# Patient Record
Sex: FEMALE | Race: WHITE | NOT HISPANIC OR LATINO | Employment: OTHER | URBAN - METROPOLITAN AREA
[De-identification: names, ages, dates, MRNs, and addresses within clinical notes are randomized per-mention and may not be internally consistent; named-entity substitution may affect disease eponyms.]

---

## 2017-01-03 ENCOUNTER — GENERIC CONVERSION - ENCOUNTER (OUTPATIENT)
Dept: OTHER | Facility: OTHER | Age: 52
End: 2017-01-03

## 2017-01-16 ENCOUNTER — GENERIC CONVERSION - ENCOUNTER (OUTPATIENT)
Dept: OTHER | Facility: OTHER | Age: 52
End: 2017-01-16

## 2017-01-16 DIAGNOSIS — R93.2 ABNORMAL FINDINGS ON DIAGNOSTIC IMAGING OF LIVER AND BILIARY TRACT: ICD-10-CM

## 2017-01-16 DIAGNOSIS — D69.3 IMMUNE THROMBOCYTOPENIC PURPURA (HCC): ICD-10-CM

## 2017-01-16 DIAGNOSIS — R79.9 ABNORMAL FINDING OF BLOOD CHEMISTRY: ICD-10-CM

## 2017-01-16 DIAGNOSIS — E78.5 HYPERLIPIDEMIA: ICD-10-CM

## 2017-01-16 DIAGNOSIS — F41.1 GENERALIZED ANXIETY DISORDER: ICD-10-CM

## 2017-01-16 DIAGNOSIS — F31.81 BIPOLAR II DISORDER (HCC): ICD-10-CM

## 2017-02-13 ENCOUNTER — ALLSCRIPTS OFFICE VISIT (OUTPATIENT)
Dept: OTHER | Facility: OTHER | Age: 52
End: 2017-02-13

## 2017-02-15 ENCOUNTER — GENERIC CONVERSION - ENCOUNTER (OUTPATIENT)
Dept: OTHER | Facility: OTHER | Age: 52
End: 2017-02-15

## 2017-03-08 ENCOUNTER — GENERIC CONVERSION - ENCOUNTER (OUTPATIENT)
Dept: OTHER | Facility: OTHER | Age: 52
End: 2017-03-08

## 2017-03-09 ENCOUNTER — GENERIC CONVERSION - ENCOUNTER (OUTPATIENT)
Dept: OTHER | Facility: OTHER | Age: 52
End: 2017-03-09

## 2017-03-20 ENCOUNTER — GENERIC CONVERSION - ENCOUNTER (OUTPATIENT)
Dept: OTHER | Facility: OTHER | Age: 52
End: 2017-03-20

## 2017-03-20 ENCOUNTER — ALLSCRIPTS OFFICE VISIT (OUTPATIENT)
Dept: PSYCHOLOGY | Facility: CLINIC | Age: 52
End: 2017-03-20
Payer: COMMERCIAL

## 2017-03-20 PROCEDURE — G0410 GRP PSYCH PARTIAL HOSP 45-50: HCPCS | Performed by: PSYCHIATRY & NEUROLOGY

## 2017-03-20 PROCEDURE — S0201 PARTIAL HOSPITALIZATION SERV: HCPCS | Performed by: PSYCHIATRY & NEUROLOGY

## 2017-03-20 PROCEDURE — 90791 PSYCH DIAGNOSTIC EVALUATION: CPT | Performed by: PSYCHIATRY & NEUROLOGY

## 2017-03-20 PROCEDURE — G0176 OPPS/PHP;ACTIVITY THERAPY: HCPCS | Performed by: PSYCHIATRY & NEUROLOGY

## 2017-03-20 PROCEDURE — G0177 OPPS/PHP; TRAIN & EDUC SERV: HCPCS | Performed by: PSYCHIATRY & NEUROLOGY

## 2017-03-21 ENCOUNTER — GENERIC CONVERSION - ENCOUNTER (OUTPATIENT)
Dept: OTHER | Facility: OTHER | Age: 52
End: 2017-03-21

## 2017-03-21 ENCOUNTER — APPOINTMENT (OUTPATIENT)
Dept: PSYCHOLOGY | Facility: CLINIC | Age: 52
End: 2017-03-21
Payer: COMMERCIAL

## 2017-03-21 PROCEDURE — S0201 PARTIAL HOSPITALIZATION SERV: HCPCS | Performed by: PSYCHIATRY & NEUROLOGY

## 2017-03-21 PROCEDURE — G0177 OPPS/PHP; TRAIN & EDUC SERV: HCPCS | Performed by: PSYCHIATRY & NEUROLOGY

## 2017-03-21 PROCEDURE — G0176 OPPS/PHP;ACTIVITY THERAPY: HCPCS | Performed by: PSYCHIATRY & NEUROLOGY

## 2017-03-21 PROCEDURE — G0410 GRP PSYCH PARTIAL HOSP 45-50: HCPCS | Performed by: PSYCHIATRY & NEUROLOGY

## 2017-03-22 ENCOUNTER — APPOINTMENT (OUTPATIENT)
Dept: PSYCHOLOGY | Facility: CLINIC | Age: 52
End: 2017-03-22
Payer: COMMERCIAL

## 2017-03-22 ENCOUNTER — GENERIC CONVERSION - ENCOUNTER (OUTPATIENT)
Dept: OTHER | Facility: OTHER | Age: 52
End: 2017-03-22

## 2017-03-22 PROCEDURE — G0177 OPPS/PHP; TRAIN & EDUC SERV: HCPCS | Performed by: PSYCHIATRY & NEUROLOGY

## 2017-03-22 PROCEDURE — G0410 GRP PSYCH PARTIAL HOSP 45-50: HCPCS | Performed by: PSYCHIATRY & NEUROLOGY

## 2017-03-22 PROCEDURE — G0176 OPPS/PHP;ACTIVITY THERAPY: HCPCS | Performed by: PSYCHIATRY & NEUROLOGY

## 2017-03-22 PROCEDURE — S0201 PARTIAL HOSPITALIZATION SERV: HCPCS | Performed by: PSYCHIATRY & NEUROLOGY

## 2017-03-23 ENCOUNTER — APPOINTMENT (OUTPATIENT)
Dept: PSYCHOLOGY | Facility: CLINIC | Age: 52
End: 2017-03-23
Payer: COMMERCIAL

## 2017-03-23 ENCOUNTER — GENERIC CONVERSION - ENCOUNTER (OUTPATIENT)
Dept: OTHER | Facility: OTHER | Age: 52
End: 2017-03-23

## 2017-03-23 PROCEDURE — G0176 OPPS/PHP;ACTIVITY THERAPY: HCPCS | Performed by: PSYCHIATRY & NEUROLOGY

## 2017-03-23 PROCEDURE — S0201 PARTIAL HOSPITALIZATION SERV: HCPCS | Performed by: PSYCHIATRY & NEUROLOGY

## 2017-03-23 PROCEDURE — G0410 GRP PSYCH PARTIAL HOSP 45-50: HCPCS | Performed by: PSYCHIATRY & NEUROLOGY

## 2017-03-23 PROCEDURE — G0177 OPPS/PHP; TRAIN & EDUC SERV: HCPCS | Performed by: PSYCHIATRY & NEUROLOGY

## 2017-03-24 ENCOUNTER — GENERIC CONVERSION - ENCOUNTER (OUTPATIENT)
Dept: OTHER | Facility: OTHER | Age: 52
End: 2017-03-24

## 2017-03-24 ENCOUNTER — APPOINTMENT (OUTPATIENT)
Dept: PSYCHOLOGY | Facility: CLINIC | Age: 52
End: 2017-03-24
Payer: COMMERCIAL

## 2017-03-24 PROCEDURE — G0177 OPPS/PHP; TRAIN & EDUC SERV: HCPCS | Performed by: PSYCHIATRY & NEUROLOGY

## 2017-03-24 PROCEDURE — G0176 OPPS/PHP;ACTIVITY THERAPY: HCPCS | Performed by: PSYCHIATRY & NEUROLOGY

## 2017-03-24 PROCEDURE — G0410 GRP PSYCH PARTIAL HOSP 45-50: HCPCS | Performed by: PSYCHIATRY & NEUROLOGY

## 2017-03-24 PROCEDURE — S0201 PARTIAL HOSPITALIZATION SERV: HCPCS | Performed by: PSYCHIATRY & NEUROLOGY

## 2017-03-27 ENCOUNTER — GENERIC CONVERSION - ENCOUNTER (OUTPATIENT)
Dept: OTHER | Facility: OTHER | Age: 52
End: 2017-03-27

## 2017-03-27 ENCOUNTER — APPOINTMENT (OUTPATIENT)
Dept: PSYCHOLOGY | Facility: CLINIC | Age: 52
End: 2017-03-27
Payer: COMMERCIAL

## 2017-03-27 PROCEDURE — S0201 PARTIAL HOSPITALIZATION SERV: HCPCS | Performed by: PSYCHIATRY & NEUROLOGY

## 2017-03-27 PROCEDURE — G0177 OPPS/PHP; TRAIN & EDUC SERV: HCPCS | Performed by: PSYCHIATRY & NEUROLOGY

## 2017-03-27 PROCEDURE — G0410 GRP PSYCH PARTIAL HOSP 45-50: HCPCS | Performed by: PSYCHIATRY & NEUROLOGY

## 2017-03-28 ENCOUNTER — APPOINTMENT (OUTPATIENT)
Dept: PSYCHOLOGY | Facility: CLINIC | Age: 52
End: 2017-03-28
Payer: COMMERCIAL

## 2017-03-28 ENCOUNTER — GENERIC CONVERSION - ENCOUNTER (OUTPATIENT)
Dept: OTHER | Facility: OTHER | Age: 52
End: 2017-03-28

## 2017-03-28 PROCEDURE — G0177 OPPS/PHP; TRAIN & EDUC SERV: HCPCS | Performed by: PSYCHIATRY & NEUROLOGY

## 2017-03-28 PROCEDURE — G0176 OPPS/PHP;ACTIVITY THERAPY: HCPCS | Performed by: PSYCHIATRY & NEUROLOGY

## 2017-03-28 PROCEDURE — S0201 PARTIAL HOSPITALIZATION SERV: HCPCS | Performed by: PSYCHIATRY & NEUROLOGY

## 2017-03-28 PROCEDURE — G0410 GRP PSYCH PARTIAL HOSP 45-50: HCPCS | Performed by: PSYCHIATRY & NEUROLOGY

## 2017-03-29 ENCOUNTER — APPOINTMENT (OUTPATIENT)
Dept: PSYCHOLOGY | Facility: CLINIC | Age: 52
End: 2017-03-29
Payer: COMMERCIAL

## 2017-03-29 ENCOUNTER — GENERIC CONVERSION - ENCOUNTER (OUTPATIENT)
Dept: OTHER | Facility: OTHER | Age: 52
End: 2017-03-29

## 2017-03-29 PROCEDURE — S0201 PARTIAL HOSPITALIZATION SERV: HCPCS | Performed by: PSYCHIATRY & NEUROLOGY

## 2017-03-29 PROCEDURE — G0176 OPPS/PHP;ACTIVITY THERAPY: HCPCS | Performed by: PSYCHIATRY & NEUROLOGY

## 2017-03-29 PROCEDURE — G0177 OPPS/PHP; TRAIN & EDUC SERV: HCPCS | Performed by: PSYCHIATRY & NEUROLOGY

## 2017-03-29 PROCEDURE — G0410 GRP PSYCH PARTIAL HOSP 45-50: HCPCS | Performed by: PSYCHIATRY & NEUROLOGY

## 2017-03-30 ENCOUNTER — APPOINTMENT (OUTPATIENT)
Dept: PSYCHOLOGY | Facility: CLINIC | Age: 52
End: 2017-03-30
Payer: COMMERCIAL

## 2017-03-30 ENCOUNTER — GENERIC CONVERSION - ENCOUNTER (OUTPATIENT)
Dept: OTHER | Facility: OTHER | Age: 52
End: 2017-03-30

## 2017-03-30 PROCEDURE — G0176 OPPS/PHP;ACTIVITY THERAPY: HCPCS | Performed by: PSYCHIATRY & NEUROLOGY

## 2017-03-30 PROCEDURE — S0201 PARTIAL HOSPITALIZATION SERV: HCPCS | Performed by: PSYCHIATRY & NEUROLOGY

## 2017-03-30 PROCEDURE — G0177 OPPS/PHP; TRAIN & EDUC SERV: HCPCS | Performed by: PSYCHIATRY & NEUROLOGY

## 2017-03-30 PROCEDURE — G0410 GRP PSYCH PARTIAL HOSP 45-50: HCPCS | Performed by: PSYCHIATRY & NEUROLOGY

## 2017-04-21 ENCOUNTER — ALLSCRIPTS OFFICE VISIT (OUTPATIENT)
Dept: OTHER | Facility: OTHER | Age: 52
End: 2017-04-21

## 2017-05-01 ENCOUNTER — ALLSCRIPTS OFFICE VISIT (OUTPATIENT)
Dept: OTHER | Facility: OTHER | Age: 52
End: 2017-05-01

## 2017-05-03 ENCOUNTER — GENERIC CONVERSION - ENCOUNTER (OUTPATIENT)
Dept: OTHER | Facility: OTHER | Age: 52
End: 2017-05-03

## 2017-05-03 ENCOUNTER — LAB CONVERSION - ENCOUNTER (OUTPATIENT)
Dept: OTHER | Facility: OTHER | Age: 52
End: 2017-05-03

## 2017-05-03 LAB
A/G RATIO (HISTORICAL): 1.6 (CALC) (ref 1–2.5)
ALBUMIN SERPL BCP-MCNC: 3.9 G/DL (ref 3.6–5.1)
ALP SERPL-CCNC: 65 U/L (ref 33–130)
ALT SERPL W P-5'-P-CCNC: 12 U/L (ref 6–29)
AST SERPL W P-5'-P-CCNC: 12 U/L (ref 10–35)
BASOPHILS # BLD AUTO: 0.6 %
BASOPHILS # BLD AUTO: 38 CELLS/UL (ref 0–200)
BILIRUB SERPL-MCNC: 0.3 MG/DL (ref 0.2–1.2)
BUN SERPL-MCNC: 15 MG/DL (ref 7–25)
BUN/CREA RATIO (HISTORICAL): NORMAL (CALC) (ref 6–22)
CALCIUM SERPL-MCNC: 9.3 MG/DL (ref 8.6–10.4)
CHLORIDE SERPL-SCNC: 107 MMOL/L (ref 98–110)
CHOLEST SERPL-MCNC: 227 MG/DL (ref 125–200)
CHOLEST/HDLC SERPL: 3.6 (CALC)
CO2 SERPL-SCNC: 29 MMOL/L (ref 20–31)
CREAT SERPL-MCNC: 0.69 MG/DL (ref 0.5–1.05)
DEPRECATED RDW RBC AUTO: 14.1 % (ref 11–15)
EGFR AFRICAN AMERICAN (HISTORICAL): 115 ML/MIN/1.73M2
EGFR-AMERICAN CALC (HISTORICAL): 99 ML/MIN/1.73M2
EOSINOPHIL # BLD AUTO: 1.9 %
EOSINOPHIL # BLD AUTO: 120 CELLS/UL (ref 15–500)
GAMMA GLOBULIN (HISTORICAL): 2.5 G/DL (CALC) (ref 1.9–3.7)
GLUCOSE (HISTORICAL): 92 MG/DL (ref 65–99)
HCT VFR BLD AUTO: 41.5 % (ref 35–45)
HDLC SERPL-MCNC: 63 MG/DL
HGB BLD-MCNC: 13.6 G/DL (ref 11.7–15.5)
LDL CHOLESTEROL (HISTORICAL): 149 MG/DL (CALC)
LYMPHOCYTES # BLD AUTO: 1909 CELLS/UL (ref 850–3900)
LYMPHOCYTES # BLD AUTO: 30.3 %
MCH RBC QN AUTO: 29.5 PG (ref 27–33)
MCHC RBC AUTO-ENTMCNC: 32.9 G/DL (ref 32–36)
MCV RBC AUTO: 89.6 FL (ref 80–100)
MONOCYTES # BLD AUTO: 510 CELLS/UL (ref 200–950)
MONOCYTES (HISTORICAL): 8.1 %
NEUTROPHILS # BLD AUTO: 3723 CELLS/UL (ref 1500–7800)
NEUTROPHILS # BLD AUTO: 59.1 %
NON-HDL-CHOL (CHOL-HDL) (HISTORICAL): 164 MG/DL (CALC)
PLATELET # BLD AUTO: 89 THOUSAND/UL (ref 140–400)
PMV BLD AUTO: 12.5 FL (ref 7.5–12.5)
POTASSIUM SERPL-SCNC: 4 MMOL/L (ref 3.5–5.3)
RBC # BLD AUTO: 4.63 MILLION/UL (ref 3.8–5.1)
RBC MORPHOLOGY (HISTORICAL): ABNORMAL
SODIUM SERPL-SCNC: 140 MMOL/L (ref 135–146)
TOTAL PROTEIN (HISTORICAL): 6.4 G/DL (ref 6.1–8.1)
TRIGL SERPL-MCNC: 77 MG/DL
WBC # BLD AUTO: 6.3 THOUSAND/UL (ref 3.8–10.8)

## 2017-05-08 ENCOUNTER — ALLSCRIPTS OFFICE VISIT (OUTPATIENT)
Dept: OTHER | Facility: OTHER | Age: 52
End: 2017-05-08

## 2017-05-08 DIAGNOSIS — R10.31 RIGHT LOWER QUADRANT PAIN: ICD-10-CM

## 2017-05-09 ENCOUNTER — ALLSCRIPTS OFFICE VISIT (OUTPATIENT)
Dept: OTHER | Facility: OTHER | Age: 52
End: 2017-05-09

## 2017-05-10 ENCOUNTER — HOSPITAL ENCOUNTER (OUTPATIENT)
Dept: ULTRASOUND IMAGING | Facility: HOSPITAL | Age: 52
Discharge: HOME | End: 2017-05-10
Payer: COMMERCIAL

## 2017-05-10 DIAGNOSIS — R10.31 RIGHT LOWER QUADRANT PAIN: ICD-10-CM

## 2017-05-10 PROCEDURE — 76830 TRANSVAGINAL US NON-OB: CPT

## 2017-05-10 PROCEDURE — 76856 US EXAM PELVIC COMPLETE: CPT

## 2017-05-15 ENCOUNTER — ALLSCRIPTS OFFICE VISIT (OUTPATIENT)
Dept: OTHER | Facility: OTHER | Age: 52
End: 2017-05-15

## 2017-05-16 ENCOUNTER — GENERIC CONVERSION - ENCOUNTER (OUTPATIENT)
Dept: OTHER | Facility: OTHER | Age: 52
End: 2017-05-16

## 2017-05-22 ENCOUNTER — ALLSCRIPTS OFFICE VISIT (OUTPATIENT)
Dept: OTHER | Facility: OTHER | Age: 52
End: 2017-05-22

## 2017-06-05 ENCOUNTER — ALLSCRIPTS OFFICE VISIT (OUTPATIENT)
Dept: OTHER | Facility: OTHER | Age: 52
End: 2017-06-05

## 2017-06-07 ENCOUNTER — APPOINTMENT (EMERGENCY)
Dept: RADIOLOGY | Facility: HOSPITAL | Age: 52
End: 2017-06-07
Payer: COMMERCIAL

## 2017-06-07 ENCOUNTER — HOSPITAL ENCOUNTER (EMERGENCY)
Facility: HOSPITAL | Age: 52
Discharge: HOME | End: 2017-06-07
Attending: EMERGENCY MEDICINE | Admitting: EMERGENCY MEDICINE
Payer: COMMERCIAL

## 2017-06-07 VITALS
OXYGEN SATURATION: 97 % | SYSTOLIC BLOOD PRESSURE: 129 MMHG | TEMPERATURE: 98.4 F | DIASTOLIC BLOOD PRESSURE: 66 MMHG | WEIGHT: 227.51 LBS | BODY MASS INDEX: 34.59 KG/M2 | RESPIRATION RATE: 16 BRPM | HEART RATE: 88 BPM

## 2017-06-07 DIAGNOSIS — M25.562 LEFT LATERAL KNEE PAIN: Primary | ICD-10-CM

## 2017-06-07 PROCEDURE — 99283 EMERGENCY DEPT VISIT LOW MDM: CPT

## 2017-06-07 PROCEDURE — 73564 X-RAY EXAM KNEE 4 OR MORE: CPT

## 2017-06-07 RX ORDER — TRAMADOL HYDROCHLORIDE 50 MG/1
50 TABLET ORAL EVERY 6 HOURS PRN
COMMUNITY
End: 2019-02-14 | Stop reason: ALTCHOICE

## 2017-06-07 RX ORDER — IBUPROFEN 400 MG/1
400 TABLET ORAL ONCE
Status: COMPLETED | OUTPATIENT
Start: 2017-06-07 | End: 2017-06-07

## 2017-06-07 RX ADMIN — IBUPROFEN 400 MG: 400 TABLET ORAL at 10:17

## 2017-06-14 ENCOUNTER — ALLSCRIPTS OFFICE VISIT (OUTPATIENT)
Dept: OTHER | Facility: OTHER | Age: 52
End: 2017-06-14

## 2017-06-14 DIAGNOSIS — F41.1 GENERALIZED ANXIETY DISORDER: ICD-10-CM

## 2017-06-14 DIAGNOSIS — D69.3 IMMUNE THROMBOCYTOPENIC PURPURA (HCC): ICD-10-CM

## 2017-06-14 DIAGNOSIS — F31.81 BIPOLAR II DISORDER (HCC): ICD-10-CM

## 2017-06-14 DIAGNOSIS — R79.9 ABNORMAL FINDING OF BLOOD CHEMISTRY: ICD-10-CM

## 2017-06-19 ENCOUNTER — ALLSCRIPTS OFFICE VISIT (OUTPATIENT)
Dept: OTHER | Facility: OTHER | Age: 52
End: 2017-06-19

## 2017-06-22 ENCOUNTER — GENERIC CONVERSION - ENCOUNTER (OUTPATIENT)
Dept: OTHER | Facility: OTHER | Age: 52
End: 2017-06-22

## 2017-06-29 ENCOUNTER — GENERIC CONVERSION - ENCOUNTER (OUTPATIENT)
Dept: OTHER | Facility: OTHER | Age: 52
End: 2017-06-29

## 2017-07-03 ENCOUNTER — ALLSCRIPTS OFFICE VISIT (OUTPATIENT)
Dept: OTHER | Facility: OTHER | Age: 52
End: 2017-07-03

## 2017-07-17 ENCOUNTER — GENERIC CONVERSION - ENCOUNTER (OUTPATIENT)
Dept: OTHER | Facility: OTHER | Age: 52
End: 2017-07-17

## 2017-07-21 ENCOUNTER — ALLSCRIPTS OFFICE VISIT (OUTPATIENT)
Dept: OTHER | Facility: OTHER | Age: 52
End: 2017-07-21

## 2017-07-24 ENCOUNTER — ALLSCRIPTS OFFICE VISIT (OUTPATIENT)
Dept: OTHER | Facility: OTHER | Age: 52
End: 2017-07-24

## 2017-07-25 ENCOUNTER — GENERIC CONVERSION - ENCOUNTER (OUTPATIENT)
Dept: OTHER | Facility: OTHER | Age: 52
End: 2017-07-25

## 2017-07-31 ENCOUNTER — ALLSCRIPTS OFFICE VISIT (OUTPATIENT)
Dept: OTHER | Facility: OTHER | Age: 52
End: 2017-07-31

## 2017-08-14 ENCOUNTER — ALLSCRIPTS OFFICE VISIT (OUTPATIENT)
Dept: OTHER | Facility: OTHER | Age: 52
End: 2017-08-14

## 2017-08-25 ENCOUNTER — ALLSCRIPTS OFFICE VISIT (OUTPATIENT)
Dept: OTHER | Facility: OTHER | Age: 52
End: 2017-08-25

## 2017-08-28 ENCOUNTER — GENERIC CONVERSION - ENCOUNTER (OUTPATIENT)
Dept: OTHER | Facility: OTHER | Age: 52
End: 2017-08-28

## 2017-09-18 ENCOUNTER — ALLSCRIPTS OFFICE VISIT (OUTPATIENT)
Dept: OTHER | Facility: OTHER | Age: 52
End: 2017-09-18

## 2017-09-27 ENCOUNTER — ALLSCRIPTS OFFICE VISIT (OUTPATIENT)
Dept: OTHER | Facility: OTHER | Age: 52
End: 2017-09-27

## 2017-10-05 ENCOUNTER — GENERIC CONVERSION - ENCOUNTER (OUTPATIENT)
Dept: OTHER | Facility: OTHER | Age: 52
End: 2017-10-05

## 2017-10-09 ENCOUNTER — GENERIC CONVERSION - ENCOUNTER (OUTPATIENT)
Dept: OTHER | Facility: OTHER | Age: 52
End: 2017-10-09

## 2017-10-10 ENCOUNTER — ALLSCRIPTS OFFICE VISIT (OUTPATIENT)
Dept: OTHER | Facility: OTHER | Age: 52
End: 2017-10-10

## 2017-10-11 ENCOUNTER — ALLSCRIPTS OFFICE VISIT (OUTPATIENT)
Dept: OTHER | Facility: OTHER | Age: 52
End: 2017-10-11

## 2017-10-18 ENCOUNTER — GENERIC CONVERSION - ENCOUNTER (OUTPATIENT)
Dept: OTHER | Facility: OTHER | Age: 52
End: 2017-10-18

## 2017-10-23 ENCOUNTER — ALLSCRIPTS OFFICE VISIT (OUTPATIENT)
Dept: OTHER | Facility: OTHER | Age: 52
End: 2017-10-23

## 2017-10-24 ENCOUNTER — GENERIC CONVERSION - ENCOUNTER (OUTPATIENT)
Dept: OTHER | Facility: OTHER | Age: 52
End: 2017-10-24

## 2017-10-24 NOTE — PROGRESS NOTES
Assessment  1  Numbness and tingling of left side of face (782 0) (R20 0,R20 2)   2  Current smoker (305 1) (F17 200)   3  DREAD (generalized anxiety disorder) (300 02) (F41 1)   4  Perimenopausal symptoms (627 2) (N95 1)    Plan  Numbness and tingling of left side of face    · *1 - [facility]  *  Status: Active  Requested for: 25VQT9696  Care Summary provided  : Yes    Discussion/Summary    See neurology for tingling/altered tasteif worseningseeing [facility]     Chief Complaint  Patient presents today for numbness and tingling in lips and a pain in the left arm  History of Present Illness  HPI: patient is here for tingling and numbness of lips and left side of face  has neck pain radiating to the left armknee painnauseoussees a therapist  under a lot of stress  upper back pain, seeing a pain specialist at [facility]  of weeks of numbness of face, changes in taste      Review of Systems    Constitutional: no fever,-- not feeling poorly,-- no chills-- and-- not feeling tired  ENT: no earache,-- no nosebleeds,-- no hearing loss-- and-- no nasal discharge  Cardiovascular: the heart rate was not slow,-- no chest pain,-- the heart rate was not fast-- and-- no palpitations  Respiratory: no shortness of breath,-- no cough,-- no wheezing-- and-- no shortness of breath during exertion  Gastrointestinal: no abdominal pain,-- no nausea,-- no constipation-- and-- no diarrhea  Musculoskeletal: no arthralgias,-- no joint swelling-- and-- no joint stiffness  Integumentary: no rashes,-- no itching,-- no skin lesions-- and-- no skin wound  Neurological: no headache,-- no numbness,-- no confusion-- and-- no dizziness  Active Problems   1  Abnormal blood chemistry (790 6) (R79 9)   2  Abnormal finding on lung imaging (793 19) (R91 8)   3  Abnormal findings on diagnostic imaging of abdomen (793 6) (R93 5)   4  Acute pain of left knee (719 46) (M25 562)   5   Benign colon polyp (211  3) (K63 5)   6  Bunion (727 1) (M21 619)   7  Chronic idiopathic thrombocytopenic purpura (287 31) (D69 3)   8  Current smoker (305 1) (F17 200)   9  Herpes simplex type 1 infection (054 9) (B00 9)   10  History of tobacco use (V15 82) (Z87 891)   11  Hyperlipidemia (272 4) (E78 5)   12  Long term use of drug (V58 69) (Z79 899)   13  Lung nodule, solitary (793 11) (R91 1)   14  Mitral regurgitation (424 0) (I34 0)   15  Obesity (278 00) (E66 9)   16  Other specified menopausal and perimenopausal disorders (627 8) (N95 8)   17  Panic disorder without agoraphobia (300 01) (F41 0)   18  Perimenopausal symptoms (627 2) (N95 1)   19  Right lower quadrant pain (789 03) (R10 31)   20  History of Tobacco use (305 1) (Z72 0)   21  Urge and stress incontinence (788 33) (N39 46)    DREAD (generalized anxiety disorder) (300 02) (F41 1)       Bipolar II disorder (296 89) (F31 81)          Past Medical History  Active Problems And Past Medical History Reviewed: The active problems and past medical history were reviewed and updated today  Surgical History  Surgical History Reviewed: The surgical history was reviewed and updated today  Social History   · Denied: History of Alcohol use   · Once weekly  · Current smoker (305 1) (F17 200)   · Daily caffeine consumption   · Denied: History of Drug Use   · Graduated from high school   · History of tobacco use (V15 82) (Z87 891)   · History of Tobacco use (305 1) (Z72 0)  The social history was reviewed and updated today  The social history was reviewed and is unchanged  Family History  Family History Reviewed: The family history was reviewed and updated today  Current Meds   1  DULoxetine HCl - 30 MG Oral Capsule Delayed Release Particles; TAKE 1 CAPSULE IN   THE EVENING; Therapy: 08TMH5625 to (GWIAHWRK:34PFH7432)  Requested for: 05UBW6570; Last   Rx:[id number] Ordered   2   DULoxetine HCl - 60 MG Oral Capsule Delayed Release Particles; TAKE 1 CAPSULE DAILY; Therapy: 06EYW5809 to (Evaluate:10Nov2017)  Requested for: 93QEA5928; Last   Rx:12Oqm4423 Ordered   3  LamoTRIgine 100 MG Oral Tablet; TAKE 1 TABLET IN THE MORNING AND 1 AND 1/2   TABLETS AT BEDTIME; Therapy: 12DVD7113 to (Evaluate:82Ezb8719)  Requested for: 23FJE0834; Last   Rx:[id number] Ordered   4  LORazepam 0 5 MG Oral Tablet; TAKE 1 TABLET 4 TIMES DAILY AS NEEDED; Last   Rx:[id number] Ordered    The medication list was reviewed and updated today  Allergies  1  BusPIRone HCl TABS  2  No Known Environmental Allergies   3  No Known Food Allergies    Vitals   Recorded: 48XPY1514 02:21PM   Heart Rate 75   Respiration 16   Systolic 424   Diastolic 70   Height 5 ft 8 in   Weight 223 lb 0 2 oz   BMI Calculated 33 91   BSA Calculated 2 14   O2 Saturation 98     Physical Exam    Constitutional   General appearance: No acute distress, well appearing and well nourished  Eyes   Conjunctiva and lids: No swelling, erythema or discharge  Pupils and irises: Equal, round and reactive to light  Ears, Nose, Mouth, and Throat   External inspection of ears and nose: Normal     Otoscopic examination: Tympanic membranes translucent with normal light reflex  Canals patent without erythema  Nasal mucosa, septum, and turbinates: Normal without edema or erythema  Oropharynx: Normal with no erythema, edema, exudate or lesions  Pulmonary   Respiratory effort: No increased work of breathing or signs of respiratory distress  Auscultation of lungs: Clear to auscultation  Cardiovascular   Auscultation of heart: Normal rate and rhythm, normal S1 and S2, without murmurs  Examination of extremities for edema and/or varicosities: Normal     Abdomen   Abdomen: Non-tender, no masses  Liver and spleen: No hepatomegaly or splenomegaly  Lymphatic   Palpation of lymph nodes in neck: No lymphadenopathy  Musculoskeletal   Gait and station: Normal     Digits and nails: Normal without clubbing or cyanosis  Inspection/palpation of joints, bones, and muscles: Normal     Skin   Skin and subcutaneous tissue: Normal without rashes or lesions  Psychiatric   Orientation to person, place, and time: Normal     Mood and affect: Normal          Future Appointments    Date/Time Provider Specialty Site   01/05/2018 12:30 PM [facility]   11/29/2017 02:00 PM EMETERIO Khalil   Psychiatry [facility]   11/14/2017 02:00 PM Malachy Paddy, LCSW Psychiatry [facility]   11/30/2017 12:00 PM Malachy Paddy, LCSW Psychiatry [facility]   12/18/2017 10:00 AM Malachy Paddy, \[facility]\""W Psychiatry [facility]   01/04/2018 10:00 AM Malachy Paddy, LCSW Psychiatry [facility]   01/16/2018 11:00 AM Malachy Paddy, \[facility]\""W Psychiatry [facility]   12/20/2017 09:00 AM [facility] Bennett[facility] [address] [place] [address]     Signatures   Electronically signed by : Theodore Patricia; Oct 23 2017  2:36PM EST                       (Author)    Electronically signed by : Forest Horn DO; Oct 23 2017  7:25PM EST                       (Author)

## 2017-10-25 ENCOUNTER — ALLSCRIPTS OFFICE VISIT (OUTPATIENT)
Dept: OTHER | Facility: OTHER | Age: 52
End: 2017-10-25

## 2017-10-25 DIAGNOSIS — R20.2 PARESTHESIA OF SKIN: ICD-10-CM

## 2017-10-25 DIAGNOSIS — I34.0 NONRHEUMATIC MITRAL VALVE INSUFFICIENCY: ICD-10-CM

## 2017-10-25 DIAGNOSIS — R20.0 ANESTHESIA OF SKIN: ICD-10-CM

## 2017-10-26 ENCOUNTER — TRANSCRIBE ORDERS (OUTPATIENT)
Dept: ADMINISTRATIVE | Facility: HOSPITAL | Age: 52
End: 2017-10-26

## 2017-10-26 DIAGNOSIS — R20.0 NUMBNESS: Primary | ICD-10-CM

## 2017-10-26 NOTE — CONSULTS
Assessment  1  Current smoker (305 1) (F17 200)   2  Hyperlipidemia (272 4) (E78 5)   3  Numbness and tingling of left side of face (782 0) (R20 0,R20 2)   4  Numbness on right side (782 0) (R20 0)   5  Cervical radiculopathy (723 4) (M54 12)   6  Chronic lumbar radiculopathy (724 4) (M54 16)    Plan  Mitral regurgitation, Numbness and tingling of left side of face, Numbness on right side    · VAS CAROTID COMPLETE STUDY; SIDE:Bilateral; Status:Active; Requested  [id number];    Perform:[address]; W[place]N:07DAB1147; Last Updated By:Martha Dill; 10/25/2017 5:08:38 PM;Ordered; For:Mitral regurgitation, Numbness and tingling of left side of face, Numbness on right side; Ordered By:Alison Mccain;  Numbness and tingling of left side of face, Numbness on right side    · Follow-up visit in 3 months Evaluation and Treatment  Follow-up  Status: Complete   Done: 03KIR3134   Ordered; For: Numbness and tingling of left side of face, Numbness on right side; Ordered By: Saranya Sanchez Performed:  Due: 58FJE7200; Last Updated By: Lamar Hawk; 10/25/2017 5:08:56 PM  Numbness on right side    · * MRI BRAIN WO CONTRAST; Status:Need Information - Financial Authorization; Requested Critical access hospital:[id number];    Perform:[facility] Radiology; BLAYNE:[id number]; Last Updated By:Martha Dill; 10/25/2017 5:08:38 PM;Ordered;For:Numbness on right side; Ordered By:Alison Mccain;    Discussion/Summary  Discussion Summary:   Ms Tabatha Hernández is a 46 yo female seen in consultation for left facial numbness and tingling acute onset and persistent daily since a few months ago, with no inciting event  my exam surprisingly today however she has decreased sensation to PP on her entire right hemisoma, as can be seen with a sensory type of lacunar infarctionNeurologic exam otherwise with left biceps, triceps, and patellar hyperreflexia- potentially chronic from her history of cervical stenosis  Will obtain MRI brain for further structural evaluation  Will obtain CUSStroke risk factors: Age, HLD-  recently, and tobacco use since the age of 5  We discussed stroke risk factor modification  Discussed with her to take ASA 81 mg daily for secondary stroke prevention (will keep a close eye on her CBC given chronic thrombocytopenia 85038 per recent CBC- she denies bruising or bleeding)  Pending above results, will likely add low dose statin to her regimen going forward for secondary stroke prevention  She tells me she will work on her diet for now in regards to her high cholesterol  We discussed asking her PCP for assistance with smoking cessation if she is interested in this  Will obtain [facility] C [facility] and if needed based on those results will repeat MRI C spine if needed  She has no other florid myelopathic signs on my exam today, other than left sided hyperreflexia  with her if sudden onset one sided weakness, or slurred speech or vision change (loss or diplopia) or any other focal deficits, to call 911/go to ED, as these can be potential signs of stroke  is in agreement with the above course of action  see her in three months time in follow up  Counseling Documentation With Imm: The patient, patient's family was counseled regarding risk factor reductions  Stroke Patient Family Education [facility]:   Patient/Family was also educated regarding: Stroke Diagnosis (TIA,Ischemic Stroke, ICH, SAH),-- Need For Medical Follow Up,-- Medication Adherence,-- Anticoagulation,-- Personal Stroke/Cardiovascular Risk Factors,-- smoking cessation advice,-- Diet Education (Low Salt, Low Cholesterol, Diabetic,-- Activity/Exercise Guidelines,-- Weight loss/Management Counseling,-- Signs And Symptoms Of Stroke/Call 911-- and-- Patient/Family Received Education Materials        Chief Complaint  Chief Complaint Free Text Note Form: Pt presents for consult of numbness      History of Present Illness  HPI: Ms Edwin Whatley is a 46 yo female seen in consultation for left tongue and facial numbness and tingling  No pain  Started a few months ago  Staying the same  States constant  States no trouble swallowing  States she does not recall if this was acute in onset  States she assumed this was secondary to the pinched nerves in her neck as most prominent dysesthesias are in her left tongue  Denies any facial droop or other weakness  Denies any new symptoms in her other extremities (does have chronic radicular pain and dysesthesias in her LUE and LLE from her history of DDD in C and L spine- states never had surgery for this  Gets shots for this with some pain relief)  new headaches  States chronic neck pain and L spine pain with radicular pain- conservatively managed- no surgery  no fevers, chills or unexpected weight loss  Denies history CA is on Duloxetine and Lamictal for bipolar disorder- states stable on this medication  thrombocytopenia 17956- tells me this is chronic and not new for her history blood clots or known heart disease or irregular heart rhythms or family history of stroke at a young age  me history of significant tobacco use- still smokes half a pack a day, started at the age of 10 alcohol/ drug use  Review of Systems  Neurological ROS:   Constitutional: no fever, no chills, no recent weight gain, no recent weight loss, no complaints of feeling tired, no changes in appetite  HEENT:  no sinus problems, not feeling congested, no blurred vision, no dryness of the eyes, no eye pain, no hearing loss, no tinnitus, no mouth sores, no sore throat, no hoarseness, no dysphagia, no masses, no bleeding  Cardiovascular: chest pain or pressure  Respiratory:  no unusual or persistant cough, no shortness of breath with or without exertion  Gastrointestinal: nausea-- and-- diarrhea  Genitourinary: increased frequency  Musculoskeletal: arthralgias,-- myalgias,-- head/neck/back pain-- and-- pain while walking  Integumentary   no masses, no rash, no skin lesions, no livedo reticularis  Psychiatric: anxiety,-- depression-- and-- mood swings  Endocrine hair loss or gain  Hematologic/Lymphatic: a tendency for easy bruising  Neurological General:  no headache, no nausea or vomiting, no lightheadedness, no convulsions, no blackouts, no syncope, no trauma, no photopsia, no increased sleepiness, no trouble falling asleep, no snoring, no awakening at night  Neurological Mental Status:  no confusion, no mood swings, no alteration or loss of consciousness, no difficulty expressing/understanding speech, no memory problems  Neurological Cranial Nerves: taste or smell loss/changes  Neurological Motor findings include:  no tremor, no twitching, no cramping(pre/post exercise), no atrophy  Neurological Coordination:  no unsteadiness, no vertigo or dizziness, no clumsiness, no problems reaching for objects  Neurological Sensory: numbness-- and-- tingling  Neurological Gait:  no difficulty walking, not falling to one side, no sensation of being pushed, has not had falls  ROS Reviewed:   ROS reviewed  Active Problems  1  Abnormal blood chemistry (790 6) (R79 9)   2  Abnormal finding on lung imaging (793 19) (R91 8)   3  Abnormal findings on diagnostic imaging of abdomen (793 6) (R93 5)   4  Acute pain of left knee (719 46) (M25 562)   5  Benign colon polyp (211 3) (K63 5)   6  Bipolar II disorder (296 89) (F31 81)   7  Bunion (727 1) (M21 619)   8  Chronic idiopathic thrombocytopenic purpura (287 31) (D69 3)   9  Current smoker (305 1) (F17 200)   10  DREAD (generalized anxiety disorder) (300 02) (F41 1)   11  Herpes simplex type 1 infection (054 9) (B00 9)   12  History of tobacco use (V15 82) (Z87 891)   13  Hyperlipidemia (272 4) (E78 5)   14  Long term use of drug (V58 69) (Z79 899)   15  Lung nodule, solitary (793 11) (R91 1)   16  Mitral regurgitation (424 0) (I34 0)   17  Numbness and tingling of left side of face (782 0) (R20 0,R20 2)   18  Obesity (278 00) (E66 9)   19  Other specified menopausal and perimenopausal disorders (627 8) (N95 8)   20  Panic disorder without agoraphobia (300 01) (F41 0)   21  Perimenopausal symptoms (627 2) (N95 1)   22  Right lower quadrant pain (789 03) (R10 31)   23  History of Tobacco use (305 1) (Z72 0)   24  Urge and stress incontinence (788 33) (N39 46)    Past Medical History  1  History of Abdominal pain, epigastric (789 06) (R10 13)   2  History of Abdominal pain, RUQ (789 01) (R10 11)   3  History of Abnormal liver scan (794 8) (R93 2)   4  History of Acute maxillary sinusitis, recurrence not specified (461 0) (J01 00)   5  History of Atypical chest pain (786 59) (R07 89)   6  History of Bladder disorder (596 9) (N32 9)   7  History of Colon cancer screening (V76 51) (Z12 11)   8  History of Conjunctivitis of left eye (372 30) (H10 9)   9  History of Encounter for routine gynecological examination (V72 31) (Z01 419)   10  History of acute pharyngitis (V12 69) (Z87 09)   11  History of acute sinusitis (V12 69) (Z87 09)   12  History of allergic reaction (V15 09) (Z88 9)   13  History of dermatitis (V13 3) (Z87 2)   14  Denied: History of head injury   15  History of headache (V13 89) (Z87 898)   16  History of influenza vaccination (V49 89) (Z92 29)   17  History of screening mammography (V15 89) (Z92 89)   18  History of viral infection (V12 09) (Z86 19)   19  History of Luteinized follicular cyst (817 9) (N83 00)   20  History of Menorrhagia (626 2) (N92 0)   21  History of Need for Tdap vaccination (V06 1) (Z23)   22  History of Preop examination (V72 84) (Z01 818)   23  Denied: History of Seizures   24  History of Skin rash (782 1) (R21)   25  History of Uterine fibroid (218 9) (D25 9)  Active Problems And Past Medical History Reviewed: The active problems and past medical history were reviewed and updated today  Surgical History  1  History of Cholecystectomy   2   History of Oral Surgery Tooth Extraction   3  History of Total Abdominal Hysterectomy    Family History  Mother    1  Family history of Bipolar disorder   2  Family history of Lung Cancer (V16 1)   3  Family history of Mother  At Age ___  Father    3  Family history of Psychosis  Sister    5  Family history of Mother  At Age 61  Family History    6  Family history of Depression  Family History Reviewed: The family history was reviewed and updated today  Social History   · Denied: History of Alcohol use   · Current smoker (305 1) (F17 200)   · Daily caffeine consumption   · Denied: History of Drug Use   · Graduated from high school   · History of tobacco use (V15 82) (Z87 891)   · History of Tobacco use (305 1) (Z72 0)  Social History Reviewed: The social history was reviewed and updated today  Current Meds   1  DULoxetine HCl - 30 MG Oral Capsule Delayed Release Particles; TAKE 1 CAPSULE IN   THE EVENING; Therapy: 43OZY9564 to (TUXABTDX:36XSL8821)  Requested for: 49QXF5716; Last   Rx:[id number] Ordered   2  DULoxetine HCl - 60 MG Oral Capsule Delayed Release Particles; TAKE 1 CAPSULE   DAILY; Therapy: 72NXM8516 to (Evaluate:00Umu6458)  Requested for: 12ONH6035; Last   Rx:[id number] Ordered   3  LamoTRIgine 100 MG Oral Tablet; TAKE 1 TABLET IN THE MORNING AND 1 AND 1/2   TABLETS AT BEDTIME; Therapy: 43KTA3158 to (Evaluate:53Ucf0990)  Requested for: 05ZEQ7396; Last   Rx:[id number] Ordered   4  LORazepam 0 5 MG Oral Tablet; TAKE 1 TABLET 4 TIMES DAILY AS NEEDED; Last   Rx:[id number] Ordered    Allergies  1  BusPIRone HCl TABS  2  No Known Environmental Allergies   3  No Known Food Allergies    Vitals  Signs   Recorded: 76OBL7597 04:21PM   Respiration: 16  Systolic: 458  Diastolic: 74  Height: 5 ft 8 in  Weight: 224 lb   BMI Calculated: 34 06  BSA Calculated: 2 14    Physical Exam    Constitutional   General appearance: Abnormal   overweight  Head and Face   Head and face: Atraumatic on inspection   Facial strength normal    Eyes   Ophthalmoscopic examination: Vision is grossly normal  Gross visual field testing by confrontation shows no abnormalities  EOMI in both eyes  Conjunctivae clear  Eyelids normal palpebral fissures equal  Orbits exhibit normal position  No discharge from the eyes  PERRL  External inspection of ears and nose: Normal       Pulmonary   Respiratory effort: Lungs are clear bilaterally  Cardiovascular   Auscultation of heart: Rate is regular  Rhythm is regular  Peripheral vascular exam: Normal pulses throughout, no tenderness, erythema or swelling  Musculoskeletal   Gait and station: Abnormal  -- antalgic gait, left LE painful  -- L spine hypertonicity, chronic LLE knee pain limited ROM in knee with extension  -- see above  Muscle strength: Abnormal  -- b/l UE 5/5, RLE 5/5, LLE 4+/5 (pain limited due to low back muscle spasm)  Muscle tone: No atrophy, abnormal movements, flaccidity, cogwheeling or spasticity  Inspection/palpation of joints, bones, and muscles: Abnormal      Range of motion: Abnormal         Neurologic   Sensation: Abnormal  -- Decreased sensation to PP right face, right arm and right leg proximal and distal  Intact symmetric sensation to temp (cold) and vibration  Reflexes: Abnormal  -- 3/4 left biceps and patellar, all else normal    Coordination: Cerebellum function intact  No involuntary movement or psychomotor activity  Cortical function: Normal     Radicular Testing: Abormal  -- + spurling sign C-spine  12th cranial nerve: Abnormal  -- see above  Upper Extremities: Normal to inspection  No tenderness over the upper extremities bilaterally  No instability bilaterally  Strength: Motor strength is 5/5 bilaterally  Normal muscle tone bilaterally  Muscle bulk: Muscle bulk is normal bilaterally  Full ROM bilaterally  Lower Extremities: Normal to inspection and palpation  No tenderness of the lower extremities bilaterally  Exhibits no instability bilaterally  Strength: Motor strength is 5/5 bilaterally  Normal muscle tone bilaterally  Muscle Bulk: Muscle bulk is normal bilaterally  Full ROM bilaterally  Cranial Nerve Exam   I: Normal with no deficit    II: Normal with no deficit  III,IV, VI: Normal with no deficit  5th cranial nerve: Abnormal  -- decreased sensation to PP R V1/2/3  VII: Normal with no deficit  VIII: Normal with no deficit  IX: Normal with no deficit  X: Normal with no deficit  XI: Normal with no deficit  12th cranial nerve: Abnormal  -- rightward tongue deviation but equal strength with b/l movement  Future Appointments    Date/Time Provider Specialty Site   11/29/2017 02:00 PM EMETERIO Velez   Psychiatry [facility]   11/14/2017 02:00 PM Vincent Escort, [facility]iat[facility]   11/30/2017 12:00 PM Vincent Escort, [facility] Psychiatry [facility]   12/18/2017 10:00 AM Vincent Escort, [facility] Psychiat[facility]   01/04/2018 10:00 AM Vincent Escort, [facility] Psychiat[facility]   01/16/2018 11:00 AM Vincent Escort, [facility] Psychiat[facility]   12/20/2017 09:00 AM Tatyana Rojo, [address] [place] [address] Internal Medicine [facility]     Signatures   Electronically signed by : Jerman Walker MD; Oct 25 2017  5:32PM EST                       (Author)

## 2017-10-31 ENCOUNTER — GENERIC CONVERSION - ENCOUNTER (OUTPATIENT)
Dept: OTHER | Facility: OTHER | Age: 52
End: 2017-10-31

## 2017-11-02 ENCOUNTER — ALLSCRIPTS OFFICE VISIT (OUTPATIENT)
Dept: OTHER | Facility: OTHER | Age: 52
End: 2017-11-02

## 2017-11-03 ENCOUNTER — HOSPITAL ENCOUNTER (OUTPATIENT)
Dept: NON INVASIVE DIAGNOSTICS | Facility: CLINIC | Age: 52
Discharge: HOME | End: 2017-11-03
Payer: COMMERCIAL

## 2017-11-03 DIAGNOSIS — R20.2 PARESTHESIA OF SKIN: ICD-10-CM

## 2017-11-03 DIAGNOSIS — R20.0 ANESTHESIA OF SKIN: ICD-10-CM

## 2017-11-03 DIAGNOSIS — I34.0 NONRHEUMATIC MITRAL VALVE INSUFFICIENCY: ICD-10-CM

## 2017-11-03 PROCEDURE — 93880 EXTRACRANIAL BILAT STUDY: CPT

## 2017-11-12 ENCOUNTER — HOSPITAL ENCOUNTER (OUTPATIENT)
Dept: MRI IMAGING | Facility: HOSPITAL | Age: 52
Discharge: HOME | End: 2017-11-12
Attending: PSYCHIATRY & NEUROLOGY
Payer: COMMERCIAL

## 2017-11-12 DIAGNOSIS — R20.0 NUMBNESS: ICD-10-CM

## 2017-11-12 PROCEDURE — 70551 MRI BRAIN STEM W/O DYE: CPT

## 2017-11-14 ENCOUNTER — ALLSCRIPTS OFFICE VISIT (OUTPATIENT)
Dept: OTHER | Facility: OTHER | Age: 52
End: 2017-11-14

## 2017-11-17 ENCOUNTER — GENERIC CONVERSION - ENCOUNTER (OUTPATIENT)
Dept: OTHER | Facility: OTHER | Age: 52
End: 2017-11-17

## 2017-11-29 ENCOUNTER — ALLSCRIPTS OFFICE VISIT (OUTPATIENT)
Dept: OTHER | Facility: OTHER | Age: 52
End: 2017-11-29

## 2017-11-30 ENCOUNTER — ALLSCRIPTS OFFICE VISIT (OUTPATIENT)
Dept: OTHER | Facility: OTHER | Age: 52
End: 2017-11-30

## 2017-12-06 LAB
A/G RATIO (HISTORICAL): 1.7 (CALC) (ref 1–2.5)
ALBUMIN SERPL BCP-MCNC: 4.3 G/DL (ref 3.6–5.1)
ALP SERPL-CCNC: 73 U/L (ref 33–130)
ALT SERPL W P-5'-P-CCNC: 13 U/L (ref 6–29)
AST SERPL W P-5'-P-CCNC: 13 U/L (ref 10–35)
BASOPHILS # BLD AUTO: 0.8 %
BASOPHILS # BLD AUTO: 53 CELLS/UL (ref 0–200)
BILIRUB SERPL-MCNC: 0.4 MG/DL (ref 0.2–1.2)
BUN SERPL-MCNC: 12 MG/DL (ref 7–25)
BUN/CREA RATIO (HISTORICAL): NORMAL (CALC) (ref 6–22)
CALCIUM (ADJUSTED FOR ALBUMIN) (HISTORICAL): 10 MG/DL (CALC) (ref 8.6–10.2)
CALCIUM SERPL-MCNC: 9.9 MG/DL (ref 8.6–10.4)
CHLORIDE SERPL-SCNC: 105 MMOL/L (ref 98–110)
CHOLEST SERPL-MCNC: 236 MG/DL
CHOLEST/HDLC SERPL: 3.5 (CALC)
CO2 SERPL-SCNC: 24 MMOL/L (ref 20–31)
CREAT SERPL-MCNC: 0.74 MG/DL (ref 0.5–1.05)
DEPRECATED RDW RBC AUTO: 12.9 % (ref 11–15)
EGFR AFRICAN AMERICAN (HISTORICAL): 107 ML/MIN/1.73M2
EGFR-AMERICAN CALC (HISTORICAL): 92 ML/MIN/1.73M2
EOSINOPHIL # BLD AUTO: 211 CELLS/UL (ref 15–500)
EOSINOPHIL # BLD AUTO: 3.2 %
GAMMA GLOBULIN (HISTORICAL): 2.6 G/DL (CALC) (ref 1.9–3.7)
GLUCOSE (HISTORICAL): 97 MG/DL (ref 65–99)
HCT VFR BLD AUTO: 41.3 % (ref 35–45)
HDLC SERPL-MCNC: 67 MG/DL
HGB BLD-MCNC: 13.9 G/DL (ref 11.7–15.5)
LDL CHOLESTEROL (HISTORICAL): 150 MG/DL (CALC)
LYMPHOCYTES # BLD AUTO: 1690 CELLS/UL (ref 850–3900)
LYMPHOCYTES # BLD AUTO: 25.6 %
MCH RBC QN AUTO: 30 PG (ref 27–33)
MCHC RBC AUTO-ENTMCNC: 33.7 G/DL (ref 32–36)
MCV RBC AUTO: 89.2 FL (ref 80–100)
MONOCYTES # BLD AUTO: 528 CELLS/UL (ref 200–950)
MONOCYTES (HISTORICAL): 8 %
NEUTROPHILS # BLD AUTO: 4118 CELLS/UL (ref 1500–7800)
NEUTROPHILS # BLD AUTO: 62.4 %
NON-HDL-CHOL (CHOL-HDL) (HISTORICAL): 169 MG/DL (CALC)
PLATELET # BLD AUTO: 104 THOUSAND/UL (ref 140–400)
PMV BLD AUTO: 13.9 FL (ref 7.5–12.5)
POTASSIUM SERPL-SCNC: 4.2 MMOL/L (ref 3.5–5.3)
RBC # BLD AUTO: 4.63 MILLION/UL (ref 3.8–5.1)
SODIUM SERPL-SCNC: 140 MMOL/L (ref 135–146)
TOTAL PROTEIN (HISTORICAL): 6.9 G/DL (ref 6.1–8.1)
TRIGL SERPL-MCNC: 83 MG/DL
WBC # BLD AUTO: 6.6 THOUSAND/UL (ref 3.8–10.8)

## 2017-12-10 ENCOUNTER — GENERIC CONVERSION - ENCOUNTER (OUTPATIENT)
Dept: OTHER | Facility: OTHER | Age: 52
End: 2017-12-10

## 2017-12-12 ENCOUNTER — GENERIC CONVERSION - ENCOUNTER (OUTPATIENT)
Dept: OTHER | Facility: OTHER | Age: 52
End: 2017-12-12

## 2017-12-14 ENCOUNTER — ALLSCRIPTS OFFICE VISIT (OUTPATIENT)
Dept: OTHER | Facility: OTHER | Age: 52
End: 2017-12-14

## 2017-12-19 ENCOUNTER — GENERIC CONVERSION - ENCOUNTER (OUTPATIENT)
Dept: INTERNAL MEDICINE CLINIC | Facility: CLINIC | Age: 52
End: 2017-12-19

## 2017-12-20 ENCOUNTER — ALLSCRIPTS OFFICE VISIT (OUTPATIENT)
Dept: OTHER | Facility: OTHER | Age: 52
End: 2017-12-20
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
## 2018-01-04 ENCOUNTER — GENERIC CONVERSION - ENCOUNTER (OUTPATIENT)
Dept: OTHER | Facility: OTHER | Age: 53
End: 2018-01-04

## 2018-01-09 NOTE — PSYCH
Message  Message Free Text Note Form: Clinician returned a call from Anna Ortiz to discuss current stressors regarding family  Janine confirmed biweekly appointments scheduled  Is unable to come in this week due to finances  Sees Dr Naga Lowery today  Active Problems    1  Abnormal blood chemistry (790 6) (R79 9)   2  Abnormal finding on lung imaging (793 19) (R91 8)   3  Abnormal findings on diagnostic imaging of abdomen (793 6) (R93 5)   4  Abnormal liver scan (794 8) (R93 2)   5  Acute maxillary sinusitis, recurrence not specified (461 0) (J01 00)   6  Acute sinusitis (461 9) (J01 90)   7  Atypical chest pain (786 59) (R07 89)   8  Benign colon polyp (211 3) (K63 5)   9  Bipolar II disorder (296 89) (F31 81)   10  Bladder disorder (596 9) (N32 9)   11  Bunion (727 1) (M21 619)   12  Chronic idiopathic thrombocytopenic purpura (287 31) (D69 3)   13  Colon cancer screening (V76 51) (Z12 11)   14  Current smoker (305 1) (F17 200)   15  Encounter for routine gynecological examination (V72 31) (Z01 419)   16  Encounter for screening mammogram for malignant neoplasm of breast (V76 12)    (Z12 31)   17  DREAD (generalized anxiety disorder) (300 02) (F41 1)   18  Herpes simplex type 1 infection (054 9) (B00 9)   19  History of tobacco use (V15 82) (Z87 891)   20  Hyperlipemia (272 4) (E78 5)   21  Hyperlipidemia (272 4) (E78 5)   22  Long term use of drug (V58 69) (Z79 899)   23  Lung nodule, solitary (793 11) (R91 1)   24  Mitral regurgitation (424 0) (I34 0)   25  Need for Tdap vaccination (V06 1) (Z23)   26  Obesity (278 00) (E66 9)   27  Other specified menopausal and perimenopausal disorders (627 8) (N95 8)   28  Panic disorder without agoraphobia (300 01) (F41 0)   29  Perimenopausal symptoms (627 2) (N95 1)   30  Preop examination (V72 84) (Z01 818)   31  Right lower quadrant pain (789 03) (R10 31)   32  Skin rash (782 1) (R21)   33  Thrombocytopenia (287 5) (D69 6)   34  History of Tobacco use (305 1) (Z72 0)   35  Urge and stress incontinence (788 33) (N39 46)   36  Viral syndrome (112 99) (B34 9)    Current Meds   1  DULoxetine HCl - 60 MG Oral Capsule Delayed Release Particles (Cymbalta); TAKE 1   CAPSULE DAILY; Therapy: 09JGB2606 to (Evaluate:10Nov2017)  Requested for: 67CHV8544; Last   Rx:69Yvg0617 Ordered   2  LamoTRIgine 100 MG Oral Tablet; TAKE 1 TABLET TWICE DAILY; Therapy: 20Mar2012 to (Evaluate:10Nov2017)  Requested for: 47HBI9669; Last   Rx:07Cgi9219 Ordered   3  LORazepam 0 5 MG Oral Tablet (Ativan); TAKE 1 TABLET 4 TIMES DAILY AS NEEDED;   Last Rx:12Ret1561 Ordered    Allergies    1  BusPIRone HCl TABS    2  No Known Environmental Allergies   3   No Known Food Allergies    Signatures   Electronically signed by : Katie Lang LCSW; May 15 2017 12:00PM EST                       (Author)

## 2018-01-09 NOTE — PSYCH
Progress Note  Psychotherapy Provided [facility]: Individual Psychotherapy 50 minutes provided today  Goals addressed in session:   Addressed goals 1-3 of initial plan    D: Met with Janine cara DIAZ; 'overwhelmed' as she is in the process of moving  Review of recent conversation with son, grandchild's illness and her marriage related to trust, affection etc  Still awaiting a response from [facility]  Recent drainage of knee due to injury  Denied SI    A; Anna Ortiz is demonstrating progress in that she has identified goals which was not always the case  SHe is downsizing her home due to finances  Her relationship at home is very uncomfortable due to trust      P: Continue individual therapy  Discuss recent move, marriage obstacles  Pain Scale and Suicide Risk [facility]: Current Pain Assessment: no pain   Current suicide risk is low   Behavioral Health Treatment Plan Kvng Chavez: Diagnosis and Treatment Plan explained to patient, patient relates understanding diagnosis and is agreeable to Treatment Plan  Results/Data  PHQ-9 Adult Depression Screening 50Lbz9358 10:59AM Tracee Berkowitz     Test Name Result Flag Reference   PHQ-9 Adult Depression Score 15     Over the last two weeks, how often have you been bothered by any of the following problems? Little interest or pleasure in doing things: More than half the days - 2  Feeling down, depressed, or hopeless: More than half the days - 2  Trouble falling or staying asleep, or sleeping too much: Nearly every day - 3  Feeling tired or having little energy: Several days - 1  Poor appetite or over eating: Not at all - 0  Feeling bad about yourself - or that you are a failure or have let yourself or your family down: Nearly every day - 3  Trouble concentrating on things, such as reading the newspaper or watching television: Nearly every day - 3  Moving or speaking so slowly that other people could have noticed   Or the opposite -  being so fidgety or restless that you have been moving around a lot more than usual: Several days - 1  Thoughts that you would be better off dead, or of hurting yourself in some way: Not at all - 0   PHQ-9 Adult Depression Screening Positive     PHQ-9 Difficulty Level Very difficult     PHQ-9 Severity      Moderately Severe Depression       Assessment    1  Bipolar II disorder (296 89) (F31 81)   2   DREAD (generalized anxiety disorder) (300 02) (F41 1)    Signatures   Electronically signed by : Karissa Beltran LCSW; Jul  3 2017  2:30PM EST                       (Author)
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
## 2018-01-10 NOTE — PSYCH
Message   Recorded as Task   Date: 07/17/2017 09:09 AM, Created By: Francois Aquino   Task Name: Document Appointment   Assigned To: Ruslanstephany Oharagard   Regarding Patient: Rupert Shah, Status: Active   CommentIris Maw - 17 Jul 2017 9:09 AM     TASK CREATED  Caller: Self; Other; [phone] (Home); [phone] (Work)  Pt called and cancelled her appointment, no reason was given  Active Problems    1  Abnormal blood chemistry (790 6) (R79 9)   2  Abnormal finding on lung imaging (793 19) (R91 8)   3  Abnormal findings on diagnostic imaging of abdomen (793 6) (R93 5)   4  Acute pain of left knee (719 46) (M25 562)   5  Benign colon polyp (211 3) (K63 5)   6  Bipolar II disorder (296 89) (F31 81)   7  Bunion (727 1) (M21 619)   8  Chronic idiopathic thrombocytopenic purpura (287 31) (D69 3)   9  Current smoker (305 1) (F17 200)   10  DREAD (generalized anxiety disorder) (300 02) (F41 1)   11  Herpes simplex type 1 infection (054 9) (B00 9)   12  History of tobacco use (V15 82) (Z87 891)   13  Hyperlipidemia (272 4) (E78 5)   14  Long term use of drug (V58 69) (Z79 899)   15  Lung nodule, solitary (793 11) (R91 1)   16  Mitral regurgitation (424 0) (I34 0)   17  Obesity (278 00) (E66 9)   18  Other specified menopausal and perimenopausal disorders (627 8) (N95 8)   19  Panic disorder without agoraphobia (300 01) (F41 0)   20  Perimenopausal symptoms (627 2) (N95 1)   21  Right lower quadrant pain (789 03) (R10 31)   22  History of Tobacco use (305 1) (Z72 0)   23  Urge and stress incontinence (788 33) (N39 46)    Current Meds   1  DULoxetine HCl - 60 MG Oral Capsule Delayed Release Particles (Cymbalta); TAKE 1   CAPSULE DAILY; Therapy: 94FQH8948 to (Evaluate:44Jzz9762)  Requested for: 64BTM2380; Last   Rx:41Qoj8848 Ordered   2  LamoTRIgine 100 MG Oral Tablet; TAKE 1 TABLET IN THE MORNING AND 1 AND 1/2   TABLETS AT BEDTIME;    Therapy: 38JUY2056 to 95 876192)  Requested for: 41ZZN7061; Last   Rx:36Asy6627 Ordered   3  LORazepam 0 5 MG Oral Tablet (Ativan); TAKE 1 TABLET 4 TIMES DAILY AS NEEDED;   Last Rx:37Lzx8081 Ordered    Allergies    1  BusPIRone HCl TABS    2  No Known Environmental Allergies   3   No Known Food Allergies    Signatures   Electronically signed by : Dony Wells LCSW; Jul 17 2017  9:56AM EST                       (Author)

## 2018-01-10 NOTE — PSYCH
Treatment Plan Tracking    #1 Treatment Plan not completed within required time limits due to: Client cancelled/ no-showed scheduled appointment            Signatures   Electronically signed by : Rosario Carrasco LCSW; Oct  9 2017  8:48AM EST                       (Author)

## 2018-01-10 NOTE — PSYCH
Progress Note  Psychotherapy Provided [facility]: Individual Psychotherapy 50 minutes provided today  Goals addressed in session:   Addressed goals 1-3 of initial plan    D: Met with Janine individually  ROS; feels depression and anxiety are 'pretty good' despite several psychosocial stressors occurring  Session focused upon ongoing martial obstacles;  may be moving to [place] hence will end marriage, Futre daughter in law; mental health concerns with periodic anger outbursts against Janine and son; unmedicated BiPolar  Specific circumstances discussed with possible skills/interventions to practice until next session  Denied SI     A: Alicia Brooks continues to experience multiple psychosocial stressors though she is beginning to exhibit some boundary setting demonstrating progress  P: Continue individual therapy  Address ongoing psychosocial stressors and await LTD decision  Pain Scale and Suicide Risk [facility]: Current Pain Assessment: no pain   Current suicide risk is low   Behavioral Health Treatment Plan [facility]: Diagnosis and Treatment Plan explained to patient, patient relates understanding diagnosis and is agreeable to Treatment Plan  Assessment    1  Bipolar II disorder (296 89) (F31 81)   2   DREAD (generalized anxiety disorder) (300 02) (F41 1)    Signatures   Electronically signed by : Juanpablo Rodney LCSW; Jul 31 2017  4:09PM EST                       (Author)

## 2018-01-10 NOTE — MISCELLANEOUS
Provider Comments  Provider Comments:   Pt  was a no show for her Rheumatology evaluation today, 8/25/17        Signatures   Electronically signed by : Lizz aSnders DO; Aug 25 2017  2:49PM EST                       (Author)
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
## 2018-01-11 NOTE — PSYCH
Progress Note  Psychotherapy Provided [facility]: Individual Psychotherapy 50 minutes provided today  Goals addressed in session:   Address goals 2,3 of initial plan    D: Met with Janine individually  ROS; feels depression and anxiety have increased since last session  Session focused upon specific psychosocial stressors i e / family dynamics, grandson, son, finances  Review of enabling behaviors with certain stressors  Fleeting SI without a plan  A: Qasim Lo presented with anxious mood, constricted affect and tangential speech  Stressors are influencing Janine's increased negative thoughts towards self, mood fluctuations and anxiety  P: Continue biweekly individual therapy  Ongoing exploration of stressors, enabling behavior and circumstances outside of Janine's control  Pain Scale and Suicide Risk [facility]: Current Pain Assessment: no pain   Current suicide risk is low   Behavioral Health Treatment Plan [facility]: Diagnosis and Treatment Plan explained to patient, patient relates understanding diagnosis and is agreeable to Treatment Plan  Assessment    1  Bipolar II disorder (296 89) (F31 81)   2   DREAD (generalized anxiety disorder) (300 02) (F41 1)    Signatures   Electronically signed by : Laura Echols LCSW; Jun 19 2017  1:25PM EST                       (Author)

## 2018-01-11 NOTE — RESULT NOTES
Message   #1  Please call the patient with the results of her laboratory tests  #2  Her laboratory test results look well  Her platelet count is low, but stable  Her cholesterol profile has improved  #3  I recommend that she continue with her current medications, until her office visit with me next month  #4  You may leave a message, if her cmmunication consent allows for it  Verified Results  (Q) CBC (INCLUDES DIFF/PLT) (REFL) [id number] 10:58AM Isabel Phillips     Test Name Result Flag Reference   CBC MORPHOLOGY  A NORMAL   Red cell morphology appears unremarkable     Giant platelets noted  WHITE BLOOD CELL COUNT 6 7 Thousand/uL  3 8-10 8   RED BLOOD CELL COUNT 4 54 Million/uL  3 80-5 10   HEMOGLOBIN 13 4 g/dL  11 7-15 5   HEMATOCRIT 41 4 %  35 0-45 0   MCV 91 3 fL  80 0-100 0   MCH 29 6 pg  27 0-33 0   MCHC 32 4 g/dL  32 0-36 0   RDW 14 1 %  11 0-15 0   PLATELET COUNT 66 Thousand/uL L 140-400   MPV 15 9 fL H 7 5-11 5   ABSOLUTE NEUTROPHILS 4643 cells/uL  6500-9293   ABSOLUTE LYMPHOCYTES 1494 cells/uL  850-3900   ABSOLUTE MONOCYTES 436 cells/uL  200-950   ABSOLUTE EOSINOPHILS 94 cells/uL     ABSOLUTE BASOPHILS 34 cells/uL  0-200   NEUTROPHILS 69 3 %     LYMPHOCYTES 22 3 %     MONOCYTES 6 5 %     EOSINOPHILS 1 4 %     BASOPHILS 0 5 %       (Q) COMPREHENSIVE METABOLIC PNL W/ADJUSTED CALCIUM [id number] 10:58AM Isabel Phillips     Test Name Result Flag Reference   GLUCOSE 91 mg/dL  65-99   Fasting reference interval   UREA NITROGEN (BUN) 10 mg/dL  7-25   CREATININE 0 56 mg/dL  0 50-1 05   For patients >52years of age, the reference limit  for Creatinine is approximately 13% higher for people  identified as -American  eGFR NON-AFR   AMERICAN 108 mL/min/1 73m2  > OR = 60   eGFR AFRICAN AMERICAN 125 mL/min/1 73m2  > OR = 60   BUN/CREATININE RATIO   3-31   NOT APPLICABLE (calc)   SODIUM 139 mmol/L  135-146   POTASSIUM 3 9 mmol/L  3 5-5 3   CHLORIDE 105 mmol/L     CARBON DIOXIDE 25 mmol/L  19-30   CALCIUM 9 1 mg/dL  8 6-10 4   CALCIUM (ADJUSTED FOR$ALBUMIN) 9 4 mg/dL (calc)  8 6-10 2   PROTEIN, TOTAL 6 5 g/dL  6 1-8 1   ALBUMIN 4 0 g/dL  3 6-5 1   GLOBULIN 2 5 g/dL (calc)  1 9-3 7   ALBUMIN/GLOBULIN RATIO 1 6 (calc)  1 0-2 5   BILIRUBIN, TOTAL 0 4 mg/dL  0 2-1 2   ALKALINE PHOSPHATASE 62 U/L     AST 13 U/L  10-35   ALT 12 U/L  6-29     (Q) LIPID PANEL WITH REFLEX TO DIRECT LDL [id number] 10:58AM Lamont Fragmin     Test Name Result Flag Reference   CHOLESTEROL, TOTAL 228 mg/dL H 125-200   HDL CHOLESTEROL 63 mg/dL  > OR = 46   TRIGLICERIDES 796 mg/dL  <150   LDL-CHOLESTEROL 144 mg/dL (calc) H <130   Desirable range <100 mg/dL for patients with CHD or  diabetes and <70 mg/dL for diabetic patients with  known heart disease  CHOL/HDLC RATIO 3 6 (calc)  < OR = 5 0   NON HDL CHOLESTEROL 165 mg/dL (calc) H    Target for non-HDL cholesterol is 30 mg/dL higher than   LDL cholesterol target       (Q) TSH, 3RD GENERATION W/REFLEX TO FT4 [id number] 10:58AM Lamont Fragmin   REPORT COMMENT:  FASTING:YES     Test Name Result Flag Reference   TSH W/REFLEX TO FT4 0 81 mIU/L     Reference Range                         > or = 20 Years  0 40-4 50                              Pregnancy Ranges            First trimester    0 26-2 66            Second trimester   0 55-2 73            Third trimester    0 43-2 91

## 2018-01-11 NOTE — PSYCH
Message   Recorded as Task   Date: 10/09/2017 11:55 AM, Created By: Dawson Martinez   Task Name: Call Back   Assigned To: Debbi Butler   Regarding Patient: Rupert Shah, Status: Active   CommentTha Santos - 09 Oct 2017 11:55 AM     TASK CREATED  Caller: Self; Other; [phone] (Home); [phone] (Work)  Not able to come in today at noon  Patient called at 11:54am   Her Aye Grant had an emergency CT  Thuy Lowry would like an appt  later this week if possible and asked that you give her a call  Thank you  Message Free Text Note Form: Spoke with Thuy Lowry regarding emergency visit for grandson  Active Problems    1  Abnormal blood chemistry (790 6) (R79 9)   2  Abnormal finding on lung imaging (793 19) (R91 8)   3  Abnormal findings on diagnostic imaging of abdomen (793 6) (R93 5)   4  Acute pain of left knee (719 46) (M25 562)   5  Benign colon polyp (211 3) (K63 5)   6  Bipolar II disorder (296 89) (F31 81)   7  Bunion (727 1) (M21 619)   8  Chronic idiopathic thrombocytopenic purpura (287 31) (D69 3)   9  Current smoker (305 1) (F17 200)   10  DREAD (generalized anxiety disorder) (300 02) (F41 1)   11  Herpes simplex type 1 infection (054 9) (B00 9)   12  History of tobacco use (V15 82) (Z87 891)   13  Hyperlipidemia (272 4) (E78 5)   14  Long term use of drug (V58 69) (Z79 899)   15  Lung nodule, solitary (793 11) (R91 1)   16  Mitral regurgitation (424 0) (I34 0)   17  Obesity (278 00) (E66 9)   18  Other specified menopausal and perimenopausal disorders (627 8) (N95 8)   19  Panic disorder without agoraphobia (300 01) (F41 0)   20  Perimenopausal symptoms (627 2) (N95 1)   21  Right lower quadrant pain (789 03) (R10 31)   22  History of Tobacco use (305 1) (Z72 0)   23  Urge and stress incontinence (788 33) (N39 46)    Current Meds   1  DULoxetine HCl - 30 MG Oral Capsule Delayed Release Particles; TAKE 1 CAPSULE IN   THE EVENING;    Therapy: 32JGX8302 to (092 214 688)  Requested for: 69XPQ0344; Last   MS:14GUT4584 Ordered   2  DULoxetine HCl - 60 MG Oral Capsule Delayed Release Particles (Cymbalta); TAKE 1   CAPSULE DAILY; Therapy: 33IGN9719 to (Evaluate:28Czv4573)  Requested for: 57WHO3725; Last   Rx:83Zmr4008 Ordered   3  LamoTRIgine 100 MG Oral Tablet; TAKE 1 TABLET IN THE MORNING AND 1 AND 1/2   TABLETS AT BEDTIME; Therapy: 77BMD9999 to (Evaluate:65Paq3927)  Requested for: 03SMR5241; Last   Rx:52Eyd2215 Ordered   4  LORazepam 0 5 MG Oral Tablet (Ativan); TAKE 1 TABLET 4 TIMES DAILY AS NEEDED;   Last Rx:64Uvr9511 Ordered    Allergies    1  BusPIRone HCl TABS    2  No Known Environmental Allergies   3   No Known Food Allergies    Signatures   Electronically signed by : Zack Young LCSW; Oct  9 2017 12:08PM EST                       (Author)

## 2018-01-11 NOTE — MISCELLANEOUS
Provider Comments  Provider Comments:   Pt was a n/s  Unable to LM on phone        Signatures   Electronically signed by : CALEB Caballero; Feb 15 2017  8:00PM EST                       (Author)
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
## 2018-01-11 NOTE — PSYCH
Progress Note  Psychotherapy Provided [facility]: Individual Psychotherapy 50 minutes provided today  Goals addressed in session:   Addressed goals 2,3 of initial plan    D: Met with Janine marroquin  ROS; depression and anxiety has increased greatly  Fleeting SI without a plan  Session focused upon completion of disability paperwork returned for further info  Meme Holcomb stated she is 'extremely' overwhelmed and 'breaking down'  Psycho stressors at home regarding grandson's health, marriage difficulties and financial needs  A: Meme Holcomb presented with depressed mood, flattened affect and tearfulness  Fleeting SI without plan noted  Contracted for safety  Crisis numbers provided  P: Continue individual therapy  Ongoing discussion regarding boundaries and need fro self care  Pain Scale and Suicide Risk [facility]: Current Pain Assessment: no pain   Current suicide risk is low   Behavioral Health Treatment Plan [facility]: Diagnosis and Treatment Plan explained to patient, patient relates understanding diagnosis and is agreeable to Treatment Plan  Assessment    1  DREAD (generalized anxiety disorder) (300 02) (F41 1)   2   Bipolar II disorder (296 89) (F31 81)    Signatures   Electronically signed by : Mariama Jane LCSW; Jul 21 2017 12:07PM EST                       (Author)

## 2018-01-11 NOTE — MISCELLANEOUS
Provider Comments  Provider Comments:     Pt was n/s  Called phone on file but couldn't leave a msg  Signatures   Electronically signed by : CALEB Ramsey;  Aug 23 2016  6:59PM EST                       (Author)

## 2018-01-11 NOTE — RESULT NOTES
Verified Results  (Q) COMPREHENSIVE METABOLIC PNL W/ADJUSTED CALCIUM 22Oct2016 11:04AM Romel Bennett     Test Name Result Flag Reference   GLUCOSE 87 mg/dL  65-99   Fasting reference interval   UREA NITROGEN (BUN) 15 mg/dL  7-25   CREATININE 0 67 mg/dL  0 50-1 05   For patients >52years of age, the reference limit  for Creatinine is approximately 13% higher for people  identified as -American  eGFR NON-AFR   AMERICAN 101 mL/min/1 73m2  > OR = 60   eGFR AFRICAN AMERICAN 117 mL/min/1 73m2  > OR = 60   BUN/CREATININE RATIO   0-77   NOT APPLICABLE (calc)   AST 12 U/L  10-35   ALT 15 U/L  6-29   PROTEIN, TOTAL 6 4 g/dL  6 1-8 1   ALBUMIN 4 0 g/dL  3 6-5 1   GLOBULIN 2 4 g/dL (calc)  1 9-3 7   ALBUMIN/GLOBULIN RATIO 1 7 (calc)  1 0-2 5   BILIRUBIN, TOTAL 0 4 mg/dL  0 2-1 2   ALKALINE PHOSPHATASE 68 U/L     SODIUM 140 mmol/L  135-146   POTASSIUM 4 0 mmol/L  3 5-5 3   CHLORIDE 107 mmol/L     CARBON DIOXIDE 25 mmol/L  20-31   CALCIUM 9 3 mg/dL  8 6-10 4   CALCIUM (ADJUSTED FOR$ALBUMIN) 9 6 mg/dL (calc)  8 6-10 2     (Q) CBC (INCLUDES DIFF/PLT) (REFL) 22Oct2016 11:04AM Chaffeebong Bennett     Test Name Result Flag Reference   WHITE BLOOD CELL COUNT 6 9 Thousand/uL  3 8-10 8   RED BLOOD CELL COUNT 4 52 Million/uL  3 80-5 10   HEMOGLOBIN 13 5 g/dL  11 7-15 5   HEMATOCRIT 40 9 %  35 0-45 0   MCV 90 5 fL  80 0-100 0   MCH 29 9 pg  27 0-33 0   EOSINOPHILS 1 3 %     BASOPHILS 0 5 %     ABSOLUTE MONOCYTES 449 cells/uL  200-950   ABSOLUTE EOSINOPHILS 90 cells/uL     ABSOLUTE BASOPHILS 35 cells/uL  0-200   NEUTROPHILS 63 2 %     LYMPHOCYTES 28 5 %     MONOCYTES 6 5 %     MCHC 33 0 g/dL  32 0-36 0   RDW 14 9 %  11 0-15 0   PLATELET COUNT 423 Thousand/uL L 140-400   MPV 11 1 fL  7 5-11 5   ABSOLUTE NEUTROPHILS 4361 cells/uL  6874-0792   ABSOLUTE LYMPHOCYTES 1967 cells/uL  850-3900     (Q) TSH, 3RD GENERATION W/REFLEX TO FT4 22Oct2016 11:04AM Romel Bennett   REPORT COMMENT:  FASTING:YES     Test Name Result Flag Reference   TSH W/REFLEX TO FT4 0 80 mIU/L     Reference Range                         > or = 20 Years  0 40-4 50                              Pregnancy Ranges            First trimester    0 26-2 66            Second trimester   0 55-2 73            Third trimester    0 43-2 91     (Q) [facility] AND [address] [id number] 11:04AM Carlos Noriega     Test Name Result Flag Reference   [facility] 44 5 mIU/mL     Reference Range                        Follicular Phase       5 0-90 8               Mid-cycle Peak         3 1-17 7               Luteal Phase           1 5- 9 1               Postmenopausal       23 0-116 3   LH 18 6 mIU/mL     Reference Range  Follicular Phase  9 0-20 5  Mid-Cycle Peak    8 7-76 3  Luteal Phase      0 5-16 9  Postmenopausal    10 0-54 7     (Q) ESTROGENS, FRACTIONATED, LC/MS/MS [id number] 11:04AM Carlos Noriega     Test Name Result Flag Reference   ESTRONE, LC/MS/MS 16 pg/mL     Adult Female Reference Ranges for Estrone,    LC/MS/MS:       Follicular Phase:        pg/mL    Luteal Phase:            pg/mL    Postmenopausal Phase:  < or = 65 pg/mL        Pediatric Female Reference Ranges for Estrone,    LC/MS/MS:       Pre-pubertal      (1-9 years):     < or = 34 pg/mL    10-11 years:       < or = 72 pg/mL    12-14 years:       < or = 75 pg/mL    15-17 years:       < or = 188 pg/mL     This test was developed and its analytical performance  characteristics have been determined by [facility]  It has not been  cleared or approved by FDA  This assay has been validated  pursuant to the CLIA regulations and is used for clinical  purposes     ESTRADIOL, ULTRASENSITIVE$LC/MS/MS 34 pg/mL     Adult Female Reference Ranges for Estradiol,    Ultrasensitive, LC/MS/MS:       Follicular Phase:       pg/mL    Luteal Phase:           pg/mL    Postmenopausal Phase: < or = 10 pg/mL        Pediatric Female Reference Ranges for Estradiol,    Ultrasensitive, LC/MS/MS:       Pre-pubertal      (1-9 years):     < or = 16 pg/mL    10-11 years:       < or = 65 pg/mL    12-14 years:       < or = 142 pg/mL    15-17 years:       < or = 283 pg/mL     This test was developed and its analytical performance  characteristics have been determined by [address]  It has not been  cleared or approved by FDA  This assay has been validated  pursuant to the CLIA regulations and is used for clinical  purposes  ESTRIOL, LC/MS/MS, SERUM <0 10 ng/mL     Adult Reference Ranges for Estriol, LC/MS/MS[de-identified]       Adult Males:                  < or = 0 18 ng/mL    Adult Females (nonpregnant):  < or = 0 21 ng/mL       Pregnancy:      First Trimester:            < or = 2 50 ng/mL      Second Trimester:           < or = 9 60 ng/mL      Third Trimester:            < or = 14 60 ng/mL     This test was developed and its analytical performance  characteristics have been determined by [address]  It has not been  cleared or approved by FDA  This assay has been validated  pursuant to the CLIA regulations and is used for clinical  purposes  (Q) LIPID PANEL WITH REFLEX TO DIRECT LDL 22Oct2016 11:04AM Chelsey Hayley     Test Name Result Flag Reference   CHOLESTEROL, TOTAL 206 mg/dL H 125-200   HDL CHOLESTEROL 70 mg/dL  > OR = 46   TRIGLICERIDES 43 mg/dL  <230   LDL-CHOLESTEROL 127 mg/dL (calc)  <130   Desirable range <100 mg/dL for patients with CHD or  diabetes and <70 mg/dL for diabetic patients with  known heart disease  CHOL/HDLC RATIO 2 9 (calc)  < OR = 5 0   NON HDL CHOLESTEROL 136 mg/dL (calc)     Target for non-HDL cholesterol is 30 mg/dL higher than   LDL cholesterol target  Discussion/Summary   LABS STABLE, FSH IN THE POSTMENOPAUSAL RANGE

## 2018-01-11 NOTE — PSYCH
Progress Note  Psychotherapy Provided [facility]: Individual Psychotherapy 50 minutes provided today  Goals addressed in session:   Crisis management during session    D: Met with Janine DIAZ; 'very overwhelmed'  Session focused upon marriage issues;  wants to move to [place] however will not leave (the marriage) until disability claim is settled and this may be another year  , newly diagnosed medical issues with Marlene Mini and completion of paperwork for disability  (LANEY signed)  A: Marcelino Perea presented with a  Due to financial constraints she has been unable to come to all her appointments  Hardship letter submitted by Marcelino Perea to [facility]  P: Continue individual therapy  Ongoing support and discussion regarding family dynamics and grandson's illness  Pain Scale and Suicide Risk [facility]: Current Pain Assessment: no pain   Current suicide risk is low   Behavioral Health Treatment Plan [facility]: Diagnosis and Treatment Plan explained to patient, patient relates understanding diagnosis and is agreeable to Treatment Plan            Signatures   Electronically signed by : Marilee Garcia LCSW; Oct 11 2017  9:09AM EST                       (Author)

## 2018-01-11 NOTE — PSYCH
Treatment Plan Tracking    #1 Treatment Plan not completed within required time limits due to: Client cancelled/ no-showed scheduled appointment  , Client presented with emotional/behavioral issues that required clinical intervention            Signatures   Electronically signed by : Joaquim Beltran LCSW; Sep 18 2017  4:53PM EST                       (Author)

## 2018-01-12 VITALS
RESPIRATION RATE: 16 BRPM | WEIGHT: 223.01 LBS | HEIGHT: 68 IN | HEART RATE: 75 BPM | DIASTOLIC BLOOD PRESSURE: 70 MMHG | BODY MASS INDEX: 33.8 KG/M2 | OXYGEN SATURATION: 98 % | SYSTOLIC BLOOD PRESSURE: 114 MMHG

## 2018-01-12 NOTE — PSYCH
History of Present Illness  [facility] Clinical Progress Note [facility]:   Specialized Services Documentation - Therapist must complete separate progress note for each specific clinical activity in which the client participated during the day  (395) Group Psychotherapy: (9:30-10:30) Hiro Duncan participated in psychotherapy group focused on loss and finding purpose in one's life  Hiro Duncan identified finances and her grandson's surgery on April 3rd as a stressor  She was an active participant in group discussion, and encouraged peers to recognize their grief from losses in their lives, and to find something in their lives to give them a sense of purpose  Moderate progress noted toward goals  Continue psychotherapy group to encourage Janine to explore stressors and coping  Treatment Plan Problem(s): 1 1, 1 2  Vidhya Kelly MSW, MANAW       (846) Group Psychotherapy: [id number] Hiro Duncan participated in wellness group focused on learning about medications used in treatment and recovery  Hiro Duncan began to ask one of her peers about his medical condition and was redirected to her own questions and concerns  Hiro Duncan acknowledged that she feels more comfortable giving advice and helping others rather than to focus on herself and her recovery  Hiro Duncan took redirection well but made only mild progress toward goals  Continue to offer group to educate Hiro Duncan on her medications, and engaging successfully in using medication treatment, in addition to cognitive and behavioral therapies used in her recovery  Treatment Plan Problem(s): 1 3  Kd Mooney RN     (121) Education Therapy Goals set - have a positive weekend by setting boundaries    Treatment Plan Problem(s): 1 1, 1 2  Education Therapy Time - 0900 - 0930 Previous goal was not met  Readiness to Learning:  She is receptive to learning  There are  no barriers to learning  Learning Assessment Time - 1330 - 1400   Education completed on  illness and wellness tools     The teaching method was  verbal and written  Shared area of learning: Yes  Albino Aguilar MSW, LSW     (050) Allied Therapy [id number] Alexis Kumari actively shared in [facility] group focused on boundary setting  She engaged in improvisation and discussion exploring value of and ways to set healthy limits with self and others  Group discussed reality of feeling guilty at times, yet the anxiety and chaos left if one chooses not to set limits  She participated in practicing boundaries with given scenarios  Ways to set limits with self including goal setting, lists and time limits reviewed  She was encouraged to look at how she sets limits as she feels easily manipulated by grandchildren's parents as they feel the grandchild is the pawn  Progress noted toward goal  Continue AT to encourage skill development and use  Treatment Plan Problem(s): 1 1  JESSICA Sanchez       Case Management Note:   [id number] Met with Alexis Kumari  She identified a sense of relief that her grandson's surgery was scheduled at [facility] in [place] for 4/3/17  She identified that she will be in [place] with the child's mother that whole week - discussed impact on program and anticipated discharge will be 3/30/17  She continues to express feeling overwhelmed by current and impending stressors  Reviewed her ability to manage her day and weekend supports and plans  TREATMENT SESSION NUMBER: 5   Current suicide risk is low  Medications not changed/added/denied  JESSICA Sanchez      Active Problems    1  Abnormal blood chemistry (790 6) (R79 9)   2  Abnormal finding on lung imaging (793 19) (R91 8)   3  Abnormal findings on diagnostic imaging of abdomen (793 6) (R93 5)   4  Abnormal liver scan (794 8) (R93 2)   5  Acute maxillary sinusitis, recurrence not specified (461 0) (J01 00)   6  Acute sinusitis (461 9) (J01 90)   7  Atypical chest pain (786 59) (R07 89)   8  Benign colon polyp (211 3) (K63 5)   9  Bipolar II disorder (296 89) (F31 81)   10  Bladder disorder (596 9) (N32 9)   11  Bunion (727 1) (M21 619)   12  Chronic idiopathic thrombocytopenic purpura (287 31) (D69 3)   13  Colon cancer screening (V76 51) (Z12 11)   14  Current smoker (305 1) (F17 200)   15  Encounter for routine gynecological examination (V72 31) (Z01 419)   16  Encounter for screening mammogram for malignant neoplasm of breast (V76 12)    (Z12 31)   17  DREAD (generalized anxiety disorder) (300 02) (F41 1)   18  Herpes simplex type 1 infection (054 9) (B00 9)   19  History of tobacco use (V15 82) (Z87 891)   20  Hyperlipemia (272 4) (E78 5)   21  Hyperlipidemia (272 4) (E78 5)   22  Long term use of drug (V58 69) (Z79 899)   23  Lung nodule, solitary (793 11) (R91 1)   24  Mitral regurgitation (424 0) (I34 0)   25  Need for Tdap vaccination (V06 1) (Z23)   26  Obesity (278 00) (E66 9)   27  Other specified menopausal and perimenopausal disorders (627 8) (N95 8)   28  Panic disorder without agoraphobia (300 01) (F41 0)   29  Perimenopausal symptoms (627 2) (N95 1)   30  Preop examination (V72 84) (Z01 818)   31  Skin rash (782 1) (R21)   32  Thrombocytopenia (287 5) (D69 6)   33  Tobacco use (305 1) (Z72 0)   34  Urge and stress incontinence (788 33) (N39 46)   35  Viral syndrome (079 99) (B34 9)    Past Medical History    1  History of Abdominal pain, epigastric (789 06) (R10 13)   2  History of Abdominal pain, RUQ (789 01) (R10 11)   3  History of Conjunctivitis of left eye (372 30) (H10 9)   4  History of acute pharyngitis (V12 69) (Z87 09)   5  History of allergic reaction (V15 09) (Z88 9)   6  History of dermatitis (V13 3) (Z87 2)   7  Denied: History of head injury   8  History of headache (V13 89) (Z87 898)   9  History of Luteinized follicular cyst (596 1) (N83 00)   10  History of Menorrhagia (626 2) (N92 0)   11  Denied: History of Seizures   12  History of Uterine fibroid (218 9) (D25 9)    Allergies    1  BusPIRone HCl TABS    2  No Known Environmental Allergies   3   No Known Food Allergies    Current Meds   1  DULoxetine HCl - 60 MG Oral Capsule Delayed Release Particles; TAKE 1 CAPSULE   DAILY; Therapy: 87XCQ9666 to (Evaluate:2017)  Requested for: 45CXI8622; Last   Rx:[id number] Ordered   2  LamoTRIgine 100 MG Oral Tablet; TAKE 1 TABLET TWICE DAILY; Therapy: 2012 to (Evaluate:2017)  Requested for: 66QGU7852; Last   Rx:[id number] Ordered   3  LORazepam 0 5 MG Oral Tablet; TAKE 1 TABLET 4 TIMES DAILY AS NEEDED; Last   Rx:[id number] Ordered    Family Psych History  Mother    1  Family history of Bipolar disorder   2  Family history of Lung Cancer (V16 1)   3  Family history of Mother  At Age ___  Father    3  Family history of Psychosis  Sister    5  Family history of Mother  At Age 61  Family History    6  Family history of Depression    Social History    · Denied: History of Alcohol use   · Current smoker (305 1) (F17 200)   · Daily caffeine consumption   · Denied: History of Drug Use   · Graduated from high school   · History of tobacco use (V15 82) (Z87 891)   · Tobacco use (305 1) (Z72 0)    Future Appointments    Date/Time Provider Specialty Site   2017 10:00 AM EMETERIO Booker  Psychiatry [facility] PARTIAL HOSPITALIZATION   05/15/2017 04:15 PM EMETERIO Oneill   Psychiatry [facility]   2017 01:[facility]   2017 06:30 PM Kinga Heath, [address] [place] [address] Internal Medicine [facility]   Electronically signed by : EMETERIO Langston ; Mar 24 2017  8:29AM EST                       (Author)    Electronically signed by : Reji Conteh RN; Mar 24 2017  2:25PM EST                       (Author)    Electronically signed by : NICK Herrera; Mar 24 2017  2:33PM EST                       (Author)    Electronically signed by : JESSICA Olvera; Mar 24 2017  2:56PM EST                       (Author) Electronically signed by : Aletha Trujillo RN; Mar 28 2017  3:41PM EST                       (Author)

## 2018-01-12 NOTE — PSYCH
Progress Note  Psychotherapy Provided [facility]: Individual Psychotherapy 50 minutes provided today  Goals addressed in session:   Addressed goals 1-3 of initial plan    D: Met with Janine individually  ROS; depression remains, "numb', feeling discouraged and irritated regarding family members  DIscussed psychiatrist visit, disability paperwork, records  Exploration f sense of self, feeling worthy and giving self permission to 'get on with her life'  Focus on family psychosocial issues has acerbated panic attacks  Self care and review of circumstances in and out of Janine's control  Denied SI    A: Janine's family history and current psychosocial issues prevent her from feeling 'worthy'  Guilt and taking on others issues has acerbated panic attacks  Demonstrating progress in that she is beginning to develop insight into this being the issue  P: Continue biweekly individual therapy  Continue discussion on psychosocial stressors       Pain Scale and Suicide Risk [facility]: Current Pain Assessment: no pain   Current suicide risk is low   Behavioral Health Treatment Plan [facility]: Diagnosis and Treatment Plan explained to patient, patient relates understanding diagnosis and is agreeable to Treatment Plan  Assessment    1   Bipolar II disorder (296 89) (F31 02)    Signatures   Electronically signed by : Richi Balderrama LCSW; May 22 2017  1:26PM EST                       (Author)
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
## 2018-01-12 NOTE — PSYCH
Assessment    1  Bipolar II disorder (296 89) (F31 81)   2  DREAD (generalized anxiety disorder) (300 02) (F41 1)   3  Panic disorder without agoraphobia (300 01) (F41 0)    [facility] Plan    [facility] Plan   AREAS OF NEEDS: Depression as evidenced by tearfulness, panic episodes, feeling overwhelmed, exhausted, sleep and appetite disturbance, hopelessness, helplessness, feels "heavy" and wants to "escape her life" but can not stand being alone with home and work stressors  Date Initiated: 3/20/17     LONG TERM GOAL: 1 0 I will identify 3 signs that my depression is beginning to lessen and I am more productive again in my life  Date Initiated: 3/20/17   Target Date: 4/17/17   Completion Date: 3/30/17     Date Initiated: 3/20/17   Revision Date: 3/29/17   1 1 I will identify 3 things I need to do to support my own self-care daily and begin to list in my WRAP   Target Date: 3/29/17   Completion Date: 3/30/17   Date Initiated: 3/20/17    1 2 I will identify 3 strategies I can begin to implement to refocus my self-talk and racing thoughts when feeling overwhelmed and defeated   Target Date: 3/29/17   Completion Date: 3/29/17   Date Initiated: 3/20/17   Revision Date: 3/29/17   1 3 I will take medication as prescribed and share questions/concerns if arise   Target Date: 3/29/17   Completion Date: 3/30/17   Date Initiated: 3/20/17   Revision Date: 3/29/17   1 4 I will identify 2 resources for support and agree to support/staff contact if indicated   Target Date: 3/29/17   Completion Date: 3/30/17          7 DAY REVISION: 1 5 I will identify 3 healthy ways I can help myself during Jerzy's upcoming surgery and 3 items I can take with me to calm myself if needed   Date Initiated: 3/29/17    Target Date: 4/7/17   Completion Date: 3/30/17                 PSYCHIATRY: Medication management and education  Continue medication management and education     Date Initiated: 3/20/2017   Revision Date: 3/29/2017   The person(s) responsible for carrying out the plan is EMETERIO Stallings    NURSIN 1,1 2,1 3,1 4 This RN will provide daily wellness group to educate Tiffanie Melo on S/S of her diagnoses and medications used in treatment  1 1,1 2,1 3,1 4 This RN will continue to provide daily wellness group to educate Tiffanie Melo on her diagnoses and medications  Date Initiated: 3/20/17     Revision Date: 3/29/17     The person(s) responsible for carrying out the plan is Chavo Carrasco RN    PSYCHOLOGY: 1 1, 1 2, 1 4 Provide psychotherapy group 5 times per week to allow opportunity for Janine to explore stressors and ways of coping  1 1, 1 2, 1 4 Continue to provide psychotherapy each program day to encourage Janine to further explore stressors and healthier ways of coping  Date Initiated: 3/20/2017   Revision Date: 3/29/2017   The person(s) responsible for carrying out the plan is SIMRAN CastrejonW  ALLIED THERAPY: 1 1,1 2 Engage Janine in AT group 5 times daily to encourage development and use of wellness tools to decrease symptoms and promote recovery through meaningful activity  1 1,1 2,1 5 Continue to encourage Janine to participate in AT group daily to practice wellness tools within program and transfer to home sharing successes and barriers through healthy task involvement  Date Initiated: 3/20/17   Revision Date: 3/29/17   The person(s) responsible for carrying out the plan is JESSICA Solis  CASE MANAGEMENT: 1 0 Meet with Janine 3-4 times weekly to assess treatment progress, discharge planning, support contact, UR and WRAP development  Date Initiated: 3/20/17   Revision Date: 3/29/17   The person(s) responsible for carrying out the plan is JESSICA Solis           DISCHARGE CRITERIA: Identify 3 signs of improvement and complete relapse prevention plan   DISCHARGE PLAN: LISAN[facility] - Dr Lisa Ramirez, therapy, support group   Estimated Length of Stay: 10 treatment days   Strengths: resilience   Limitations: limited support and resources   Diagnosis and Treatment Plan explained to patient, patient relates understanding diagnosis and is agreeable to Treatment Plan           CLIENT COMMENTS / Please share your thoughts, feelings, need and/or experiences regarding your treatment plan: _____________________________________________________________________________________________________________________________________________________________________________________________________________________________________________________________________________________________________________________ Date/Time: ______________     Patient Signature: _________________________________ Date/Time: ______________      Signatures   Electronically signed by : EMETERIO Lr ; Mar 20 2017 10:01AM EST                       (Author)    Electronically signed by : NICK Blanco; Mar 20 2017 10:42AM EST                       (Author)    Electronically signed by : Lamar Saldivar RN; Mar 20 2017  2:28PM EST                       (Author)    Electronically signed by : JESSICA Borden; Mar 20 2017  3:08PM EST                       (Author)    Electronically signed by : EMETERIO Lr ; Mar 29 2017  8:37AM EST                       (Author)    Electronically signed by : NICK Blanco; Mar 29 2017  9:09AM EST                       (Author)    Electronically signed by : Lamar Saldivar RN; Mar 29 2017 10:30AM EST                       (Author)    Electronically signed by : JESSICA Borden; Mar 29 2017 12:30PM EST                       (Author)    Electronically signed by : JESSICA Borden; Mar 30 2017  3:02PM EST                       (Author)

## 2018-01-12 NOTE — RESULT NOTES
Verified Results  (1) CBC/PLT/DIFF [id number] 08:03AM Bea Mcintyre   REPORT COMMENT:  FASTING:YES     Test Name Result Flag Reference   WHITE BLOOD CELL COUNT 6 3 Thousand/uL  3 8-10 8   RED BLOOD CELL COUNT 4 63 Million/uL  3 80-5 10   HEMOGLOBIN 13 6 g/dL  11 7-15 5   HEMATOCRIT 41 5 %  35 0-45 0   MCV 89 6 fL  80 0-100 0   MCH 29 5 pg  27 0-33 0   MCHC 32 9 g/dL  32 0-36 0   RDW 14 1 %  11 0-15 0   PLATELET COUNT 89 Thousand/uL L 140-400   ABSOLUTE NEUTROPHILS 3723 cells/uL  7071-5656   ABSOLUTE LYMPHOCYTES 1909 cells/uL  850-3900   ABSOLUTE MONOCYTES 510 cells/uL  200-950   ABSOLUTE EOSINOPHILS 120 cells/uL     ABSOLUTE BASOPHILS 38 cells/uL  0-200   NEUTROPHILS 59 1 %     LYMPHOCYTES 30 3 %     MONOCYTES 8 1 %     EOSINOPHILS 1 9 %     BASOPHILS 0 6 %     CBC MORPHOLOGY   NORMAL   Red cell morphology appears unremarkable   MPV 12 5 fL  7 5-12 5     (1) COMPREHENSIVE METABOLIC PANEL [id number] 22:23TI Bea Mcintyre     Test Name Result Flag Reference   GLUCOSE 92 mg/dL  65-99   Fasting reference interval   UREA NITROGEN (BUN) 15 mg/dL  7-25   CREATININE 0 69 mg/dL  0 50-1 05   For patients >52years of age, the reference limit  for Creatinine is approximately 13% higher for people  identified as -American  eGFR NON-AFR   AMERICAN 99 mL/min/1 73m2  > OR = 60   eGFR AFRICAN AMERICAN 115 mL/min/1 73m2  > OR = 60   BUN/CREATININE RATIO   9-41   NOT APPLICABLE (calc)   SODIUM 140 mmol/L  135-146   POTASSIUM 4 0 mmol/L  3 5-5 3   CHLORIDE 107 mmol/L     CARBON DIOXIDE 29 mmol/L  20-31   CALCIUM 9 3 mg/dL  8 6-10 4   PROTEIN, TOTAL 6 4 g/dL  6 1-8 1   ALBUMIN 3 9 g/dL  3 6-5 1   GLOBULIN 2 5 g/dL (calc)  1 9-3 7   ALBUMIN/GLOBULIN RATIO 1 6 (calc)  1 0-2 5   BILIRUBIN, TOTAL 0 3 mg/dL  0 2-1 2   ALKALINE PHOSPHATASE 65 U/L     AST 12 U/L  10-35   ALT 12 U/L  6-29     (Q) LIPID PANEL WITH REFLEX TO DIRECT LDL [id number] 08:03AM Bea Mcintyre     Test Name Result Flag Reference CHOLESTEROL, TOTAL 227 mg/dL H 125-200   HDL CHOLESTEROL 63 mg/dL  > OR = 46   TRIGLICERIDES 77 mg/dL  <457   LDL-CHOLESTEROL 149 mg/dL (calc) H <130   Desirable range <100 mg/dL for patients with CHD or  diabetes and <70 mg/dL for diabetic patients with  known heart disease  CHOL/HDLC RATIO 3 6 (calc)  < OR = 5 0   NON HDL CHOLESTEROL 164 mg/dL (calc) H    Target for non-HDL cholesterol is 30 mg/dL higher than   LDL cholesterol target  Discussion/Summary   CONTINUES WITH PLATELETS LOW AT 89  CHOLESTEROL STILL ELEVATED BUT PROFILE IS OK

## 2018-01-12 NOTE — PSYCH
Progress Note  Psychotherapy Provided [facility]: Individual Psychotherapy 50 minutes provided today  Goals addressed in session:   Addressed goals 1-3 of initial plan    D: Met with Janine marroquin  ROS; feeling depressed, denied SI  Was denied for LTD  Experiencing anxiety regarding having to find a job she can handle medically and emotionally  Further discussion of ongoing psychosocial stressors regarding family, marriage/relationship and finances  A: Veronica Maguire presented with appropriate mood and affect during session  Veronica Maguire is aware of her obstacles in the home setting however is unable to implement interventions discussed i e /enabling son, setting boundaries, follow through with goals identified in session etc  Demonstrating moderate progress  P: Continue individual therapy  Address goal's and work towards further implementation  Pain Scale and Suicide Risk [facility]: Current Pain Assessment: no pain   Current suicide risk is low   Behavioral Health Treatment Plan [facility]: Diagnosis and Treatment Plan explained to patient, patient relates understanding diagnosis and is agreeable to Treatment Plan  Assessment    1  Bipolar II disorder (296 89) (F31 81)   2   DREAD (generalized anxiety disorder) (300 02) (F41 1)    Signatures   Electronically signed by : Santos Villa LCSW; Aug 15 2017  9:50AM EST                       (Author)

## 2018-01-13 VITALS
HEART RATE: 69 BPM | SYSTOLIC BLOOD PRESSURE: 110 MMHG | HEIGHT: 68 IN | BODY MASS INDEX: 33.95 KG/M2 | DIASTOLIC BLOOD PRESSURE: 67 MMHG | WEIGHT: 224 LBS

## 2018-01-13 VITALS
HEIGHT: 68 IN | BODY MASS INDEX: 34.1 KG/M2 | HEART RATE: 84 BPM | WEIGHT: 225.03 LBS | OXYGEN SATURATION: 96 % | TEMPERATURE: 98.5 F | RESPIRATION RATE: 18 BRPM | SYSTOLIC BLOOD PRESSURE: 116 MMHG | DIASTOLIC BLOOD PRESSURE: 72 MMHG

## 2018-01-13 NOTE — PSYCH
Progress Note  Psychotherapy Provided [facility]: Individual Psychotherapy minutes provided today  Goals addressed in session:   D: met with Janine individually  Reviewed journal entries with exploration  Discussion regarding Janine feeling she is 'obsessed' with Nadja Pierson and his inability to walk yet  Azul Chaudhari stated she is unable to work at this time and is applying for SSD until she can overcome her recent 'nervous breakdown'  Self care, distraction techniques and patience reviewed  Completed initial treatment plan with identification of long term goals with specific interventions  Denied SI     A: zAul Chaudhari presented as focused and engaged  Mood anxious and tearful  Azul Chaudhari struggles greatly with her grandson's medical illness  She feels consumed with his minimal progress and inability to walk yet  PHQ 17  Denied SI     P: Continue weekly individual therapy  Ongoing ROS, review of journal entries and exploration of treatment goal interventions  Pain Scale and Suicide Risk [facility]: Current Pain Assessment: no pain   Current suicide risk is low   Behavioral Health Treatment Plan [facility]: Diagnosis and Treatment Plan explained to patient, patient relates understanding diagnosis and is agreeable to Treatment Plan  Assessment    1  Bipolar II disorder (296 89) (F31 81)   2   DREAD (generalized anxiety disorder) (300 02) (F41 1)    Signatures   Electronically signed by : Alicia Israel LCSW; May  1 2017  2:58PM EST                       (Author)
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
## 2018-01-13 NOTE — PSYCH
Progress Note  Psychotherapy Provided [facility]: Individual Psychotherapy 50 minutes provided today  Goals addressed in session:   Addressed goals 1-3 of initial plan    D: Met with Janine individually  ROS; tearful and feeling anxious  Updates on current psychosocial stressors involving son, grandson and  discussed  Problem solving skills and circumstances in and out of Janine's control  Denied SI    A: Dorinda Cristobal presented as tearful and anxious throughout session  She was unfocused and exhibited tangential speech  Increase level of stress may have been a contributing factor for this as it is not usual presentation for Janine  She continues to struggle with significant psychosocial stressors involving her family  WIth her grandson's illness, she struggles to let go of some circumstances she cannot 'fix'  P: Continue biweekly individual therapy  Will keep on cancellation list as Janine requested a few weekly sessions due to current circumstances  Continue to explore psychosocial stressors  Pain Scale and Suicide Risk [facility]: On a scale of 0 to 10, the patient rates current pain at 3   Current suicide risk is low   Behavioral Health Treatment Plan [facility]: Diagnosis and Treatment Plan explained to patient, patient relates understanding diagnosis and is agreeable to Treatment Plan  Results/Data  PHQ-9 Adult Depression Screening 05Jun2017 11:53AM Hi Bassem     Test Name Result Flag Reference   PHQ-9 Adult Depression Score 17     Over the last two weeks, how often have you been bothered by any of the following problems?   Little interest or pleasure in doing things: More than half the days - 2  Feeling down, depressed, or hopeless: Nearly every day - 3  Trouble falling or staying asleep, or sleeping too much: Nearly every day - 3  Feeling tired or having little energy: More than half the days - 2  Poor appetite or over eating: More than half the days - 2  Feeling bad about yourself - or that you are a failure or have let yourself or your family down: Several days - 1  Trouble concentrating on things, such as reading the newspaper or watching television: More than half the days - 2  Moving or speaking so slowly that other people could have noticed  Or the opposite -  being so fidgety or restless that you have been moving around a lot more than usual: More than half the days - 2  Thoughts that you would be better off dead, or of hurting yourself in some way: Not at all - 0   PHQ-9 Adult Depression Screening Positive     PHQ-9 Difficulty Level Very difficult     PHQ-9 Severity      Moderately Severe Depression     GAD7 - Generalized Anxiety Disorder [id number] 11:51AM Julia Cr     Test Name Result Flag Reference   GAD7 - Anxiety Severity Level Severe Anxiety     GAD7 - Difficulty Level Extremely difficult     How difficult have those problems made it for you to do your work, take care of things at home, or get along with other people? GAD7 - Score 20     Over the last two weeks, how often have you been bothered by the following problems? Feeling nervous, anxious, or on edge Nearly every day - 3  Not being able to stop or control worrying Nearly every day - 3  Worrying too much about different things Nearly every day - 3  Trouble relaxing Nearly every day - 3  Being so restless that it's hard to sit still Over half the days - 2  Becoming easily annoyed or irritable Nearly every day - 3  Feeling afraid as if something awful might happen Nearly every day - 3       Assessment    1  Bipolar II disorder (296 89) (F31 81)   2   DREAD (generalized anxiety disorder) (300 02) (F41 1)    Signatures   Electronically signed by : Raquel Hoover LCSW; Jun 5 2017  1:59PM EST                       (Author)

## 2018-01-13 NOTE — RESULT NOTES
Discussion/Summary   call if worsening we can proceed with ct scan     Verified Results  * [facility] [id number] 03:08PM Hari Escobar    Order Number: [id number]    - Patient Instructions: To schedule this appointment, please contact Central Scheduling at [phone]  Test Name Result Flag Reference   US PELVIS COMPLETE (TRANSABDOMINAL AND TRANSVAGINAL) (Report)     PELVIC ULTRASOUND, COMPLETE     INDICATION: Right lower quadrant pain for 3 days  Status post hysterectomy and left oophorectomy  COMPARISON: CT AP 12/21/2011  TECHNIQUE:  Transabdominal pelvic ultrasound was performed in sagittal and transverse planes with a curvilinear transducer  Additional transvaginal imaging was performed to better evaluate the adnexa  Imaging included volumetric sweeps as well as    traditional still imaging technique  FINDINGS:     Vaginal cuff appears normal      OVARIES/ADNEXA:   Right ovary: 1 9 x 1 3 x 6 cm  No suspicious right ovarian abnormality  Doppler flow within normal limits  No suspicious adnexal mass or loculated collections  There is no free fluid  IMPRESSION:      Normal right ovary  Workstation performed: [id number]     Signed by:    Johanny Finn MD   5/15/17

## 2018-01-14 VITALS
TEMPERATURE: 98 F | RESPIRATION RATE: 16 BRPM | BODY MASS INDEX: 33.95 KG/M2 | HEART RATE: 90 BPM | HEIGHT: 68 IN | WEIGHT: 224 LBS | SYSTOLIC BLOOD PRESSURE: 126 MMHG | DIASTOLIC BLOOD PRESSURE: 70 MMHG | OXYGEN SATURATION: 96 %

## 2018-01-14 VITALS
HEART RATE: 80 BPM | DIASTOLIC BLOOD PRESSURE: 73 MMHG | WEIGHT: 220 LBS | SYSTOLIC BLOOD PRESSURE: 118 MMHG | HEIGHT: 68 IN | BODY MASS INDEX: 33.34 KG/M2

## 2018-01-14 VITALS
DIASTOLIC BLOOD PRESSURE: 66 MMHG | SYSTOLIC BLOOD PRESSURE: 98 MMHG | WEIGHT: 228 LBS | BODY MASS INDEX: 34.56 KG/M2 | HEIGHT: 68 IN | HEART RATE: 102 BPM
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
## 2018-01-14 NOTE — RESULT NOTES
Verified Results  (1) ESTROGEN [id number] 11:02AM Carlos Cerise     Test Name Result Flag Reference   ESTROGEN, TOTAL, SERUM 462 1 pg/mL     Reference Ranges for Total Estrogen: Follicular Phase         (1-12 days):   pg/mL    Luteal Phase:     130-460 pg/mL    Postmenopausal:     pg/mL     The total estrogen assay is not recommended for use in  pre-pubertal children  (1) C-REACTIVE PROTEIN [id number] 11:02AM Carlos Cerise     Test Name Result Flag Reference   C-REACTIVE PROTEIN 0 25 mg/dL  <0 80   Please be advised that patients taking Carboxypenicillins  may exhibit falsely decreased C-Reactive Protein levels  due to an analytical interference in this assay  (Q) RHEUMATOID ARTHRITIS DIAGNOSTIC PANEL [id number] 11:02AM Carlos Cerise     Test Name Result Flag Reference   RHEUMATOID FACTOR 10 IU/mL  <44   CYCLIC CITRULLINATED$PEPTIDE (CCP) AB (IGG) 17 UNITS     Reference Range  Negative:            <20  Weak Positive:       20-39  Moderate Positive:   40-59  Strong Positive:     >59   INTERPRETATION      These serologic results may be found in 10-20% of  patients with polyarthritis that is clinically and  radiologically indistinguishable from RA       (Q) [facility] AND [address] [id number] 11:02AM Carlos Cerise     Test Name Result Flag Reference   [address] [place] [address] 9 8 mIU/mL     Reference Range                        Follicular Phase       6 7-85 0               Mid-cycle Peak         3 1-17 7               Luteal Phase           1 5- 9 1               Postmenopausal       23 0-116 3   LH 11 1 mIU/mL     Reference Range  Follicular Phase  6 0-46 8  Mid-Cycle Peak    8 7-76 3  Luteal Phase      0 5-16 9  Postmenopausal    10 0-54 7     (Q) SED RATE BY MODIFIED WESTERGREN [id number] 11:02AM Carlos Cerise     Test Name Result Flag Reference   SED RATE BY MODIFIED$WESTERGREN 17 mm/h  < OR = 30     (Q) ESTRADIOL [id number] 11:02AM Carlos Cerise   REPORT COMMENT:  DUPLICATE TEST TSH W/ REFLEX TO T4 FREE ORDER BY DR Andreia Tse  FASTING:YES     Test Name Result Flag Reference   ESTRADIOL 160 pg/mL     Reference Range          Follicular Phase:              Mid-Cycle:                     Luteal Phase:                  Postmenopausal:      < or = 31            Reference range established on post-pubertal patient  population  No pre-pubertal reference range  established using this assay  For any patients for  whom low Estradiol levels are anticipated (e g  males,  pre-pubertal children and hypogonadal/post-menopausal   females), the [employer]  Estradiol, Ultrasensitive, LCMSMS assay is recommended  (order code 41993)         Discussion/Summary   not in menopuase yet   negative arthritis test      Signatures   Electronically signed by : CALEB Mistry; Feb 10 2016 11:58AM EST                       (Author)

## 2018-01-15 VITALS
DIASTOLIC BLOOD PRESSURE: 74 MMHG | SYSTOLIC BLOOD PRESSURE: 102 MMHG | WEIGHT: 224 LBS | HEIGHT: 68 IN | RESPIRATION RATE: 16 BRPM | BODY MASS INDEX: 33.95 KG/M2

## 2018-01-15 NOTE — MISCELLANEOUS
Message   Recorded as Task   Date: 01/29/2016 10:37 AM, Created By: Marques Cr   Task Name: Follow Up   Assigned To: Kayla Pappas   Regarding Patient: Liz Isbell, Status: Active   CommentConnor Oneill - 29 Jan 2016 10:37 AM     TASK CREATED  Caller: Self; Other; [phone] (Home); [phone] (Work)  Patient called from work stating she was experiencing left arm tingling, hot flashes and significant chest pain  Patient stated she called her  prior to calling us and he was already on his way to her place of work due to her symptoms  Patient was told to go to ER, and she stated her  would drive her there  Kayla Pappas - 29 Jan 2016 10:52 AM     TASK EDITED  Phillip BOYCE        Active Problems    1  Abnormal blood chemistry (790 6) (R79 9)   2  Abnormal finding on lung imaging (793 19) (R91 8)   3  Abnormal findings on diagnostic imaging of abdomen (793 6) (R93 5)   4  Abnormal liver scan (794 8) (R93 2)   5  Acute maxillary sinusitis, recurrence not specified (461 0) (J01 00)   6  Benign colon polyp (211 3) (K63 5)   7  Bipolar II disorder, most recent episode major depressive (296 89) (F31 81)   8  Bladder disorder (596 9) (N32 9)   9  Bunion (727 1) (M20 10)   10  Chronic idiopathic thrombocytopenic purpura (287 31) (D69 3)   11  Colon cancer screening (V76 51) (Z12 11)   12  Current smoker (305 1) (F17 200)   13  Encounter for routine gynecological examination (V72 31) (Z01 419)   14  Encounter for screening mammogram for malignant neoplasm of breast (V76 12)    (Z12 31)   15  DREAD (generalized anxiety disorder) (300 02) (F41 1)   16  Herpes simplex type 1 infection (054 9) (B00 9)   17  History of tobacco use (V15 82) (Z87 891)   18  Hyperlipidemia (272 4) (E78 5)   19  Long term use of drug (V58 69) (Z79 899)   20  Lung nodule, solitary (793 11) (R91 1)   21  Need for Tdap vaccination (V06 1) (Z23)   22  Obesity (278 00) (E66 9)   23   Other specified menopausal and perimenopausal disorders (627 8) (N95 8)   24  Panic disorder without agoraphobia (300 01) (F41 0)   25  Preop examination (V72 84) (Z01 818)   26  Thrombocytopenia (287 5) (D69 6)   27  Tobacco use (305 1) (Z72 0)   28  Urge and stress incontinence (788 33) (N39 46)    Current Meds   1  DULoxetine HCl - 60 MG Oral Capsule Delayed Release Particles (Cymbalta); TAKE 1   CAPSULE DAILY; Therapy: 66VXS2539 to (Evaluate:99Zgg6699)  Requested for: 98XFX0202; Last   Rx:[id number] Ordered   2  LamoTRIgine 100 MG Oral Tablet; TAKE 1 TABLET TWICE DAILY; Therapy: 27RSR7179 to (Evaluate:50Maa1339)  Requested for: 81BLT2169; Last   Rx:[id number] Ordered   3  LORazepam 0 5 MG Oral Tablet (Ativan); TAKE 1 TABLET 3 TIMES DAILY AS NEEDED;   Last Rx:[id number] Ordered   4  Myrbetriq 50 MG Oral Tablet Extended Release 24 Hour; Takes one daily in the morning; Therapy: (Recorded:32Bdk1846) to Recorded    Allergies    1  BusPIRone HCl TABS    2  No Known Environmental Allergies   3  No Known Food Allergies    Signatures   Electronically signed by :  Matthew Calvin MD; Jan 29 2016 10:52AM EST                       (Author)

## 2018-01-15 NOTE — PSYCH
Message     Recorded as Task   Date: 08/28/2017 07:27 AM, Created By: Braxton Boas   Task Name: Document Appointment   Assigned To: Deepali Hook   Regarding Patient: Onesimo Ludwig, Status: Active   CommentChristeen  - 28 Aug 2017 7:27 AM     TASK CREATED  Caller: Self; Other; [phone] (Home); [phone] (Work)  pt called and left a message to cancel - financial reasons        Active Problems    1  Abnormal blood chemistry (790 6) (R79 9)   2  Abnormal finding on lung imaging (793 19) (R91 8)   3  Abnormal findings on diagnostic imaging of abdomen (793 6) (R93 5)   4  Acute pain of left knee (719 46) (M25 562)   5  Benign colon polyp (211 3) (K63 5)   6  Bipolar II disorder (296 89) (F31 81)   7  Bunion (727 1) (M21 619)   8  Chronic idiopathic thrombocytopenic purpura (287 31) (D69 3)   9  Current smoker (305 1) (F17 200)   10  DREAD (generalized anxiety disorder) (300 02) (F41 1)   11  Herpes simplex type 1 infection (054 9) (B00 9)   12  History of tobacco use (V15 82) (Z87 891)   13  Hyperlipidemia (272 4) (E78 5)   14  Long term use of drug (V58 69) (Z79 899)   15  Lung nodule, solitary (793 11) (R91 1)   16  Mitral regurgitation (424 0) (I34 0)   17  Obesity (278 00) (E66 9)   18  Other specified menopausal and perimenopausal disorders (627 8) (N95 8)   19  Panic disorder without agoraphobia (300 01) (F41 0)   20  Perimenopausal symptoms (627 2) (N95 1)   21  Right lower quadrant pain (789 03) (R10 31)   22  History of Tobacco use (305 1) (Z72 0)   23  Urge and stress incontinence (788 33) (N39 46)    Current Meds   1  DULoxetine HCl - 60 MG Oral Capsule Delayed Release Particles (Cymbalta); TAKE 1   CAPSULE DAILY; Therapy: 93SNK1231 to (Evaluate:68Oct5343)  Requested for: 40LYR9046; Last   Rx:[id number] Ordered   2  LamoTRIgine 100 MG Oral Tablet; TAKE 1 TABLET IN THE MORNING AND 1 AND 1/2   TABLETS AT BEDTIME;    Therapy: 96NDK6169 to (Demetria Briseno)  Requested for: 29GXY8802; Last   Rx:[id number] Ordered   3  LORazepam 0 5 MG Oral Tablet (Ativan); TAKE 1 TABLET 4 TIMES DAILY AS NEEDED;   Last Rx:[id number] Ordered    Allergies    1  BusPIRone HCl TABS    2  No Known Environmental Allergies   3   No Known Food Allergies    Signatures   Electronically signed by : Tara Vargas LCSW; Aug 28 2017  8:52AM EST                       (Author)
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
## 2018-01-15 NOTE — PSYCH
History of Present Illness  [facility] Clinical Progress Note [facility]:   Specialized Services Documentation - Therapist must complete separate progress note for each specific clinical activity in which the client participated during the day  (038) Group Psychotherapy: (9:30-10:30) Hiro Duncan participated in psychotherapy group  She identified her finances and her  as stressors today  She talked about her grandson's upcoming surgery and that she is hopeful about it, but expressed frustration that her son and his girlfriend do not seem to be responsible with the baby or with their money, and continue to assume that Hiro Duncan will provide them with money  She also noted that her  is stressful to her and that they should never have gotten  in the first place  She realizes that for financial reasons she needs to stay with him, and that she will make it work for now, but she does not enjoy the thought of moving to a smaller one-bedroom apartment with him  Group encouraged her to focus on the immediate problems like her son moving out this weekend, and to not think too much about the "what ifs " Some mild progress noted toward goals  Continue psychotherapy group to encourage Janine to explore stressors and coping  Treatment Plan Problem(s): 1 1, 1 2  Vidhya Kelly MSW, LSW     (607) Group Psychotherapy: [id number] Hiro Duncan participated in wellness group focused on working on recovery from mental illness; anticipating potential triggers that could cause relapses and identifying healthy coping strategies to replace unhealthy strategies  Hiro Duncan stated that she had a question about agoraphobia and was successfully redirected back to topic  Hiro Duncan was able to be attentive to education and handout on relapse prevention and participated in peer discussion minimally after that  Hiro Duncan made mild progress toward goals   Continue group to educate patient on the recovery process visualized as a road to better wellness and how to [facility] on or to get back onâ the road to recovery and overcome obstacles along the way  Treatment Plan Problem(s): 1 1,1 2  Bruna Miller RN       (450) Education Therapy Goals set - straighten up her bedroom    Treatment Plan Problem(s): 1 1  Education Therapy Time - 0900 - 0930 Previous goal was met  Readiness to Learning:  She is receptive to learning  There are  no barriers to learning  Learning Assessment Time - 1330 - 1400   Education completed on  illness, aftercare and wellness tools  The teaching method was  verbal  Shared area of learning: Yes  Author JSESICA Meehan     (052) Allied Therapy [id number] Jenna Rome actively shared in [facility] group focused on work stressors  She was able to speak to challenges in her work and home environment  She identified a strength that makes her a good team player and inconsistent boundaries as a struggle related to these challenges  Group explored ways to set her day up for success and explore ways to set priorities  Relaxation technique reviewed which she found helpful  Some exploration of tx goals  Continue AT to encourage identification of stressors and wellness tools to manage stress consistently  Treatment Plan Problem(s): 1 1,1 2  JESSICA Lowery       Case Management Note:   [id number] Met with Janine  She identified anxiety related to leaving program Thursday, yet her need to be present in [place] during her grandson's surgery and recovery  She is "stressing" over money and her , however overall she is more rationale discussing her options and resources  She requested and was given information regarding several self-help books  TREATMENT SESSION NUMBER: 7   Current suicide risk is low  Medications not changed/added/denied  Author JESSICA Meehan      Active Problems    1  Abnormal blood chemistry (790 6) (R79 9)   2  Abnormal finding on lung imaging (793 19) (R91 8)   3   Abnormal findings on diagnostic imaging of abdomen (793 6) (R93 5)   4  Abnormal liver scan (794 8) (R93 2)   5  Acute maxillary sinusitis, recurrence not specified (461 0) (J01 00)   6  Acute sinusitis (461 9) (J01 90)   7  Atypical chest pain (786 59) (R07 89)   8  Benign colon polyp (211 3) (K63 5)   9  Bipolar II disorder (296 89) (F31 81)   10  Bladder disorder (596 9) (N32 9)   11  Bunion (727 1) (M21 619)   12  Chronic idiopathic thrombocytopenic purpura (287 31) (D69 3)   13  Colon cancer screening (V76 51) (Z12 11)   14  Current smoker (305 1) (F17 200)   15  Encounter for routine gynecological examination (V72 31) (Z01 419)   16  Encounter for screening mammogram for malignant neoplasm of breast (V76 12)    (Z12 31)   17  DREAD (generalized anxiety disorder) (300 02) (F41 1)   18  Herpes simplex type 1 infection (054 9) (B00 9)   19  History of tobacco use (V15 82) (Z87 891)   20  Hyperlipemia (272 4) (E78 5)   21  Hyperlipidemia (272 4) (E78 5)   22  Long term use of drug (V58 69) (Z79 899)   23  Lung nodule, solitary (793 11) (R91 1)   24  Mitral regurgitation (424 0) (I34 0)   25  Need for Tdap vaccination (V06 1) (Z23)   26  Obesity (278 00) (E66 9)   27  Other specified menopausal and perimenopausal disorders (627 8) (N95 8)   28  Panic disorder without agoraphobia (300 01) (F41 0)   29  Perimenopausal symptoms (627 2) (N95 1)   30  Preop examination (V72 84) (Z01 818)   31  Skin rash (782 1) (R21)   32  Thrombocytopenia (287 5) (D69 6)   33  Tobacco use (305 1) (Z72 0)   34  Urge and stress incontinence (788 33) (N39 46)   35  Viral syndrome (079 99) (B34 9)    Past Medical History    1  History of Abdominal pain, epigastric (789 06) (R10 13)   2  History of Abdominal pain, RUQ (789 01) (R10 11)   3  History of Conjunctivitis of left eye (372 30) (H10 9)   4  History of acute pharyngitis (V12 69) (Z87 09)   5  History of allergic reaction (V15 09) (Z88 9)   6  History of dermatitis (V13 3) (Z87 2)   7  Denied: History of head injury   8   History of headache (V13 89) (Z87 898)   9  History of Luteinized follicular cyst (260 6) (N83 00)   10  History of Menorrhagia (626 2) (N92 0)   11  Denied: History of Seizures   12  History of Uterine fibroid (218 9) (D25 9)    Allergies    1  BusPIRone HCl TABS    2  No Known Environmental Allergies   3  No Known Food Allergies    Current Meds   1  DULoxetine HCl - 60 MG Oral Capsule Delayed Release Particles; TAKE 1 CAPSULE   DAILY; Therapy: 59JDZ1535 to (Evaluate:2017)  Requested for: 17VGZ2136; Last   Rx:[id number] Ordered   2  LamoTRIgine 100 MG Oral Tablet; TAKE 1 TABLET TWICE DAILY; Therapy: 2012 to (Evaluate:2017)  Requested for: 91YHD0980; Last   Rx:[id number] Ordered   3  LORazepam 0 5 MG Oral Tablet; TAKE 1 TABLET 4 TIMES DAILY AS NEEDED; Last   Rx:[id number] Ordered    Family Psych History  Mother    1  Family history of Bipolar disorder   2  Family history of Lung Cancer (V16 1)   3  Family history of Mother  At Age ___  Father    3  Family history of Psychosis  Sister    5  Family history of Mother  At Age 61  Family History    6  Family history of Depression    Social History    · Denied: History of Alcohol use   · Current smoker (305 1) (F17 200)   · Daily caffeine consumption   · Denied: History of Drug Use   · Graduated from high school   · History of tobacco use (V15 82) (Z87 891)   · Tobacco use (305 1) (Z72 0)    Future Appointments    Date/Time Provider Specialty Site   2017 11:15 AM EMETERIO Bradford  Psychiatry [facility]IAL H[facility]PITAL[facility]   2017 10:45 AM EMETERIO Bradford  Psychiatry [facility] PARTIAL HOSPITAL[facility]   05/15/2017 04:15 PM EMETERIO Cade   Psychiatry  [address]   2017 01:00 PM Munira Hong [facility] Psychiatry [facility]   2017 06:30 PM Darline Zuleta  [facility]     Signatures   Electronically signed by : NICK Moyer; Mar 28 2017  1:37PM EST                       (Author)    Electronically signed by : Thalia Crowe RN; Mar 28 2017  2:25PM EST                       (Author)    Electronically signed by : JESSICA Martines; Mar 28 2017  3:02PM EST                       (Author)

## 2018-01-16 ENCOUNTER — ALLSCRIPTS OFFICE VISIT (OUTPATIENT)
Dept: OTHER | Facility: OTHER | Age: 53
End: 2018-01-16

## 2018-01-16 NOTE — PSYCH
1  Bipolar II disorder (296 89) (F31 81)   2  DREAD (generalized anxiety disorder) (300 02) (F41 1)      Date of Initial Treatment Plan: 5/1/17  Date of Current Treatment Plan: 5/1/17  Treatment Plan 5/[birth year]47  Strengths/Personal Resources for Self Care: Caring, loving, always there for people, giving  Diagnosis:   Axis I: DREAD 41 1; Panic DIiorder 41 0     Area of Needs: Anxiety, depression, overwhelmed with family situations,  Long Term Goals:   I have peace and understanding of myself   Target Date: 8/30/17      My relationship with my  is better   Target Date: 8/30/17      I have given up my 'obsession' with Alexsandra Riches   Target Date: 8/30/17    Short Term Objectives:   Goal 1:   1  Yoga, Mindfulness  2  Journaling  3  Complete SSD paperwork    Target Date: 8/30/17      Goal 2:   1  Explore health issues affecting relationship  2  Relationship building  3  Historic circumstances    Target Date: 8/30/17      Goal 3:   1  Acknowledging Jerzy's progress  2  Precess grief for medical diagnosis  Target Date: 8/30/17      GOAL 1: Modality: Individual 2 x per month Target Date: 8/30/17         GOAL 2: Modality: Individual 2 x per month Target Date: 8/30/17         GOAL 3: Modality: Individual 2 x per month Target Date: 8/30/17             The first scheduled review date is 8/30/17  The expected length of service is 120  Level of functioning at initial assessment: 70  The highest level of functioning in the past year was Unknown  The current level of functioning is 70               CLIENT COMMENTS / Please share your thoughts, feelings, need and/or experiences regarding your treatment plan: _____________________________________________________________________________________________________________________________________________________________________________________________________________________________________________________________________________________________________________________ Date/Time: ______________     Patient Signature: _________________________________ Date/Time: ______________       Electronically signed by : Shashank Obregon, SIDDHARTHW; May  1 2017  9:58AM EST                       (Author)

## 2018-01-16 NOTE — RESULT NOTES
Message   Recorded as Task   Date: 01/26/2016 12:48 PM, Created By: Luz Rodriguez   Task Name: Medical Complaint Callback   Assigned To: Ilya Linares   Regarding Patient: Rupert Shah, Status: In Progress   Comment:    Glo Sanchez - 26 Jan 2016 12:48 PM     TASK CREATED  C/o vulvar severe itching and burning x 1 week  No discharge or odor  [facility] [phone]  She can be reached anytime after 3pm    AP   Paula Mejia - 26 Jan 2016 12:52 PM     TASK IN PROGRESS   Paula Mejia - 26 Jan 2016 12:58 PM     TASK EDITED  spoke with pt    inf it has been over 2 years since her last visit, and she would have to schedule as a new pt    she will try otc med and cb to sched yrly with Leslie   Electronically signed by : Joleen Bean, ; Jan 26 2016 12:58PM EST                       (Author)

## 2018-01-16 NOTE — PSYCH
Progress Note  Psychotherapy Provided [facility]: Individual Psychotherapy 50 minutes provided today  Goals addressed in session:   Crisis management during session    D: Met with Janine marroquin  ROS; 'everything is crashing down'  Session focused upon financial constraints; son Alonzo Shanks 'sneaky behavior' and safety concerns with leaving Godwin Marshaller alone with him due to level of distraction with movies of cell phone, Godwin Vega is currently stable however Jerzy's mother has been diagnosed with Arlester Patee but never knew  Janine utilizing self care though superficial right now  ANxiety very high  Acknowledged fleeting SI have increased but no plan 'I would never do it  Sometimes it becomes way to much though'  Frequent anxiety attacks with heart palpations  EKG confirmed no issues at last PCP visit  Ativan effective as it 'takes away the intense anxiety'  A: Carli Ochoa presented with tearful affect, anxious and depressed mood  She remains overwhelmed with psychosocial stressors which affects her level of concentration and trigger's fleeting SI     P: Continue individual therapy  Ongoing support and discussion regarding family dynamics and self care       Pain Scale and Suicide Risk [facility]: Current Pain Assessment: moderate to severe   On a scale of 0 to 10, the patient rates current pain at 5   Current suicide risk is low   Behavioral Health Treatment Plan [facility]: Diagnosis and Treatment Plan explained to patient, patient relates understanding diagnosis and is agreeable to Treatment Plan  Results/Data  PHQ-9 Adult Depression Screening 23Oct2017 02:55PM Mata Page     Test Name Result Flag Reference   PHQ-9 Adult Depression Score 26     Over the last two weeks, how often have you been bothered by any of the following problems?   Little interest or pleasure in doing things: Nearly every day - 3  Feeling down, depressed, or hopeless: Nearly every day - 3  Trouble falling or staying asleep, or sleeping too much: Nearly every day - 3  Feeling tired or having little energy: Nearly every day - 3  Poor appetite or over eating: Nearly every day - 3  Feeling bad about yourself - or that you are a failure or have let yourself or your family down: Nearly every day - 3  Trouble concentrating on things, such as reading the newspaper or watching television: Nearly every day - 3  Moving or speaking so slowly that other people could have noticed  Or the opposite -  being so fidgety or restless that you have been moving around a lot more than usual: Nearly every day - 3  Thoughts that you would be better off dead, or of hurting yourself in some way: More than half the days - 2   PHQ-9 Adult Depression Screening Positive     PHQ-9 Difficulty Level Extremely difficult     PHQ-9 Severity Severe Depression         Assessment    1  Bipolar II disorder (296 89) (F31 81)   2   DREAD (generalized anxiety disorder) (300 02) (F41 1)    Signatures   Electronically signed by : Juanpablo Rodney LCSW; Oct 23 2017  3:02PM EST                       (Author)

## 2018-01-16 NOTE — PSYCH
Progress Note  Psychotherapy Provided [facility]: Individual Psychotherapy 50 minutes provided today  Goals addressed in session:   Addressed goals 1-3 of plan    D: Met with Janine individually  ROS; 'Things are bad'  Denied SI  Session focused upon relationship and communication obstacles between Radha and Jerzy's mother, Notified that Jerzy's mother is pregnant and she has 2 kids with medical concerns  Janine explained she stood up for herself with son and expressed her feelings to them the very first time  Self care, breathing exercises and boundaries for her son and his family was discussed  Utilizing Ativan for frequent anxiety attacks with chest pain under left breast     A: Janine demonstrated depressed mood and blunted affect  News of pregnancy is a big shock and acerbated depressive and anxiety symptoms already established  P: Continue individual therapy  Ongoing support and discussion regarding family , begin empty chair  Pain Scale and Suicide Risk [facility]: Current Pain Assessment: moderate to severe   On a scale of 0 to 10, the patient rates current pain at 5   Current suicide risk is low   Behavioral Health Treatment Plan [facility]: Diagnosis and Treatment Plan explained to patient, patient relates understanding diagnosis and is agreeable to Treatment Plan  Assessment    1  Bipolar II disorder (296 89) (F31 81)   2   DREAD (generalized anxiety disorder) (300 02) (F41 1)    Signatures   Electronically signed by : Branden Jiménez LCSW; Nov 14 2017  4:27PM EST                       (Author)
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
## 2018-01-16 NOTE — PSYCH
History of Present Illness  [facility] Clinical Progress Note [facility]:   Specialized Services Documentation - Therapist must complete separate progress note for each specific clinical activity in which the client participated during the day  (915) Group Psychotherapy: 9:30 to 10:30 Janine participated in a group discussing Barriers to Change  She was attentive to the group and participated in the discussion  She learned that there is a light at the end of the tunnel  She liked the support that she received from the group during the discussion  She continues to benefit from group participation  La Thakkar LPC  Treatment Plan Problem(s): 1 1, 1 2, 1 4      (915) Group Psychotherapy: (10:45-11:45) Janine participated in psychotherapy group  She was engaged and contributed to the group discussion  Moderate progress noted toward goals  Continue group to encourage Janine to explore stressors and coping skills  Aurelia Gross, [facility] Counseling Assistant)  Treatment Plan Problem(s): 1 1, 1 2      (915) Group Psychotherapy: [id number] Nick Mcneil participated in wellness group focused on choosing how to handle difficult situations that arise in daily life and crisis  Janine shared that she is feeling more positive now that the deadline for her son and his girlfriend to move out of her house is near  Nick Mcneil stated that she is trying to be more positive about his girlfriend's mothering of her special needs child and Nick Mcneil was able to name several strengths that this woman possessed  Bailey was able to relate to other peers in group who struggled with negatively when under stress or depressed  Bailey made moderate progress toward goals  Continue to offer group to present education on handling stressors with positive problem solving, identifying negative thinking and negative problem solving and turning using cognitive and behavioral strategies to arrive at more positive outcomes  Treatment Plan Problem(s): 1 1,1 2,1 4  Neda Hansen RN     (415) Education Therapy Goals set - organize papers/bills    Treatment Plan Problem(s): 1 1, 1 2  Education Therapy Time - 0900 - 0930 Previous goal was met  Readiness to Learning:  She is receptive to learning  There are  no barriers to learning  Learning Assessment Time - 1330 - 1400   Education completed on  illness and wellness tools  The teaching method was  verbal and written  Shared area of learning: Yes  [facility] MSW, LSW         Case Management Note: Individual Case Management visit not provided today  Current suicide risk is low  Medications not changed/added/denied  [facility] MSW, LSW      Active Problems    1  Abnormal blood chemistry (790 6) (R79 9)   2  Abnormal finding on lung imaging (793 19) (R91 8)   3  Abnormal findings on diagnostic imaging of abdomen (793 6) (R93 5)   4  Abnormal liver scan (794 8) (R93 2)   5  Acute maxillary sinusitis, recurrence not specified (461 0) (J01 00)   6  Acute sinusitis (461 9) (J01 90)   7  Atypical chest pain (786 59) (R07 89)   8  Benign colon polyp (211 3) (K63 5)   9  Bipolar II disorder (296 89) (F31 81)   10  Bladder disorder (596 9) (N32 9)   11  Bunion (727 1) (M21 619)   12  Chronic idiopathic thrombocytopenic purpura (287 31) (D69 3)   13  Colon cancer screening (V76 51) (Z12 11)   14  Current smoker (305 1) (F17 200)   15  Encounter for routine gynecological examination (V72 31) (Z01 419)   16  Encounter for screening mammogram for malignant neoplasm of breast (V76 12)    (Z12 31)   17  DREAD (generalized anxiety disorder) (300 02) (F41 1)   18  Herpes simplex type 1 infection (054 9) (B00 9)   19  History of tobacco use (V15 82) (Z87 891)   20  Hyperlipemia (272 4) (E78 5)   21  Hyperlipidemia (272 4) (E78 5)   22  Long term use of drug (V58 69) (Z79 899)   23  Lung nodule, solitary (793 11) (R91 1)   24  Mitral regurgitation (424 0) (I34 0)   25  Need for Tdap vaccination (V06 1) (Z23)   26   Obesity (278 00) (E66 9) 27  Other specified menopausal and perimenopausal disorders (627 8) (N95 8)   28  Panic disorder without agoraphobia (300 01) (F41 0)   29  Perimenopausal symptoms (627 2) (N95 1)   30  Preop examination (V72 84) (Z01 818)   31  Skin rash (782 1) (R21)   32  Thrombocytopenia (287 5) (D69 6)   33  Tobacco use (305 1) (Z72 0)   34  Urge and stress incontinence (788 33) (N39 46)   35  Viral syndrome (079 99) (B34 9)    Past Medical History    1  History of Abdominal pain, epigastric (789 06) (R10 13)   2  History of Abdominal pain, RUQ (789 01) (R10 11)   3  History of Conjunctivitis of left eye (372 30) (H10 9)   4  History of acute pharyngitis (V12 69) (Z87 09)   5  History of allergic reaction (V15 09) (Z88 9)   6  History of dermatitis (V13 3) (Z87 2)   7  Denied: History of head injury   8  History of headache (V13 89) (Z87 898)   9  History of Luteinized follicular cyst (694 0) (N83 00)   10  History of Menorrhagia (626 2) (N92 0)   11  Denied: History of Seizures   12  History of Uterine fibroid (218 9) (D25 9)    Allergies    1  BusPIRone HCl TABS    2  No Known Environmental Allergies   3  No Known Food Allergies    Current Meds   1  DULoxetine HCl - 60 MG Oral Capsule Delayed Release Particles; TAKE 1 CAPSULE   DAILY; Therapy: 19KFZ2663 to (Evaluate:2017)  Requested for: 52ODM1954; Last   Rx:[id number] Ordered   2  LamoTRIgine 100 MG Oral Tablet; TAKE 1 TABLET TWICE DAILY; Therapy: 2012 to (Evaluate:2017)  Requested for: 96IMP1859; Last   Rx:[id number] Ordered   3  LORazepam 0 5 MG Oral Tablet; TAKE 1 TABLET 4 TIMES DAILY AS NEEDED; Last   Rx:[id number] Ordered    Family Psych History  Mother    1  Family history of Bipolar disorder   2  Family history of Lung Cancer (V16 1)   3  Family history of Mother  At Age ___  Father    3  Family history of Psychosis  Sister    5  Family history of Mother  At Age 61  Family History    6   Family history of Depression    Social History · Denied: History of Alcohol use   · Current smoker (305 1) (F17 200)   · Daily caffeine consumption   · Denied: History of Drug Use   · Graduated from high school   · History of tobacco use (V15 82) (Z87 891)   · Tobacco use (305 1) (Z72 0)    Future Appointments    Date/Time Provider Specialty Site   03/28/2017 11:00 AM EMETERIO Olvera  Psychiatry [facility]   03/29/2017 11:15 AM EMETERIO Olvera  Psychiatry [facility]   03/30/2017 10:45 AM EMETERIO Olvera  Psychiatry [facility]   05/15/2017 04:15 PM EMETERIO Castillo  Psychiatry [facility]   04/06/2017 01:00 PM Fernando Barrera Psychiatry [facility] ASSOC THERAPISTS   03/28/2017 06:30 PM Carrie Fernandes, [facility] Internal Medicine [facility]   Electronically signed by :  Jose Maldonado [facility]; Mar 27 2017 11:27AM EST                       (Author)    Electronically signed by : KIKO Soria; Mar 27 2017 12:39PM EST                       (Author)    Electronically signed by : NICK Wiggins; Mar 27 2017  2:43PM EST                       (Author)    Electronically signed by : Lin Jack RN; Mar 27 2017  3:47PM EST                       (Author)

## 2018-01-16 NOTE — PSYCH
History of Present Illness  [facility] Clinical Progress Note [facility]:   Specialized Services Documentation - Therapist must complete separate progress note for each specific clinical activity in which the client participated during the day  (915) Group Psychotherapy: (10:45-11:45) Thuy Lowry participated in psychotherapy group focused on family conflict and managing anxiety  Thuy Lowry identified her grandson's mother as a stressor  She expressed feeling frustrated because of the constant demands of Janine and her  to take care of their grandson, and the lack of appreciation for all they do to help  She noted that her son, his girlfriend and her grandson will be moving out of their house in two days, and hopes that things will be better then  Peers provided Thuy Lowry with support and suggestions for ways to manage the situation, and Thuy Lowry expressed some consideration of suggestions but said that for right now she feels she has to just appease them to be able to get through the baby's surgery  Some mild progress noted toward goals  Continue psychotherapy group to encourage Janine to explore stressors and coping  Treatment Plan Problem(s): 1 1, 1 2  Conception Whitley KHALIL, LSW     (081) Group Psychotherapy: [id number] Thuy Lowry participated in wellness group focused on educating supports on how to help in recovery and in relapse  Thuy Lowry was attentive but very quiet in group and did not share personal information on supports education although Thuy Lowry was observed writing down notes regarding community supports such as ANN where families and supports could obtain information and other resources  Thuy Lowry made mild progress toward goals  Continue to offer group education to develop an individualized relapse prevention plan that includes supports, who are informed on how to help you, the client, as an individual, in recovery as well as in relapse  Treatment Plan Problem(s): 1 1,1 2,1 4   Negra Alarcon RN       (198) Education Therapy Goals set - stay positive despite family stressors    Treatment Plan Problem(s): 1 5  Education Therapy Time - 0900 - 0930 Previous goal was not met  Readiness to Learning:  She is receptive to learning  There are  no barriers to learning  Learning Assessment Time - 1330 - 1400   Education completed on  illness, aftercare and wellness tools  The teaching method was  verbal and demonstration  Shared area of learning: Yes  JESSICA Miller     (010) Allied Therapy [id number] Kristyn Suazo actively shared in [facility] group focused on challenging cognitive distortions and relaxation exercises  Fully engaged in therapist led relaxation exercises offering feedback related to benefits  Janine expressed struggling with âcritical self-talk like should, could, mustâ and found learning the different types of cognitive distortions to be helpful  She was quite upset before group started and was encouraged to focus on self and refocus thinking - which she stated she was able to accomplish  Group practiced CBT technique to challenge and change thoughts  Group reinforced practice of skill  Some effort noted toward goal  Continue AT to increase awareness and use of wellness tools  Treatment Plan Problem(s): 1 2  JESSICA Miller       Case Management Note:   2905-7919 Met with Kristyn Suazo  She was very upset with morning due to texts from her grandson's daughter  She fears that she will reschedule and delay the surgery and almost went home to "help her"  Reinforced what she has control of and what she doesn't  Discussed discharge plan due to her going to [place] next week  OP appointments arranged  Relapse prevention plan given  TREATMENT SESSION NUMBER: 8   Current suicide risk is low  Medications not changed/added/denied  JESSICA Miller      Active Problems    1  Abnormal blood chemistry (790 6) (R79 9)   2  Abnormal finding on lung imaging (793 19) (R91 8)   3   Abnormal findings on diagnostic imaging of abdomen (793 6) (R93 5)   4  Abnormal liver scan (794 8) (R93 2)   5  Acute maxillary sinusitis, recurrence not specified (461 0) (J01 00)   6  Acute sinusitis (461 9) (J01 90)   7  Atypical chest pain (786 59) (R07 89)   8  Benign colon polyp (211 3) (K63 5)   9  Bipolar II disorder (296 89) (F31 81)   10  Bladder disorder (596 9) (N32 9)   11  Bunion (727 1) (M21 619)   12  Chronic idiopathic thrombocytopenic purpura (287 31) (D69 3)   13  Colon cancer screening (V76 51) (Z12 11)   14  Current smoker (305 1) (F17 200)   15  Encounter for routine gynecological examination (V72 31) (Z01 419)   16  Encounter for screening mammogram for malignant neoplasm of breast (V76 12)    (Z12 31)   17  DREAD (generalized anxiety disorder) (300 02) (F41 1)   18  Herpes simplex type 1 infection (054 9) (B00 9)   19  History of tobacco use (V15 82) (Z87 891)   20  Hyperlipemia (272 4) (E78 5)   21  Hyperlipidemia (272 4) (E78 5)   22  Long term use of drug (V58 69) (Z79 899)   23  Lung nodule, solitary (793 11) (R91 1)   24  Mitral regurgitation (424 0) (I34 0)   25  Need for Tdap vaccination (V06 1) (Z23)   26  Obesity (278 00) (E66 9)   27  Other specified menopausal and perimenopausal disorders (627 8) (N95 8)   28  Panic disorder without agoraphobia (300 01) (F41 0)   29  Perimenopausal symptoms (627 2) (N95 1)   30  Preop examination (V72 84) (Z01 818)   31  Skin rash (782 1) (R21)   32  Thrombocytopenia (287 5) (D69 6)   33  Tobacco use (305 1) (Z72 0)   34  Urge and stress incontinence (788 33) (N39 46)   35  Viral syndrome (079 99) (B34 9)    Past Medical History    1  History of Abdominal pain, epigastric (789 06) (R10 13)   2  History of Abdominal pain, RUQ (789 01) (R10 11)   3  History of Conjunctivitis of left eye (372 30) (H10 9)   4  History of acute pharyngitis (V12 69) (Z87 09)   5  History of allergic reaction (V15 09) (Z88 9)   6  History of dermatitis (V13 3) (Z87 2)   7  Denied: History of head injury   8  History of headache (V13 89) (Z87 898)   9  History of Luteinized follicular cyst (257 6) (N83 00)   10  History of Menorrhagia (626 2) (N92 0)   11  Denied: History of Seizures   12  History of Uterine fibroid (218 9) (D25 9)    Allergies    1  BusPIRone HCl TABS    2  No Known Environmental Allergies   3  No Known Food Allergies    Current Meds   1  DULoxetine HCl - 60 MG Oral Capsule Delayed Release Particles; TAKE 1 CAPSULE   DAILY; Therapy: 75FUI4951 to (Evaluate:2017)  Requested for: 00GOZ8548; Last   Rx:[id number] Ordered   2  LamoTRIgine 100 MG Oral Tablet; TAKE 1 TABLET TWICE DAILY; Therapy: 2012 to (Evaluate:2017)  Requested for: 10AXQ5784; Last   Rx:[id number] Ordered   3  LORazepam 0 5 MG Oral Tablet; TAKE 1 TABLET 4 TIMES DAILY AS NEEDED; Last   Rx:[id number] Ordered    Family Psych History  Mother    1  Family history of Bipolar disorder   2  Family history of Lung Cancer (V16 1)   3  Family history of Mother  At Age ___  Father    3  Family history of Psychosis  Sister    5  Family history of Mother  At Age 61  Family History    6  Family history of Depression    Social History    · Denied: History of Alcohol use   · Current smoker (305 1) (F17 200)   · Daily caffeine consumption   · Denied: History of Drug Use   · Graduated from high school   · History of tobacco use (V15 82) (Z87 891)   · Tobacco use (305 1) (Z72 0)    Future Appointments    Date/Time Provider Specialty Site   2017 10:45 AM EMETERIO Oseguera  Psychiatry [facility] PARTIAL HOSPITALIZATION   05/15/2017 04:15 PM EMETERIO Dee   Psychiatry [facility]   2017 01:[facility] [address] [facility]     Signatures   Electronically signed by : Claudetta Clarke, MSWLSW; Mar 29 2017  1:17PM EST                       (Author)    Electronically signed by : JESSICA Verduzco; Mar 29 2017  3:14PM EST                       (Author) Electronically signed by : Casi Villeda RN; Mar 29 2017  3:52PM EST                       (Author)
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
## 2018-01-16 NOTE — PSYCH
Progress Note  Psychotherapy Provided [facility]: Individual Psychotherapy 50 minutes provided today  Goals addressed in session:   Addressed treatment goals 1-3 of initial treatment plan    D: Met with Janine individually  ROS; depression has increased due to additional medical issues for grandson  Exploration of psychosocial stressors  Began exploration of historic trauma  Denied SI    A: Jolanta Net presented as anxious and tearful  Appropriate for topics of discussion  Demonstrating progress  Denied SI  PHQ 16     P: Continue biweekly individually therapy  Ongoing ROS; process psychosocial stressors  Pain Scale and Suicide Risk [facility]: Current Pain Assessment: no pain   Current suicide risk is low   Behavioral Health Treatment Plan [address] [place] [address] [address]: Diagnosis and Treatment Plan explained to patient, patient relates understanding diagnosis and is agreeable to Treatment Plan  Assessment    1  DREAD (generalized anxiety disorder) (300 02) (F41 1)   2  Bipolar II disorder (296 89) (F31 81)   3   Panic disorder without agoraphobia (300 01) (F41 0)    Signatures   Electronically signed by : Sunnie Cogan, LCSW; May  9 2017  6:00PM EST                       (Author)
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
## 2018-01-17 NOTE — PSYCH
Progress Note  Psychotherapy Provided [facility]: Individual Psychotherapy 50 minutes provided today  Goals addressed in session:   Crisis management during session    D: Met with Janine DIAZ; Denies SI but 'just wants to leave this place'  States she would never leave her grandchildren  Session focused upon marriage issues, newly diagnosed medical issues with Mitchel Smiley and communication with son and Jerzy's mother whom both struggle with mental health issues  Quinten Mac is also awaiting her disability claim which she had to challenge  A: Quinten Mac presented as tearful with periods of sobbing due to topics of discussion  Due to financial constraints she has been unable to come to all her appointments  Hardship letter submitted by Quinten Mac to [facility]  P: Continue individual therapy  Ongoing support and discussion regarding family dynamics and grandson's illness  Pain Scale and Suicide Risk [facility]: Current Pain Assessment: no pain   Current suicide risk is low   Behavioral Health Treatment Plan [employer]: Diagnosis and Treatment Plan explained to patient, patient relates understanding diagnosis and is agreeable to Treatment Plan  Assessment    1  Bipolar II disorder (296 89) (F31 81)   2   DREAD (generalized anxiety disorder) (300 02) (F41 1)    Signatures   Electronically signed by : Rosa Montana LCSW; Sep 18 2017  4:58PM EST                       (Author)

## 2018-01-17 NOTE — PSYCH
Date of Initial Treatment Plan: 5/1/17  Date of Current Treatment Plan: 11/2/17  Treatment Plan 2  Strengths/Personal Resources for Self Care: Caring, loving, always there for people, giving  Diagnosis:   Axis I: DREAD 41 1; Panic DIiorder 41 0     Area of Needs: Anxiety, depression, overwhelmed with family situations,  Long Term Goals:   I have peace and understanding of myself   Target Date: 2/28/18      My relationship with my  is better   Target Date: 2/28/18      I have given up my 'obsession' with Crystal Alcala   Target Date: 2/28/18    Short Term Objectives:   Goal 1:   1  Yoga, Mindfulness  2  Journaling  3  Complete SSD paperwork    Target Date: 2/28/18      Goal 2:   1  Explore health issues affecting relationship  2  Relationship building  3  Historic circumstances    Target Date: 2/28/18      Goal 3:   1  Acknowledging Jerzy's progress  2  Precess grief for medical diagnosis  Target Date: 2/28/18      GOAL 1: Modality: Individual 2 x per month Target Date: 2/28/18       The person(s) responsible for carrying out the plan is Janine and Mkene Betzaida  GOAL 2: Modality: Individual 2 x per month Target Date: 2/28/18       The person(s) responsible for carrying out the plan is Janine and Ileene Betzaida  GOAL 3: Modality: Individual 2 x per month Target Date: 2/28/18         The person(s) responsible for carrying out the plan is Janine and Ileene Betzaida  The first scheduled review date is 2/28/18  The expected length of service is 120  Level of functioning at initial assessment: 70  The highest level of functioning in the past year was 70  The current level of functioning is 70               CLIENT COMMENTS / Please share your thoughts, feelings, need and/or experiences regarding your treatment plan: _____________________________________________________________________________________________________________________________________________________________________________________________________________________________________________________________________________________________________________________ Date/Time: ______________     Patient Signature: _________________________________ Date/Time: ______________       Electronically signed by : Laura Gates LCSW; Nov 2 2017 11:24AM EST                       (Author)

## 2018-01-17 NOTE — PROGRESS NOTES
Assessment    1  Acute maxillary sinusitis, recurrence not specified (461 0) (J01 00)   2  Bipolar II disorder, most recent episode major depressive (296 89) (F31 81)   3  Current smoker (305 1) (F17 200)    Plan  Unlinked    · Amoxicillin-Pot Clavulanate 875-125 MG Oral Tablet (Augmentin); TAKE 1 TABLET  EVERY 12 HOURS WITH MEALS UNTIL GONE    Discussion/Summary    ANTIBIOTIC AS ORDERED  CONT CURRENT MEDS  CALL IF NOT BETTER OR WORSENS  Possible side effects of new medications were reviewed with the patient/guardian today  The treatment plan was reviewed with the patient/guardian  The patient/guardian understands and agrees with the treatment plan      Chief Complaint  Patient presents with head congestion, itchy throat, nasal drip, and cough times 1 week  History of Present Illness  HPI: HERE FOR ILLNESS FOR ONE WEEK  USED MUCINEX AND SINUS MEDS WITH MINIMAL RELIEF  HEAD CONGESTION, HA, EAR PAIN AND PRESSURE  SCRATCHY THROAT  SMOKING  NO FEVERS   Cold Symptoms: Genevieve Douglas presents with complaints of cold symptoms  Associated symptoms include sneezing, nasal congestion, runny nose, post nasal drainage, scratchy throat, hoarseness, facial pressure, facial pain, headache and fatigue, but no sore throat, no dry cough, no productive cough, no plugged ear(s), no ear pain, no swollen lymph nodes, no wheezing, no shortness of breath, no weakness, no nausea, no vomiting, no diarrhea, no fever and no chills  Review of Systems    Constitutional: as noted in HPI    ENT: as noted in HPI  Cardiovascular: no complaints of slow or fast heart rate, no chest pain, no palpitations, no leg claudication or lower extremity edema  Respiratory: no complaints of shortness of breath, no wheezing, no dyspnea on exertion, no orthopnea or PND  Breasts: no complaints of breast pain, breast lump or nipple discharge     Gastrointestinal: no complaints of abdominal pain, no constipation, no nausea or diarrhea, no vomiting, no bloody stools  Genitourinary: no complaints of dysuria, no incontinence, no pelvic pain, no dysmenorrhea, no vaginal discharge or abnormal vaginal bleeding  Musculoskeletal: no complaints of arthralgia, no myalgia, no joint swelling or stiffness, no limb pain or swelling  Integumentary: no complaints of skin rash or lesion, no itching or dry skin, no skin wounds  Neurological: as noted in HPI  Active Problems    1  Abnormal blood chemistry (790 6) (R79 9)   2  Abnormal finding on lung imaging (793 19) (R91 8)   3  Abnormal findings on diagnostic imaging of abdomen (793 6) (R93 5)   4  Abnormal liver scan (794 8) (R93 2)   5  Benign colon polyp (211 3) (K63 5)   6  Bipolar II disorder, most recent episode major depressive (296 89) (F31 81)   7  Bladder disorder (596 9) (N32 9)   8  Bunion (727 1) (M20 10)   9  Chronic idiopathic thrombocytopenic purpura (287 31) (D69 3)   10  Colon cancer screening (V76 51) (Z12 11)   11  Current smoker (305 1) (F17 200)   12  Encounter for routine gynecological examination (V72 31) (Z01 419)   13  Encounter for screening mammogram for malignant neoplasm of breast (V76 12)    (Z12 31)   14  DREAD (generalized anxiety disorder) (300 02) (F41 1)   15  Herpes simplex type 1 infection (054 9) (B00 9)   16  History of tobacco use (V15 82) (Z87 891)   17  Hyperlipidemia (272 4) (E78 5)   18  Long term use of drug (V58 69) (Z79 899)   19  Lung nodule, solitary (793 11) (R91 1)   20  Need for Tdap vaccination (V06 1) (Z23)   21  Obesity (278 00) (E66 9)   22  Other specified menopausal and perimenopausal disorders (627 8) (N95 8)   23  Panic disorder without agoraphobia (300 01) (F41 0)   24  Preop examination (V72 84) (Z01 818)   25  Thrombocytopenia (287 5) (D69 6)   26  Tobacco use (305 1) (Z72 0)   27  Urge and stress incontinence (788 33) (N39 46)    Past Medical History  Active Problems And Past Medical History Reviewed:    The active problems and past medical history were reviewed and updated today  Social History    · Denied: History of Alcohol use   · Once weekly  · Current smoker (305 1) (F17 200)   · Denied: History of Drug Use   · History of tobacco use (V15 82) (F47 447)   · Tobacco use (305 1) (Z72 0)  The social history was reviewed and updated today  The social history was reviewed and is unchanged  Family History  Family History Reviewed: The family history was reviewed and updated today  Current Meds   1  DULoxetine HCl - 60 MG Oral Capsule Delayed Release Particles; TAKE 1 CAPSULE   DAILY; Therapy: 98VYQ4032 to (Evaluate:37Xrm5894)  Requested for: 32YHZ9634; Last   Rx:98Pwe3536 Ordered   2  LamoTRIgine 100 MG Oral Tablet; TAKE 1 TABLET TWICE DAILY; Therapy: 48YGE7376 to (Evaluate:74Bhu6764)  Requested for: 85XCF9317; Last   Rx:29Spk1573 Ordered   3  LORazepam 0 5 MG Oral Tablet; TAKE 1 TABLET 3 TIMES DAILY AS NEEDED; Last   Rx:88Hac4970 Ordered   4  Myrbetriq 50 MG Oral Tablet Extended Release 24 Hour; Takes one daily in the morning; Therapy: (Recorded:67Cyl7257) to Recorded    The medication list was reviewed and updated today  Allergies    1  BusPIRone HCl TABS    2  No Known Environmental Allergies   3  No Known Food Allergies    Vitals   Recorded: 99BTH0458 03:25PM   Temperature 98 8 F    Heart Rate 81    Respiration 18    Systolic 941    Diastolic 80    Height 5 ft 7 in    Patient Refused Weight Yes Yes   O2 Saturation 96      Physical Exam    Constitutional   General appearance: No acute distress, well appearing and well nourished  Eyes   Conjunctiva and lids: No swelling, erythema or discharge  Pupils and irises: Equal, round and reactive to light  Ears, Nose, Mouth, and Throat   External inspection of ears and nose: Normal     Otoscopic examination: Tympanic membranes translucent with normal light reflex  Canals patent without erythema  Nasal mucosa, septum, and turbinates: Normal without edema or erythema      Oropharynx: Normal with no erythema, edema, exudate or lesions  Pulmonary   Respiratory effort: No increased work of breathing or signs of respiratory distress  Auscultation of lungs: Clear to auscultation  Cardiovascular   Auscultation of heart: Normal rate and rhythm, normal S1 and S2, without murmurs  Examination of extremities for edema and/or varicosities: Normal     Abdomen   Abdomen: Non-tender, no masses  Liver and spleen: No hepatomegaly or splenomegaly  Lymphatic   Palpation of lymph nodes in neck: No lymphadenopathy  Musculoskeletal   Gait and station: Normal     Digits and nails: Normal without clubbing or cyanosis  Inspection/palpation of joints, bones, and muscles: Normal     Skin   Skin and subcutaneous tissue: Normal without rashes or lesions  Neurologic   Reflexes: 2+ and symmetric  Sensation: No sensory loss  Psychiatric   Orientation to person, place, and time: Normal     Mood and affect: Normal          Future Appointments    Date/Time Provider Specialty Site   03/09/2016 04:30 PM EMETERIO Urena   [facility]   03/03/2016 06:00 PM Suri Noonan MD Internal Medicine [facility]     Signatures   Electronically signed [facility] : Darline Betancourt [facility]; Jan 18 2016  3:36PM EST                       (Author)    Electronically signed by : Caren Darby DO; Jan 18 2016  6:52PM EST                       (Author)

## 2018-01-17 NOTE — PSYCH
Progress Note  Psychotherapy Provided [facility]: Individual Psychotherapy 50 minutes provided today  Goals addressed in session:   Crisis management during session    D: Met with Janine individually  ROS; 'I'm going insane'  Denied SI  Session focused upon relationship and communication obstacles between [place] and [id number] mother, feeling hurt and betrayed by a facebook post  [place] setting strongly encouraged  Janine explained she's a 'push over' and consistently falls for the 'guilt trips' from son and girlfriend  Pain from neck remains an issue  Going to [facility] tomorrow to visit with cousin for the weekend  Treatment plan reassessed  No changes made  A: Janine demonstrated depressed mood and blunted affect  She is caught between challenging relationships with both her  and son's girlfriend who lives [place]  She is unable to verbal her feelings to them, will begin empty chair process next session to help alleviate feelings of hurt, anger and betrayal     P: Continue individual therapy  Ongoing support and discussion regarding family , begin empty chair  Pain Scale and Suicide Risk [facility]: Current Pain Assessment: moderate to severe   On a scale of 0 to 10, the patient rates current pain at 5   Current suicide risk is low   Behavioral Health Treatment Plan [facility]: Diagnosis and Treatment Plan explained to patient, patient relates understanding diagnosis and is agreeable to Treatment Plan  Assessment    1  DREAD (generalized anxiety disorder) (300 02) (F41 1)   2   Bipolar II disorder (296 89) (F31 81)    Signatures   Electronically signed by : Richard Geller LCSW; Nov 2 2017  4:47PM EST                       (Author)

## 2018-01-17 NOTE — PSYCH
History of Present Illness  [facility] Progress [facility]:   Specialized Services Documentation - Therapist must complete separate progress note for each specific clinical activity in which the client participated during the day  (867) Group Psychotherapy: (9:30-10:30) Dylan Vyas participated in psychotherapy group focused on feeling overwhelmed and setting boundaries in relationships  Dylan Vyas identified her grandson possibly having surgery soon as a stressor  She talked about feeling very overwhelmed by all the things that need to get done, including getting 401K paperwork from her employer and worry about her grandson  She talked about using to do lists to help her manage things in the here and now and trying not to worry about things too far in the future  She provided good feedback and suggestions to peers also struggling with similar feelings  Some mild progress noted toward goals today  Continue psychotherapy group to encourage Janine to explore stressors and coping  Treatment Plan Problem(s): 1 1, 1 2  Clyde Walton MSW, LSW     (368) Group Psychotherapy: 6819-1445 Dylan Vyas participated in wellness group focused on identifying what a healthy boundary is and how to begin to practice setting healthy boundaries with family, friends, acquaintances and co-workers  Dylan Vyas shared that she had a difficult childhood and that there were not good boundaries set in her home  Dylan Vyas stated that it is "very challenging" for her to set any healthy boundary  She described feeling "ashamed" because she shared all of her private struggles and information with co-workers  She describes regretting doing this because, she stated, that they are interested in what is happening (the gossip) but not supportive of me   Dylan Vyas stated that she is equally challenged at home and her  also recognized how she did not set healthy limits with her adult son who has been living with them and now the situation has turned to one of anger on her part as well as her son's part  Janine related that her son has no respect for her, or her home, and has taken advantage of her generosity and inability set healthy boundaries  Shane Higgins made good beginning progress toward goals  Continue to offer group to educate, and provide peer practice, support and feedback in building wellness skills of setting and maintain healthy boundaries now and in recovery  Treatment Plan Problem(s): 1 1,1 2,1 4  Malgorzata Marquez RN       (724) Education Therapy Goals set - speak up with her son related to boundaries group    Treatment Plan Problem(s): 1 1  Education Therapy Time - 0900 - 0930 Previous goal was met  Readiness to Learning:  She is receptive to learning  There are  no barriers to learning  Learning Assessment Time - 1330 - 1400   Education completed on  illness and wellness tools  The teaching method was  verbal, written and audio/visual  Shared area of learning: Yes  JESSICA Orr     (439) Allied Therapy 1163-4440 Shane Higgins actively shared in [facility] group focused on support and resources  During ange discussion, she shared as peers identified the tendency to want to isolate and try and âhideâ from issues  Group discussed value of and resources for support  Personal role in connecting with social, emotional, professional, financial and spiritual resources explored  Shane Ronaldo identified the need to put effort into growing emotional and interpersonal supports  Some beginning effort noted toward goal  Continue AT to encourage awareness and role in wellness  Treatment Plan Problem(s): 1 1,1 4  JESSICA Orr       Case Management Note:   7944-1764 Met with Janine  Reviewed and given copy of treatment plan and WRAP concept  She identified a quieter than usual evening last night, however becomes frustrated around her son (who spent over 100 dollars on a new tattoo when the household in 400 dollars short with electric company)   She appears to have difficulties with her son and worries that he does not follow through with needs like paying their portion of the rent  She did speak with the landlord related to downsizing prior to the end of their current lease  She denied any initial concerns related to program    TREATMENT SESSION NUMBER: 2   Current suicide risk is low  Medications not changed/added/denied  Doe [place], [facility]      Active Problems    1  Abnormal blood chemistry (790 6) (R79 9)   2  Abnormal finding on lung imaging (793 19) (R91 8)   3  Abnormal findings on diagnostic imaging of abdomen (793 6) (R93 5)   4  Abnormal liver scan (794 8) (R93 2)   5  Acute maxillary sinusitis, recurrence not specified (461 0) (J01 00)   6  Acute sinusitis (461 9) (J01 90)   7  Atypical chest pain (786 59) (R07 89)   8  Benign colon polyp (211 3) (K63 5)   9  Bipolar II disorder (296 89) (F31 81)   10  Bladder disorder (596 9) (N32 9)   11  Bunion (727 1) (M21 619)   12  Chronic idiopathic thrombocytopenic purpura (287 31) (D69 3)   13  Colon cancer screening (V76 51) (Z12 11)   14  Current smoker (305 1) (F17 200)   15  Encounter for routine gynecological examination (V72 31) (Z01 419)   16  Encounter for screening mammogram for malignant neoplasm of breast (V76 12)    (Z12 31)   17  DREAD (generalized anxiety disorder) (300 02) (F41 1)   18  Herpes simplex type 1 infection (054 9) (B00 9)   19  History of tobacco use (V15 82) (Z87 891)   20  Hyperlipemia (272 4) (E78 5)   21  Hyperlipidemia (272 4) (E78 5)   22  Long term use of drug (V58 69) (Z79 899)   23  Lung nodule, solitary (793 11) (R91 1)   24  Mitral regurgitation (424 0) (I34 0)   25  Need for Tdap vaccination (V06 1) (Z23)   26  Obesity (278 00) (E66 9)   27  Other specified menopausal and perimenopausal disorders (627 8) (N95 8)   28  Panic disorder without agoraphobia (300 01) (F41 0)   29  Perimenopausal symptoms (627 2) (N95 1)   30  Preop examination (V72 84) (Z01 818)   31   Skin rash (782 1) (R21)   32  Thrombocytopenia (287 5) (D69 6)   33  Tobacco use (305 1) (Z72 0)   34  Urge and stress incontinence (788 33) (N39 46)   35  Viral syndrome (079 99) (B34 9)    Past Medical History    1  History of Abdominal pain, epigastric (789 06) (R10 13)   2  History of Abdominal pain, RUQ (789 01) (R10 11)   3  History of Conjunctivitis of left eye (372 30) (H10 9)   4  History of acute pharyngitis (V12 69) (Z87 09)   5  History of allergic reaction (V15 09) (Z88 9)   6  History of dermatitis (V13 3) (Z87 2)   7  Denied: History of head injury   8  History of headache (V13 89) (Z87 898)   9  History of Luteinized follicular cyst (613 7) (N83 00)   10  History of Menorrhagia (626 2) (N92 0)   11  Denied: History of Seizures   12  History of Uterine fibroid (218 9) (D25 9)    Allergies    1  BusPIRone HCl TABS    2  No Known Environmental Allergies   3  No Known Food Allergies    Current Meds   1  DULoxetine HCl - 60 MG Oral Capsule Delayed Release Particles; TAKE 1 CAPSULE   DAILY; Therapy: 30ALO8146 to (Evaluate:2017)  Requested for: 60JPI9282; Last   Rx:[id number] Ordered   2  LamoTRIgine 100 MG Oral Tablet; TAKE 1 TABLET TWICE DAILY; Therapy: 35Wlu5758 to (Evaluate:2017)  Requested for: 37OPQ4352; Last   Rx:[id number] Ordered   3  LORazepam 0 5 MG Oral Tablet; TAKE 1 TABLET 4 TIMES DAILY AS NEEDED; Last   Rx:[id number] Ordered    Family Psych History  Mother    1  Family history of Bipolar disorder   2  Family history of Lung Cancer (V16 1)   3  Family history of Mother  At Age ___  Father    3  Family history of Psychosis  Sister    5  Family history of Mother  At Age 61  Family History    6   Family history of Depression    Social History    · Denied: History of Alcohol use   · Current smoker (305 1) (F17 200)   · Daily caffeine consumption   · Denied: History of Drug Use   · Graduated from high school   · History of tobacco use (V15 82) (Z87 891)   · Tobacco use (305 1) (Z72 0)    Future Appointments    Date/Time Provider Specialty Site   03/22/2017 10:00 AM EMETERIO Funes  Psychiatry [facility]   03/23/2017 11:15 AM EMETERIO uFnes  Psychiatry [facility]   03/24/2017 10:00 AM EMETERIO Funes  [facility]iatry [facility]   05/15/2017 04:15 PM EMETERIO Moya   Psychiatry [facility]   04/06/2017 01:00 PM Lizz Palacios [facility] Psychiatry [facility]   03/28/2017 06:30 PM Xiao Rodgers [address] [place] [address] Internal Medicine [facility]     Signatures   Electronically signed by : NICK Vargas; Mar 21 2017 10:57AM EST                       (Author)    Electronically signed by : JESSICA Darby; Mar 21 2017  2:02PM EST                       (Author)    Electronically signed by : Kd Mooney RN; Mar 21 2017  2:12PM EST                       (Author)

## 2018-01-18 ENCOUNTER — GENERIC CONVERSION - ENCOUNTER (OUTPATIENT)
Dept: OTHER | Facility: OTHER | Age: 53
End: 2018-01-18

## 2018-01-18 ENCOUNTER — ALLSCRIPTS OFFICE VISIT (OUTPATIENT)
Dept: OTHER | Facility: OTHER | Age: 53
End: 2018-01-18

## 2018-01-18 DIAGNOSIS — K21.9 GASTRO-ESOPHAGEAL REFLUX DISEASE WITHOUT ESOPHAGITIS: ICD-10-CM

## 2018-01-18 NOTE — PSYCH
Provider Comments  Provider Comments:   No Show for Evaluation Psychotherapy patrice't at 2:15pm, so this Therapist tried to call pt---> her line is busy, x2  Will await for her to reschedule  --EMETERIO Servin MS, APRN, [address] [place] [address]      Signatures   Electronically signed by : Cornelius Barnard MSAPRNPMHCNS-BC; Aug  9 2016  2:36PM EST                       (Author)
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
## 2018-01-18 NOTE — MISCELLANEOUS
Message   Recorded as Task   Date: 03/08/2017 01:56 PM, Created By: Yamile Santana   Task Name: Call Back   Assigned To: Mahad Richardson   Regarding Patient: Coretta Zuleta, Status: Active   Comment:    Candy Wiggins - 08 Mar 2017 1:56 PM     TASK CREATED  Caller: Self; [phone] (Home); [phone] (Work)  KANE WOULD LIKE YOU TO CALL HER BACK  SHE IS HAVING A PROBLEM AND IS VERY OVER Modocariella MIDDLETON WOULD ONLY TELL ME THIS  KANE  # [phone]   Spoke with patient - feels more depressed and more anxious, thinks she had "nervous breakdown due to issues at work  States she resigned from her job  Not suicidal at present, but had on and off passive death wish last few weeks  Wanted me to help her get an earlier appointment with therapist, but agreed instead to start Partial Program   Plan: refer to [facility]        Signatures   Electronically signed by : EMETERIO Cruz ; Mar  8 2017  2:34PM EST                       (Author)

## 2018-01-18 NOTE — PSYCH
History of Present Illness  [facility] Clinical Progress Note [facility]:   Specialized Services Documentation - Therapist must complete separate progress note for each specific clinical activity in which the client participated during the day  (598) Group Psychotherapy: (9:30-10:30) Galindo Leary participated in psychotherapy group focused on regrets and gaining closure  Galindo Leary identified her grandson's surgery as a stressor  She was an active participant in group discussion, talking about things she did in the past that she regrets doing, but realizing now that she was just doing what he knew and did the best she could to cope  She provided peers feedback about ways to communicate regrets and emotions so that each can gain some closure and move forward in life  Some moderate progress noted toward goals  Discharge at the end of program day today  Treatment Plan Problem(s): 1 1, 1 2  Emilia KHALIL, MANAW     (339) Group Psychotherapy: [id number] Galindo Leary participated in wellness group focused today on discussion of utilizing journaling as a strategy for improved cognitive and behavioral wellness  How journaling can help each individual in recovery from mental illness as well as types of journaling were reviewed, and discussed such as medication, dream, guided, symptom, creative journaling, etcâLaureen shared that she was especially interested in trying some guided journaling where she answered questions that were already prepared to help her get to know herself better  Galindo Leary made good progress toward goals  D/C today  Treatment Plan Problem(s): 1 1,1 2  Chavez Dale RN       (636) Education Therapy Goals set - discharge at the end of treatment day    Treatment Plan Problem(s): 1 0    Education Therapy Time - 0900 - 0930 Previous goal was met  Readiness to Learning:  She is receptive to learning  There are  no barriers to learning    Learning Assessment Time - 1330 - 1400   Education completed on  illness, medication, aftercare and wellness tools  The teaching method was  verbal and written  Shared area of learning: Yes  JESSICA Sanchez     (956) Allied Therapy [id number] Alexis Kumari actively shared in [facility] group focused on listening  She engaged in ange tasks exploring challenges to listening patterns and ways to feel heard  She appeared attentive and offered personal feedback as strategies to improve listening skills explored  Group discussed need not only to set limits with listening to others, but also with self and to learn to listen to one's healthy self and specific strategies to do so (ie  WRAP, goal setting, healthy self talk)  Some effort toward goal noted  Discharge at the end of treatment day  Treatment Plan Problem(s): 1 5  JESSICA Sanchez       Case Management Note:   [id number] Met with Alexis Kumari to review discharge readiness  She identified she is less tearful, feels "lighter" and more hopeful and her thinking is clearer as signs of improvement  She denied SI, HI and psychosis  Reviewed the relapse prevention plan she completed, medication reconciliation form and aftercare plan  She was able to identify what items she could take to [place] to help keep herself well  Discharge at end of treatment day with OP providers to received summary  TREATMENT SESSION NUMBER: 9   Current suicide risk is low  Medications not changed/added/denied  JESSICA Sanchez   [facility] Follow-up Physician's Orders:   3/30/2017  8:20 AM Discharge Today   MD Emmy Santo RN      Active Problems    1  Abnormal blood chemistry (790 6) (R79 9)   2  Abnormal finding on lung imaging (793 19) (R91 8)   3  Abnormal findings on diagnostic imaging of abdomen (793 6) (R93 5)   4  Abnormal liver scan (794 8) (R93 2)   5  Acute maxillary sinusitis, recurrence not specified (461 0) (J01 00)   6  Acute sinusitis (461 9) (J01 90)   7  Atypical chest pain (786 59) (R07 89)   8   Benign colon polyp (211 3) (K63 5)   9  Bipolar II disorder (296 89) (F31 81)   10  Bladder disorder (596 9) (N32 9)   11  Bunion (727 1) (M21 619)   12  Chronic idiopathic thrombocytopenic purpura (287 31) (D69 3)   13  Colon cancer screening (V76 51) (Z12 11)   14  Current smoker (305 1) (F17 200)   15  Encounter for routine gynecological examination (V72 31) (Z01 419)   16  Encounter for screening mammogram for malignant neoplasm of breast (V76 12)    (Z12 31)   17  DREAD (generalized anxiety disorder) (300 02) (F41 1)   18  Herpes simplex type 1 infection (054 9) (B00 9)   19  History of tobacco use (V15 82) (Z87 891)   20  Hyperlipemia (272 4) (E78 5)   21  Hyperlipidemia (272 4) (E78 5)   22  Long term use of drug (V58 69) (Z79 899)   23  Lung nodule, solitary (793 11) (R91 1)   24  Mitral regurgitation (424 0) (I34 0)   25  Need for Tdap vaccination (V06 1) (Z23)   26  Obesity (278 00) (E66 9)   27  Other specified menopausal and perimenopausal disorders (627 8) (N95 8)   28  Panic disorder without agoraphobia (300 01) (F41 0)   29  Perimenopausal symptoms (627 2) (N95 1)   30  Preop examination (V72 84) (Z01 818)   31  Skin rash (782 1) (R21)   32  Thrombocytopenia (287 5) (D69 6)   33  Tobacco use (305 1) (Z72 0)   34  Urge and stress incontinence (788 33) (N39 46)   35  Viral syndrome (079 99) (B34 9)    Past Medical History    1  History of Abdominal pain, epigastric (789 06) (R10 13)   2  History of Abdominal pain, RUQ (789 01) (R10 11)   3  History of Conjunctivitis of left eye (372 30) (H10 9)   4  History of acute pharyngitis (V12 69) (Z87 09)   5  History of allergic reaction (V15 09) (Z88 9)   6  History of dermatitis (V13 3) (Z87 2)   7  Denied: History of head injury   8  History of headache (V13 89) (Z87 898)   9  History of Luteinized follicular cyst (849 5) (N83 00)   10  History of Menorrhagia (626 2) (N92 0)   11  Denied: History of Seizures   12  History of Uterine fibroid (218 9) (D25 9)    Allergies    1   BusPIRone HCl TABS    2  No Known Environmental Allergies   3  No Known Food Allergies    Current Meds   1  DULoxetine HCl - 60 MG Oral Capsule Delayed Release Particles; TAKE 1 CAPSULE   DAILY; Therapy: 80POQ0616 to (Evaluate:2017)  Requested for: 63RFP2721; Last   Rx:[id number] Ordered   2  LamoTRIgine 100 MG Oral Tablet; TAKE 1 TABLET TWICE DAILY; Therapy: 2012 to (Evaluate:2017)  Requested for: 14SLO9149; Last   Rx:[id number] Ordered   3  LORazepam 0 5 MG Oral Tablet; TAKE 1 TABLET 4 TIMES DAILY AS NEEDED; Last   Rx:[id number] Ordered    Family Psych History  Mother    1  Family history of Bipolar disorder   2  Family history of Lung Cancer (V16 1)   3  Family history of Mother  At Age ___  Father    3  Family history of Psychosis  Sister    5  Family history of Mother  At Age 61  Family History    6  Family history of Depression    Social History    · Denied: History of Alcohol use   · Current smoker (305 1) (F17 200)   · Daily caffeine consumption   · Denied: History of Drug Use   · Graduated from high school   · History of tobacco use (V15 82) (Z87 891)   · Tobacco use (305 1) (Z72 0)    Future Appointments    Date/Time Provider Specialty Site   2017 10:45 AM EMETERIO Dumont  Psychiatry [facility] PARTIAL HOSPITALIZATION   05/15/2017 04:15 PM EMETERIO Jauregui   Psychiatry [facility]   2017 01:00 PM Rosangela Sanchez [facility] Psychiatry [facility]     Signatures   Electronically signed by : EMETERIO Zayas ; Mar 30 2017  8:23AM EST                       (Author)    Electronically signed by : Rosina Cruz RN; Mar 30 2017  8:42AM EST                       (Author)    Electronically signed by : NICK Dunn; Mar 30 2017  1:48PM EST                       (Author)    Electronically signed by : JESSICA Griffith; Mar 30 2017  3:01PM EST                       (Author)    Electronically signed by : Rosina Cruz RN; Mar 30 2017  3:59PM EST                       (Author)

## 2018-01-18 NOTE — PSYCH
History of Present Illness  [facility] Clinical Progress [facility]:   Specialized Services Documentation - Therapist must complete separate progress note for each specific clinical activity in which the client participated during the day  (305) Group Psychotherapy: (9:30-10:30) Neil Hdez participated in psychotherapy group focused on work issues  Neil Hdez identified not being able to afford her rent any longer and needing to move as a stressor  She participated in group discussion about work-related stressors, specifically resigning from her job because of her disrespectful boss and the constant reminders of her grandson not reaching normal milestones  The group discussed ways to help make decisions about work changes and prioritizing one's own health and well-being over a job  Some moderate progress noted toward goals today  Continue psychotherapy group to encourage Janine to explore stressors and coping  Treatment Plan Problem(s): 1 1, 1 2  [place] MSW, LSW     (048) Group Psychotherapy: 3443-1300 Neil Hdez participated in wellness group focused on learning how the fight or flight mechanism works to protect individuals but a also how it can be triggered by anxious thoughts, changes and other âfalse alarms  Sara Humphrey shared actively in peer discussion and mentioned that she relies very heavily on her PRN medications when she is anxious  She stated that she would be "lost" without it  Peers provided support and encouragement for Janine to practice cognitive and behavioral strategies along with medication to reduce dependence on PRN benzodiazepines  Neil Hdez agreed that she does need to do this but also was distracted during group with what she shared as "another emergency" going on at her home that needed her attention  Neilkeagan Hdez made only mild progress toward goals   Continue group to provide education on cognitive and behavioral strategies that can be learned and practiced to break the cycle of fear and dysfunctional coping mechanisms that reinforce the cycle of fear and anxiety  Treatment Plan Problem(s): 1 1,1 2  Saqib Hoffmann RN       (432) Education Therapy Goals set - do 1 thing for self    Treatment Plan Problem(s): 1 1  Education Therapy Time - 0900 - 0930 Previous goal was met  Readiness to Learning:  She is receptive to learning  There are  no barriers to learning  Learning Assessment Time - 1330 - 1400   Education completed on  illness and wellness tools  The teaching method was  verbal and demonstration  Shared area of learning: Yes  JESSICA Page     (289) Allied Therapy 3093-5477 Florentino Sutherland actively shared in [facility] group focused on relaxation techniques and anxiety reduction  She was observed to be engaged in therapist led relaxation exercises  She reported benefits of visualization and progressive muscle relaxation in program as she reports feeling very irritated and anxious today (related to home events)  Group discussed specific items that could help self soothe if in an âanxiety crisisâ with encouragement to use these things regularly to manage stressors consistently  She identified she would put puzzle books and a soft blanket in her ârelaxation kitâ  Some effort noted toward tx goal  Continue AT to encourage development of wellness skills and consistent practice  Treatment Plan Problem(s): 1 1,1 2  JESSICA Page       Case Management Note:   4589-5252 Met with Janine  She was aggravated as her spouse would not answer phone calls or texts and her son's girlfriend would not go downstairs and ask him to  the phone  Janine received 2 options to downsize at the apartment complex and must make a decision before 5 today and needs his input  She has a doctor's appointment today at 3pm  She was offered support related to the normalcy of her frustration and her options as she views them  She expressed hopelessness related to her marriage "I would leave him if I could"   Encouraged to recognize what she can do for herself tonight outside of the chaos in her home  TREATMENT SESSION NUMBER: 3   Current suicide risk is low  Medications not changed/added/denied  Robin Wharton, MT-BC      Active Problems    1  Abnormal blood chemistry (790 6) (R79 9)   2  Abnormal finding on lung imaging (793 19) (R91 8)   3  Abnormal findings on diagnostic imaging of abdomen (793 6) (R93 5)   4  Abnormal liver scan (794 8) (R93 2)   5  Acute maxillary sinusitis, recurrence not specified (461 0) (J01 00)   6  Acute sinusitis (461 9) (J01 90)   7  Atypical chest pain (786 59) (R07 89)   8  Benign colon polyp (211 3) (K63 5)   9  Bipolar II disorder (296 89) (F31 81)   10  Bladder disorder (596 9) (N32 9)   11  Bunion (727 1) (M21 619)   12  Chronic idiopathic thrombocytopenic purpura (287 31) (D69 3)   13  Colon cancer screening (V76 51) (Z12 11)   14  Current smoker (305 1) (F17 200)   15  Encounter for routine gynecological examination (V72 31) (Z01 419)   16  Encounter for screening mammogram for malignant neoplasm of breast (V76 12)    (Z12 31)   17  DREAD (generalized anxiety disorder) (300 02) (F41 1)   18  Herpes simplex type 1 infection (054 9) (B00 9)   19  History of tobacco use (V15 82) (Z87 891)   20  Hyperlipemia (272 4) (E78 5)   21  Hyperlipidemia (272 4) (E78 5)   22  Long term use of drug (V58 69) (Z79 899)   23  Lung nodule, solitary (793 11) (R91 1)   24  Mitral regurgitation (424 0) (I34 0)   25  Need for Tdap vaccination (V06 1) (Z23)   26  Obesity (278 00) (E66 9)   27  Other specified menopausal and perimenopausal disorders (627 8) (N95 8)   28  Panic disorder without agoraphobia (300 01) (F41 0)   29  Perimenopausal symptoms (627 2) (N95 1)   30  Preop examination (V72 84) (Z01 818)   31  Skin rash (782 1) (R21)   32  Thrombocytopenia (287 5) (D69 6)   33  Tobacco use (305 1) (Z72 0)   34  Urge and stress incontinence (788 33) (N39 46)   35   Viral syndrome (079 99) (B34 9)    Past Medical History    1  History of Abdominal pain, epigastric (789 06) (R10 13)   2  History of Abdominal pain, RUQ (789 01) (R10 11)   3  History of Conjunctivitis of left eye (372 30) (H10 9)   4  History of acute pharyngitis (V12 69) (Z87 09)   5  History of allergic reaction (V15 09) (Z88 9)   6  History of dermatitis (V13 3) (Z87 2)   7  Denied: History of head injury   8  History of headache (V13 89) (Z87 898)   9  History of Luteinized follicular cyst (661 6) (N83 00)   10  History of Menorrhagia (626 2) (N92 0)   11  Denied: History of Seizures   12  History of Uterine fibroid (218 9) (D25 9)    Allergies    1  BusPIRone HCl TABS    2  No Known Environmental Allergies   3  No Known Food Allergies    Current Meds   1  DULoxetine HCl - 60 MG Oral Capsule Delayed Release Particles; TAKE 1 CAPSULE   DAILY; Therapy: 23VHA7400 to (Evaluate:2017)  Requested for: 08HAC1344; Last   Rx:[id number] Ordered   2  LamoTRIgine 100 MG Oral Tablet; TAKE 1 TABLET TWICE DAILY; Therapy: 2012 to (Evaluate:2017)  Requested for: 46NPN2136; Last   Rx:[id number] Ordered   3  LORazepam 0 5 MG Oral Tablet; TAKE 1 TABLET 4 TIMES DAILY AS NEEDED; Last   Rx:[id number] Ordered    Family Psych History  Mother    1  Family history of Bipolar disorder   2  Family history of Lung Cancer (V16 1)   3  Family history of Mother  At Age ___  Father    3  Family history of Psychosis  Sister    5  Family history of Mother  At Age 61  Family History    6  Family history of Depression    Social History    · Denied: History of Alcohol use   · Current smoker (305 1) (F17 200)   · Daily caffeine consumption   · Denied: History of Drug Use   · Graduated from high school   · History of tobacco use (V15 82) (Z87 891)   · Tobacco use (305 1) (Z72 0)    Future Appointments    Date/Time Provider Specialty Site   2017 11:15 AM EMETERIO Khan   [facility]   2017 10:00 AM Trevor EMETERIO Moore  Psychiatry [facility] PARTIAL HOSPITALIZATION   05/15/2017 04:15 PM Wilkins Phoenix, M D   Psychiatry [facility]CHIATR[facility]   04/06/2017 01:00 PM Jose M Sosa [facility] Psychiatry [facility]ER[facility]   03/28/2017 06:30 PM Zia Arreola, 10 [facility]   Electronically signed by : NICK Rae; Mar 22 2017  1:58PM EST                       (Author)    Electronically signed by : JESSICA Bearden; Mar 22 2017  2:32PM EST                       (Author)    Electronically signed by : Ronn Lewis RN; Mar 22 2017  4:36PM EST                       (Author)    Electronically signed by : Rnon Lewis RN; Mar 22 2017  5:30PM EST                       (Author)

## 2018-01-18 NOTE — MISCELLANEOUS
Message  LVM for call back  Final Flu PCR results came back positive  Discussed results with patient at 18:33  SHe is feeling improved, but is still fatigued   Advised her to get rechecked if any worsening symptoms occur      Signatures   Electronically signed by : Todd Mccarty, [facility]; Mar 28 2016  6:34PM EST                       (Author)

## 2018-01-19 LAB
BASOPHILS # BLD AUTO: 0.4 %
BASOPHILS # BLD AUTO: 28 CELLS/UL (ref 0–200)
DEPRECATED RDW RBC AUTO: 12.5 % (ref 11–15)
EOSINOPHIL # BLD AUTO: 1.3 %
EOSINOPHIL # BLD AUTO: 91 CELLS/UL (ref 15–500)
HCT VFR BLD AUTO: 37.3 % (ref 35–45)
HGB BLD-MCNC: 13.1 G/DL (ref 11.7–15.5)
LYMPHOCYTES # BLD AUTO: 1967 CELLS/UL (ref 850–3900)
LYMPHOCYTES # BLD AUTO: 28.1 %
MCH RBC QN AUTO: 31.2 PG (ref 27–33)
MCHC RBC AUTO-ENTMCNC: 35.1 G/DL (ref 32–36)
MCV RBC AUTO: 88.8 FL (ref 80–100)
MONOCYTES # BLD AUTO: 560 CELLS/UL (ref 200–950)
MONOCYTES (HISTORICAL): 8 %
NEUTROPHILS # BLD AUTO: 4354 CELLS/UL (ref 1500–7800)
NEUTROPHILS # BLD AUTO: 62.2 %
PLATELET # BLD AUTO: 158 THOUSAND/UL (ref 140–400)
PMV BLD AUTO: 12.2 FL (ref 7.5–12.5)
RBC # BLD AUTO: 4.2 MILLION/UL (ref 3.8–5.1)
WBC # BLD AUTO: 7 THOUSAND/UL (ref 3.8–10.8)

## 2018-01-19 NOTE — PROGRESS NOTES
Assessment   1  Gastroesophageal reflux disease without esophagitis (530 81) (K21 9)   2  Nausea (787 02) (R11 0)   3  Thrombocytopenia (287 5) (D69 6)    Plan   Gastroesophageal reflux disease without esophagitis    · Omeprazole 20 MG Oral Capsule Delayed Release; TAKE 1 CAPSULE TWICE    DAILY   · (1) CBC/PLT/DIFF; Status:Active; Requested QLD:[id number];    · (1) HELICOBACTER PYLORI ANTIGEN, STOOL; Status:Active; Requested    MAV:[id number]; Discussion/Summary      Start omeprazole 20 mg twice a day- may take a week or two for relief  may try tums in between  nsaids- tramadol and ibuprofen  non-ulcer genic diet sheet given and reviewed to check for anemia, check h pylori   up in 1 month  return sooner for worsening symptoms  Go to ER if any black/blood stools or vomit  Possible side effects of new medications were reviewed with the patient/guardian today  The treatment plan was reviewed with the patient/guardian  The patient/guardian understands and agrees with the treatment plan      Chief Complaint   The patient began having dizziness, nausea and burning in her throat for 3 days  She was at her sons home and there was urine and mouse excrement in the carpet  She wanted to make sure the dizziness is not from that  History of Present Illness   HPI: symptoms started 3 days ago which has been longer than 3 days- a few weeks feeling in her throat  happens when she turns her head  relief with laying down body aches, fever, vomiting, diarrhea dark/bloody stooks   eating causes nausea- chocolate, tea, tomato sauce, garlic, onions, coffee tramadol at least 3 days a week, also taking ibuprofen 800 mg in between  increased stress  exposure to mice urine/feces and is concerned this is related      Review of Systems        Constitutional: feeling tired, but-- no fever,-- not feeling poorly-- and-- no chills  ENT: sore throat, but-- as noted in HPI-- and-- no nasal discharge        Cardiovascular: no chest pain-- and-- no palpitations  Respiratory: no shortness of breath-- and-- no cough  Gastrointestinal: abdominal pain-- and-- nausea, but-- as noted in HPI,-- no vomiting,-- no diarrhea-- and-- no blood in stools  Genitourinary: no dysuria  Musculoskeletal: no arthralgias-- and-- no myalgias  Integumentary: no rashes  Neurological: dizziness, but-- as noted in HPI-- and-- no headache  Active Problems   1  Abnormal blood chemistry (790 6) (R79 9)   2  Abnormal finding on lung imaging (793 19) (R91 8)   3  Abnormal findings on diagnostic imaging of abdomen (793 6) (R93 5)   4  Acute pain of left knee (719 46) (M25 562)   5  Amenorrhea (626 0) (N91 2)   6  Benign colon polyp (211 3) (K63 5)   7  Bipolar II disorder (296 89) (F31 81)   8  Bunion (727 1) (M21 619)   9  Cervical radiculopathy (723 4) (M54 12)   10  Chronic idiopathic thrombocytopenic purpura (287 31) (D69 3)   11  Chronic lumbar radiculopathy (724 4) (M54 16)   12  DREAD (generalized anxiety disorder) (300 02) (F41 1)   13  Herpes simplex type 1 infection (054 9) (B00 9)   14  History of tobacco use (V15 82) (Z87 891)   15  Hyperlipidemia (272 4) (E78 5)   16  Long term use of drug (V58 69) (Z79 899)   17  Lung nodule, solitary (793 11) (R91 1)   18  Mitral regurgitation (424 0) (I34 0)   19  Numbness and tingling of left side of face (782 0) (R20 0,R20 2)   20  Numbness on right side (782 0) (R20 0)   21  Obesity (278 00) (E66 9)   22  Other specified menopausal and perimenopausal disorders (627 8) (N95 8)   23  Panic disorder without agoraphobia (300 01) (F41 0)   24  Perimenopausal symptoms (627 2) (N95 1)   25  Right lower quadrant pain (789 03) (R10 31)   26  History of Tobacco use (305 1) (Z72 0)   27  Urge and stress incontinence (788 33) (N39 46)    Past Medical History   Active Problems And Past Medical History Reviewed: The active problems and past medical history were reviewed and updated today        Surgical History   Surgical History Reviewed: The surgical history was reviewed and updated today  Social History    · Denied: History of Alcohol use   · Once weekly  · Daily caffeine consumption   · Denied: History of Drug Use   · Graduated from high school   · History of tobacco use (V15 82) (U55 977)   · History of Tobacco use (305 1) (Z72 0)  The social history was reviewed and updated today  The social history was reviewed and is unchanged  Family History   Family History Reviewed: The family history was reviewed and updated today  Current Meds    1  DULoxetine HCl - 30 MG Oral Capsule Delayed Release Particles; TAKE 1 CAPSULE IN     THE EVENING; Therapy: 33WQM2602 to (EEGPAB:24ZFX6430)  Requested for: 12KCJ8911; Last     Rx:50Pjz5359 Ordered   2  DULoxetine HCl - 60 MG Oral Capsule Delayed Release Particles; TAKE 1 CAPSULE     DAILY; Therapy: 84WLQ4846 to (Joanna Bravo)  Requested for: 70SIE6454; Last     Rx:29Nov2017 Ordered   3  LamoTRIgine 100 MG Oral Tablet; TAKE 1 TABLET DAILY; Therapy: 20Mar2012 to (Evaluate:17Udg3329)  Requested for: 66GUK3202; Last     Rx:29Nov2017 Ordered   4  LamoTRIgine 150 MG Oral Tablet; TAKE 1 TABLET AT BEDTIME; Therapy: 54HJL3777 to (Joanna Bravo)  Requested for: 26CZS2070; Last     Rx:29Nov2017 Ordered   5  LORazepam 0 5 MG Oral Tablet; TAKE 1 TABLET 4 TIMES DAILY AS NEEDED; Last     Rx:99Cgn7791 Ordered   6  TraMADol HCl - 50 MG Oral Tablet; TAKE 1 TABLET EVERY 12 HOURS AS NEEDED; Therapy: 55KXU7435 to (Evaluate:01Qhb0069) Recorded     The medication list was reviewed and updated today  Allergies   1  BusPIRone HCl TABS  2  No Known Environmental Allergies   3   No Known Food Allergies    Vitals    Recorded: 58ZXL2549 02:48PM   Heart Rate 83   Respiration 16   Systolic 815, LUE, Sitting   Diastolic 62, LUE, Sitting   BP CUFF SIZE Large   Height 5 ft 8 in   Weight 234 lb 0 2 oz   BMI Calculated 35 58   BSA Calculated 2 18 Physical Exam        Constitutional      General appearance: No acute distress, well appearing and well nourished  Ears, Nose, Mouth, and Throat      External inspection of ears and nose: Normal        Otoscopic examination: Tympanic membranes translucent with normal light reflex  Canals patent without erythema  Nasal mucosa, septum, and turbinates: Normal without edema or erythema  Oropharynx: Normal with no erythema, edema, exudate or lesions  Pulmonary      Respiratory effort: No increased work of breathing or signs of respiratory distress  Auscultation of lungs: Clear to auscultation  Cardiovascular      Auscultation of heart: Normal rate and rhythm, normal S1 and S2, without murmurs  Abdomen      Abdomen: Non-tender, no masses  -- soft non tender good bowel sounds  Liver and spleen: No hepatomegaly or splenomegaly  Lymphatic      Palpation of lymph nodes in neck: No lymphadenopathy  Musculoskeletal      Gait and station: Normal        Psychiatric      Orientation to person, place, and time: Normal        Mood and affect: Normal           Future Appointments      Date/Time Provider Specialty Site   02/26/2018 02:20 PM EMETERIO Campbell   Psychiatry [facility]   01/30/2018 01:00 [facility]     Signatures    Electronically signed by : Neeta Pineda, [address] [place] [address]; Jan 18 2018  4:11PM EST                       (Author)     Electronically signed by : EMETERIO Daigle ; Jan 18 2018  5:14PM EST                       (Review)

## 2018-01-22 ENCOUNTER — LAB CONVERSION - ENCOUNTER (OUTPATIENT)
Dept: OTHER | Facility: OTHER | Age: 53
End: 2018-01-22

## 2018-01-22 VITALS
HEIGHT: 68 IN | WEIGHT: 225 LBS | HEART RATE: 87 BPM | SYSTOLIC BLOOD PRESSURE: 114 MMHG | BODY MASS INDEX: 34.1 KG/M2 | DIASTOLIC BLOOD PRESSURE: 77 MMHG

## 2018-01-22 VITALS
DIASTOLIC BLOOD PRESSURE: 73 MMHG | SYSTOLIC BLOOD PRESSURE: 118 MMHG | HEIGHT: 68 IN | TEMPERATURE: 98.4 F | BODY MASS INDEX: 33.49 KG/M2 | RESPIRATION RATE: 18 BRPM | WEIGHT: 221 LBS | HEART RATE: 80 BPM

## 2018-01-23 VITALS
RESPIRATION RATE: 16 BRPM | WEIGHT: 234.01 LBS | SYSTOLIC BLOOD PRESSURE: 112 MMHG | HEIGHT: 68 IN | DIASTOLIC BLOOD PRESSURE: 62 MMHG | HEART RATE: 83 BPM | BODY MASS INDEX: 35.47 KG/M2

## 2018-01-23 VITALS
TEMPERATURE: 98 F | HEIGHT: 68 IN | DIASTOLIC BLOOD PRESSURE: 70 MMHG | RESPIRATION RATE: 18 BRPM | OXYGEN SATURATION: 96 % | HEART RATE: 84 BPM | SYSTOLIC BLOOD PRESSURE: 120 MMHG

## 2018-01-23 NOTE — PSYCH
Progress Note  Psychotherapy Provided [facility]: Individual Psychotherapy 50 minutes provided today  Goals addressed in session:   Addressed goals 1-3 of plan    D: Met with Janine individually  ROS; 'things are still the same'  Denied SI  Quit smoking  Session focused upon ongoing family obstacles, setting clearer boundaries with Jerzy's mother and beginning to motivate  to plan on moving to [place] as he has stated as they have grown apart over the years  Leydi Plascencia acknowledged she is struggling with 'moving on' if he remains but won't leave as Janine doesn't have money to support herself  Further discussion of options to report to Disability to have case pushed to priority if she financial needs change as it would  A: Leydi Plascencia demonstrated depressed mood with broad affect  She continues to provide excuses for moving forward though there are goals Leydi Plascencia had identified for herself that would be beneficial  Demonstrating minimal progress  P: Continue individual therapy  Ongoing support and discussion regarding family  Pain Scale and Suicide Risk [facility]: Current Pain Assessment: moderate to severe   On a scale of 0 to 10, the patient rates current pain at 7   Current suicide risk is low   Behavioral Health Treatment Plan [employer]: Diagnosis and Treatment Plan explained to patient, patient relates understanding diagnosis and is agreeable to Treatment Plan  Assessment    1  Bipolar II disorder (296 89) (F31 81)   2   DREAD (generalized anxiety disorder) (300 02) (F41 1)    Signatures   Electronically signed by : Ethel Beckham LCSW; Jan 17 2018  9:19AM EST                       (Author)

## 2018-01-23 NOTE — PSYCH
Message   Recorded as Task   Date: 01/04/2018 09:20 AM, Created By: Glen Vee   Task Name: Document Appointment   Assigned To: Tru Schaeffer   Regarding Patient: Harry Grey, Status: Active   CommentDayle Monas - 04 Jan 2018 9:20 AM     TASK CREATED  Caller: Self; Other; [phone] (Home); [phone] (Work)  Patient called to cancel her appointment because of the snow  Active Problems    1  Abnormal blood chemistry (790 6) (R79 9)   2  Abnormal finding on lung imaging (793 19) (R91 8)   3  Abnormal findings on diagnostic imaging of abdomen (793 6) (R93 5)   4  Acute pain of left knee (719 46) (M25 562)   5  Amenorrhea (626 0) (N91 2)   6  Benign colon polyp (211 3) (K63 5)   7  Bipolar II disorder (296 89) (F31 81)   8  Bunion (727 1) (M21 619)   9  Cervical radiculopathy (723 4) (M54 12)   10  Chronic idiopathic thrombocytopenic purpura (287 31) (D69 3)   11  Chronic lumbar radiculopathy (724 4) (M54 16)   12  Current smoker (305 1) (F17 200)   13  DREAD (generalized anxiety disorder) (300 02) (F41 1)   14  Herpes simplex type 1 infection (054 9) (B00 9)   15  History of tobacco use (V15 82) (Z87 891)   16  Hyperlipidemia (272 4) (E78 5)   17  Long term use of drug (V58 69) (Z79 899)   18  Lung nodule, solitary (793 11) (R91 1)   19  Mitral regurgitation (424 0) (I34 0)   20  Numbness and tingling of left side of face (782 0) (R20 0,R20 2)   21  Numbness on right side (782 0) (R20 0)   22  Obesity (278 00) (E66 9)   23  Other specified menopausal and perimenopausal disorders (627 8) (N95 8)   24  Panic disorder without agoraphobia (300 01) (F41 0)   25  Perimenopausal symptoms (627 2) (N95 1)   26  Right lower quadrant pain (789 03) (R10 31)   27  History of Tobacco use (305 1) (Z72 0)   28  Urge and stress incontinence (788 33) (N39 46)    Current Meds   1  DULoxetine HCl - 30 MG Oral Capsule Delayed Release Particles; TAKE 1 CAPSULE IN   THE EVENING;    Therapy: 33GUF5098 to (IDKGTTIK:72WMT5486)  Requested for: 19KRC1279; Last   US:01FHQ8474 Ordered   2  DULoxetine HCl - 60 MG Oral Capsule Delayed Release Particles (Cymbalta); TAKE 1   CAPSULE DAILY; Therapy: 23IPP2792 to (Bobbi Mooney)  Requested for: 93RJZ9633; Last   Rx:29Nov2017 Ordered   3  LamoTRIgine 100 MG Oral Tablet; TAKE 1 TABLET DAILY; Therapy: 20Mar2012 to (Evaluate:87Mpt5666)  Requested for: 43XHB7432; Last   Rx:29Nov2017 Ordered   4  LamoTRIgine 150 MG Oral Tablet; TAKE 1 TABLET AT BEDTIME; Therapy: 24ODC9868 to (Bobbi Mooney)  Requested for: 51LNZ1834; Last   Rx:29Nov2017 Ordered   5  LORazepam 0 5 MG Oral Tablet (Ativan); TAKE 1 TABLET 4 TIMES DAILY AS NEEDED;   Last Rx:32Rlk3188 Ordered    Allergies    1  BusPIRone HCl TABS    2  No Known Environmental Allergies   3   No Known Food Allergies    Signatures   Electronically signed by : Richard Geller LCSW; Jan 4 2018 10:10AM EST                       (Author)

## 2018-01-23 NOTE — PSYCH
Message     Recorded as Task   Date: 12/12/2017 10:56 AM, Created By: Carlton Shearer   Task Name: Document Appointment   Assigned To: Nancy Valenzuela   Regarding Patient: Polina Flood, Status: Active   CommentDawn Aguilar - 12 Dec 2017 10:56 AM     TASK CREATED  Caller: Self; Personal; [phone] (Home); [phone] (Work)  Patient called back because she had to cqancel her 12/18 appointment  Her grandson is having heart surgery but she asks to keep her in mind for any openings because she really needs to see you  Active Problems    1  Abnormal blood chemistry (790 6) (R79 9)   2  Abnormal finding on lung imaging (793 19) (R91 8)   3  Abnormal findings on diagnostic imaging of abdomen (793 6) (R93 5)   4  Acute pain of left knee (719 46) (M25 562)   5  Benign colon polyp (211 3) (K63 5)   6  Bipolar II disorder (296 89) (F31 81)   7  Bunion (727 1) (M21 619)   8  Cervical radiculopathy (723 4) (M54 12)   9  Chronic idiopathic thrombocytopenic purpura (287 31) (D69 3)   10  Chronic lumbar radiculopathy (724 4) (M54 16)   11  Current smoker (305 1) (F17 200)   12  DREAD (generalized anxiety disorder) (300 02) (F41 1)   13  Herpes simplex type 1 infection (054 9) (B00 9)   14  History of tobacco use (V15 82) (Z87 891)   15  Hyperlipidemia (272 4) (E78 5)   16  Long term use of drug (V58 69) (Z79 899)   17  Lung nodule, solitary (793 11) (R91 1)   18  Mitral regurgitation (424 0) (I34 0)   19  Numbness and tingling of left side of face (782 0) (R20 0,R20 2)   20  Numbness on right side (782 0) (R20 0)   21  Obesity (278 00) (E66 9)   22  Other specified menopausal and perimenopausal disorders (627 8) (N95 8)   23  Panic disorder without agoraphobia (300 01) (F41 0)   24  Perimenopausal symptoms (627 2) (N95 1)   25  Right lower quadrant pain (789 03) (R10 31)   26  History of Tobacco use (305 1) (Z72 0)   27  Urge and stress incontinence (788 33) (N39 46)    Current Meds   1   DULoxetine HCl - 30 MG Oral Capsule Delayed Release Particles; TAKE 1 CAPSULE IN   THE EVENING; Therapy: 65XZT2771 to (ZUEUCPLM:84FJD6797)  Requested for: 01AWE4292; Last   Rx:27Sep2017 Ordered   2  DULoxetine HCl - 60 MG Oral Capsule Delayed Release Particles (Cymbalta); TAKE 1   CAPSULE DAILY; Therapy: 28RKN9817 to ([facility])  Requested for: 43SIX8335; Last   Rx:29Nov2017 Ordered   3  LamoTRIgine 100 MG Oral Tablet; TAKE 1 TABLET DAILY; Therapy: 20Mar2012 to (Evaluate:43Whr4556)  Requested for: 46QPZ7493; Last   Rx:29Nov2017 Ordered   4  LamoTRIgine 150 MG Oral Tablet; TAKE 1 TABLET AT BEDTIME; Therapy: 79WAJ3676 to ([facility])  Requested for: 42WDC3406; Last   Rx:29Nov2017 Ordered   5  LORazepam 0 5 MG Oral Tablet (Ativan); TAKE 1 TABLET 4 TIMES DAILY AS NEEDED;   Last Rx:27Sep2017 Ordered    Allergies    1  BusPIRone HCl TABS    2  No Known Environmental Allergies   3   No Known Food Allergies    Signatures   Electronically signed by : Sierra Coe LCSW; Dec 12 2017 11:33AM EST                       (Author)

## 2018-01-23 NOTE — PSYCH
Progress Note  Psychotherapy Provided [facility]: Individual Psychotherapy 50 minutes provided today  Goals addressed in session:   Addressed goals 1-3 of plan    D: Met with Janine individually  ROS; 'things are a mess'  Denied SI  Session focused upon relationship and communication obstacles between Radha and Mathew Medina mother; Radha continues to express concern regarding Jerzy's care, medical needs and is feeling overwhelmed  Additional concerns regarding son and his mental health  Plans to go to see cousin after [place] to get away for a bit  A: Janine demonstrated depressed mood and blunted affect  Jerzy's health concerns and feeling mother is not attentive has been very overwhelming for Janine  She is still waiting for disability court date  P: Continue individual therapy  Ongoing support and discussion regarding family  Pain Scale and Suicide Risk [facility]: Current Pain Assessment: moderate to severe   On a scale of 0 to 10, the patient rates current pain at 7   Current suicide risk is low   Behavioral Health Treatment Plan [facility]: Diagnosis and Treatment Plan explained to patient, patient relates understanding diagnosis and is agreeable to Treatment Plan  Results/Data  PHQ-9 Adult Depression Screening [id number] 10:47AM Rico Newton     Test Name Result Flag Reference   PHQ-9 Adult Depression Score 19     Over the last two weeks, how often have you been bothered by any of the following problems?   Little interest or pleasure in doing things: Nearly every day - 3  Feeling down, depressed, or hopeless: Nearly every day - 3  Trouble falling or staying asleep, or sleeping too much: Nearly every day - 3  Feeling tired or having little energy: Nearly every day - 3  Poor appetite or over eating: More than half the days - 2  Feeling bad about yourself - or that you are a failure or have let yourself or your family down: Nearly every day - 3  Trouble concentrating on things, such as reading the newspaper or watching television: More than half the days - 2  Moving or speaking so slowly that other people could have noticed  Or the opposite -  being so fidgety or restless that you have been moving around a lot more than usual: Not at all - 0  Thoughts that you would be better off dead, or of hurting yourself in some way: Not at all - 0   PHQ-9 Adult Depression Screening Positive     PHQ-9 Difficulty Level Very difficult     PHQ-9 Severity      Moderately Severe Depression       Assessment    1  Bipolar II disorder (296 89) (F31 81)   2  DREAD (generalized anxiety disorder) (300 02) (F41 1)   3   Panic disorder without agoraphobia (300 01) (F41 0)    Signatures   Electronically signed by : Marito Lackey LCSW; Dec 14 2017  3:19PM EST                       (Author)

## 2018-01-23 NOTE — RESULT NOTES
Verified Results  (1) CBC/PLT/DIFF [id number] 03:39PM Anthony Fernandez     Test Name Result Flag Reference   WHITE BLOOD CELL COUNT 7 0 Thousand/uL  3 8-10 8   RED BLOOD CELL COUNT 4 20 Million/uL  3 80-5 10   HEMOGLOBIN 13 1 g/dL  11 7-15 5   HEMATOCRIT 37 3 %  35 0-45 0   MCV 88 8 fL  80 0-100 0   MCH 31 2 pg  27 0-33 0   MCHC 35 1 g/dL  32 0-36 0   RDW 12 5 %  11 0-15 0   PLATELET COUNT 073 Thousand/uL  140-400   ABSOLUTE NEUTROPHILS 4354 cells/uL  2030-7393   ABSOLUTE LYMPHOCYTES 1967 cells/uL  850-3900   ABSOLUTE MONOCYTES 560 cells/uL  200-950   ABSOLUTE EOSINOPHILS 91 cells/uL     ABSOLUTE BASOPHILS 28 cells/uL  0-200   NEUTROPHILS 62 2 %     LYMPHOCYTES 28 1 %     MONOCYTES 8 0 %     EOSINOPHILS 1 3 %     BASOPHILS 0 4 %     MPV 12 2 fL  7 5-12 5

## 2018-01-23 NOTE — RESULT NOTES
Verified Results  (Q) COMPREHENSIVE METABOLIC PNL W/ADJUSTED CALCIUM [id number] 10:43AM Casey Ratliff     Test Name Result Flag Reference   GLUCOSE 97 mg/dL  65-99   Fasting reference interval   UREA NITROGEN (BUN) 12 mg/dL  7-25   CREATININE 0 74 mg/dL  0 50-1 05   For patients >52years of age, the reference limit  for Creatinine is approximately 13% higher for people  identified as -American  eGFR NON-AFR  AMERICAN 92 mL/min/1 73m2  > OR = 60   eGFR AFRICAN AMERICAN 107 mL/min/1 73m2  > OR = 60   BUN/CREATININE RATIO   3-43   NOT APPLICABLE (calc)   SODIUM 140 mmol/L  135-146   POTASSIUM 4 2 mmol/L  3 5-5 3   CHLORIDE 105 mmol/L     CARBON DIOXIDE 24 mmol/L  20-31   CALCIUM 9 9 mg/dL  8 6-10 4   CALCIUM (ADJUSTED FOR$ALBUMIN) 10 0 mg/dL (calc)  8 6-10 2   PROTEIN, TOTAL 6 9 g/dL  6 1-8 1   ALBUMIN 4 3 g/dL  3 6-5 1   GLOBULIN 2 6 g/dL (calc)  1 9-3 7   ALBUMIN/GLOBULIN RATIO 1 7 (calc)  1 0-2 5   BILIRUBIN, TOTAL 0 4 mg/dL  0 2-1 2   ALKALINE PHOSPHATASE 73 U/L     AST 13 U/L  10-35   ALT 13 U/L  6-29     (Q) LIPID PANEL WITH REFLEX TO DIRECT LDL [id number] 10:43AM Casey [facility]State [facility]     Test Name Result Flag Reference   CHOLESTEROL, TOTAL 236 mg/dL H <200   HDL CHOLESTEROL 67 mg/dL  >50   TRIGLICERIDES 83 mg/dL  <946   LDL-CHOLESTEROL 150 mg/dL (calc) H    Reference range: <100     Desirable range <100 mg/dL for patients with CHD or  diabetes and <70 mg/dL for diabetic patients with  known heart disease  LDL-C is now calculated using the Roe-Triplett   calculation, which is a validated novel method providing   better accuracy than the Friedewald equation in the   estimation of LDL-C  Winter Farmer  6191;454(92): 9348-1759   (http://[employer]/faq/NGZ565)   CHOL/HDLC RATIO 3 5 (calc)  <5 0   NON HDL CHOLESTEROL 169 mg/dL (calc) H <130   For patients with diabetes plus 1 major ASCVD risk   factor, treating to a non-HDL-C goal of <100 mg/dL   (LDL-C of <70 mg/dL) is considered a therapeutic   option  (Q) CBC (INCLUDES DIFF/PLT) (REFL) [id number] 10:43AM Belinda Clayton   REPORT COMMENT:  FASTING:YES     Test Name Result Flag Reference   WHITE BLOOD CELL COUNT 6 6 Thousand/uL  3 8-10 8   RED BLOOD CELL COUNT 4 63 Million/uL  3 80-5 10   HEMOGLOBIN 13 9 g/dL  11 7-15 5   HEMATOCRIT 41 3 %  35 0-45 0   MCV 89 2 fL  80 0-100 0   MCH 30 0 pg  27 0-33 0   MCHC 33 7 g/dL  32 0-36 0   RDW 12 9 %  11 0-15 0   PLATELET COUNT 876 Thousand/uL L 140-400   MPV 13 9 fL H 7 5-12 5   ABSOLUTE NEUTROPHILS 4118 cells/uL  9520-2193   ABSOLUTE LYMPHOCYTES 1690 cells/uL  850-3900   ABSOLUTE MONOCYTES 528 cells/uL  200-950   ABSOLUTE EOSINOPHILS 211 cells/uL     ABSOLUTE BASOPHILS 53 cells/uL  0-200   NEUTROPHILS 62 4 %     LYMPHOCYTES 25 6 %     MONOCYTES 8 0 %     EOSINOPHILS 3 2 %     BASOPHILS 0 8 %         Discussion/Summary   CHOLESTEROL ELEVATED  PLATELETS ARE PRETTY GOOD RIGHT NOW    ALL OTHER LABS OK

## 2018-01-24 NOTE — RESULT NOTES
Verified Results  (Q) HELICOBACTER PYLORI AG, Rebjacecaside, STOOL [id number] 10:44AM Priti Pierce     Test Name Result Flag Reference   HELICOBACTER PYLORI AG,$EIA, STOOL (Report)     HELICOBACTER PYLORI AG, EIA, STOOL       MICRO NUMBER:   [id number]   TEST STATUS:    FINAL   SPECIMEN SOURCE:  STOOL   SPECIMEN QUALITY: ADEQUATE   RESULT:      Not Detected                          Antimicrobials, proton pump inhibitors, and             bismuth preparations inhibit H  pylori and             ingestion up to two weeks prior to testing may             cause false negative results  If clinically             indicated the test should be repeated on a new             specimen obtained two weeks after discontinuing             treatment

## 2018-01-30 ENCOUNTER — OFFICE VISIT (OUTPATIENT)
Dept: BEHAVIORAL/MENTAL HEALTH CLINIC | Facility: CLINIC | Age: 53
End: 2018-01-30
Payer: COMMERCIAL

## 2018-01-30 DIAGNOSIS — F31.81 BIPOLAR II DISORDER (HCC): Primary | ICD-10-CM

## 2018-01-30 DIAGNOSIS — F41.1 GAD (GENERALIZED ANXIETY DISORDER): ICD-10-CM

## 2018-01-30 PROBLEM — F41.0 PANIC DISORDER WITHOUT AGORAPHOBIA: Status: ACTIVE | Noted: 2018-01-30

## 2018-01-30 PROCEDURE — 90834 PSYTX W PT 45 MINUTES: CPT | Performed by: SOCIAL WORKER

## 2018-01-30 NOTE — PSYCH
Psychotherapy Provided: Individual Psychotherapy 50 minutes     Length of time in session: 50 minutes, follow up in 2week    Goals addressed in session: Goal 1, Goal 2 and Goal 3      Pain:      none    0    Current suicide risk : Low     D: Met with Janine marroquin  ROS; 'things are going down hill'  Denied SI  Session focused upon ongoing family obstacles; Anahi Aguilar and parents can not return to home full time due to infestation  They will remain at [address] [facility] until 2/28  La Cordero feels her depression has increased and she 'has no emotion and feels like a robot'  Dx with an ulcer and is pending surgery for pinched nerves and weakness in her hand  A: La Cordero demonstrated a depressed mood with stotic affect  She continues to struggle greatly with her family obstacles and with grandson and parents living there for the next month, stressors will only increase  P: Continue individual therapy  Ongoing support and discussion regarding family  Behavioral Health Treatment Plan [facility]: Diagnosis and Treatment Plan explained to Roslyn Mendoza relates understanding diagnosis and is agreeable to Treatment Plan   Yes

## 2018-02-09 ENCOUNTER — OFFICE VISIT (OUTPATIENT)
Dept: BEHAVIORAL/MENTAL HEALTH CLINIC | Facility: CLINIC | Age: 53
End: 2018-02-09
Payer: COMMERCIAL

## 2018-02-09 DIAGNOSIS — F41.1 GAD (GENERALIZED ANXIETY DISORDER): ICD-10-CM

## 2018-02-09 DIAGNOSIS — F31.81 BIPOLAR II DISORDER (HCC): Primary | ICD-10-CM

## 2018-02-09 PROCEDURE — 90834 PSYTX W PT 45 MINUTES: CPT | Performed by: SOCIAL WORKER

## 2018-02-09 NOTE — PSYCH
Psychotherapy Provided: Individual Psychotherapy 50 minutes     Length of time in session: 50 minutes, follow up in 2 week    Goals addressed in session: Goal 1, Goal 2 and Goal 3      Pain:      moderate to severe    3    Current suicide risk : Low     D: Met with Janine DIAZ; 'I am exausted'  Acknowledged increased SI 'I don't want to die but sometimes I feel better if I was dead'  "I think about it then I get scared'  Discussed grandchildren and wanting to be there for them  Session focused upon ongoing family obstacles; Tiffany Vidal and parents remain in their home, multiple obstacles remain however Michelle Oconnor has been setting her own boundaries and speaking up more  Discussed situation with ; asked him about intentions, encouraged him to leave despite finances  Has agreed to discuss neck surgery  Disability case still pending  A: Michelle Oconnor demonstrated a depressed mood with constricted affect  Demonstrating progress though regarding boundary setting          P: Continue individual therapy  Ongoing support and discussion regarding family  Behavioral Health Treatment Plan [facility]: Diagnosis and Treatment Plan explained to Josh Strauss relates understanding diagnosis and is agreeable to Treatment Plan   Yes

## 2018-02-19 ENCOUNTER — OFFICE VISIT (OUTPATIENT)
Dept: INTERNAL MEDICINE CLINIC | Facility: CLINIC | Age: 53
End: 2018-02-19
Payer: COMMERCIAL

## 2018-02-19 VITALS — SYSTOLIC BLOOD PRESSURE: 122 MMHG | DIASTOLIC BLOOD PRESSURE: 72 MMHG | HEART RATE: 82 BPM | HEIGHT: 68 IN

## 2018-02-19 DIAGNOSIS — F41.1 GAD (GENERALIZED ANXIETY DISORDER): ICD-10-CM

## 2018-02-19 DIAGNOSIS — K21.9 GASTROESOPHAGEAL REFLUX DISEASE WITHOUT ESOPHAGITIS: Primary | ICD-10-CM

## 2018-02-19 DIAGNOSIS — J02.9 SORE THROAT: ICD-10-CM

## 2018-02-19 PROCEDURE — 99214 OFFICE O/P EST MOD 30 MIN: CPT | Performed by: NURSE PRACTITIONER

## 2018-02-19 RX ORDER — LAMOTRIGINE 150 MG/1
1 TABLET ORAL
Status: ON HOLD | COMMUNITY
Start: 2017-11-29 | End: 2018-04-29 | Stop reason: SDUPTHER

## 2018-02-19 RX ORDER — OMEPRAZOLE 20 MG/1
1 CAPSULE, DELAYED RELEASE ORAL 2 TIMES DAILY
COMMUNITY
Start: 2018-01-18 | End: 2018-03-19 | Stop reason: SDUPTHER

## 2018-02-19 NOTE — PATIENT INSTRUCTIONS
Gastroesophageal Reflux Disease   AMBULATORY CARE:   Gastroesophageal reflux  reflux occurs when acid and food in the stomach back up into the esophagus  Gastroesophageal reflux disease (GERD) is reflux that occurs more than twice a week for a few weeks  It usually causes heartburn and other symptoms  GERD can cause other health problems over time if it is not treated  Common symptoms include:  Heartburn is the most common symptom of GERD  You may feel burning pain in your chest or below the breast bone  This usually occurs after meals and spreads to your neck, jaw, or shoulder  The pain gets better when you change positions  You may also have any of the following:  · Bitter or acid taste in your mouth    · Dry cough    · Trouble swallowing or pain with swallowing    · Hoarseness or sore throat    · Frequent burping or hiccups    · Feeling of fullness soon after you start eating  Seek care immediately if:  · You feel full and cannot burp or vomit  · You have severe chest pain and sudden trouble breathing  · Your bowel movements are black, bloody, or tarry-looking  · Your vomit looks like coffee grounds or has blood in it  Contact your healthcare provider if:   · You vomit large amounts, or you vomit often  · You have trouble breathing after you vomit  · You have trouble swallowing, or pain with swallowing  · You are losing weight without trying  · Your symptoms get worse or do not improve with treatment  · You have questions or concerns about your condition or care  Treatment for GERD:  Your healthcare provider may prescribe medicine to decrease stomach acid  He may also prescribe medicine that help your esophagus and stomach move food and liquid to your intestines  Surgery may be done if other treatments do not work  You may need surgery to wrap the upper part of the stomach around the esophageal sphincter  This will strengthen the sphincter and prevent reflux     Manage GERD: · Do not have foods or drinks that may increase heartburn  These include chocolate, peppermint, fried or fatty foods, drinks that contain caffeine, or carbonated drinks (soda)  Other foods include spicy foods, onions, tomatoes, and tomato-based foods  Do not have foods or drinks that can irritate your esophagus, such as citrus fruits, juices, and alcohol  · Do not eat large meals  When you eat a lot of food at one time, your stomach needs more acid to digest it  Eat 6 small meals each day instead of 3 large ones, and eat slowly  Do not eat meals 2 to 3 hours before bedtime  · Elevate the head of your bed  Place 6-inch blocks under the head of your bed frame  You may also use more than one pillow under your head and shoulders while you sleep  · Maintain a healthy weight  If you are overweight, weight loss may help relieve symptoms of GERD  · Do not smoke  Smoking weakens the lower esophageal sphincter and increases the risk of GERD  Ask your healthcare provider for information if you currently smoke and need help to quit  E-cigarettes or smokeless tobacco still contain nicotine  Talk to your healthcare provider before you use these products  · Do not wear clothing that is tight around your waist   Tight clothing can put pressure on your stomach and cause or worsen GERD symptoms  Follow up with your healthcare provider as directed:  Write down your questions so you remember to ask them during your visits  © 2017 [address] Information is for End User's use only and may not be sold, redistributed or otherwise used for commercial purposes  All illustrations and images included in CareNotes® are the copyrighted property of [employer] M [employer]  or [address]  The above information is an  only  It is not intended as medical advice for individual conditions or treatments   Talk to your doctor, nurse or pharmacist before following any medical regimen to see if it is safe and effective for you

## 2018-02-19 NOTE — PROGRESS NOTES
Assessment/Plan:     Diagnoses and all orders for this visit:    Gastroesophageal reflux disease without esophagitis  Comments:  start omeprazole 20 mg bid X 1 month  Dietary modifications  Try to quit smoking     DREAD (generalized anxiety disorder)  Comments:  suggested increasing counseling to once a week  referral to [facility] wed 2/21    Sore throat  Comments:  normal on assessment, most likely from reflux  See if symptoms resolve with PPI          Subjective:      Patient ID: Daphnie Almaguer is a 47 y o  female  Here for follow up for acid reflux  Still having reflux symptoms  Hasn't started omeprazole yet  Hasn't made dietary modifications  Patient has started smoking agin   Complaining of sore/irritated throat X 1 month  Denies fevers, n/v/d  H  Pylori test negative     Also c/o worsening depression and fatigue recently- goes for counseling twice a month and sees psychiatry- [facility]- has increased stressors with  and financing  Has had thoughts of what if "she wasn't here" but no thoughts of hurting herself or plan      Family History   Problem Relation Age of Onset    Stroke Father     Psychosis Father     Bipolar disorder Mother     Lung cancer Mother     Depression Family      Past Medical History:   Diagnosis Date    Abnormal liver scan     last assessed 8/14/2014    Atypical chest pain     last assessed 2/3/2016    Bladder disorder     last assessed 1/22/2013    Chronic pain     neck and back    Luteinized follicular cyst     Psychiatric disorder     Seizures ([facility] [address] )     Uterine fibroid      Social History     Social History    Marital status: /Civil Union     Spouse name: N/A    Number of children: N/A    Years of education: 12     Occupational History    Not on file       Social History Main Topics    Smoking status: Current Every Day Smoker     Packs/day: 0 50    Smokeless tobacco: Not on file      Comment: per kandy' keyona of tobacco abuse'    Alcohol use No      Comment: per allscripts ' once weekly'    Drug use: No    Sexual activity: Not on file     Other Topics Concern    Not on file     Social History Narrative    Daily caffeine consumption    Graduated from high school     History of tobacco use       Current Outpatient Prescriptions:     DULoxetine (CYMBALTA) 60 mg delayed release capsule, Take 60 mg by mouth daily  , Disp: , Rfl:     lamoTRIgine (LaMICtal) 100 mg tablet, Take 100 mg by mouth 2 (two) times a day , Disp: , Rfl:     LORazepam (ATIVAN) 0 5 mg tablet, Take 0 5 mg by mouth every 8 (eight) hours as needed for anxiety  , Disp: , Rfl:     omeprazole (PriLOSEC) 20 mg delayed release capsule, Take 1 capsule by mouth 2 (two) times a day, Disp: , Rfl:     traMADol (ULTRAM) 50 mg tablet, Take 50 mg by mouth every 6 (six) hours as needed for moderate pain, Disp: , Rfl:   Allergies   Allergen Reactions    Buspar [Buspirone] Hives and Rash       The following portions of the patient's history were reviewed and updated as appropriate: allergies, current medications, past family history, past medical history, past social history, past surgical history and problem list     Review of Systems   Constitutional: Positive for fatigue  Negative for fever  HENT: Positive for sore throat  Negative for postnasal drip and rhinorrhea  Respiratory: Negative for cough, chest tightness, shortness of breath and wheezing  Cardiovascular: Negative for chest pain and palpitations  Gastrointestinal: Negative for abdominal pain, diarrhea, nausea and vomiting  Musculoskeletal: Negative for myalgias  Neurological: Negative for dizziness, light-headedness and headaches  Psychiatric/Behavioral: Negative for self-injury and suicidal ideas  The patient is nervous/anxious            Objective:      /72 (BP Location: Left arm, Patient Position: Sitting, Cuff Size: Standard)   Pulse 82   Ht 5' 8" (1 727 m)          Physical Exam Constitutional: She is oriented to person, place, and time  She appears well-developed and well-nourished  HENT:   Mouth/Throat: No oropharyngeal exudate, posterior oropharyngeal edema or posterior oropharyngeal erythema  Cardiovascular: Normal rate, regular rhythm and normal heart sounds  Pulmonary/Chest: Effort normal and breath sounds normal    Abdominal: Soft  Bowel sounds are normal    Neurological: She is alert and oriented to person, place, and time  Psychiatric: She has a normal mood and affect  Vitals reviewed

## 2018-02-19 NOTE — ASSESSMENT & PLAN NOTE
H   Pylori negative  Start omeprazole 20 mg bid for one month  If no improvement, may consider GI consult

## 2018-02-20 PROBLEM — M54.16 CHRONIC LUMBAR RADICULOPATHY: Status: ACTIVE | Noted: 2017-10-25

## 2018-02-20 PROBLEM — D69.6 THROMBOCYTOPENIA (HCC): Status: ACTIVE | Noted: 2018-01-18

## 2018-02-20 PROBLEM — M54.12 CERVICAL RADICULOPATHY: Status: ACTIVE | Noted: 2017-10-25

## 2018-02-21 ENCOUNTER — OFFICE VISIT (OUTPATIENT)
Dept: BEHAVIORAL/MENTAL HEALTH CLINIC | Facility: CLINIC | Age: 53
End: 2018-02-21
Payer: COMMERCIAL

## 2018-02-21 DIAGNOSIS — F31.81 BIPOLAR II DISORDER (HCC): Primary | ICD-10-CM

## 2018-02-21 PROCEDURE — 90834 PSYTX W PT 45 MINUTES: CPT | Performed by: SOCIAL WORKER
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
## 2018-02-21 NOTE — PATIENT INSTRUCTIONS
Provided supportive therapy  Reviewed coping strategies for mood and anxiety issues  Needs to make her own health needs primary  Reviewed assertiveness strategies to apply with family  Given her struggles, may be good candidate for [facility] referral and she is in agreement   Will contact [employer] to complete referral

## 2018-02-23 ENCOUNTER — OFFICE VISIT (OUTPATIENT)
Dept: BEHAVIORAL/MENTAL HEALTH CLINIC | Facility: CLINIC | Age: 53
End: 2018-02-23
Payer: COMMERCIAL

## 2018-02-23 DIAGNOSIS — F31.81 BIPOLAR II DISORDER (HCC): Primary | ICD-10-CM

## 2018-02-23 DIAGNOSIS — F41.0 PANIC DISORDER WITHOUT AGORAPHOBIA: ICD-10-CM

## 2018-02-23 DIAGNOSIS — F41.1 GAD (GENERALIZED ANXIETY DISORDER): ICD-10-CM

## 2018-02-23 PROCEDURE — 90834 PSYTX W PT 45 MINUTES: CPT | Performed by: SOCIAL WORKER

## 2018-02-23 NOTE — PSYCH
Psychotherapy Provided: Individual Psychotherapy 50 minutes     Length of time in session: 50 minutes, follow up in 2 week    Goals addressed in session: Goal 1, Goal 2 and Goal 3      Pain:      none    0    Current suicide risk : Low     D: Met with Janine and  Mundo Oropeza beginning of session  Janey Amezcua wanted to discuss unhealthy home environment, encouraging Mundo Oropeza to move to [place] and they need to file for divorce  Met with Janine individually  ROS; 'I am at my limit'  Acknowledged increased SI 'I wouldn't due to my grandchildren'  Discussed grandson's illness; additional neurological findings  Encouraged activities in the local area to get out and take time for self  Options provided      A: Janine demonstrated a depressed mood with constricted affect  Janey Amezcua has been setting her own boundaries and speaking up more but she is also creating a lot of her stress            P: Continue individual therapy  Ongoing support and discussion regarding family  Behavioral Health Treatment Plan [facility]: Diagnosis and Treatment Plan explained to Olesya Montgomery relates understanding diagnosis and is agreeable to Treatment Plan   Yes

## 2018-02-26 ENCOUNTER — TELEPHONE (OUTPATIENT)
Dept: PSYCHIATRY | Facility: CLINIC | Age: 53
End: 2018-02-26

## 2018-03-01 ENCOUNTER — OFFICE VISIT (OUTPATIENT)
Dept: BEHAVIORAL/MENTAL HEALTH CLINIC | Facility: CLINIC | Age: 53
End: 2018-03-01
Payer: COMMERCIAL

## 2018-03-01 DIAGNOSIS — F31.81 BIPOLAR II DISORDER (HCC): ICD-10-CM

## 2018-03-01 DIAGNOSIS — F41.0 PANIC DISORDER WITHOUT AGORAPHOBIA: ICD-10-CM

## 2018-03-01 DIAGNOSIS — F41.1 GAD (GENERALIZED ANXIETY DISORDER): Primary | ICD-10-CM

## 2018-03-01 PROCEDURE — 90834 PSYTX W PT 45 MINUTES: CPT | Performed by: SOCIAL WORKER

## 2018-03-01 NOTE — PSYCH
This encounter was created in error - please disregard  Psychotherapy Provided: Family Therapy     Length of time in session: 50 minutes, follow up in 2 week    Goals addressed in session: Goal 1, Goal 2 and Goal 3      Pain:      none    0    Current suicide risk : Low     D: Met with Janine and  Alvin Faust beginning of session  Matthews Brittle wanted to discuss unhealthy home environment, encouraging Alvin Faust to move to [place] and they need to file for divorce  Met with Janine individually  ROS; 'I am at my limit'  Acknowledged increased SI 'I wouldn't due to my grandchildren'   Discussed grandson's illness; additional neurological findings  Encouraged activities in the local area to get out and take time for self  Options provided      A: Janine demonstrated a depressed mood with constricted affect  Weinstein Freeze has been setting her own boundaries and speaking up more but she is also creating a lot of her stress            P: Continue individual therapy  Ongoing support and discussion regarding family  Behavioral Health Treatment Plan [facility]: Diagnosis and Treatment Plan explained to Gearld Pitcairn Islander relates understanding diagnosis and is agreeable to Treatment Plan   Yes

## 2018-03-01 NOTE — PSYCH
Date of Initial Treatment Plan: 5/1/17   Date of Current Treatment Plan: 03/01/18     Treatment Plan Number 3     Strengths/Personal Resources for Self Care: Caring, grandmother    Diagnosis:   1  DREAD (generalized anxiety disorder)     2  Bipolar II disorder ([facility] [address] )     3  Panic disorder without agoraphobia         Area of Needs: Anxiety, depression, overwhelmed with family situations,  Long Term Goal 1:        I have peace and understanding of myself   Target Date: 6/25/18  Completion Date:  N/A         Short Term Objectives for Goal 1:   1  Yoga, Mindfulness  2  Journaling     Long Term Goal 2:        My anxiety/depression has greatly improved  Target Date: 6/25/18  Completion Date: N/A    Short Term Objectives for Goal 2:   1  Raulito Kathleen moved to [place]  2  I can afford a place to live  3  Historic circumstances              Long Term Goal # 3:        I have given up my 'obsession' with Brain Muller   Target Date: 6/25/18  Completion Date: N/A    Short Term Objectives for Goal 3:   1  Acknowledging Jerzy's progress  2  Process grief for medical diagnosis  GOAL 1: Modality: Individual 2x per month   Completion Date N/A                                   Medication management 1x per month  GOAL 2: Modality: Individual 2x per month   Completion Date N/A                                                          Medication management 1x per month  GOAL 3: Modality: Individual 2x per month   Completion Date N/A                                  Medication management 1x per month    [address]: Diagnosis and Treatment Plan explained to Selina Wei relates understanding diagnosis and is agreeable to Treatment Plan         Client Comments : Please share your thoughts, feelings, need and/or experiences regarding your treatment plan: __________________________________________________________________    __________________________________________________________________    __________________________________________________________________    __________________________________________________________________    _______________________________________                Patient signature, Date Time: __________________________________________             Physician cosigner signature, Date, Time: ________________________________

## 2018-03-01 NOTE — PSYCH
Psychotherapy Provided: Individual Psychotherapy 50 minutes     Length of time in session: 50 minutes, follow up in 2 week    Goals addressed in session: Goal 1, Goal 2 and Goal 3      Pain:      moderate to severe    7    Current suicide risk : Low     D: Met with Janine individually  ROS; 'I am at my limit'  Acknowledged ongoing fleeting SI 'I wouldn't due to my grandchildren'   Discussed outline of goals; Diego Ling moving to [place], filing for divorce, Lanny Juliansus will be homeless after a month of not paying rent, going to [facility] office to discuss options due to living situation  Reassessment of treatment plan completed  Currently contracted for safety  A: Janine demonstrated a depressed mood with constricted affect  Macky Nyhan has demonstrated more boundry setting with Jerzy's mother and Diego Ling  Identified goals and presented with slight motivation to follow through      P: Continue individual therapy  Ongoing support and discussion regarding family  Behavioral Health Treatment Plan [address] [place] [address] [address]: Diagnosis and Treatment Plan explained to Tatiana Gonzalez relates understanding diagnosis and is agreeable to Treatment Plan   Yes

## 2018-03-05 ENCOUNTER — TELEPHONE (OUTPATIENT)
Dept: INTERNAL MEDICINE CLINIC | Facility: CLINIC | Age: 53
End: 2018-03-05

## 2018-03-07 ENCOUNTER — TELEPHONE (OUTPATIENT)
Dept: PSYCHIATRY | Facility: CLINIC | Age: 53
End: 2018-03-07

## 2018-03-07 NOTE — PSYCH
History of Present Illness    Presenting Problems: Stressors: PATIENT MORE DEPRESSED AND ANXIOUS DUE TO ISSUES AT WORK WHICH SHE NOW RESIGNED PATIENT STATES "FEELS LIKE SHE IS HAVING NERVOUS BREAKDOWN" HAVING PASSIVE DEATH WISH  Referral Source: DR Nayely Higginbotham  She is employed  at [employer]  She is a smoker  Symptoms: suicidal ideation, no self abusive behaviors, no homicidal thoughts, no history of violence toward others, depressed mood, anxiety, no psychosis, no medication noncompliance, sleep disturbances, no change in appetite, no agitation and no hypomania  Provisional Diagnosis: Axis I: BIPOLAR II and Axis II: PANIC DISORDER DREAD  Substance Abuse: No substance abuse  Psychiatric Treatment History: [facility] SEVERAL YEARS AGO, Current Psychiatrist: DR Nayely Higginbotham and Therapist: NONE   The patient does not require ambulatory assistance  Legal Issues: The patient does not have legal issues  Action: Record received and Laboratory work received  ACCEPTED  Appointment Date: 03/20/2017        Signatures   Electronically signed by : Kim Agosto, ; Mar  9 2017  3:23PM EST                       (Author)

## 2018-03-07 NOTE — PSYCH
Message  Patient No Show Letter - Behavioral Health:     Date: 09/27/2016     Dear Madhavi Ely,     We missed seeing you for a scheduled appointment at [facility] on 9-26-16 at 4:15pm with Dr Alessandra Lewis  Our goal is to offer the best possible care to our patients, so we are concerned when you are unable to keep a scheduled appointment  Please call us at [phone] so that we can reschedule the appointment for a date and time that will work for you  We understand that circumstances may arise which make it impossible for you to keep a scheduled appointment  Should this happen in the future, please call us as soon as you know the appointment will be missed  The earlier you let us know, the more likely we can offer your scheduled appointment time to another patient  We hope to hear from you soon       Sincerely,   Dr Jed Lawler   Electronically signed by : EMETERIO Woody ; Sep 27 2016  1:35PM EST                       (Author)

## 2018-03-09 ENCOUNTER — OFFICE VISIT (OUTPATIENT)
Dept: BEHAVIORAL/MENTAL HEALTH CLINIC | Facility: CLINIC | Age: 53
End: 2018-03-09
Payer: COMMERCIAL

## 2018-03-09 DIAGNOSIS — F41.1 GAD (GENERALIZED ANXIETY DISORDER): ICD-10-CM

## 2018-03-09 DIAGNOSIS — F31.81 BIPOLAR II DISORDER (HCC): ICD-10-CM

## 2018-03-09 DIAGNOSIS — F41.0 PANIC DISORDER WITHOUT AGORAPHOBIA: Primary | ICD-10-CM

## 2018-03-09 PROCEDURE — 90834 PSYTX W PT 45 MINUTES: CPT | Performed by: SOCIAL WORKER
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
## 2018-03-19 ENCOUNTER — OFFICE VISIT (OUTPATIENT)
Dept: INTERNAL MEDICINE CLINIC | Facility: CLINIC | Age: 53
End: 2018-03-19
Payer: COMMERCIAL

## 2018-03-19 VITALS
SYSTOLIC BLOOD PRESSURE: 110 MMHG | HEIGHT: 68 IN | HEART RATE: 95 BPM | BODY MASS INDEX: 36.31 KG/M2 | OXYGEN SATURATION: 98 % | WEIGHT: 239.6 LBS | RESPIRATION RATE: 16 BRPM | DIASTOLIC BLOOD PRESSURE: 82 MMHG

## 2018-03-19 DIAGNOSIS — K21.9 GASTROESOPHAGEAL REFLUX DISEASE WITHOUT ESOPHAGITIS: ICD-10-CM

## 2018-03-19 DIAGNOSIS — K21.9 GASTROESOPHAGEAL REFLUX DISEASE, ESOPHAGITIS PRESENCE NOT SPECIFIED: Primary | ICD-10-CM

## 2018-03-19 PROCEDURE — 99213 OFFICE O/P EST LOW 20 MIN: CPT | Performed by: NURSE PRACTITIONER

## 2018-03-19 RX ORDER — OMEPRAZOLE 20 MG/1
20 CAPSULE, DELAYED RELEASE ORAL 2 TIMES DAILY
Qty: 60 CAPSULE | Refills: 0 | Status: SHIPPED | OUTPATIENT
Start: 2018-03-19 | End: 2018-04-26

## 2018-03-19 RX ORDER — DULOXETIN HYDROCHLORIDE 30 MG/1
30 CAPSULE, DELAYED RELEASE ORAL
COMMUNITY
End: 2018-03-29 | Stop reason: SDUPTHER

## 2018-03-19 NOTE — ASSESSMENT & PLAN NOTE
Continue omeprazole 20 mg bid for one more month then switch to daily  Try to quit smoking- samples of nicotine patch given   If symptoms worsen or continue to persist despite medication- recommend GI referral

## 2018-03-19 NOTE — PROGRESS NOTES
Assessment/Plan:    Gastroesophageal reflux disease without esophagitis  Continue omeprazole 20 mg bid for one more month then switch to daily  Try to quit smoking- samples of nicotine patch given   If symptoms worsen or continue to persist despite medication- recommend GI referral       Diagnoses and all orders for this visit:    Gastroesophageal reflux disease, esophagitis presence not specified  -     omeprazole (PriLOSEC) 20 mg delayed release capsule; Take 1 capsule (20 mg total) by mouth 2 (two) times a day    Gastroesophageal reflux disease without esophagitis    Other orders  -     DULoxetine (CYMBALTA) 30 mg delayed release capsule; Take 30 mg by mouth daily at bedtime          Subjective:      Patient ID: Angel Torre is a 47 y o  female  Here for follow up for gerd symptoms  Taking omeprazole twice a day  Symptoms are improving but still having some epigastric discomfort 2-3 days a week   Started smoking again   She has not changed her diet   Stress has continued to be an issue- financial and family problems    Depression- Saw Blondell Finger a few weeks ago   Still seeing her counselor   She is trying to get a small part time job which she thinks would help   Denies suicidal thoughts/plans          The following portions of the patient's history were reviewed and updated as appropriate: allergies, current medications, past family history, past medical history, past social history, past surgical history and problem list     Review of Systems   Constitutional: Negative  HENT: Negative  Eyes: Negative  Respiratory: Negative  Cardiovascular: Negative  Gastrointestinal: Positive for abdominal pain  Negative for blood in stool, constipation, diarrhea, nausea and vomiting  Epigastric discomfort   Genitourinary: Negative  Musculoskeletal: Positive for neck pain  Neurological: Negative  Psychiatric/Behavioral: Positive for sleep disturbance  Negative for self-injury and suicidal ideas   The patient is nervous/anxious  Objective:      /82 (BP Location: Left arm, Patient Position: Sitting, Cuff Size: Large)   Pulse 95   Resp 16   Ht 5' 8" (1 727 m)   Wt 109 kg (239 lb 9 6 oz)   SpO2 98%   BMI 36 43 kg/m²          Physical Exam   Constitutional: She is oriented to person, place, and time  She appears well-developed and well-nourished  Cardiovascular: Normal rate, regular rhythm, normal heart sounds and intact distal pulses  Pulmonary/Chest: Effort normal and breath sounds normal    Abdominal: Soft  Bowel sounds are normal  She exhibits no distension  There is no tenderness  Neurological: She is alert and oriented to person, place, and time  Psychiatric: She has a normal mood and affect  Her behavior is normal    Vitals reviewed

## 2018-03-28 ENCOUNTER — DOCUMENTATION (OUTPATIENT)
Dept: PSYCHIATRY | Facility: CLINIC | Age: 53
End: 2018-03-28

## 2018-03-28 ENCOUNTER — OFFICE VISIT (OUTPATIENT)
Dept: BEHAVIORAL/MENTAL HEALTH CLINIC | Facility: CLINIC | Age: 53
End: 2018-03-28
Payer: COMMERCIAL

## 2018-03-28 DIAGNOSIS — F41.0 PANIC DISORDER WITHOUT AGORAPHOBIA: Primary | ICD-10-CM

## 2018-03-28 DIAGNOSIS — F31.81 BIPOLAR II DISORDER (HCC): ICD-10-CM

## 2018-03-28 DIAGNOSIS — F41.1 GAD (GENERALIZED ANXIETY DISORDER): ICD-10-CM

## 2018-03-28 PROCEDURE — 90834 PSYTX W PT 45 MINUTES: CPT | Performed by: SOCIAL WORKER

## 2018-03-28 NOTE — TELEPHONE ENCOUNTER
Received this message  Will ask covering provider to review  Message   Received: Today   Message Contents   Monica Rangel, RN              Client very upset stating she left a phone message over a week ago asking for a refill  Sees Dr Moshe Young    Client is requesting a refill of 30mg of Cymbalta  Thanks!
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
## 2018-03-29 DIAGNOSIS — F41.0 PANIC DISORDER WITHOUT AGORAPHOBIA: Primary | ICD-10-CM

## 2018-03-29 DIAGNOSIS — F41.1 GAD (GENERALIZED ANXIETY DISORDER): ICD-10-CM

## 2018-03-29 DIAGNOSIS — F31.81 BIPOLAR II DISORDER (HCC): ICD-10-CM

## 2018-03-29 RX ORDER — DULOXETIN HYDROCHLORIDE 30 MG/1
30 CAPSULE, DELAYED RELEASE ORAL
Qty: 90 CAPSULE | Refills: 0 | Status: SHIPPED | OUTPATIENT
Start: 2018-03-29 | End: 2018-06-13 | Stop reason: SDUPTHER

## 2018-03-29 RX ORDER — DULOXETIN HYDROCHLORIDE 60 MG/1
60 CAPSULE, DELAYED RELEASE ORAL
Qty: 90 CAPSULE | Refills: 0 | Status: SHIPPED | OUTPATIENT
Start: 2018-03-29 | End: 2018-06-13 | Stop reason: SDUPTHER

## 2018-03-29 NOTE — TELEPHONE ENCOUNTER
Patient called and informed script called to [facility] pharmacy  Patient stated she only needs Cymbalta 30 mg and was going to call to see how much script would be  She reported she preferred using [facility] for the 90 day supplies

## 2018-04-16 ENCOUNTER — OFFICE VISIT (OUTPATIENT)
Dept: BEHAVIORAL/MENTAL HEALTH CLINIC | Facility: CLINIC | Age: 53
End: 2018-04-16
Payer: COMMERCIAL

## 2018-04-16 DIAGNOSIS — F31.81 BIPOLAR II DISORDER (HCC): ICD-10-CM

## 2018-04-16 DIAGNOSIS — F41.0 PANIC DISORDER WITHOUT AGORAPHOBIA: ICD-10-CM

## 2018-04-16 DIAGNOSIS — F41.1 GAD (GENERALIZED ANXIETY DISORDER): Primary | ICD-10-CM

## 2018-04-16 PROCEDURE — 90834 PSYTX W PT 45 MINUTES: CPT | Performed by: SOCIAL WORKER

## 2018-04-16 NOTE — PSYCH
Psychotherapy Provided: Individual Psychotherapy 50 minutes     Length of time in session: 50 minutes, follow up in 2 week    Goals addressed in session: Goal 1, Goal 2 and Goal 3      Pain:      none    0    Current suicide risk : Low     D: Met with Janine DIAZ; 'I am not doing good' Experiencing fleeting SI without plan  Disclosed fleeting urges to hurt self in frustration  Psychosocial stressors continue to be overwhelming; grandson's acute autism, medical needs and need for a number of services  Finances are minimal; reviewed available food duran, ongoing plans for divorce       A: Marisela Diaz demonstrated a depressed mood with constricted , tearful affect  Depression has increased anxiety is high   Family struggles are a considerable struggle and awaiting disability hearing has her finances at a minimum       P: Continue individual therapy  Ongoing support and exploration of current and historic circumstances       Behavioral Health Treatment Plan [facility]: Diagnosis and Treatment Plan explained to Ben Perez relates understanding diagnosis and is agreeable to Treatment Plan   Yes

## 2018-04-26 ENCOUNTER — APPOINTMENT (OUTPATIENT)
Dept: RADIOLOGY | Facility: HOSPITAL | Age: 53
End: 2018-04-26
Payer: COMMERCIAL

## 2018-04-26 ENCOUNTER — APPOINTMENT (EMERGENCY)
Dept: RADIOLOGY | Facility: HOSPITAL | Age: 53
End: 2018-04-26
Payer: COMMERCIAL

## 2018-04-26 ENCOUNTER — HOSPITAL ENCOUNTER (OUTPATIENT)
Facility: HOSPITAL | Age: 53
Setting detail: OBSERVATION
Discharge: HOME HEALTH | End: 2018-05-04
Attending: EMERGENCY MEDICINE | Admitting: ORTHOPAEDIC SURGERY
Payer: COMMERCIAL

## 2018-04-26 DIAGNOSIS — E78.5 HYPERLIPIDEMIA: ICD-10-CM

## 2018-04-26 DIAGNOSIS — F41.0 PANIC DISORDER WITHOUT AGORAPHOBIA: Chronic | ICD-10-CM

## 2018-04-26 DIAGNOSIS — S82.892A CLOSED FRACTURE OF LEFT ANKLE, INITIAL ENCOUNTER: Primary | ICD-10-CM

## 2018-04-26 DIAGNOSIS — S82.852A LEFT TRIMALLEOLAR FRACTURE: ICD-10-CM

## 2018-04-26 DIAGNOSIS — F31.81 BIPOLAR II DISORDER (HCC): ICD-10-CM

## 2018-04-26 DIAGNOSIS — S92.353A FRACTURE OF 5TH METATARSAL: ICD-10-CM

## 2018-04-26 PROCEDURE — 96376 TX/PRO/DX INJ SAME DRUG ADON: CPT

## 2018-04-26 PROCEDURE — 96374 THER/PROPH/DIAG INJ IV PUSH: CPT

## 2018-04-26 PROCEDURE — 73610 X-RAY EXAM OF ANKLE: CPT

## 2018-04-26 PROCEDURE — 73600 X-RAY EXAM OF ANKLE: CPT

## 2018-04-26 PROCEDURE — 73560 X-RAY EXAM OF KNEE 1 OR 2: CPT

## 2018-04-26 PROCEDURE — 73630 X-RAY EXAM OF FOOT: CPT

## 2018-04-26 PROCEDURE — 93005 ELECTROCARDIOGRAM TRACING: CPT | Performed by: ORTHOPAEDIC SURGERY

## 2018-04-26 RX ORDER — PROPOFOL 10 MG/ML
150 INJECTION, EMULSION INTRAVENOUS ONCE
Status: COMPLETED | OUTPATIENT
Start: 2018-04-26 | End: 2018-04-26

## 2018-04-26 RX ORDER — FENTANYL CITRATE 50 UG/ML
INJECTION, SOLUTION INTRAMUSCULAR; INTRAVENOUS
Status: COMPLETED
Start: 2018-04-26 | End: 2018-04-26

## 2018-04-26 RX ORDER — LIDOCAINE HYDROCHLORIDE 10 MG/ML
40 INJECTION, SOLUTION EPIDURAL; INFILTRATION; INTRACAUDAL; PERINEURAL ONCE
Status: DISCONTINUED | OUTPATIENT
Start: 2018-04-26 | End: 2018-04-26

## 2018-04-26 RX ORDER — SODIUM CHLORIDE 9 MG/ML
75 INJECTION, SOLUTION INTRAVENOUS CONTINUOUS
Status: DISCONTINUED | OUTPATIENT
Start: 2018-04-27 | End: 2018-05-04 | Stop reason: HOSPADM

## 2018-04-26 RX ORDER — PREDNISONE 1 MG/1
TABLET ORAL DAILY
COMMUNITY
End: 2018-10-08 | Stop reason: ALTCHOICE

## 2018-04-26 RX ADMIN — HYDROMORPHONE HYDROCHLORIDE 1 MG: 1 INJECTION, SOLUTION INTRAMUSCULAR; INTRAVENOUS; SUBCUTANEOUS at 20:31

## 2018-04-26 RX ADMIN — HYDROMORPHONE HYDROCHLORIDE 1 MG: 1 INJECTION, SOLUTION INTRAMUSCULAR; INTRAVENOUS; SUBCUTANEOUS at 18:32

## 2018-04-26 RX ADMIN — FENTANYL CITRATE 100 MCG: 50 INJECTION, SOLUTION INTRAMUSCULAR; INTRAVENOUS at 21:55

## 2018-04-26 RX ADMIN — PROPOFOL 100 MG: 10 INJECTION, EMULSION INTRAVENOUS at 21:37

## 2018-04-26 NOTE — ED PROVIDER NOTES
History  Chief Complaint   Patient presents with    Leg Injury - Major     Per EMS pt fell down 3 stairs  Obvious deformity noted of the pt's L ankle  Pt also reports L knee pain  Per EMS pt has chronic neck and back pain  No LOC and no thinners  This is a 39-year-old female that presents today with ankle injury  Patient had a mechanical fall where she fell down 3 steps  She denies hitting her head  She states she has an obvious deformity to her left ankle and has been unable to bear any weight  Brought in by paramedics received 75 mcg of fentanyl  She states pain localized to her left ankle along with left knee  She also states some mild swelling of her right foot as well  She denies any previous fractures  Currently states her pain is controlled  Denies any other injuries of her back neck upper extremities head neck  18-year-old female that presents today with ankle injury  Concern for fracture  Will get x-rays treat pain            Prior to Admission Medications   Prescriptions Last Dose Informant Patient Reported? Taking? DULoxetine (CYMBALTA) 30 mg delayed release capsule   No Yes   Sig: Take 1 capsule (30 mg total) by mouth daily at bedtime   DULoxetine (CYMBALTA) 60 mg delayed release capsule   No Yes   Sig: Take 1 capsule (60 mg total) by mouth daily in the early morning   LORazepam (ATIVAN) 0 5 mg tablet   Yes Yes   Sig: Take 0 5 mg by mouth every 8 (eight) hours as needed for anxiety  lamoTRIgine (LaMICtal) 100 mg tablet   Yes Yes   Sig: Take 100 mg by mouth daily     lamoTRIgine (LaMICtal) 150 MG tablet   Yes Yes   Sig: Take 1 tablet by mouth daily at bedtime     predniSONE 1 mg tablet   Yes Yes   Sig: Take by mouth daily Unknown dose     traMADol (ULTRAM) 50 mg tablet   Yes Yes   Sig: Take 50 mg by mouth every 6 (six) hours as needed for moderate pain      Facility-Administered Medications: None       Past Medical History:   Diagnosis Date    Abnormal liver scan     last assessed 8/14/2014    Atypical chest pain     last assessed 2/3/2016    Bladder disorder     last assessed 1/22/2013    Chronic pain     neck and back    Dermatitis     Luteinized follicular cyst     Uterine fibroid        Past Surgical History:   Procedure Laterality Date    CHOLECYSTECTOMY      HYSTERECTOMY      MELO    TOOTH EXTRACTION      last assessed 3/20/2017, oral surgery       Family History   Problem Relation Age of Onset    Stroke Father     Psychosis Father     Bipolar disorder Mother     Lung cancer Mother     Depression Family      I have reviewed and agree with the history as documented  Social History   Substance Use Topics    Smoking status: Current Every Day Smoker     Packs/day: 0 50    Smokeless tobacco: Never Used      Comment: per allscripts' hx of tobacco abuse'    Alcohol use No        Review of Systems   Constitutional: Negative  Negative for diaphoresis and fever  HENT: Negative  Respiratory: Negative  Negative for cough, shortness of breath and wheezing  Cardiovascular: Negative  Negative for chest pain, palpitations and leg swelling  Gastrointestinal: Negative for abdominal distention, abdominal pain, nausea and vomiting  Genitourinary: Negative  Musculoskeletal:        Ankle pain and knee pain     Skin: Negative  Neurological: Negative  Psychiatric/Behavioral: Negative  All other systems reviewed and are negative        Physical Exam  ED Triage Vitals   Temperature Pulse Respirations Blood Pressure SpO2   04/26/18 1803 04/26/18 1803 04/26/18 1803 04/26/18 1803 04/26/18 1803   98 7 °F (37 1 °C) 68 20 129/66 96 %      Temp Source Heart Rate Source Patient Position - Orthostatic VS BP Location FiO2 (%)   04/26/18 1803 04/26/18 2134 04/26/18 2015 04/26/18 2015 04/26/18 2135   Oral Monitor Lying Right arm 2      Pain Score       04/26/18 1803       Worst Possible Pain           Orthostatic Vital Signs  Vitals:    04/26/18 2212 04/26/18 2215 04/26/18 2215 04/26/18 2218   BP: 145/67  145/67    Pulse:  86 80 78   Patient Position - Orthostatic VS:   Sitting        Physical Exam   Constitutional: She is oriented to person, place, and time  She appears well-developed  HENT:   Head: Normocephalic and atraumatic  Eyes: EOM are normal  Pupils are equal, round, and reactive to light  Neck: Normal range of motion  Neck supple  Cardiovascular: Normal rate, regular rhythm and normal heart sounds  No murmur heard  Pulmonary/Chest: Effort normal and breath sounds normal    Abdominal: Soft  Bowel sounds are normal  She exhibits no distension  There is no tenderness  There is no rebound and no guarding  Musculoskeletal: She exhibits edema, tenderness and deformity  Left ankle:  Obvious deformity to ankle with swelling  Normal pulses DP  Normal color  Tenderness to left knee along with proximal tib-fib  Right ankle swelling and tenderness around the 5th navicular   Neurological: She is alert and oriented to person, place, and time  Skin: Skin is warm and dry  Psychiatric: She has a normal mood and affect         ED Medications  Medications   sodium chloride 0 9 % infusion (125 mL/hr Intravenous New Bag 4/27/18 0036)   labetalol (NORMODYNE) injection 10 mg (not administered)   acetaminophen (TYLENOL) tablet 650 mg (not administered)   oxyCODONE-acetaminophen (PERCOCET) 5-325 mg per tablet 1 tablet (1 tablet Oral Given 4/27/18 0021)   HYDROmorphone (DILAUDID) injection 1 mg (1 mg Intravenous Given 4/27/18 0021)    EMS REPLENISHMENT MED ( Does not apply Given to EMS 4/26/18 1804)   HYDROmorphone (DILAUDID) injection 1 mg (1 mg Intravenous Given 4/26/18 1832)   HYDROmorphone (DILAUDID) injection 1 mg (1 mg Intravenous Given 4/26/18 2031)   propofol (DIPRIVAN) 200 MG/20ML bolus injection 150 mg (100 mg Intravenous Given 4/26/18 2137)   fentanyl citrate (PF) 100 MCG/2ML **AcuDose Override Pull** (100 mcg  Given 4/26/18 2155)       Diagnostic Studies  Results Reviewed Procedure Component Value Units Date/Time    APTT [[id number]]     Lab Status:  No result Specimen:  Blood     Basic metabolic panel [[id number]]     Lab Status:  No result Specimen:  Blood     CBC [[id number]]     Lab Status:  No result Specimen:  Blood     Protime-INR [[id number]]     Lab Status:  No result Specimen:  Blood                  XR knee 1 or 2 vw left   ED Interpretation by Bib Perez DO (04/26 1922)   Fracture or proximal fibula      Final Result by Geneva Urban MD (04/26 2250)   There is subtle lucency seen in the proximal tibia suspect nondisplaced tibial plateau fracture  A CT can be considered as needed      Fracture of the proximal fibula with mild comminution no significant displacement             I personally discussed this study with Mily De La Cruz on 4/26/2018 at 10:49 PM                 Workstation performed: [id number]         XR foot 3+ views RIGHT   ED Interpretation by Bib Perez DO (04/26 1921)   Fracture of the base of the 5th metatarsal      Final Result by Geneva Urban MD (04/26 2247)   Fracture of the proximal base of the 5th metatarsal with fracture line located about 1 8 cm from its proximal aspect               Workstation performed: [id number]         XR ankle 2 vw left   ED Interpretation by Bib Perez DO (04/26 1921)   Trimalleolar fracture      Final Result by Geneva Urban MD (04/26 2240)   Trimalleolar fracture with disruption of the ankle mortise with a tibiotalar dislocation   Findings concur with the preliminary report by the referring clinician already in PACS and/or our electronic record EPIC              Workstation performed: [id number]         XR ankle 2 vw left    (Results Pending)   XR ankle 3+ vw left    (Results Pending)   XR knee 1 or 2 vw right    (Results Pending)   XR chest portable    (Results Pending)         Procedures  Procedural Sedation  Date/Time: 4/26/2018 11:03 PM  Performed by: Ruy Washington by: Dominga Robledo Consent:     Consent obtained:  Written    Consent given by:  Patient    Risks discussed: Allergic reaction, dysrhythmia, inadequate sedation, nausea, vomiting, respiratory compromise necessitating ventilatory assistance and intubation, prolonged sedation necessitating reversal and prolonged hypoxia resulting in organ damage  Universal protocol:     Patient identity confirmation method:  Verbally with patient and arm band  Indications:     Sedation purpose:  Dislocation reduction  Pre-sedation assessment:     NPO status caution: unable to specify NPO status      ASA classification: class 2 - patient with mild systemic disease      Neck mobility: normal      Mouth openin finger widths    Mallampati score:  II - soft palate, uvula, fauces visible    History of difficult intubation: no    Immediate pre-procedure details:     Reassessment: Patient reassessed immediately prior to procedure      Reviewed: vital signs, relevant labs/tests and NPO status      Verified: bag valve mask available, emergency equipment available, intubation equipment available, IV patency confirmed, oxygen available and suction available    Procedure details (see MAR for exact dosages):     Preoxygenation:  Nasal cannula    Sedation:  Propofol    Analgesia:  Fentanyl    Intra-procedure monitoring:  Blood pressure monitoring, continuous capnometry, continuous pulse oximetry, cardiac monitor, frequent vital sign checks and frequent LOC assessments    Intra-procedure events: none    Post-procedure details:     Recovery: Patient returned to pre-procedure baseline      Post-sedation assessments completed and reviewed: airway patency, cardiovascular function, hydration status, mental status, nausea/vomiting, pain level, respiratory function and temperature      Patient tolerance:   Tolerated well, no immediate complications          Phone Consults  ED Phone Contact    ED Course  ED Course as of  014   u 2018   1811 Patient already received 75mcg of fentanyl and currently not requesting any     1925 I spoke withOrtho resident regarding x-rays  Patient has a left trimalleolar fracture along with right 5th metatarsal fracture    1925 I discussed the results with the patient    2113  sign consent for conscious sedation  [facility]  CritCare Time    Disposition  Final diagnoses:   Bipolar II disorder ([facility] [address] )   Hyperlipidemia   Left trimalleolar fracture   Fracture of 5th metatarsal - right     Time reflects when diagnosis was documented in both MDM as applicable and the Disposition within this note     Time User Action Codes Description Comment    4/26/2018  8:24 PM Chichi Alcaraz Add [C52 024I] Closed fracture of left ankle, initial encounter     4/26/2018  9:51 PM Milas Part, [address] Add [F31 81] Bipolar II disorder ([facility] [address] )     4/26/2018  9:51 PM Milas Part, [address] Modify [F31 81] Bipolar II disorder ([facility] [address] )     4/26/2018  9:51 PM Milas Part, [place] Add [E78 5] Hyperlipidemia     4/26/2018  9:51 PM Milas Part, [address] [E78 5] Hyperlipidemia     4/26/2018  9:52 PM Magali Flavio Add [Y83 884E] Left trimalleolar fracture     4/26/2018  9:52 PM Milas Part, [place] Add [S92 353A] Fracture of 5th metatarsal     4/26/2018  9:52 PM Milas Part, [address] Fracture of 5th metatarsal right      ED Disposition     ED Disposition Condition Comment    Admit  Admitting Physician: Juice Johnson [[id number]]   Level of Care: Med Surg [16]        Follow-up Information    None       Patient's Medications   Discharge Prescriptions    No medications on file     No discharge procedures on file  ED Provider  Attending physically available and evaluated Tom Phillip  I managed the patient along with the ED Attending      Electronically Signed by         Holger Khan MD  04/27/18 2325

## 2018-04-26 NOTE — Clinical Note
Case was discussed with ortho and the patient's admission status was agreed to be Admission Status: observation status to the service of Dr Chon Lombardo

## 2018-04-26 NOTE — ED ATTENDING ATTESTATION
Mei Bermudez DO, saw and evaluated the patient  I have discussed the patient with the resident/non-physician practitioner and agree with the resident's/non-physician practitioner's findings, Plan of Care, and MDM as documented in the resident's/non-physician practitioner's note, except where noted  All available labs and Radiology studies were reviewed  At this point I agree with the current assessment done in the Emergency Department  I have conducted an independent evaluation of this patient a history and physical is as follows:    Patient complains of left ankle and right foot pain after mechanical trip and fall down 3 steps  She inverted her left ankle and landed on both of her knees  No prior fractures or  Orthopedic surgery  She denies head injury or loss of consciousness  No prodromal symptoms including lightheadedness or dizziness  ROS: Denies visual change, HA, neck or back pain, CP, SOB, abdominal pain, n/v  12 system ROS o/w negative  PE: Mild distress, appears uncomfortable, alert, GCS 15; PERRL, EOMI; no hemotympanum; no septal hematoma or epistaxis; MMM, no post OP exudate, edema or erythema, no dental trauma; no midline vert TTP, no crepitus or step-offs; HRR, no murmur; lungs CTA w/o w/r/r, POx 96% on RA (nl); abd s/nt/nd, nl BS in all four quadrants; moderate left ankle edema with tenderness and deformity, distal movement intact, distal lateral right foot is TTP without crepitus or deformity, no calf TTP, stable pelvis, otherwise FROM extremities x4, strength and sensation appear intact; skin on right knee has a shallow abrasion without bleeding, left ankle is ecchymotic, o/w p/w/d; CN II-XII GI/NF, oriented  DDx: Fall with right foot/left ankle/b/l knee injuries - left ankle fracture/dislocation, right foot contusion, bilateral knee abrasions  A/P: Will check XRays, treat symptoms, reevaluate for further work up and disposition      Critical Care Time  CritCare Time    Procedures

## 2018-04-27 ENCOUNTER — APPOINTMENT (OUTPATIENT)
Dept: RADIOLOGY | Facility: HOSPITAL | Age: 53
End: 2018-04-27
Payer: COMMERCIAL

## 2018-04-27 ENCOUNTER — ANESTHESIA EVENT (OUTPATIENT)
Dept: PERIOP | Facility: HOSPITAL | Age: 53
End: 2018-04-27
Payer: COMMERCIAL

## 2018-04-27 ENCOUNTER — ANESTHESIA (OUTPATIENT)
Dept: PERIOP | Facility: HOSPITAL | Age: 53
End: 2018-04-27
Payer: COMMERCIAL

## 2018-04-27 ENCOUNTER — TELEPHONE (OUTPATIENT)
Dept: BEHAVIORAL/MENTAL HEALTH CLINIC | Facility: CLINIC | Age: 53
End: 2018-04-27

## 2018-04-27 PROBLEM — S99.919A ANKLE INJURY: Status: ACTIVE | Noted: 2018-04-27

## 2018-04-27 LAB
ABO GROUP BLD: NORMAL
ANION GAP SERPL CALCULATED.3IONS-SCNC: 5 MMOL/L (ref 4–13)
APTT PPP: 31 SECONDS (ref 23–35)
ATRIAL RATE: 69 BPM
BLD GP AB SCN SERPL QL: NEGATIVE
BUN SERPL-MCNC: 14 MG/DL (ref 5–25)
CALCIUM SERPL-MCNC: 8.9 MG/DL (ref 8.3–10.1)
CHLORIDE SERPL-SCNC: 108 MMOL/L (ref 100–108)
CO2 SERPL-SCNC: 27 MMOL/L (ref 21–32)
CREAT SERPL-MCNC: 0.7 MG/DL (ref 0.6–1.3)
ERYTHROCYTE [DISTWIDTH] IN BLOOD BY AUTOMATED COUNT: 13.6 % (ref 11.6–15.1)
GFR SERPL CREATININE-BSD FRML MDRD: 99 ML/MIN/1.73SQ M
GLUCOSE SERPL-MCNC: 105 MG/DL (ref 65–140)
HCT VFR BLD AUTO: 36.1 % (ref 34.8–46.1)
HGB BLD-MCNC: 12.2 G/DL (ref 11.5–15.4)
INR PPP: 1.03 (ref 0.86–1.16)
MCH RBC QN AUTO: 30.3 PG (ref 26.8–34.3)
MCHC RBC AUTO-ENTMCNC: 33.8 G/DL (ref 31.4–37.4)
MCV RBC AUTO: 90 FL (ref 82–98)
P AXIS: 48 DEGREES
PLATELET # BLD AUTO: 149 THOUSANDS/UL (ref 149–390)
PMV BLD AUTO: 11.7 FL (ref 8.9–12.7)
POTASSIUM SERPL-SCNC: 3.6 MMOL/L (ref 3.5–5.3)
PR INTERVAL: 162 MS
PROTHROMBIN TIME: 13.5 SECONDS (ref 12.1–14.4)
QRS AXIS: 59 DEGREES
QRSD INTERVAL: 86 MS
QT INTERVAL: 380 MS
QTC INTERVAL: 407 MS
RBC # BLD AUTO: 4.02 MILLION/UL (ref 3.81–5.12)
RH BLD: POSITIVE
SODIUM SERPL-SCNC: 140 MMOL/L (ref 136–145)
SPECIMEN EXPIRATION DATE: NORMAL
T WAVE AXIS: 40 DEGREES
VENTRICULAR RATE: 69 BPM
WBC # BLD AUTO: 12.73 THOUSAND/UL (ref 4.31–10.16)

## 2018-04-27 PROCEDURE — 86850 RBC ANTIBODY SCREEN: CPT | Performed by: ORTHOPAEDIC SURGERY

## 2018-04-27 PROCEDURE — 36415 COLL VENOUS BLD VENIPUNCTURE: CPT | Performed by: ORTHOPAEDIC SURGERY

## 2018-04-27 PROCEDURE — C1713 ANCHOR/SCREW BN/BN,TIS/BN: HCPCS | Performed by: ORTHOPAEDIC SURGERY

## 2018-04-27 PROCEDURE — 86901 BLOOD TYPING SEROLOGIC RH(D): CPT | Performed by: ORTHOPAEDIC SURGERY

## 2018-04-27 PROCEDURE — 99285 EMERGENCY DEPT VISIT HI MDM: CPT

## 2018-04-27 PROCEDURE — 80048 BASIC METABOLIC PNL TOTAL CA: CPT | Performed by: ORTHOPAEDIC SURGERY

## 2018-04-27 PROCEDURE — 71045 X-RAY EXAM CHEST 1 VIEW: CPT

## 2018-04-27 PROCEDURE — 73600 X-RAY EXAM OF ANKLE: CPT

## 2018-04-27 PROCEDURE — 85027 COMPLETE CBC AUTOMATED: CPT | Performed by: ORTHOPAEDIC SURGERY

## 2018-04-27 PROCEDURE — 73700 CT LOWER EXTREMITY W/O DYE: CPT

## 2018-04-27 PROCEDURE — 27814 TREATMENT OF ANKLE FRACTURE: CPT | Performed by: ORTHOPAEDIC SURGERY

## 2018-04-27 PROCEDURE — 99220 PR INITIAL OBSERVATION CARE/DAY 70 MINUTES: CPT | Performed by: ORTHOPAEDIC SURGERY

## 2018-04-27 PROCEDURE — 93010 ELECTROCARDIOGRAM REPORT: CPT | Performed by: INTERNAL MEDICINE

## 2018-04-27 PROCEDURE — 27814 TREATMENT OF ANKLE FRACTURE: CPT | Performed by: PHYSICIAN ASSISTANT

## 2018-04-27 PROCEDURE — C1769 GUIDE WIRE: HCPCS | Performed by: ORTHOPAEDIC SURGERY

## 2018-04-27 PROCEDURE — 85610 PROTHROMBIN TIME: CPT | Performed by: ORTHOPAEDIC SURGERY

## 2018-04-27 PROCEDURE — 28470 CLTX METATARSAL FX WO MNP EA: CPT | Performed by: ORTHOPAEDIC SURGERY

## 2018-04-27 PROCEDURE — 99244 OFF/OP CNSLTJ NEW/EST MOD 40: CPT | Performed by: HOSPITALIST

## 2018-04-27 PROCEDURE — 85730 THROMBOPLASTIN TIME PARTIAL: CPT | Performed by: ORTHOPAEDIC SURGERY

## 2018-04-27 PROCEDURE — 86900 BLOOD TYPING SEROLOGIC ABO: CPT | Performed by: ORTHOPAEDIC SURGERY

## 2018-04-27 DEVICE — 2.7MM/3.5MM LCP LATERAL DISTAL FIBULA PLATE 5H/LEFT/99MM
Type: IMPLANTABLE DEVICE | Site: ANKLE | Status: FUNCTIONAL
Brand: LCP

## 2018-04-27 DEVICE — 2.7MM LOCKING SCREW SLF-TPNG WITH T8 STARDRIVE RECESS 16MM: Type: IMPLANTABLE DEVICE | Site: ANKLE | Status: FUNCTIONAL

## 2018-04-27 DEVICE — 3.5MM CORTEX SCREW SELF-TAPPING 14MM: Type: IMPLANTABLE DEVICE | Site: ANKLE | Status: FUNCTIONAL

## 2018-04-27 DEVICE — 4.0MM CANNULATED SCREW-SHORT THREAD 60MM: Type: IMPLANTABLE DEVICE | Site: ANKLE | Status: FUNCTIONAL

## 2018-04-27 DEVICE — 2.7MM CORTEX SCREW SELF-TAPPING 22MM: Type: IMPLANTABLE DEVICE | Site: ANKLE | Status: FUNCTIONAL

## 2018-04-27 DEVICE — 3.5MM CORTEX SCREW SELF-TAPPING 12MM: Type: IMPLANTABLE DEVICE | Site: ANKLE | Status: FUNCTIONAL

## 2018-04-27 DEVICE — 2.7MM LOCKING SCREW SLF-TPNG WITH T8 STARDRIVE RECESS 18MM: Type: IMPLANTABLE DEVICE | Site: ANKLE | Status: FUNCTIONAL

## 2018-04-27 RX ORDER — LORAZEPAM 0.5 MG/1
0.5 TABLET ORAL EVERY 8 HOURS PRN
Status: DISCONTINUED | OUTPATIENT
Start: 2018-04-27 | End: 2018-05-04 | Stop reason: HOSPADM

## 2018-04-27 RX ORDER — SUCCINYLCHOLINE CHLORIDE 20 MG/ML
INJECTION INTRAMUSCULAR; INTRAVENOUS AS NEEDED
Status: DISCONTINUED | OUTPATIENT
Start: 2018-04-27 | End: 2018-04-27 | Stop reason: SURG

## 2018-04-27 RX ORDER — ONDANSETRON 2 MG/ML
4 INJECTION INTRAMUSCULAR; INTRAVENOUS EVERY 6 HOURS PRN
Status: DISCONTINUED | OUTPATIENT
Start: 2018-04-27 | End: 2018-05-04 | Stop reason: HOSPADM

## 2018-04-27 RX ORDER — LAMOTRIGINE 100 MG/1
100 TABLET ORAL DAILY
Status: DISCONTINUED | OUTPATIENT
Start: 2018-04-27 | End: 2018-05-04 | Stop reason: HOSPADM

## 2018-04-27 RX ORDER — DULOXETIN HYDROCHLORIDE 30 MG/1
30 CAPSULE, DELAYED RELEASE ORAL
Status: DISCONTINUED | OUTPATIENT
Start: 2018-04-27 | End: 2018-04-29

## 2018-04-27 RX ORDER — CALCIUM CARBONATE 200(500)MG
1000 TABLET,CHEWABLE ORAL DAILY PRN
Status: DISCONTINUED | OUTPATIENT
Start: 2018-04-27 | End: 2018-05-04 | Stop reason: HOSPADM

## 2018-04-27 RX ORDER — FENTANYL CITRATE 50 UG/ML
INJECTION, SOLUTION INTRAMUSCULAR; INTRAVENOUS AS NEEDED
Status: DISCONTINUED | OUTPATIENT
Start: 2018-04-27 | End: 2018-04-27 | Stop reason: SURG

## 2018-04-27 RX ORDER — NICOTINE 21 MG/24HR
1 PATCH, TRANSDERMAL 24 HOURS TRANSDERMAL DAILY
Status: DISCONTINUED | OUTPATIENT
Start: 2018-04-27 | End: 2018-05-04 | Stop reason: SDUPTHER

## 2018-04-27 RX ORDER — ACETAMINOPHEN 325 MG/1
650 TABLET ORAL EVERY 4 HOURS PRN
Status: DISCONTINUED | OUTPATIENT
Start: 2018-04-27 | End: 2018-05-04 | Stop reason: HOSPADM

## 2018-04-27 RX ORDER — FENTANYL CITRATE/PF 50 MCG/ML
50 SYRINGE (ML) INJECTION
Status: DISCONTINUED | OUTPATIENT
Start: 2018-04-27 | End: 2018-04-27 | Stop reason: HOSPADM

## 2018-04-27 RX ORDER — MIDAZOLAM HYDROCHLORIDE 1 MG/ML
INJECTION INTRAMUSCULAR; INTRAVENOUS AS NEEDED
Status: DISCONTINUED | OUTPATIENT
Start: 2018-04-27 | End: 2018-04-27 | Stop reason: SURG

## 2018-04-27 RX ORDER — DOCUSATE SODIUM 100 MG/1
100 CAPSULE, LIQUID FILLED ORAL 2 TIMES DAILY PRN
Status: DISCONTINUED | OUTPATIENT
Start: 2018-04-27 | End: 2018-05-04 | Stop reason: HOSPADM

## 2018-04-27 RX ORDER — OXYCODONE HYDROCHLORIDE 5 MG/1
5 TABLET ORAL EVERY 4 HOURS PRN
Status: DISCONTINUED | OUTPATIENT
Start: 2018-04-27 | End: 2018-05-04 | Stop reason: HOSPADM

## 2018-04-27 RX ORDER — OXYCODONE HYDROCHLORIDE 10 MG/1
10 TABLET ORAL EVERY 4 HOURS PRN
Status: DISCONTINUED | OUTPATIENT
Start: 2018-04-27 | End: 2018-05-04 | Stop reason: HOSPADM

## 2018-04-27 RX ORDER — LIDOCAINE HYDROCHLORIDE 10 MG/ML
INJECTION, SOLUTION INFILTRATION; PERINEURAL AS NEEDED
Status: DISCONTINUED | OUTPATIENT
Start: 2018-04-27 | End: 2018-04-27 | Stop reason: SURG

## 2018-04-27 RX ORDER — ONDANSETRON 2 MG/ML
INJECTION INTRAMUSCULAR; INTRAVENOUS AS NEEDED
Status: DISCONTINUED | OUTPATIENT
Start: 2018-04-27 | End: 2018-04-27 | Stop reason: SURG

## 2018-04-27 RX ORDER — ACETAMINOPHEN 325 MG/1
650 TABLET ORAL EVERY 6 HOURS PRN
Status: DISCONTINUED | OUTPATIENT
Start: 2018-04-27 | End: 2018-04-27

## 2018-04-27 RX ORDER — TRAMADOL HYDROCHLORIDE 50 MG/1
50 TABLET ORAL EVERY 6 HOURS PRN
Status: DISCONTINUED | OUTPATIENT
Start: 2018-04-27 | End: 2018-05-04 | Stop reason: HOSPADM

## 2018-04-27 RX ORDER — OXYCODONE HYDROCHLORIDE AND ACETAMINOPHEN 5; 325 MG/1; MG/1
1 TABLET ORAL EVERY 4 HOURS PRN
Status: DISCONTINUED | OUTPATIENT
Start: 2018-04-27 | End: 2018-04-27

## 2018-04-27 RX ORDER — MAGNESIUM HYDROXIDE 1200 MG/15ML
LIQUID ORAL AS NEEDED
Status: DISCONTINUED | OUTPATIENT
Start: 2018-04-27 | End: 2018-04-27 | Stop reason: HOSPADM

## 2018-04-27 RX ORDER — DULOXETIN HYDROCHLORIDE 60 MG/1
60 CAPSULE, DELAYED RELEASE ORAL
Status: DISCONTINUED | OUTPATIENT
Start: 2018-04-27 | End: 2018-04-29

## 2018-04-27 RX ORDER — KETAMINE HYDROCHLORIDE 50 MG/ML
INJECTION, SOLUTION, CONCENTRATE INTRAMUSCULAR; INTRAVENOUS AS NEEDED
Status: DISCONTINUED | OUTPATIENT
Start: 2018-04-27 | End: 2018-04-27 | Stop reason: SURG

## 2018-04-27 RX ORDER — LABETALOL HYDROCHLORIDE 5 MG/ML
10 INJECTION, SOLUTION INTRAVENOUS EVERY 4 HOURS PRN
Status: DISCONTINUED | OUTPATIENT
Start: 2018-04-27 | End: 2018-05-04 | Stop reason: HOSPADM

## 2018-04-27 RX ORDER — METHOCARBAMOL 500 MG/1
500 TABLET, FILM COATED ORAL EVERY 6 HOURS PRN
Status: DISCONTINUED | OUTPATIENT
Start: 2018-04-27 | End: 2018-05-04 | Stop reason: HOSPADM

## 2018-04-27 RX ORDER — PROPOFOL 10 MG/ML
INJECTION, EMULSION INTRAVENOUS AS NEEDED
Status: DISCONTINUED | OUTPATIENT
Start: 2018-04-27 | End: 2018-04-27 | Stop reason: SURG

## 2018-04-27 RX ORDER — OXYCODONE HYDROCHLORIDE 5 MG/1
5 TABLET ORAL EVERY 6 HOURS PRN
Qty: 30 TABLET | Refills: 0 | Status: SHIPPED | OUTPATIENT
Start: 2018-04-27 | End: 2018-05-07

## 2018-04-27 RX ADMIN — OXYCODONE HYDROCHLORIDE AND ACETAMINOPHEN 1 TABLET: 5; 325 TABLET ORAL at 00:21

## 2018-04-27 RX ADMIN — SODIUM CHLORIDE: 0.9 INJECTION, SOLUTION INTRAVENOUS at 11:30

## 2018-04-27 RX ADMIN — SUCCINYLCHOLINE CHLORIDE 100 MG: 20 INJECTION, SOLUTION INTRAMUSCULAR; INTRAVENOUS at 11:47

## 2018-04-27 RX ADMIN — SODIUM CHLORIDE 125 ML/HR: 0.9 INJECTION, SOLUTION INTRAVENOUS at 14:30

## 2018-04-27 RX ADMIN — FENTANYL CITRATE 50 MCG: 50 INJECTION, SOLUTION INTRAMUSCULAR; INTRAVENOUS at 13:55

## 2018-04-27 RX ADMIN — Medication 2000 MG: at 11:45

## 2018-04-27 RX ADMIN — MIDAZOLAM HYDROCHLORIDE 2 MG: 1 INJECTION, SOLUTION INTRAMUSCULAR; INTRAVENOUS at 11:37

## 2018-04-27 RX ADMIN — HYDROMORPHONE HYDROCHLORIDE 1 MG: 1 INJECTION, SOLUTION INTRAMUSCULAR; INTRAVENOUS; SUBCUTANEOUS at 22:24

## 2018-04-27 RX ADMIN — OXYCODONE HYDROCHLORIDE 10 MG: 10 TABLET ORAL at 21:08

## 2018-04-27 RX ADMIN — DEXAMETHASONE SODIUM PHOSPHATE 10 MG: 10 INJECTION INTRAMUSCULAR; INTRAVENOUS at 12:25

## 2018-04-27 RX ADMIN — FENTANYL CITRATE 25 MCG: 50 INJECTION, SOLUTION INTRAMUSCULAR; INTRAVENOUS at 12:10

## 2018-04-27 RX ADMIN — HYDROMORPHONE HYDROCHLORIDE 1 MG: 1 INJECTION, SOLUTION INTRAMUSCULAR; INTRAVENOUS; SUBCUTANEOUS at 04:32

## 2018-04-27 RX ADMIN — LAMOTRIGINE 100 MG: 100 TABLET ORAL at 08:40

## 2018-04-27 RX ADMIN — SODIUM CHLORIDE 125 ML/HR: 0.9 INJECTION, SOLUTION INTRAVENOUS at 21:00

## 2018-04-27 RX ADMIN — CEFAZOLIN SODIUM 2000 MG: 10 INJECTION, POWDER, FOR SOLUTION INTRAVENOUS at 21:01

## 2018-04-27 RX ADMIN — FENTANYL CITRATE 25 MCG: 50 INJECTION, SOLUTION INTRAMUSCULAR; INTRAVENOUS at 12:07

## 2018-04-27 RX ADMIN — DULOXETINE HYDROCHLORIDE 30 MG: 30 CAPSULE, DELAYED RELEASE ORAL at 22:18

## 2018-04-27 RX ADMIN — ONDANSETRON 4 MG: 2 INJECTION INTRAMUSCULAR; INTRAVENOUS at 12:25

## 2018-04-27 RX ADMIN — FENTANYL CITRATE 50 MCG: 50 INJECTION, SOLUTION INTRAMUSCULAR; INTRAVENOUS at 11:47

## 2018-04-27 RX ADMIN — FENTANYL CITRATE 50 MCG: 50 INJECTION, SOLUTION INTRAMUSCULAR; INTRAVENOUS at 14:52

## 2018-04-27 RX ADMIN — DULOXETINE HYDROCHLORIDE 60 MG: 60 CAPSULE, DELAYED RELEASE ORAL at 06:14

## 2018-04-27 RX ADMIN — HYDROMORPHONE HYDROCHLORIDE 1 MG: 1 INJECTION, SOLUTION INTRAMUSCULAR; INTRAVENOUS; SUBCUTANEOUS at 00:21

## 2018-04-27 RX ADMIN — SODIUM CHLORIDE 125 ML/HR: 0.9 INJECTION, SOLUTION INTRAVENOUS at 00:36

## 2018-04-27 RX ADMIN — LIDOCAINE HYDROCHLORIDE 100 MG: 10 INJECTION, SOLUTION INFILTRATION; PERINEURAL at 11:47

## 2018-04-27 RX ADMIN — LAMOTRIGINE 150 MG: 100 TABLET ORAL at 22:18

## 2018-04-27 RX ADMIN — KETAMINE HYDROCHLORIDE 30 MG: 50 INJECTION, SOLUTION INTRAMUSCULAR; INTRAVENOUS at 12:00

## 2018-04-27 RX ADMIN — OXYCODONE HYDROCHLORIDE 10 MG: 10 TABLET ORAL at 17:02

## 2018-04-27 RX ADMIN — FENTANYL CITRATE 50 MCG: 50 INJECTION, SOLUTION INTRAMUSCULAR; INTRAVENOUS at 11:56

## 2018-04-27 RX ADMIN — METHOCARBAMOL 500 MG: 500 TABLET ORAL at 17:02

## 2018-04-27 RX ADMIN — HYDROMORPHONE HYDROCHLORIDE 1 MG: 1 INJECTION, SOLUTION INTRAMUSCULAR; INTRAVENOUS; SUBCUTANEOUS at 08:39

## 2018-04-27 RX ADMIN — FENTANYL CITRATE 50 MCG: 50 INJECTION, SOLUTION INTRAMUSCULAR; INTRAVENOUS at 13:17

## 2018-04-27 RX ADMIN — PROPOFOL 150 MG: 10 INJECTION, EMULSION INTRAVENOUS at 11:47

## 2018-04-27 NOTE — TELEPHONE ENCOUNTER
Patient called to cancel her appointment with you today, she is having surgery  She is scheduled next for 05/04

## 2018-04-27 NOTE — CONSULTS
Consult- Mattie Babinski 1965, 46 y o  female MRN: [id number]    Unit/Bed#: ED 18 Encounter: [id number]    Primary Care Provider: No primary care provider on file  Date and time admitted to hospital: 4/26/2018  5:53 PM      Consults    * Closed fracture of left ankle   Assessment & Plan     management as per orthopedic surgeon  In regards of preoperative risk assessment given no significant cardiac history, advanced renal disease or insulin-dependent diabetes  this patient is consider to be an appropriate risk for planned intervention  DVT prophylaxis, pain management  Incentive spirometer perioperatively  Continue all home Rx        Thrombocytopenia (HCC)   Assessment & Plan    Platelets 401Z  Continue DVT prophylaxis  Patient was evaluated by hematologist in the past            VTE Prophylaxis: Enoxaparin (Lovenox)  / sequential compression device     Recommendations for Discharge:  continue to follow up with PCP and orthopedic surgeon    Counseling / Coordination of Care Time: 45 minutes  Greater than 50% of total time spent on patient counseling and coordination of care  Collaboration of Care: Were Recommendations Directly Discussed with Primary Treatment Team? - Yes     History of Present Illness:    Mattie Babinski is a 46 y o  female who is originally admitted to the orthopedic service due to left ankle pain  We are consulted for preoperative risk assessment  Patient reports an accidental fall after she missed a step and fell down 3 steps  She immediately fell left ankle pain and imaging study showed   " left trimalleolar ankle fracture dislocation, left fibular head fracture, and right 5th metatarsal fracture"  Orthopedic surgeon consulted, patient will need OR in a m    Upon my conversation with the patient she denies any significant heart history, chest pain or shortness of breath with his usual daily activities labs vital signs EKG revised      Review of Systems:    Review of Systems Musculoskeletal:        Ankle pain       Past Medical and Surgical History:     Past Medical History:   Diagnosis Date    Abnormal liver scan     last assessed 8/14/2014    Atypical chest pain     last assessed 2/3/2016    Bladder disorder     last assessed 1/22/2013    Chronic pain     neck and back    Dermatitis     Luteinized follicular cyst     Uterine fibroid        Past Surgical History:   Procedure Laterality Date    CHOLECYSTECTOMY      HYSTERECTOMY      MELO    TOOTH EXTRACTION      last assessed 3/20/2017, oral surgery       Meds/Allergies:    all medications and allergies reviewed    Allergies: Allergies   Allergen Reactions    Buspar [Buspirone] Hives and Rash       Social History:     Marital Status: /Civil Union    Substance Use History:   History   Alcohol Use No     History   Smoking Status    Current Every Day Smoker    Packs/day: 0 50   Smokeless Tobacco    Never Used     Comment: per allscripts' hx of tobacco abuse'     History   Drug Use No     Comment: History of marijuana use as a teenager  Denies current recreational drug use       Family History:    non-contributory    Physical Exam:     Vitals:   Blood Pressure: 145/67 (04/26/18 2215)  Pulse: 78 (04/26/18 2218)  Temperature: 98 7 °F (37 1 °C) (04/26/18 1803)  Temp Source: Oral (04/26/18 1803)  Respirations: 18 (04/26/18 2215)  Height: 5' 8" (172 7 cm) (04/26/18 1803)  Weight - Scale: 107 kg (235 lb) (04/26/18 1803)  SpO2: 97 % (04/26/18 2215)    Physical Exam   Constitutional: No distress  HENT:   Head: Normocephalic  Eyes: Pupils are equal, round, and reactive to light  Neck: Normal range of motion  Cardiovascular: Regular rhythm  Murmur heard  Pulmonary/Chest: No respiratory distress  Abdominal: She exhibits no distension  There is no tenderness  Musculoskeletal: She exhibits tenderness  She exhibits no edema  Neurological: She is alert  Skin: Skin is warm     Psychiatric: She has a normal mood and affect  Additional Data:     Lab Results: I have personally reviewed pertinent reports  Results from last 7 days  Lab Units 04/27/18  0234   WBC Thousand/uL 12 73*   HEMOGLOBIN g/dL 12 2   HEMATOCRIT % 36 1   PLATELETS Thousands/uL 149       Results from last 7 days  Lab Units 04/27/18  0234   SODIUM mmol/L 140   POTASSIUM mmol/L 3 6   CHLORIDE mmol/L 108   CO2 mmol/L 27   BUN mg/dL 14   CREATININE mg/dL 0 70   CALCIUM mg/dL 8 9   GLUCOSE RANDOM mg/dL 105       Results from last 7 days  Lab Units 04/27/18  0317   INR  1 03         No results found for: HGBA1C    Imaging: I have personally reviewed pertinent reports  XR knee 1 or 2 vw left   ED Interpretation by Tati Slater DO (04/26 1922)   Fracture or proximal fibula      Final Result by Juice Cantrell MD (04/26 2250)   There is subtle lucency seen in the proximal tibia suspect nondisplaced tibial plateau fracture  A CT can be considered as needed      Fracture of the proximal fibula with mild comminution no significant displacement             I personally discussed this study with Brenda Thomas on 4/26/2018 at 10:49 PM                 Workstation performed: [id number]         XR foot 3+ views RIGHT   ED Interpretation by Tati Slater DO (04/26 1921)   Fracture of the base of the 5th metatarsal      Final Result by Juice Cantrell MD (04/26 2247)   Fracture of the proximal base of the 5th metatarsal with fracture line located about 1 8 cm from its proximal aspect               Workstation performed: [id number]         XR ankle 2 vw left   ED Interpretation by Tati Slater DO (04/26 1921)   Trimalleolar fracture      Final Result by Juice Cantrell MD (04/26 2240)   Trimalleolar fracture with disruption of the ankle mortise with a tibiotalar dislocation   Findings concur with the preliminary report by the referring clinician already in PACS and/or our electronic record EPIC              Workstation performed: [id number] XR ankle 2 vw left    (Results Pending)   XR ankle 3+ vw left    (Results Pending)   XR knee 1 or 2 vw right    (Results Pending)   XR chest portable    (Results Pending)       EKG, Pathology, and Other Studies Reviewed on Admission:   · EKG: NSR, HR 69    ** Please Note: This note has been constructed using a voice recognition system   **

## 2018-04-27 NOTE — H&P
Orthopedics   Marvin Edwards 46 y o  female MRN: [id number]  Unit/Bed#: X ray      Chief Complaint:   left ankle pain    HPI:  46 y  o female status post fall down 3 steps complaining of left ankle pain and inability to bear weight  Patient was going down the steps to get food, missed a step and fell down her steps  She has never hurt this ankle before, but she also complains of left knee and right foot pain  She did not hit her head, lose consciousness, or have any other injuries  Review Of Systems:   · Skin: Visible deformity of left ankle  · Neuro: Chronic neck and back pain  · Musculoskeletal: Pain as above  · 14 point review of systems negative except as stated above     Past Medical History:   Past Medical History:   Diagnosis Date    Abnormal liver scan     last assessed 8/14/2014    Atypical chest pain     last assessed 2/3/2016    Bladder disorder     last assessed 1/22/2013    Chronic pain     neck and back    Dermatitis     Luteinized follicular cyst     Uterine fibroid        Past Surgical History:   Past Surgical History:   Procedure Laterality Date    CHOLECYSTECTOMY      HYSTERECTOMY      MELO    TOOTH EXTRACTION      last assessed 3/20/2017, oral surgery       Family History:  Family history reviewed and non-contributory  Family History   Problem Relation Age of Onset    Stroke Father     Psychosis Father     Bipolar disorder Mother     Lung cancer Mother     Depression Family        Social History:  Social History     Social History    Marital status: /Civil Union     Spouse name: N/A    Number of children: 2    Years of education: 12     Social History Main Topics    Smoking status: Current Every Day Smoker     Packs/day: 0 50    Smokeless tobacco: Never Used      Comment: per allscripts' hx of tobacco abuse'    Alcohol use No    Drug use: No      Comment: History of marijuana use as a teenager   Denies current recreational drug use    Sexual activity: Not Asked Other Topics Concern    None     Social History Narrative    Daily caffeine consumption        Education: high school graduate    Learning Disabilities: none    Marital History:     Children: 2 adult sons    Living Arrangement: lives in home with     Occupational History: worked in  in the past, unemployed, applied for disability    Functioning Relationships: good support system    Legal History: none     History: None       Allergies: Allergies   Allergen Reactions    Buspar [Buspirone] Hives and Rash           Labs:    0  Lab Value Date/Time   HCT 37 3 01/18/2018 1539   HCT 41 3 12/05/2017 1043   HCT 41 5 05/02/2017 0803   HGB 13 1 01/18/2018 1539   HGB 13 9 12/05/2017 1043   HGB 13 6 05/02/2017 0803   WBC 7 0 01/18/2018 1539   WBC 6 6 12/05/2017 1043   WBC 6 3 05/02/2017 0803   ESR 17 02/05/2016 1102   CRP 0 25 02/05/2016 1102       Meds:    Current Facility-Administered Medications:     HYDROmorphone (DILAUDID) injection 1 mg, 1 mg, Intravenous, Once, Liz Ellington MD, Stopped at 04/26/18 2019    Current Outpatient Prescriptions:     DULoxetine (CYMBALTA) 30 mg delayed release capsule, Take 1 capsule (30 mg total) by mouth daily at bedtime, Disp: 90 capsule, Rfl: 0    DULoxetine (CYMBALTA) 60 mg delayed release capsule, Take 1 capsule (60 mg total) by mouth daily in the early morning, Disp: 90 capsule, Rfl: 0    lamoTRIgine (LaMICtal) 100 mg tablet, Take 100 mg by mouth daily  , Disp: , Rfl:     lamoTRIgine (LaMICtal) 150 MG tablet, Take 1 tablet by mouth daily at bedtime  , Disp: , Rfl:     LORazepam (ATIVAN) 0 5 mg tablet, Take 0 5 mg by mouth every 8 (eight) hours as needed for anxiety  , Disp: , Rfl:     predniSONE 1 mg tablet, Take by mouth daily Unknown dose , Disp: , Rfl:     traMADol (ULTRAM) 50 mg tablet, Take 50 mg by mouth every 6 (six) hours as needed for moderate pain, Disp: , Rfl:     Blood Culture:   No results found for: BLOODCX    Wound Culture:    No results found for: WOUNDCULT    Ins and Outs:  No intake/output data recorded  Physical Exam:   /60 (BP Location: Right arm)   Pulse 68   Temp 98 7 °F (37 1 °C) (Oral)   Resp 18   Ht 5' 8" (1 727 m)   Wt 107 kg (235 lb)   SpO2 95%   BMI 35 73 kg/m²   Gen: Alert and oriented to person, place, time  HEENT: EOMI, eyes clear, moist mucus membranes, hearing intact  Respiratory: Bilateral chest rise  No audible wheezing found  Cardiovascular: No audible murmurs  Abdomen: soft nontender  Musculoskeletal: bilateral lower extremity  · Visible deformity of left ankle, superficial abrasion over right knee  · Tender to palpation over medial and lateral malleoli of left ankle, anterior left knee, and lateral right foot  · 2+ DP bilaterally   · Sensation intact L2-S1 bilaterally  · Able to move toes distally in bilateral feet  · Able to fire quadriceps in left knee  · Able to bend right knee    Radiology:   I personally reviewed the films  X-rays show left trimalleolar ankle fracture dislocation, left fibular head fracture, and right 5th metatarsal fracture    Procedure: Ankle reduction and splint application    Bedside conscious sedation was performed with help of the ED team  Once adequate anesthesia was obtained a gentle closed reduction maneuver was performed and pt was placed in a well padded AO splint  Pt tolerated the procedure well and was neurovascularly intact both pre and post procedure  Post reduction orthogonal x rays showed appropriate reduction of the talus in the ankle mortise  RLE was put in posterior slab splint      Assessment:  46 y  o female status post fall with left trimalleolar ankle fracture-dislocation, left fibular head fracture, and right 5th metatarsal fracture      Plan:   · NWB left lower extremity in AO splint, NWB RLE in Posterior slab splint  · To OR for ORIF of left ankle fracture  · Analgesics for pain  · Informed consent obtained  · NPO at midnight  · Pre op labs in ED  · Medicine team for clearance to Rosangela Tom MD

## 2018-04-27 NOTE — OP NOTE
OPERATIVE REPORT  PATIENT NAME: Paula Zuleta    :  1965  MRN: [id number]  Pt Location: [facility] OR ROOM 09    SURGERY DATE: 2018    Surgeon(s) and Role:     * Brittney Dos Santos MD - Primary     * Carter Fernandez PA-C - Assisting (no qualified resident available)    Preop Diagnosis:  Closed fracture of left ankle, initial encounter [A14 907N]    Post-Op Diagnosis Codes:     * Closed fracture of left ankle, initial encounter [W94 540T]    Procedure(s) (LRB):  OPEN REDUCTION W/ INTERNAL FIXATION (ORIF) ANKLE (Left)    Specimen(s):  * No specimens in log *    Estimated Blood Loss:   Minimal    Drains:       Anesthesia Type:   General    Operative Indications:  Closed fracture of left ankle, initial encounter [I15 660S]      Operative Findings:  Bimalleolar fractures with subsequent fixation    Complications:   None    Procedure and Technique:  Open reduction internal fixation left ankle  Patient was correctly identified by both the anesthesia department and myself  The left lower extremity was marked  She was taken to the operating room with general anesthesia was induced  She was positioned supine onto radiolucent table ensuring that all bony prominences and neurovascular structures were adequately padded and protected  Tourniquet was applied to the left lower extremity  Splint to the left ankle was removed  A bump was placed underneath the left hip  2 g of Ancef were administered for preoperative antibiotics  The left lower extremity was prepped and draped in the usual sterile fashion Time-out was performed  Extremity was elevated and exsanguinated with the use of an Esmarch  The tourniquet was inflated to a pressure of 300 mm of mercury  AP and lateral imaging confirmed with the incision would be made over the lateral malleolus as well as medial malleolus  A 5 cm longitudinal incision was made in line with the fibula  Dissection was carried to through the superficial subcutaneous layer    Asya scissors was used to perform the dissection proximally to ensure no violation of the superficial peroneal nerve distally the dissection was carried sharply to the level of the bone with the Bovie electric cautery  Subperiosteal dissection was performed from the tip of the lateral mild past the fracture site to roughly 3 cm proximal to the fracture site  Self-retaining retractors were placed  Provisional reduction was held with bone forceps  A lag screw was placed obliquely across the fracture site from front to back  A Synthes 5 hole distal fibular locking plate was then applied and held in place provisionally with K-wires  The plate was then transfixed distally with 3 locking screws and proximally with 3 nonlocking cortical screws  The fibula was found to have good fixation  Attention was paid to the medial malleolus where there was a large comminuted fracture  A 2 cm curvilinear incision was made over the medial malleolus taking care to protect the saphenous vein  The fracture site was exposed  There was significant comminution  Locally the most distal fragment was large enough to accept 2 K-wires and subsequently 240 cannulated screws holding the medial ankle stable  Final AP and lateral imaging demonstrated good fixation with symmetric mortise  The ankle was stressed in external rotation without any widening of the medial clear space  A towel clamp was applied to the fibula and this demonstrated no abnormal motion with stressing  Copious irrigation was performed to the wound  The subcutaneous layers were reapproximated over the hardware using 0 Vicryl followed by 2 Vicryl sutures  The skin incisions were reapproximated using 3 O nylon suture in interrupted fashion  The tourniquet was let down after 70 minutes  Sterile dressings were applied to the wound  A well-molded AO splint was applied  Anesthesia was reversed  Patient was taken out of the operating room       I was present for the entire procedure    Patient Disposition:  PACU      Implants: Synthes Distal Fibula locking plate, 5 hole    SIGNATURE: Meka Moreno MD  DATE: April 27, 2018  TIME: 1:30 PM

## 2018-04-27 NOTE — CASE MANAGEMENT
Initial Clinical Review    Thank you,  [facility] in the [employer] by Josh Kemp for 2017  Network Utilization Review Department  Phone: [phone]; Fax [phone]  ATTENTION: The Network Utilization Review Department is now centralized for our 7 Facilities  Make a note that we have a new phone and fax numbers for our Department  Please call with any questions or concerns to [phone] and carefully follow the prompts so that you are directed to the right person  All voicemails are confidential  Fax any determinations, approvals, denials, and requests for initial or continue stay review clinical to [phone]  Due to HIGH CALL volume, it would be easier if you could please send faxed requests to expedite your requests and in part, help us provide discharge notifications faster  Admission: Date/Time/Statement: Placed in Observation status  On 4/26 @ 21:54    Orders Placed This Encounter   Procedures    Place in Observation (expected length of stay for this patient is less than two midnights)     Standing Status:   Standing     Number of Occurrences:   1     Order Specific Question:   Admitting Physician     Answer:   Naina Harrison [[id number]]     Order Specific Question:   Level of Care     Answer:   Med Surg [[id number]]         ED: Date/Time/Mode of Arrival:   ED Arrival Information     Expected Arrival Acuity Means of Arrival Escorted By Service Admission Type    - 4/26/2018 17:53 Urgent Ambulance [facility] EMS Orthopedic Surgery Urgent    Arrival Complaint    Ankle Injury          Chief Complaint:   Chief Complaint   Patient presents with    Leg Injury - Major     Per EMS pt fell down 3 stairs  Obvious deformity noted of the pt's L ankle  Pt also reports L knee pain  Per EMS pt has chronic neck and back pain  No LOC and no thinners  History of Illness: 46 y  o female status post fall down 3 steps complaining of left ankle pain and inability to bear weight  Patient was going down the steps to get food, missed a step and fell down her steps  She has never hurt this ankle before, but she also complains of left knee and right foot pain  She did not hit her head, lose consciousness, or have any other injuries       ED Vital Signs:   ED Triage Vitals   Temperature Pulse Respirations Blood Pressure SpO2   04/26/18 1803 04/26/18 1803 04/26/18 1803 04/26/18 1803 04/26/18 1803   98 7 °F (37 1 °C) 68 20 129/66 96 %      Temp Source Heart Rate Source Patient Position - Orthostatic VS BP Location FiO2 (%)   04/26/18 1803 04/26/18 2134 04/26/18 2015 04/26/18 2015 04/26/18 2135   Oral Monitor Lying Right arm 2      Pain Score       04/26/18 1803       Worst Possible Pain        Wt Readings from Last 1 Encounters:   04/26/18 107 kg (235 lb)       Abnormal Labs/Diagnostic Test Results:  cmp- normal-  Wbc 12 73    XR R foot -  Fracture of the proximal base of the 5th metatarsal with fracture line located about 1 8 cm from its proximal aspect  XR L ankle -Trimalleolar fracture with disruption of the ankle mortise with a tibiotalar dislocation  XR L knee - There is subtle lucency seen in the proximal tibia suspect nondisplaced tibial plateau fracture  XR R knee - no acute     CXR - pending           ED Treatment:   Medication Administration from 04/26/2018 1753 to 04/27/2018 0756       Date/Time Order Dose Route Action     04/26/2018 1832 HYDROmorphone (DILAUDID) injection 1 mg 1 mg Intravenous Given     04/26/2018 2031 HYDROmorphone (DILAUDID) injection 1 mg 1 mg Intravenous Given     04/26/2018 2137 propofol (DIPRIVAN) 200 MG/20ML bolus injection 150 mg 100 mg Intravenous Given     04/26/2018 2155 fentanyl citrate (PF) 100 MCG/2ML **AcuDose Override Pull** 100 mcg  Given     04/27/2018 0036 sodium chloride 0 9 % infusion 125 mL/hr Intravenous New Bag     04/27/2018 0614 DULoxetine (CYMBALTA) delayed release capsule 60 mg 60 mg Oral Given     04/27/2018 0021 oxyCODONE-acetaminophen (PERCOCET) 5-325 mg per tablet 1 tablet 1 tablet Oral Given     04/27/2018 0432 HYDROmorphone (DILAUDID) injection 1 mg 1 mg Intravenous Given     04/27/2018 0021 HYDROmorphone (DILAUDID) injection 1 mg 1 mg Intravenous Given       Past Medical/Surgical History:     Diagnosis    Abnormal liver scan    Atypical chest pain    Bladder disorder    Chronic pain    Dermatitis    Luteinized follicular cyst    Uterine fibroid     Procedure    CHOLECYSTECTOMY    HYSTERECTOMY        TOOTH EXTRACTION                 Admitting Diagnosis: Ankle injury [S99 911P]    Age/Sex: 46 y o  female    Assessment/Plan: Assessment:  46 y  o female status post fall with left trimalleolar ankle fracture-dislocation, left fibular head fracture, and right 5th metatarsal fracture      Plan:   · NWB left lower extremity in AO splint, NWB RLE in Posterior slab splint  · To OR for ORIF of left ankle fracture  · Analgesics for pain  · Informed consent obtained  · NPO at midnight  · Pre op labs in ED  Medicine team for clearance to OR    Admission Orders:  Scheduled Meds:   Current Facility-Administered Medications:  acetaminophen 650 mg Oral Q6H PRN    acetaminophen 650 mg Oral Q4H PRN    calcium carbonate 1,000 mg Oral Daily PRN    cefazolin 2,000 mg Intravenous Once    cefazolin 2,000 mg Intravenous Once    docusate sodium 100 mg Oral BID PRN    DULoxetine 30 mg Oral HS    DULoxetine 60 mg Oral Early Morning    HYDROmorphone 0 2 mg Intravenous Q3H PRN    HYDROmorphone 1 mg Intravenous Q4H PRN    labetalol 10 mg Intravenous Q4H PRN    lamoTRIgine 100 mg Oral Daily    lamoTRIgine 150 mg Oral HS    LORazepam 0 5 mg Oral Q8H PRN    methocarbamol 500 mg Oral Q6H PRN    nicotine 1 patch Transdermal Daily    ondansetron 4 mg Intravenous Q6H PRN    oxyCODONE 10 mg Oral Q4H PRN    oxyCODONE 5 mg Oral Q4H PRN    oxyCODONE-acetaminophen 1 tablet Oral Q4H PRN    traMADol 50 mg Oral Q6H PRN      Continuous Infusions:   sodium chloride 125 mL/hr Last Rate: 125 mL/hr (04/27/18 0036)     Nursing Orders - VS q 4 - Incentive spirometry - I & O q shift - bedrest - nonweight bearing - Knee immobilizer - Diet NPO - PT/OT eval

## 2018-04-27 NOTE — ANESTHESIA PREPROCEDURE EVALUATION
Review of Systems/Medical History  Patient summary reviewed  Chart reviewed      Cardiovascular  Hyperlipidemia,    Pulmonary  Smoker , Tobacco cessation counseling given Cumulative Pack Years: 8,        GI/Hepatic    GERD well controlled,        Negative  ROS        Endo/Other    Obesity    GYN  Negative gynecology ROS          Hematology  Negative hematology ROS      Musculoskeletal  Negative musculoskeletal ROS        Neurology  Negative neurology ROS      Psychology           Physical Exam    Airway    Mallampati score: II  TM Distance: >3 FB  Neck ROM: full     Dental   upper dentures and lower dentures,     Cardiovascular  Cardiovascular exam normal    Pulmonary  Pulmonary exam normal     Other Findings        Anesthesia Plan  ASA Score- 2     Anesthesia Type- general with ASA Monitors  Additional Monitors:   Airway Plan: ETT and LMA  Plan Factors-  Patient did not smoke on day of surgery  Induction- intravenous  Postoperative Plan-     Informed Consent- Anesthetic plan and risks discussed with patient  I personally reviewed this patient with the CRNA  Discussed and agreed on the Anesthesia Plan with the CRNA  Chelo Rodríguez

## 2018-04-27 NOTE — DISCHARGE INSTRUCTIONS
Discharge Instructions - Orthopedics  Deven Guerrero 46 y o  female MRN: [id number]  Unit/Bed#: PACU 03    Weight Bearing Status:                                           Non-weight bearing to the left lower extremity    DVT prophylaxis:  Complete course of Lovenox as directed    Pain:  Continue analgesics as directed    Dressing Instructions:   Keep dressing clean, dry and intact until follow up appointment  Do not shower until 3-4 days after the procedure  PT/OT:  Continue PT/OT on outpatient basis as directed    Appt Instructions: If you do not have your appointment, please call the clinic at [phone] to f/u with Dr Luis Armando Gonsales in 2 weeks in the office  Contact the office sooner if you experience any increased numbness/tingling in the extremities        Miscellaneous:

## 2018-04-27 NOTE — ASSESSMENT & PLAN NOTE
management as per orthopedic surgeon  In regards of preoperative risk assessment given no significant cardiac history, advanced renal disease or insulin-dependent diabetes  this patient is consider to be an appropriate risk for planned intervention  DVT prophylaxis, pain management  Incentive spirometer perioperatively  Continue all home Rx

## 2018-04-28 LAB
ANION GAP SERPL CALCULATED.3IONS-SCNC: 5 MMOL/L (ref 4–13)
BASOPHILS # BLD AUTO: 0.01 THOUSANDS/ΜL (ref 0–0.1)
BASOPHILS NFR BLD AUTO: 0 % (ref 0–1)
BILIRUB UR QL STRIP: NEGATIVE
BUN SERPL-MCNC: 8 MG/DL (ref 5–25)
CALCIUM SERPL-MCNC: 8.5 MG/DL (ref 8.3–10.1)
CHLORIDE SERPL-SCNC: 106 MMOL/L (ref 100–108)
CLARITY UR: CLEAR
CO2 SERPL-SCNC: 28 MMOL/L (ref 21–32)
COLOR UR: ABNORMAL
CREAT SERPL-MCNC: 0.61 MG/DL (ref 0.6–1.3)
EOSINOPHIL # BLD AUTO: 0 THOUSAND/ΜL (ref 0–0.61)
EOSINOPHIL NFR BLD AUTO: 0 % (ref 0–6)
ERYTHROCYTE [DISTWIDTH] IN BLOOD BY AUTOMATED COUNT: 13.5 % (ref 11.6–15.1)
GFR SERPL CREATININE-BSD FRML MDRD: 103 ML/MIN/1.73SQ M
GLUCOSE SERPL-MCNC: 90 MG/DL (ref 65–140)
GLUCOSE UR STRIP-MCNC: NEGATIVE MG/DL
HCT VFR BLD AUTO: 35.5 % (ref 34.8–46.1)
HGB BLD-MCNC: 11.2 G/DL (ref 11.5–15.4)
HGB UR QL STRIP.AUTO: NEGATIVE
KETONES UR STRIP-MCNC: ABNORMAL MG/DL
LEUKOCYTE ESTERASE UR QL STRIP: NEGATIVE
LYMPHOCYTES # BLD AUTO: 1.94 THOUSANDS/ΜL (ref 0.6–4.47)
LYMPHOCYTES NFR BLD AUTO: 18 % (ref 14–44)
MCH RBC QN AUTO: 29.2 PG (ref 26.8–34.3)
MCHC RBC AUTO-ENTMCNC: 31.5 G/DL (ref 31.4–37.4)
MCV RBC AUTO: 93 FL (ref 82–98)
MONOCYTES # BLD AUTO: 1.09 THOUSAND/ΜL (ref 0.17–1.22)
MONOCYTES NFR BLD AUTO: 10 % (ref 4–12)
NEUTROPHILS # BLD AUTO: 7.89 THOUSANDS/ΜL (ref 1.85–7.62)
NEUTS SEG NFR BLD AUTO: 72 % (ref 43–75)
NITRITE UR QL STRIP: NEGATIVE
NRBC BLD AUTO-RTO: 0 /100 WBCS
PH UR STRIP.AUTO: 6 [PH] (ref 4.5–8)
PLATELET # BLD AUTO: 139 THOUSANDS/UL (ref 149–390)
PMV BLD AUTO: 12.2 FL (ref 8.9–12.7)
POTASSIUM SERPL-SCNC: 3.7 MMOL/L (ref 3.5–5.3)
PROT UR STRIP-MCNC: NEGATIVE MG/DL
RBC # BLD AUTO: 3.83 MILLION/UL (ref 3.81–5.12)
SODIUM SERPL-SCNC: 139 MMOL/L (ref 136–145)
SP GR UR STRIP.AUTO: 1.02 (ref 1–1.03)
UROBILINOGEN UR QL STRIP.AUTO: 0.2 E.U./DL
WBC # BLD AUTO: 10.95 THOUSAND/UL (ref 4.31–10.16)

## 2018-04-28 PROCEDURE — G8978 MOBILITY CURRENT STATUS: HCPCS

## 2018-04-28 PROCEDURE — 99024 POSTOP FOLLOW-UP VISIT: CPT | Performed by: PHYSICIAN ASSISTANT

## 2018-04-28 PROCEDURE — 85025 COMPLETE CBC W/AUTO DIFF WBC: CPT | Performed by: PHYSICIAN ASSISTANT

## 2018-04-28 PROCEDURE — G8988 SELF CARE GOAL STATUS: HCPCS

## 2018-04-28 PROCEDURE — 81003 URINALYSIS AUTO W/O SCOPE: CPT | Performed by: PHYSICIAN ASSISTANT

## 2018-04-28 PROCEDURE — 80048 BASIC METABOLIC PNL TOTAL CA: CPT | Performed by: PHYSICIAN ASSISTANT

## 2018-04-28 PROCEDURE — 97167 OT EVAL HIGH COMPLEX 60 MIN: CPT

## 2018-04-28 PROCEDURE — 97163 PT EVAL HIGH COMPLEX 45 MIN: CPT

## 2018-04-28 PROCEDURE — G8979 MOBILITY GOAL STATUS: HCPCS

## 2018-04-28 PROCEDURE — G8987 SELF CARE CURRENT STATUS: HCPCS

## 2018-04-28 RX ADMIN — LAMOTRIGINE 100 MG: 100 TABLET ORAL at 08:01

## 2018-04-28 RX ADMIN — METHOCARBAMOL 500 MG: 500 TABLET ORAL at 11:35

## 2018-04-28 RX ADMIN — CEFAZOLIN SODIUM 2000 MG: 10 INJECTION, POWDER, FOR SOLUTION INTRAVENOUS at 03:50

## 2018-04-28 RX ADMIN — HYDROMORPHONE HYDROCHLORIDE 1 MG: 1 INJECTION, SOLUTION INTRAMUSCULAR; INTRAVENOUS; SUBCUTANEOUS at 04:03

## 2018-04-28 RX ADMIN — HYDROMORPHONE HYDROCHLORIDE 1 MG: 1 INJECTION, SOLUTION INTRAMUSCULAR; INTRAVENOUS; SUBCUTANEOUS at 11:35

## 2018-04-28 RX ADMIN — LAMOTRIGINE 150 MG: 100 TABLET ORAL at 22:05

## 2018-04-28 RX ADMIN — DOCUSATE SODIUM 100 MG: 100 CAPSULE, LIQUID FILLED ORAL at 08:01

## 2018-04-28 RX ADMIN — DULOXETINE HYDROCHLORIDE 60 MG: 60 CAPSULE, DELAYED RELEASE ORAL at 05:02

## 2018-04-28 RX ADMIN — OXYCODONE HYDROCHLORIDE 10 MG: 10 TABLET ORAL at 01:17

## 2018-04-28 RX ADMIN — OXYCODONE HYDROCHLORIDE 10 MG: 10 TABLET ORAL at 13:41

## 2018-04-28 RX ADMIN — ENOXAPARIN SODIUM 40 MG: 40 INJECTION SUBCUTANEOUS at 08:01

## 2018-04-28 RX ADMIN — OXYCODONE HYDROCHLORIDE 10 MG: 10 TABLET ORAL at 22:21

## 2018-04-28 RX ADMIN — DULOXETINE HYDROCHLORIDE 30 MG: 30 CAPSULE, DELAYED RELEASE ORAL at 22:06

## 2018-04-28 RX ADMIN — OXYCODONE HYDROCHLORIDE 10 MG: 10 TABLET ORAL at 17:37

## 2018-04-28 RX ADMIN — OXYCODONE HYDROCHLORIDE 10 MG: 10 TABLET ORAL at 08:01

## 2018-04-28 NOTE — OCCUPATIONAL THERAPY NOTE
[address] [facility]     Patient Name: Jesús Peña  DHZFT'Y Date: 4/28/2018  Problem List  Patient Active Problem List   Diagnosis    Chest pain    T wave inversion in EKG    Bipolar II disorder (HCC)    DREAD (generalized anxiety disorder)    Panic disorder without agoraphobia    Gastroesophageal reflux disease without esophagitis    Benign colon polyp    Cervical radiculopathy    Chronic idiopathic thrombocytopenic purpura (HCC)    Chronic lumbar radiculopathy    Herpes simplex type 1 infection    Hyperlipidemia    Lung nodule, solitary    Mitral regurgitation    Perimenopausal symptoms    Thrombocytopenia (HCC)    Urge and stress incontinence    Closed fracture of left ankle     Past Medical History  Past Medical History:   Diagnosis Date    Abnormal liver scan     last assessed 8/14/2014    Atypical chest pain     last assessed 2/3/2016    Bladder disorder     last assessed 1/22/2013    Chronic pain     neck and back    Dermatitis     Luteinized follicular cyst     Uterine fibroid      Past Surgical History  Past Surgical History:   Procedure Laterality Date    CHOLECYSTECTOMY      HYSTERECTOMY      MELO    TOOTH EXTRACTION      last assessed 3/20/2017, oral surgery         04/28/18 1216   Note Type   Note type Eval/Treat   Restrictions/Precautions   Weight Bearing Precautions Per Order Yes   RLE Weight Bearing Per Order WBAT  (IN CAM BOOT)   LLE Weight Bearing Per Order NWB  (IN KNEE IMMOBILIZER)   Braces or Orthoses LE Immobilizer;CAM Boot   Other Precautions Chair Alarm;WBS; Telemetry; Fall Risk;Pain  (CHAIR ALARM ON AT END OF THERAPY SESSION )   Pain Assessment   Pain Assessment 0-10   Pain Score Worst Possible Pain   Pain Type Acute pain;Surgical pain   Pain Location Leg   Pain Orientation Bilateral   Hospital Pain Intervention(s) Repositioned   Response to Interventions tolerated   Home Living   Type of [facility] [facility] One level;Stairs to enter with rails Bathroom Shower/Tub Tub/shower unit   Bathroom Toilet Standard   Bathroom Accessibility Accessible   Additional Comments Pt lives with her  in an apartment w/ a full flight to enter and no elevator access  Prior Function   Level of [place] Independent with ADLs and functional mobility   Lives With Spouse   Receives Help From Family   ADL Assistance Independent   IADLs Independent   Falls in the last 6 months 0   Vocational Unemployed   Comments Pt was I w/ ADLS and IADLS, drove, & required no use of DME PTA   Lifestyle   Autonomy Pt was I w/ ADLS/IADLS PTA    Reciprocal Relationships Pt lives with her  who is home and able to provide A upon D/C   Psychosocial   Psychosocial (WDL) WDL   ADL   Eating Assistance 7  Independent   Grooming Assistance 7  Independent   UB Bathing Assistance 5  Supervision/Setup  (SBA)   LB Bathing Assistance 2  Maximal Assistance   UB Dressing Assistance 5  Supervision/Setup  (SBA)   LB Dressing Assistance 2  Maximal Assistance   Toileting Assistance  3  Moderate Assistance   Bed Mobility   Supine to Sit 4  Minimal assistance   Additional items Assist x 1; Increased time required;LE management;Verbal cues   Sit to Supine Unable to assess  (Pt OOB to chair at end of therapy session )   Transfers   Sit to Stand 1  Dependent   Additional items Assist x 2; Increased time required;Verbal cues   Stand to Sit 1  Dependent   Additional items Assist x 2; Increased time required;Verbal cues   Sliding Board transfer 4  Minimal assistance   Additional items Assist x 1; Increased time required;Verbal cues   Additional Comments Pt unable to clear buttom from bed during attempted sit to stand transfer trials   Pt performed slide board transfer from EOB to drop arm recliner w/ min A x1 and SBA of second for safety    Functional Mobility   Additional Comments Pt not appropriate at this time    Balance   Static Sitting Fair +   Dynamic Sitting Fair   Static Standing Fair   Activity Tolerance   Activity Tolerance Patient limited by fatigue;Patient limited by pain;Treatment limited secondary to medical complications (Comment)   Medical Staff Made Aware PT Ssuan    Nurse Made Aware DANIELLE TINOCO Assessment   RUE Assessment WFL   LUE Assessment   LUE Assessment WFL   Hand Function   Gross Motor Coordination Functional   Fine Motor Coordination Functional   Cognition   Overall Cognitive Status WFL   Arousal/Participation Alert; Responsive; Cooperative   Attention Within functional limits   Memory Within functional limits   Following Commands Follows all commands and directions without difficulty   Assessment   Limitation Decreased ADL status; Decreased endurance;Decreased self-care trans;Decreased high-level ADLs   Prognosis Good   Assessment Pt is a 45 y/o female seen for OT eval s/p adm to [facility] s/p fall down 3 steps w/ Closed fracture of left ankle receiving OPEN REDUCTION W/ INTERNAL FIXATION (ORIF) ANKLE (Left) on 4/27/18 and right 5th metatarsal fracture  Pt is NWB L LE in a knee immobilizer and WBAT R LE in CAM boot  Comorbidities include a h/o chronic canchola and back pain, bipolar 2 disorder, panic disorder, DREAD, GERD, HLD, mitral regurgitation, and presents currently w/ menopausal symptoms per pt  Pt with active OT orders and up with assistance orders  Pt lives with her  in an apartment w/ a full flight to enter and no elevator access  Pt was I w/ ADLS and IADLS, drove, & required no use of DME PTA  Pt is currently demonstrating the following occupational deficits: SBA UB ADLS, max A, LB ADLS, and min A slide board transfers functional transfers  Pt with deficits and limitations in all baseline areas of occupation 2*  significant pain, dizziness, decreased endurance/activity tolerance, decreased functional forward reach, decreased ADL status, impaired balance, decreased mobility status, WBS, and orthopedic restrictions   The following Occupational Performance Areas to address include: bathing/shower, dressing, functional mobility, community mobility, clothing management, meal prep and household maintenance  Pt scored overall 50/100 on the Barthel Index  Based on the aforementioned OT evaluation, functional performance deficits, and assessments, pt has been identified as a high complexity evaluation  Recommend STR upon D/C  Pt to continue to benefit from acute immediate OT services to address the following goals 3-5x/week to  within 10-14 days:   Goals   Patient Goals none expressed   Plan   Treatment Interventions ADL retraining;Functional transfer training; Endurance training;Patient/family training;Equipment evaluation/education; Compensatory technique education;Continued evaluation; Energy conservation; Activityengagement   Goal Expiration Date 18   OT Frequency 3-5x/wk   Recommendation   OT Discharge Recommendation Short Term Rehab   OT - OK to Discharge Yes  (to STR)   Barthel Index   Feeding 10   Bathing 0   Grooming Score 5   Dressing Score 5   Bladder Score 10   Bowels Score 10   Toilet Use Score 5   Transfers (Bed/Chair) Score 5   Mobility (Level Surface) Score 0   Stairs Score 0   Barthel Index Score 50   Modified [place] Scale   Modified [place] Scale 4       GOALS:    1) Pt will improve sitting tolerance to 10 minutes at EOB with mod I to facilitate OOB participation in ADLs   2) Pt will improve activity tolerance to G for min 30 min txment sessions  3) Pt will increase bed mobility to mod I and transfer EOB to participate in functional activity with G tolerance and balance    4) Pt will complete ADLs/self care at seated level w/ mod I w/ G hyiene/thoroughness using adaptive equipment as needed w/ min cues for cog support  5) Pt will complete toileting w/ mod I w/ G hygiene/thoroughness using DME as needed  6) Pt will improve functional sit pivot/ slide board transfers on/off all surfaces using DME as needed w/ G balance/safety including toileting w/ mod I  7) Pt will improve functional sit to stand/ stand pivot transfers on/off all surfaces using DME as needed w/ G balance/safety including toileting w/ mod A  8)Pt will improve functional mobility using rw during ADL/IADL/leisure tasks using DME as needed w/ g balance/safety w/ min A  9) Pt will demonstrate G carryover of pt/caregiver education and training as appropriate w/ mod I w/o cues w/ G tolerance  10) Pt will demonstrate 100% carryover of learned E C  techniques s/p skilled education w/o cues t/o functional ADL/ IADL/leisure interest tasks w/ mod I  11) Pt will demonstrate 100% carryover of precautions s/p review w/o cues w/ mod I w/ G tolerance/participation t/o functional ADL/IADL/leisure tasks         Juliano Jacobson MS, OTR/L

## 2018-04-28 NOTE — PROGRESS NOTES
Orthopedics   Mirlande Beckett 46 y o  female MRN: [id number]  Unit/Bed#: [facility] 911-01    Subjective:  46 y  o female post operative day 1 status post left Ankle ORIF  She also has a right 5th metatarsal fracture  Patient having pain about a 7/10, but controlled with the medications  Patient denies any numbness/tingling in lower extremities  She denies any chest pain, SOB, GI complaints or any other acute/associated complaints       Labs:    0  Lab Value Date/Time   HCT 35 5 04/28/2018 0440   HCT 36 1 04/27/2018 0234   HCT 37 3 01/18/2018 1539   HCT 41 3 12/05/2017 1043   HCT 41 5 05/02/2017 0803   HGB 11 2 (L) 04/28/2018 0440   HGB 12 2 04/27/2018 0234   HGB 13 1 01/18/2018 1539   HGB 13 9 12/05/2017 1043   HGB 13 6 05/02/2017 0803   INR 1 03 04/27/2018 0317   WBC 10 95 (H) 04/28/2018 0440   WBC 12 73 (H) 04/27/2018 0234   WBC 7 0 01/18/2018 1539   WBC 6 6 12/05/2017 1043   WBC 6 3 05/02/2017 0803   ESR 17 02/05/2016 1102   CRP 0 25 02/05/2016 1102       Meds:    Current Facility-Administered Medications:     acetaminophen (TYLENOL) tablet 650 mg, 650 mg, Oral, Q4H PRN, Lauro Osler, MD    calcium carbonate (TUMS) chewable tablet 1,000 mg, 1,000 mg, Oral, Daily PRN, Chay Linares MD    docusate sodium (COLACE) capsule 100 mg, 100 mg, Oral, BID PRN, Chay Linares MD    DULoxetine (CYMBALTA) delayed release capsule 30 mg, 30 mg, Oral, HS, Chay Linares MD, 30 mg at 04/27/18 4768    DULoxetine (CYMBALTA) delayed release capsule 60 mg, 60 mg, Oral, Early Morning, Chay Linares MD, 60 mg at 04/28/18 0502    enoxaparin (LOVENOX) subcutaneous injection 40 mg, 40 mg, Subcutaneous, Daily, Susen Bamberger, PA-C    HYDROmorphone (DILAUDID) injection 1 mg, 1 mg, Intravenous, Q4H PRN, Juan Carlos Sue PA-C, 1 mg at 04/28/18 0403    labetalol (NORMODYNE) injection 10 mg, 10 mg, Intravenous, Q4H PRN, Lauro Osler, MD    lamoTRIgine (LaMICtal) tablet 100 mg, 100 mg, Oral, Daily, Chay Linares MD, 100 mg at 04/27/18 0840    lamoTRIgine (LaMICtal) tablet 150 mg, 150 mg, Oral, HS, Indiana Stanley MD, 150 mg at 04/27/18 2218    LORazepam (ATIVAN) tablet 0 5 mg, 0 5 mg, Oral, Q8H PRN, Indiana Stanley MD    methocarbamol (ROBAXIN) tablet 500 mg, 500 mg, Oral, Q6H PRN, Indiana Stanley MD, 500 mg at 04/27/18 1702    nicotine (NICODERM CQ) 14 mg/24hr TD 24 hr patch 1 patch, 1 patch, Transdermal, Daily, Indiana Stanley MD    ondansetron (ZOFRAN) injection 4 mg, 4 mg, Intravenous, Q6H PRN, Indiana Stanley MD    oxyCODONE (ROXICODONE) immediate release tablet 10 mg, 10 mg, Oral, Q4H PRN, Govind Lyon MD, 10 mg at 04/28/18 0117    oxyCODONE (ROXICODONE) IR tablet 5 mg, 5 mg, Oral, Q4H PRN, Govind Lyon MD    sodium chloride 0 9 % infusion, 75 mL/hr, Intravenous, Continuous, Maylin Hernandez PA-C, Last Rate: 75 mL/hr at 04/27/18 2340, 75 mL/hr at 04/27/18 2340    traMADol (ULTRAM) tablet 50 mg, 50 mg, Oral, Q6H PRN, Indiana Stanley MD    Blood Culture:   No results found for: BLOODCX    Wound Culture:   No results found for: WOUNDCULT    Ins and Outs:  I/O last 24 hours: In: 3329 [P O :225; I V :1200]  Out: 1150 [Urine:1150]          Physical:  Vitals:    04/27/18 2322   BP: 140/66   Pulse: 86   Resp: 18   Temp: 98 3 °F (36 8 °C)   SpO2: 95%     left lower extremity  · Dressings in place with minimal drainage  · decreased strength to EHL/FHL  · Sensation intact L1-S1  · Toes warm and well perfused  right lower extremity  · Splint in place  · decreased strength to EHL/FHL  · Sensation intact L1-S1  · Toes warm and well perfused    _*_*_*_*_*_*_*_*_*_*_*_*_*_*_*_*_*_*_*_*_*_*_*_*_*_*_*_*_*_*_*_*_*_*_*_*_*_*_*_*_*    Assessment:   Post operative day 1 status post left Ankle ORIF  Doing well      Plan:  · Nonweight bearing left lower extremity; nonweight bearing to right lower extremity  · Will transition RLE to boot and will be transitioned to WBAT in the boot  · Ice, Elevation to affected extremity  · Analgesics  · DVT prophylaxis  · PT/OT  · Will likely need placement on discharge  · Dispo: Ortho will follow  · Will continue to assess for acute blood loss anemia    Jeff Hawkins PA-C

## 2018-04-28 NOTE — CASE MANAGEMENT
Continued Stay Review    Date: 4/28/2018    Vital Signs: /57 (BP Location: Right arm)   Pulse 92   Temp 98 5 °F (36 9 °C) (Oral)   Resp 18   Ht 5' 8" (1 727 m)   Wt 104 kg (230 lb)   SpO2 91%   BMI 34 97 kg/m²     Medications:   Scheduled Meds:   Current Facility-Administered Medications:  acetaminophen 650 mg Oral Q4H PRN Arina Vaca MD    calcium carbonate 1,000 mg Oral Daily PRN Rosita Hollingsworth MD    docusate sodium 100 mg Oral BID PRN Rosita Hollingsworth MD    DULoxetine 30 mg Oral HS Rosita Hollingsworth MD    DULoxetine 60 mg Oral Early Morning Rosita Hollingsworth MD    enoxaparin 40 mg Subcutaneous Daily Parmjit Story PA-C    HYDROmorphone 1 mg Intravenous Q4H PRN Yfn Sales PA-C    labetalol 10 mg Intravenous Q4H PRN Arina Vaca MD    lamoTRIgine 100 mg Oral Daily Rosita Hollingsworth MD    lamoTRIgine 150 mg Oral HS Rosita Hollingsworth MD    LORazepam 0 5 mg Oral Q8H PRN Rosita Hollingsworth MD    methocarbamol 500 mg Oral Q6H PRN Rosita Hollingsworth MD    nicotine 1 patch Transdermal Daily Rosita Hollingsworth MD    ondansetron 4 mg Intravenous Q6H PRN Rosita Hollingsworth MD    oxyCODONE 10 mg Oral Q4H PRN Govind Lyon MD    oxyCODONE 5 mg Oral Q4H PRN Govind Lyon MD    sodium chloride 75 mL/hr Intravenous Continuous Yfn Sales PA-C Last Rate: 75 mL/hr (04/27/18 2340)   traMADol 50 mg Oral Q6H PRN Rosita Hollingsworth MD      Continuous Infusions:   sodium chloride 75 mL/hr Last Rate: 75 mL/hr (04/27/18 2340)     PRN Meds:   acetaminophen    calcium carbonate    docusate sodium - used x 1 on 4/28    HYDROmorphone  1 mg iv - usedx 4 on 4/27 (0021; 0432; 5093; 224)    labetalol    LORazepam    Methocarbamol - used x 1    ondansetron    oxyCODONE - used x 2 (1702; 2108)    oxyCODONE acetominophen - used x 1 (0021)    traMADol    Abnormal Labs/Diagnostic Results:   4/28/2018- wbc 10 95, hgb 11 2    Platelets 338    Age/Sex: 46 y o  female     Assessment/Plan:  Procedure - OPEN REDUCTION W/ INTERNAL FIXATION (ORIF) ANKLE (Left)    Discharge Plan: To be determined

## 2018-04-28 NOTE — ORTHOTIC NOTE
Orthotic Note            Date: 4/28/2018     Time: 9:35 - 10:08      Patient Name: Zohreh Breaker        Order for CAM boot right leg from Zandra Buckley MD      Reason for Consult:  Patient Active Problem List   Diagnosis    Chest pain    T wave inversion in EKG    Bipolar II disorder ([facility] [address] )    DREAD (generalized anxiety disorder)    Panic disorder without agoraphobia    Gastroesophageal reflux disease without esophagitis    Benign colon polyp    Cervical radiculopathy    Chronic idiopathic thrombocytopenic purpura ([facility] [address] )    Chronic lumbar radiculopathy    Herpes simplex type 1 infection    Hyperlipidemia    Lung nodule, solitary    Mitral regurgitation    Perimenopausal symptoms    Thrombocytopenia (HCC)    Urge and stress incontinence    Closed fracture of left ankle     Pt states she wears a size 11 shoe therefore a size Large CAM boot was brought to the pt's room  Straps adjusted and reviewed how to don the CAM boot with the pt as she was supine in the bed  Pt's right leg is currently splinted and ACE wrapped therefore the CAM boot was not actually placed onto the pt  I did communicate with Susan PT, that the CAM boot is the room and I will be able to assist when she arrives for her PT eval if needed  PT's nurse, Linda Bain, is aware the CAM is in the room, but not currently on the patient  Recommendations:  Please call the ortho tech, ext [phone], with any adjustments/questions      Joao Hawkins PTA, BS, , Restorative Technician

## 2018-04-28 NOTE — CASE MANAGEMENT
FAXING  FOR   EXTENDED  OBS  AURH    OBS  ORDER   ENTERED   4/26  @  215       REMAINS  IN HOUSE    POSS  NEEDING  REHAB PRIOR  TO  D/C  HOME    ANY  QUES  CALL KEVIN     [phone]    [address]    Progress    Note    4/28    Post operative day 1 status post left Ankle ORIF  Doing well      Plan:  · Nonweight bearing left lower extremity; nonweight bearing to right lower extremity  ? Will transition RLE to boot and will be transitioned to WBAT in the boot  · Ice, Elevation to affected extremity  · Analgesics  · DVT prophylaxis  · PT/OT  ? Will likely need placement on discharge  · Dispo: Ortho will follow  · Will continue to assess for acute blood loss anemia    Thank you,  [address] in the [facility] by Josh Kemp for 2017  Network Utilization Review Department  Phone: [phone]; Fax [phone]  ATTENTION: The Network Utilization Review Department is now centralized for our 7 Facilities  Make a note that we have a new phone and fax numbers for our Department  Please call with any questions or concerns to [phone] and carefully follow the prompts so that you are directed to the right person  All voicemails are confidential  Fax any determinations, approvals, denials, and requests for initial or continue stay review clinical to [phone]  Due to HIGH CALL volume, it would be easier if you could please send faxed requests to expedite your requests and in part, help us provide discharge notifications faster

## 2018-04-28 NOTE — PLAN OF CARE
Problem: OCCUPATIONAL THERAPY ADULT  Goal: Performs self-care activities at highest level of function for planned discharge setting  See evaluation for individualized goals  Treatment Interventions: ADL retraining, Functional transfer training, Endurance training, Patient/family training, Equipment evaluation/education, Compensatory technique education, Continued evaluation, Energy conservation, Activityengagement          See flowsheet documentation for full assessment, interventions and recommendations  Limitation: Decreased ADL status, Decreased endurance, Decreased self-care trans, Decreased high-level ADLs  Prognosis: Good  Assessment: Pt is a 45 y/o female seen for OT eval s/p adm to [facility] s/p fall down 3 steps w/ Closed fracture of left ankle receiving OPEN REDUCTION W/ INTERNAL FIXATION (ORIF) ANKLE (Left) on 4/27/18 and right 5th metatarsal fracture  Pt is NWB L LE in a knee immobilizer and WBAT R LE in CAM boot  Comorbidities include a h/o chronic canchola and back pain, bipolar 2 disorder, panic disorder, DREAD, GERD, HLD, mitral regurgitation, and presents currently w/ menopausal symptoms per pt  Pt with active OT orders and up with assistance orders  Pt lives with her  in an apartment w/ a full flight to enter and no elevator access  Pt was I w/ ADLS and IADLS, drove, & required no use of DME PTA  Pt is currently demonstrating the following occupational deficits: SBA UB ADLS, max A, LB ADLS, and min A slide board transfers functional transfers  Pt with deficits and limitations in all baseline areas of occupation 2*  significant pain, dizziness, decreased endurance/activity tolerance, decreased functional forward reach, decreased ADL status, impaired balance, decreased mobility status, WBS, and orthopedic restrictions   The following Occupational Performance Areas to address include: bathing/shower, dressing, functional mobility, community mobility, clothing management, meal prep and household maintenance  Pt scored overall 50/100 on the Barthel Index  Based on the aforementioned OT evaluation, functional performance deficits, and assessments, pt has been identified as a high complexity evaluation  Recommend STR upon D/C   Pt to continue to benefit from acute immediate OT services to address the following goals 3-5x/week to  within 10-14 days:     OT Discharge Recommendation: Short Term Rehab  OT - OK to Discharge: Yes (to STR)      Elly Anguiano MS, OTR/L

## 2018-04-28 NOTE — PLAN OF CARE
Problem: PHYSICAL THERAPY ADULT  Goal: Performs mobility at highest level of function for planned discharge setting  See evaluation for individualized goals  Treatment/Interventions: Functional transfer training, LE strengthening/ROM, Therapeutic exercise, Endurance training, Patient/family training, Equipment eval/education, Bed mobility, Gait training, Spoke to nursing, Spoke to case management, Spoke to advanced practitioner, OT  Equipment Recommended: Wheelchair ([employer])       See flowsheet documentation for full assessment, interventions and recommendations  Prognosis: Good  Problem List: Decreased endurance, Decreased strength, Decreased range of motion, Impaired balance, Decreased mobility, Obesity, Decreased skin integrity, Orthopedic restrictions, Pain  Assessment: Pt is a 46year old female presenting s/p fall down 3 steps with inability to WB  Pt found to have sustain fracture of 5th metatarsal RLE and L trimalleolar fx  Pt underwent OPEN REDUCTION W/ INTERNAL FIXATION (ORIF) ANKLE 4/27/18  Pt with a problem list which includes bipolar II disorder, chest pain, CAD, panic disorder, cervical radiculopathy, chronic lumbar radiculopathy, herpes simplex type 1 infection, thrombocytopenia, urge and stress incontinence, and mitral regurgitation  PMH includes chronic pain, dermatitis, bladder disorder and atypical chest pain  Please see above for a more comprehensive list  PT consulted to assess functional mobility and assist with safe d/c planning  PT eval performed POD #1 with NWB LLE in KI and WBAT RLE in CAM boot  PTA, pt living at home with her spouse in a 1 floor apartment with a FF to enter  Pt (I) prior with no use of DME or additional falls  On eval, pt presenting with the following deficits: impaired balance, decreased strength and ROM, pain, poor tolerance to activity, and decreased overall functional mobility  Pt required Nat for bed mobility, dependent Ax2 for sit<->stand and Nat Ax1 for slide board transfer Danilo Meredith  PT to continue to follow pt during stay  To progress functional mobility (I) and safety  Rehab recommended at this time  Barriers to Discharge: Inaccessible home environment  Barriers to Discharge Comments: FF to enter  Recommendation: Post acute IP rehab, Rehab consult     PT - OK to Discharge: Yes (to rehab when medically appropriate )    See flowsheet documentation for full assessment

## 2018-04-28 NOTE — PHYSICAL THERAPY NOTE
Physical Therapy Evaluation     Patient's Name: Cathleen Longoria    Admitting Diagnosis  Bipolar II disorder ([facility] [address] ) [F31 81]  Hyperlipidemia [E78 5]  Ankle injury [S99 919A]  Fracture of 5th metatarsal [S92 353A]  Left trimalleolar fracture [S82 852A]  Closed fracture of left ankle, initial encounter [S82 892A]    Problem List  Patient Active Problem List   Diagnosis    Chest pain    T wave inversion in EKG    Bipolar II disorder ([facility] [address] )    DREAD (generalized anxiety disorder)    Panic disorder without agoraphobia    Gastroesophageal reflux disease without esophagitis    Benign colon polyp    Cervical radiculopathy    Chronic idiopathic thrombocytopenic purpura (HCC)    Chronic lumbar radiculopathy    Herpes simplex type 1 infection    Hyperlipidemia    Lung nodule, solitary    Mitral regurgitation    Perimenopausal symptoms    Thrombocytopenia (HCC)    Urge and stress incontinence    Closed fracture of left ankle       Past Medical History  Past Medical History:   Diagnosis Date    Abnormal liver scan     last assessed 8/14/2014    Atypical chest pain     last assessed 2/3/2016    Bladder disorder     last assessed 1/22/2013    Chronic pain     neck and back    Dermatitis     Luteinized follicular cyst     Uterine fibroid        Past Surgical History  Past Surgical History:   Procedure Laterality Date    CHOLECYSTECTOMY      HYSTERECTOMY      MELO    TOOTH EXTRACTION      last assessed 3/20/2017, oral surgery      04/28/18 1218   Note Type   Note type Eval/Treat   Pain Assessment   Pain Assessment 0-10   Pain Score Worst Possible Pain   Pain Type Acute pain;Surgical pain   Pain Location Leg   Pain Orientation Bilateral   Home Living   Type of [address] One level;Stairs to enter with rails  (FF to enter)   Bathroom Shower/Tub Tub/shower unit   Bathroom Toilet Standard   Bathroom Accessibility Accessible   Additional Comments Denies use of DME PTA    Prior Function   Level of [place] Independent with ADLs and functional mobility   Lives With Spouse   Receives Help From Family   ADL Assistance Independent   IADLs Independent   Falls in the last 6 months 0   Vocational Retired   Comments Pt (I), driving and not working   is retired and can assist with d/c needs   Restrictions/Precautions   Weight Bearing Precautions Per Order Yes   RLE Weight Bearing Per Order WBAT  (in CAM boot)   LLE Weight Bearing Per Order NWB  (in Knee Immobilizer )   Braces or Orthoses LE Immobilizer;CAM Boot   Other Precautions Chair Alarm; Bed Alarm;WBS;Fall Risk;Pain   General   Additional Pertinent History 04/27/18 OPEN REDUCTION W/ INTERNAL FIXATION (ORIF) ANKLE    Family/Caregiver Present Yes  (spouse)   Cognition   Overall Cognitive Status WFL   Arousal/Participation Alert   Orientation Level Oriented X4   Memory Decreased recall of precautions   Following Commands Follows multistep commands with increased time or repetition   Comments Pt is pleasant and cooperative  Able to converse beyond basic needs    RUE Assessment   RUE Assessment WFL   LUE Assessment   LUE Assessment WFL   RLE Assessment   RLE Assessment (NT distal to knee 2* pain )   LLE Assessment   LLE Assessment (NWB LLE in KI )   Coordination   Movements are Fluid and Coordinated 0   Coordination and Movement Description antalgic    Sensation WFL   Light Touch   RLE Light Touch Grossly intact   LLE Light Touch Grossly intact   Proprioception   RLE Proprioception Grossly intact   LLE Proprioception Grossly Intact   Bed Mobility   Supine to Sit 4  Minimal assistance   Additional items LE management   Additional Comments Pt reporting light headedness thorughout position changes that subsided with time and pursed lip breating  Pt also reporting "hot flashes" 2* menopause thorughout session  Transfers   Sit to Stand 1  Dependent   Additional items Assist x 2; Increased time required;Verbal cues   Stand to Sit 1  Dependent   Additional items Assist x 2; Increased time required;Verbal cues   Sliding Board transfer 4  Minimal assistance   Additional items Assist x 1  (second person present for safety )   Additional Comments Attemtped sit<->stand however, pt unable to clear >20%  Able to perform slide board transfer OOB to drop arm chair with Nat  Able to maintain WBS during transfer    Ambulation/Elevation   Gait pattern Not appropriate; Not tested   Balance   Static Sitting Fair +   Dynamic Sitting Fair   Static Standing Fair   Dynamic Standing Poor -  (Zero)   Endurance Deficit   Endurance Deficit Yes   Endurance Deficit Description pain, fatigue, increase in HR to 129bpm with activity- returned to WNL with rest    Activity Tolerance   Activity Tolerance Patient limited by fatigue;Treatment limited secondary to medical complications (Comment)   Medical Staff Made Aware DANIA Tolentino; Nasrin Lezama OT   Nurse Made Aware appropriate to see per Lorena Partida RN; Do Aguilar [facility]    Assessment   Prognosis Good   Problem List Decreased endurance;Decreased strength;Decreased range of motion; Impaired balance;Decreased mobility;Obesity; Decreased skin integrity;Orthopedic restrictions;Pain   Assessment Pt is a 46year old female presenting s/p fall down 3 steps with inability to WB  Pt found to have sustain fracture of 5th metatarsal RLE and L trimalleolar fx  Pt underwent OPEN REDUCTION W/ INTERNAL FIXATION (ORIF) ANKLE 4/27/18  Pt with a problem list which includes bipolar II disorder, chest pain, CAD, panic disorder, cervical radiculopathy, chronic lumbar radiculopathy, herpes simplex type 1 infection, thrombocytopenia, urge and stress incontinence, and mitral regurgitation  PMH includes chronic pain, dermatitis, bladder disorder and atypical chest pain  Please see above for a more comprehensive list  PT consulted to assess functional mobility and assist with safe d/c planning  PT eval performed POD #1 with NWB LLE in KI and WBAT RLE in CAM boot   PTA, pt living at home with her spouse in a 1 floor apartment with a FF to enter  Pt (I) prior with no use of DME or additional falls  On eval, pt presenting with the following deficits: impaired balance, decreased strength and ROM, pain, poor tolerance to activity, and decreased overall functional mobility  Pt required Nat for bed mobility, dependent Ax2 for sit<->stand and Nat Ax1 for slide board transfer OOB  PT to continue to follow pt during stay  To progress functional mobility (I) and safety  Rehab recommended at this time  Barriers to Discharge Inaccessible home environment   Barriers to Discharge Comments FF to enter   Goals   Patient Goals to go home    STG Expiration Date 05/12/18   Short Term Goal #1 Pt will be mod(I) with rolling R and L  Pt will be mod(I) with supine<->sit  Pt will be Nat with sit<->stand  Pt will stand EOB > 3 minutes with CS  Pt will be mod9I) with slide board OOB to drop arm surface  Pt will be mod(I) with w/c mobility  Pt will mobilize > 100' in w/c  Pt will particiapte in HEP consisting of balance, strenghtening, coordination and flexibility exercises  Treatment Day 0   Plan   Treatment/Interventions Functional transfer training;LE strengthening/ROM; Therapeutic exercise; Endurance training;Patient/family training;Equipment eval/education; Bed mobility;Gait training;Spoke to nursing;Spoke to case management;Spoke to advanced practitioner;OT   PT Frequency Other (Comment)  (6x/wk)   Recommendation   Recommendation Post acute IP rehab;Rehab consult   Equipment Recommended Wheelchair  (slide board)   PT - OK to Discharge Yes  (to rehab when medically appropriate )   Additional Comments OOB in chair at the end of session with alarm activated and all needs within reach    Barthel Index   Feeding 10   Bathing 0   Grooming Score 5   Dressing Score 5   Bladder Score 10   Bowels Score 10   Toilet Use Score 5   Transfers (Bed/Chair) Score 10   Mobility (Level Surface) Score 0   Stairs Score 0   Barthel Index Score 55           Susan Romero, PT

## 2018-04-29 PROBLEM — F41.0 PANIC DISORDER WITHOUT AGORAPHOBIA: Chronic | Status: ACTIVE | Noted: 2018-01-30

## 2018-04-29 PROBLEM — F41.1 GAD (GENERALIZED ANXIETY DISORDER): Chronic | Status: ACTIVE | Noted: 2018-01-30

## 2018-04-29 LAB
ANION GAP SERPL CALCULATED.3IONS-SCNC: 6 MMOL/L (ref 4–13)
BASOPHILS # BLD AUTO: 0.02 THOUSANDS/ΜL (ref 0–0.1)
BASOPHILS NFR BLD AUTO: 0 % (ref 0–1)
BUN SERPL-MCNC: 8 MG/DL (ref 5–25)
CALCIUM SERPL-MCNC: 8.5 MG/DL (ref 8.3–10.1)
CHLORIDE SERPL-SCNC: 104 MMOL/L (ref 100–108)
CO2 SERPL-SCNC: 30 MMOL/L (ref 21–32)
CREAT SERPL-MCNC: 0.56 MG/DL (ref 0.6–1.3)
EOSINOPHIL # BLD AUTO: 0.11 THOUSAND/ΜL (ref 0–0.61)
EOSINOPHIL NFR BLD AUTO: 2 % (ref 0–6)
ERYTHROCYTE [DISTWIDTH] IN BLOOD BY AUTOMATED COUNT: 13.8 % (ref 11.6–15.1)
GFR SERPL CREATININE-BSD FRML MDRD: 106 ML/MIN/1.73SQ M
GLUCOSE SERPL-MCNC: 90 MG/DL (ref 65–140)
HCT VFR BLD AUTO: 32.2 % (ref 34.8–46.1)
HGB BLD-MCNC: 10.7 G/DL (ref 11.5–15.4)
LYMPHOCYTES # BLD AUTO: 1.89 THOUSANDS/ΜL (ref 0.6–4.47)
LYMPHOCYTES NFR BLD AUTO: 26 % (ref 14–44)
MCH RBC QN AUTO: 30.1 PG (ref 26.8–34.3)
MCHC RBC AUTO-ENTMCNC: 33.2 G/DL (ref 31.4–37.4)
MCV RBC AUTO: 91 FL (ref 82–98)
MONOCYTES # BLD AUTO: 0.97 THOUSAND/ΜL (ref 0.17–1.22)
MONOCYTES NFR BLD AUTO: 13 % (ref 4–12)
NEUTROPHILS # BLD AUTO: 4.37 THOUSANDS/ΜL (ref 1.85–7.62)
NEUTS SEG NFR BLD AUTO: 59 % (ref 43–75)
NRBC BLD AUTO-RTO: 0 /100 WBCS
PLATELET # BLD AUTO: 133 THOUSANDS/UL (ref 149–390)
PMV BLD AUTO: 11.9 FL (ref 8.9–12.7)
POTASSIUM SERPL-SCNC: 3.4 MMOL/L (ref 3.5–5.3)
RBC # BLD AUTO: 3.55 MILLION/UL (ref 3.81–5.12)
SODIUM SERPL-SCNC: 140 MMOL/L (ref 136–145)
WBC # BLD AUTO: 7.38 THOUSAND/UL (ref 4.31–10.16)

## 2018-04-29 PROCEDURE — 80048 BASIC METABOLIC PNL TOTAL CA: CPT | Performed by: PHYSICIAN ASSISTANT

## 2018-04-29 PROCEDURE — 99024 POSTOP FOLLOW-UP VISIT: CPT | Performed by: ORTHOPAEDIC SURGERY

## 2018-04-29 PROCEDURE — 85025 COMPLETE CBC W/AUTO DIFF WBC: CPT | Performed by: PHYSICIAN ASSISTANT

## 2018-04-29 RX ADMIN — OXYCODONE HYDROCHLORIDE 10 MG: 10 TABLET ORAL at 21:10

## 2018-04-29 RX ADMIN — LAMOTRIGINE 100 MG: 100 TABLET ORAL at 08:44

## 2018-04-29 RX ADMIN — ENOXAPARIN SODIUM 40 MG: 40 INJECTION SUBCUTANEOUS at 08:44

## 2018-04-29 RX ADMIN — OXYCODONE HYDROCHLORIDE 10 MG: 10 TABLET ORAL at 08:01

## 2018-04-29 RX ADMIN — OXYCODONE HYDROCHLORIDE 10 MG: 10 TABLET ORAL at 02:41

## 2018-04-29 RX ADMIN — OXYCODONE HYDROCHLORIDE 10 MG: 10 TABLET ORAL at 14:00

## 2018-04-29 RX ADMIN — DULOXETINE HYDROCHLORIDE 60 MG: 60 CAPSULE, DELAYED RELEASE ORAL at 05:00

## 2018-04-29 RX ADMIN — LAMOTRIGINE 150 MG: 100 TABLET ORAL at 21:04

## 2018-04-29 NOTE — PLAN OF CARE
Problem: DISCHARGE PLANNING - CARE MANAGEMENT  Goal: Discharge to post-acute care or home with appropriate resources  INTERVENTIONS:  - Conduct assessment to determine patient/family and health care team treatment goals, and need for post-acute services based on payer coverage, community resources, and patient preferences, and barriers to discharge  - Address psychosocial, clinical, and financial barriers to discharge as identified in assessment in conjunction with the patient/family and health care team  - Arrange appropriate level of post-acute services according to patient's   needs and preference and payer coverage in collaboration with the physician and health care team  - Communicate with and update the patient/family, physician, and health care team regarding progress on the discharge plan  - Arrange appropriate transportation to post-acute venues  - CM will assist the Pt with the Option process  - CM will assist the Pt with getting into the recommended rehab placement upon d/c    Outcome: Progressing

## 2018-04-29 NOTE — SOCIAL WORK
CM met with Pt with an introduction and explanation of role  Pt reported residing with spouse in a 2nd floor apt with no use of DME and 1 flight of steps to enter  Pt reported being independent with ADLs with no hx of VNA, SNF  Pt reported having a Bipolar diagnosis and receiving treatment [address] [facility], therapist [address]  Pt reported her last psych placement was over ten years ago at \[facility]\""  Pt denied having a living will and reported using [facility] on [facility]  Pt reported being agreeable to the recommendation of SNF and requested referrals to [facility] and [address] [place] [address]  CM made the requested the referrals, and explained to the Pt the need for the Option process  CM reviewed d/c planning process including the following: identifying help at home, patient preference for d/c planning needs, Discharge Lounge, [facility] Meds to Bed program, availability of treatment team to discuss questions or concerns patient and/or family may have regarding understanding medications and recognizing signs and symptoms once discharged  CM also encouraged patient to follow up with all recommended appointments after discharge  Patient advised of importance for patient and family to participate in managing patients medical well being

## 2018-04-29 NOTE — SOCIAL WORK
CM completed Optioned paperwork and awaiting MD signature, before being forward to the [place]  CM will continue to follow

## 2018-04-29 NOTE — PROGRESS NOTES
Orthopedics   Michelle Sawyer 46 y o  female MRN: [id number]  [facility]    Subjective:  46 y  o female post operative day 2 status post left Ankle ORIF  She also has a right 5th metatarsal fracture  Patient denies any numbness/tingling in lower extremities  She denies any chest pain, SOB, GI complaints or any other acute/associated complaints       Labs:    0  Lab Value Date/Time   HCT 32 2 (L) 04/29/2018 0438   HCT 35 5 04/28/2018 0440   HCT 36 1 04/27/2018 0234   HCT 37 3 01/18/2018 1539   HCT 41 3 12/05/2017 1043   HCT 41 5 05/02/2017 0803   HGB 10 7 (L) 04/29/2018 0438   HGB 11 2 (L) 04/28/2018 0440   HGB 12 2 04/27/2018 0234   HGB 13 1 01/18/2018 1539   HGB 13 9 12/05/2017 1043   HGB 13 6 05/02/2017 0803   INR 1 03 04/27/2018 0317   WBC 7 38 04/29/2018 0438   WBC 10 95 (H) 04/28/2018 0440   WBC 12 73 (H) 04/27/2018 0234   WBC 7 0 01/18/2018 1539   WBC 6 6 12/05/2017 1043   WBC 6 3 05/02/2017 0803   ESR 17 02/05/2016 1102   CRP 0 25 02/05/2016 1102       Meds:    Current Facility-Administered Medications:     acetaminophen (TYLENOL) tablet 650 mg, 650 mg, Oral, Q4H PRN, Inga Arteaga MD    calcium carbonate (TUMS) chewable tablet 1,000 mg, 1,000 mg, Oral, Daily PRN, Efrain Contreras MD    docusate sodium (COLACE) capsule 100 mg, 100 mg, Oral, BID PRN, Efrain Contreras MD, 100 mg at 04/28/18 0801    DULoxetine (CYMBALTA) delayed release capsule 30 mg, 30 mg, Oral, HS, Efrain Contreras MD, 30 mg at 04/28/18 2206    DULoxetine (CYMBALTA) delayed release capsule 60 mg, 60 mg, Oral, Early Morning, Efrain Contreras MD, 60 mg at 04/29/18 0500    enoxaparin (LOVENOX) subcutaneous injection 40 mg, 40 mg, Subcutaneous, Daily, Braxton Trotter PA-C, 40 mg at 04/28/18 0801    HYDROmorphone (DILAUDID) injection 1 mg, 1 mg, Intravenous, Q4H PRN, Jeff Hawkins PA-C, 1 mg at 04/28/18 1135    labetalol (NORMODYNE) injection 10 mg, 10 mg, Intravenous, Q4H PRN, Inga Arteaga MD    lamoTRIgine (LaMICtal) tablet 100 mg, 100 mg, Oral, Daily, Rafael Granados MD, 100 mg at 04/28/18 0801    lamoTRIgine (LaMICtal) tablet 150 mg, 150 mg, Oral, HS, Rafael Granados MD, 150 mg at 04/28/18 2205    LORazepam (ATIVAN) tablet 0 5 mg, 0 5 mg, Oral, Q8H PRN, Rafael Granados MD    methocarbamol (ROBAXIN) tablet 500 mg, 500 mg, Oral, Q6H PRN, Rafael Granados MD, 500 mg at 04/28/18 1135    nicotine (NICODERM CQ) 14 mg/24hr TD 24 hr patch 1 patch, 1 patch, Transdermal, Daily, Rafael Granados MD    ondansetron (ZOFRAN) injection 4 mg, 4 mg, Intravenous, Q6H PRN, Rafael Granados MD    oxyCODONE (ROXICODONE) immediate release tablet 10 mg, 10 mg, Oral, Q4H PRN, Govind Lyon MD, 10 mg at 04/29/18 0241    oxyCODONE (ROXICODONE) IR tablet 5 mg, 5 mg, Oral, Q4H PRN, Govind Lyon MD    sodium chloride 0 9 % infusion, 75 mL/hr, Intravenous, Continuous, Arvil HandlerJAYME, Stopped at 04/28/18 1513    traMADol (ULTRAM) tablet 50 mg, 50 mg, Oral, Q6H PRN, Rafael Granados MD    Blood Culture:   No results found for: BLOODCX    Wound Culture:   No results found for: WOUNDCULT    Ins and Outs:  I/O last 24 hours: In: 8872 [P O :1405]  Out: 660 [Urine:660]          Physical:  Vitals:    04/29/18 0111   BP:    Pulse:    Resp:    Temp:    SpO2: 94%     left lower extremity  · Dressings in place with minimal drainage, KI in place   · decreased strength to EHL/FHL  · Sensation intact L1-S1  · Toes warm and well perfused  right lower extremity  · No splint, TTP over 5 MT   · EHL/FHL   · Sensation intact L1-S1  · Toes warm and well perfused    _*_*_*_*_*_*_*_*_*_*_*_*_*_*_*_*_*_*_*_*_*_*_*_*_*_*_*_*_*_*_*_*_*_*_*_*_*_*_*_*_*    Assessment:   Post operative day 2  status post left Ankle ORIF  Doing well      Plan:  · Nonweight bearing left lower extremity; nonweight bearing to right lower extremity  · Will transition RLE to boot and will be transitioned to WBAT in the boot  · Ice, Elevation to affected extremity  · Analgesics  · DVT prophylaxis  · PT/OT  · Will likely need placement on discharge  · Dispo: Ortho will follow  · Patient noted to have acute blood loss anemia due to a drop in Hbg of > 2 0g from preop levels, will monitor vital signs and resuscitate with IV fluids as needed    Yosef Ewing MD

## 2018-04-30 ENCOUNTER — TELEPHONE (OUTPATIENT)
Dept: BEHAVIORAL/MENTAL HEALTH CLINIC | Facility: CLINIC | Age: 53
End: 2018-04-30

## 2018-04-30 LAB
ANION GAP SERPL CALCULATED.3IONS-SCNC: 6 MMOL/L (ref 4–13)
BASOPHILS # BLD AUTO: 0.02 THOUSANDS/ΜL (ref 0–0.1)
BASOPHILS NFR BLD AUTO: 0 % (ref 0–1)
BUN SERPL-MCNC: 6 MG/DL (ref 5–25)
CALCIUM SERPL-MCNC: 8.9 MG/DL (ref 8.3–10.1)
CHLORIDE SERPL-SCNC: 102 MMOL/L (ref 100–108)
CO2 SERPL-SCNC: 30 MMOL/L (ref 21–32)
CREAT SERPL-MCNC: 0.58 MG/DL (ref 0.6–1.3)
EOSINOPHIL # BLD AUTO: 0.18 THOUSAND/ΜL (ref 0–0.61)
EOSINOPHIL NFR BLD AUTO: 3 % (ref 0–6)
ERYTHROCYTE [DISTWIDTH] IN BLOOD BY AUTOMATED COUNT: 13.5 % (ref 11.6–15.1)
GFR SERPL CREATININE-BSD FRML MDRD: 105 ML/MIN/1.73SQ M
GLUCOSE SERPL-MCNC: 96 MG/DL (ref 65–140)
HCT VFR BLD AUTO: 35.8 % (ref 34.8–46.1)
HGB BLD-MCNC: 11.3 G/DL (ref 11.5–15.4)
LYMPHOCYTES # BLD AUTO: 1.63 THOUSANDS/ΜL (ref 0.6–4.47)
LYMPHOCYTES NFR BLD AUTO: 23 % (ref 14–44)
MCH RBC QN AUTO: 29.3 PG (ref 26.8–34.3)
MCHC RBC AUTO-ENTMCNC: 31.6 G/DL (ref 31.4–37.4)
MCV RBC AUTO: 93 FL (ref 82–98)
MONOCYTES # BLD AUTO: 0.88 THOUSAND/ΜL (ref 0.17–1.22)
MONOCYTES NFR BLD AUTO: 13 % (ref 4–12)
NEUTROPHILS # BLD AUTO: 4.32 THOUSANDS/ΜL (ref 1.85–7.62)
NEUTS SEG NFR BLD AUTO: 61 % (ref 43–75)
NRBC BLD AUTO-RTO: 0 /100 WBCS
PLATELET # BLD AUTO: 167 THOUSANDS/UL (ref 149–390)
PMV BLD AUTO: 11.9 FL (ref 8.9–12.7)
POTASSIUM SERPL-SCNC: 3.6 MMOL/L (ref 3.5–5.3)
RBC # BLD AUTO: 3.86 MILLION/UL (ref 3.81–5.12)
SODIUM SERPL-SCNC: 138 MMOL/L (ref 136–145)
WBC # BLD AUTO: 7.06 THOUSAND/UL (ref 4.31–10.16)

## 2018-04-30 PROCEDURE — 99024 POSTOP FOLLOW-UP VISIT: CPT | Performed by: ORTHOPAEDIC SURGERY

## 2018-04-30 PROCEDURE — 80048 BASIC METABOLIC PNL TOTAL CA: CPT | Performed by: PHYSICIAN ASSISTANT

## 2018-04-30 PROCEDURE — 97116 GAIT TRAINING THERAPY: CPT

## 2018-04-30 PROCEDURE — 85025 COMPLETE CBC W/AUTO DIFF WBC: CPT | Performed by: PHYSICIAN ASSISTANT

## 2018-04-30 PROCEDURE — 97530 THERAPEUTIC ACTIVITIES: CPT

## 2018-04-30 RX ORDER — LAMOTRIGINE 150 MG/1
150 TABLET ORAL
Refills: 0
Start: 2018-04-30 | End: 2018-06-13 | Stop reason: SDUPTHER

## 2018-04-30 RX ORDER — LAMOTRIGINE 100 MG/1
100 TABLET ORAL DAILY
Refills: 0
Start: 2018-04-30 | End: 2018-06-13 | Stop reason: SDUPTHER

## 2018-04-30 RX ADMIN — METHOCARBAMOL 500 MG: 500 TABLET ORAL at 09:09

## 2018-04-30 RX ADMIN — LAMOTRIGINE 150 MG: 100 TABLET ORAL at 21:03

## 2018-04-30 RX ADMIN — OXYCODONE HYDROCHLORIDE 10 MG: 10 TABLET ORAL at 18:50

## 2018-04-30 RX ADMIN — OXYCODONE HYDROCHLORIDE 10 MG: 10 TABLET ORAL at 13:12

## 2018-04-30 RX ADMIN — OXYCODONE HYDROCHLORIDE 10 MG: 10 TABLET ORAL at 09:09

## 2018-04-30 RX ADMIN — LAMOTRIGINE 100 MG: 100 TABLET ORAL at 09:09

## 2018-04-30 RX ADMIN — ENOXAPARIN SODIUM 40 MG: 40 INJECTION SUBCUTANEOUS at 09:09

## 2018-04-30 NOTE — CASE MANAGEMENT
Continued Stay Review    Date: 4/30    Vital Signs: /62 (BP Location: Left arm)   Pulse 91   Temp 98 5 °F (36 9 °C) (Oral)   Resp 18   Ht 5' 8" (1 727 m)   Wt 104 kg (230 lb)   SpO2 94%   BMI 34 97 kg/m²     Medications:   Scheduled Meds:   Current Facility-Administered Medications:  enoxaparin 40 mg Subcutaneous Daily   lamoTRIgine 100 mg Oral Daily   lamoTRIgine 150 mg Oral HS   nicotine 1 patch Transdermal Daily     Continuous Infusions:   sodium chloride 75 mL/hr Last Rate: Stopped (04/28/18 8423)     PRN Meds:   acetaminophen    calcium carbonate    docusate sodium    HYDROmorphone    labetalol    LORazepam    methocarbamol    ondansetron    oxyCODONE    traMADol    Abnormal Labs/Diagnostic Results:   Creatinine 0 60 - 1 30 mg/dL 0 58         Age/Sex: 46 y o  female     Assessment:   Post operative day 3  status post left Ankle ORIF  Doing well      Plan:  · Nonweight bearing left lower extremity; nonweight bearing to right lower extremity in Boot  · Analgesics  · DVT prophylaxis  · PT/OT  · Dispo: Ortho will follow  · Patient noted to have acute blood loss anemia due to a drop in Hbg of > 2 0g from preop levels, will monitor vital signs and resuscitate with IV fluids as needed     Discharge Plan: Pt medically cleared  Awaiting Option paperwork  4/30 Social work notes:  Cm completed Options paperwork and sent to [address] and Adult services to request assessment  Both [place] and [facility] continue to review and follow patient

## 2018-04-30 NOTE — PLAN OF CARE
Problem: Potential for Falls  Goal: Patient will remain free of falls  INTERVENTIONS:  - Assess patient frequently for physical needs  -  Identify cognitive and physical deficits and behaviors that affect risk of falls    -  [place] fall precautions as indicated by assessment   - Educate patient/family on patient safety including physical limitations  - Instruct patient to call for assistance with activity based on assessment  - Modify environment to reduce risk of injury  - Consider OT/PT consult to assist with strengthening/mobility   Outcome: Progressing      Problem: Prexisting or High Potential for Compromised Skin Integrity  Goal: Skin integrity is maintained or improved  INTERVENTIONS:  - Identify patients at risk for skin breakdown  - Assess and monitor skin integrity  - Assess and monitor nutrition and hydration status  - Monitor labs (i e  albumin)  - Assess for incontinence   - Turn and reposition patient  - Assist with mobility/ambulation  - Relieve pressure over bony prominences  - Avoid friction and shearing  - Provide appropriate hygiene as needed including keeping skin clean and dry  - Evaluate need for skin moisturizer/barrier cream  - Collaborate with interdisciplinary team (i e  Nutrition, Rehabilitation, etc )   - Patient/family teaching   Outcome: Progressing      Problem: PAIN - ADULT  Goal: Verbalizes/displays adequate comfort level or baseline comfort level  Interventions:  - Encourage patient to monitor pain and request assistance  - Assess pain using appropriate pain scale  - Administer analgesics based on type and severity of pain and evaluate response  - Implement non-pharmacological measures as appropriate and evaluate response  - Consider cultural and social influences on pain and pain management  - Notify physician/advanced practitioner if interventions unsuccessful or patient reports new pain   Outcome: Progressing      Problem: INFECTION - ADULT  Goal: Absence or prevention of progression during hospitalization  INTERVENTIONS:  - Assess and monitor for signs and symptoms of infection  - Monitor lab/diagnostic results  - Monitor all insertion sites, i e  indwelling lines, tubes, and drains  - Monitor endotracheal (as able) and nasal secretions for changes in amount and color  - [place] appropriate cooling/warming therapies per order  - Administer medications as ordered  - Instruct and encourage patient and family to use good hand hygiene technique  - Identify and instruct in appropriate isolation precautions for identified infection/condition   Outcome: Progressing      Problem: SAFETY ADULT  Goal: Maintain or return to baseline ADL function  INTERVENTIONS:  -  Assess patient's ability to carry out ADLs; assess patient's baseline for ADL function and identify physical deficits which impact ability to perform ADLs (bathing, care of mouth/teeth, toileting, grooming, dressing, etc )  - Assess/evaluate cause of self-care deficits   - Assess range of motion  - Assess patient's mobility; develop plan if impaired  - Assess patient's need for assistive devices and provide as appropriate  - Encourage maximum independence but intervene and supervise when necessary  ¯ Involve family in performance of ADLs  ¯ Assess for home care needs following discharge   ¯ Request OT consult to assist with ADL evaluation and planning for discharge  ¯ Provide patient education as appropriate   Outcome: Progressing    Goal: Maintain or return mobility status to optimal level  INTERVENTIONS:  - Assess patient's baseline mobility status (ambulation, transfers, stairs, etc )    - Identify cognitive and physical deficits and behaviors that affect mobility  - Identify mobility aids required to assist with transfers and/or ambulation (gait belt, sit-to-stand, lift, walker, cane, etc )  - [place] fall precautions as indicated by assessment  - Record patient progress and toleration of activity level on Mobility SBAR; progress patient to next Phase/Stage  - Instruct patient to call for assistance with activity based on assessment  - Request Rehabilitation consult to assist with strengthening/weightbearing, etc    Outcome: Progressing      Problem: DISCHARGE PLANNING  Goal: Discharge to home or other facility with appropriate resources  INTERVENTIONS:  - Identify barriers to discharge w/patient and caregiver  - Arrange for needed discharge resources and transportation as appropriate  - Identify discharge learning needs (meds, wound care, etc )  - Arrange for interpretive services to assist at discharge as needed  - Refer to Case Management Department for coordinating discharge planning if the patient needs post-hospital services based on physician/advanced practitioner order or complex needs related to functional status, cognitive ability, or social support system   Outcome: Progressing      Problem: Knowledge Deficit  Goal: Patient/family/caregiver demonstrates understanding of disease process, treatment plan, medications, and discharge instructions  Complete learning assessment and assess knowledge base    Interventions:  - Provide teaching at level of understanding  - Provide teaching via preferred learning methods   Outcome: Progressing      Problem: DISCHARGE PLANNING - CARE MANAGEMENT  Goal: Discharge to post-acute care or home with appropriate resources  INTERVENTIONS:  - Conduct assessment to determine patient/family and health care team treatment goals, and need for post-acute services based on payer coverage, community resources, and patient preferences, and barriers to discharge  - Address psychosocial, clinical, and financial barriers to discharge as identified in assessment in conjunction with the patient/family and health care team  - Arrange appropriate level of post-acute services according to patient's   needs and preference and payer coverage in collaboration with the physician and health care team  - Communicate with and update the patient/family, physician, and health care team regarding progress on the discharge plan  - Arrange appropriate transportation to post-acute venues  - CM will assist the Pt with the Option process  - CM will assist the Pt with getting into the recommended rehab placement upon d/c     Outcome: Progressing

## 2018-04-30 NOTE — SOCIAL WORK
Cm reviewed patient  Cm completed Options paperwork and sent to [address] and Adult services to request assessment  Both [place] and [address] [place] [address] continue to review and follow patient

## 2018-04-30 NOTE — DISCHARGE SUMMARY
Discharge Summary - Orthopedics   Gibson Romano 46 y o  female MRN: [id number]  Unit/Bed#: [facility] 911-01    Attending Physician: Marylee Foreman    Admitting diagnosis: Bipolar II disorder ([facility] [address] ) [F31 81]  Hyperlipidemia [E78 5]  Ankle injury [S99 919A]  Fracture of 5th metatarsal [S92 353A]  Left trimalleolar fracture [S82 852A]  Closed fracture of left ankle, initial encounter [S82 892A]    Discharge diagnosis: Bipolar II disorder ([facility] [address] ) [F31 81]  Hyperlipidemia [E78 5]  Ankle injury [S99 919A]  Fracture of 5th metatarsal [S92 353A]  Left trimalleolar fracture [S82 852A]  Closed fracture of left ankle, initial encounter [S82 892A]    Date of admission: 4/26/2018    Date of discharge: 04/30/18    Procedure: ORIF L ankle      HPI & Hospital course:  46 y o  female who presented to the ED after a fall down steps sustaining a left trimalleolar ankle fracture dislocation with a left fibular head fracture  Pt was admitted to the orthopaedic service and taken to the OR on 4/27 for ORIF L ankle  Prior to surgery the risks and benefits of surgery were explained and informed consent was obtained  Surgery went without complications and pt was discharged to the PACU in a stable condition and was transferred to the floor  Patient was also found to have a right 5th metatarsal fracture treated non operatively in a CAM boot On discharge date pt was cleared by PT and the medicine team and determined to be safe for discharge  Daily discussion was had with the patient, nursing staff, orthopaedic team, and family members if present  All questions were answered to the patients satisifaction          0  Lab Value Date/Time   HGB 11 3 (L) 04/30/2018 0438   HGB 10 7 (L) 04/29/2018 0438   HGB 11 2 (L) 04/28/2018 0440   HGB 12 2 04/27/2018 0234   HGB 13 1 01/18/2018 1539   HGB 13 9 12/05/2017 1043   HGB 13 6 05/02/2017 0803   HGB 13 5 10/22/2016 1104   HGB 13 4 02/05/2016 1058   HGB 13 6 01/30/2016 0400   HGB 14 3 01/29/2016 1116         Discharge Instructions:   · Weight bearing as tolerated right lower extremity in CAM boot  · Non weight bearing left lower extremity in AO splint   · Keep dressings clean and dry at all times   · Complete DVT prophylaxis as prescribed   · Physical therapy  · Follow-up as scheduled, otherwise call for appt  Discharge Medications: For the complete list of discharge medications, please refer to the patient's medication reconciliation

## 2018-04-30 NOTE — PROGRESS NOTES
Orthopedics   Connor Adam 46 y o  female MRN: [id number]  Unit/Bed#: [facility] 911-01    Subjective:  46 y  o female post operative day 3 status post left Ankle ORIF  She also has a right 5th metatarsal fracture  Pain well controlled this morning      Labs:    0  Lab Value Date/Time   HCT 35 8 04/30/2018 0438   HCT 32 2 (L) 04/29/2018 0438   HCT 35 5 04/28/2018 0440   HCT 37 3 01/18/2018 1539   HCT 41 3 12/05/2017 1043   HCT 41 5 05/02/2017 0803   HGB 11 3 (L) 04/30/2018 0438   HGB 10 7 (L) 04/29/2018 0438   HGB 11 2 (L) 04/28/2018 0440   HGB 13 1 01/18/2018 1539   HGB 13 9 12/05/2017 1043   HGB 13 6 05/02/2017 0803   INR 1 03 04/27/2018 0317   WBC 7 06 04/30/2018 0438   WBC 7 38 04/29/2018 0438   WBC 10 95 (H) 04/28/2018 0440   WBC 7 0 01/18/2018 1539   WBC 6 6 12/05/2017 1043   WBC 6 3 05/02/2017 0803   ESR 17 02/05/2016 1102   CRP 0 25 02/05/2016 1102       Meds:    Current Facility-Administered Medications:     acetaminophen (TYLENOL) tablet 650 mg, 650 mg, Oral, Q4H PRN, Junior Seu MD    calcium carbonate (TUMS) chewable tablet 1,000 mg, 1,000 mg, Oral, Daily PRN, Debra Finch MD    docusate sodium (COLACE) capsule 100 mg, 100 mg, Oral, BID PRN, Debra Finch MD, 100 mg at 04/28/18 0801    enoxaparin (LOVENOX) subcutaneous injection 40 mg, 40 mg, Subcutaneous, Daily, Jas Hoffmann PA-C, 40 mg at 04/29/18 0844    HYDROmorphone (DILAUDID) injection 1 mg, 1 mg, Intravenous, Q4H PRN, Chiara Kang PA-C, 1 mg at 04/28/18 1135    labetalol (NORMODYNE) injection 10 mg, 10 mg, Intravenous, Q4H PRN, Junior Sue MD    lamoTRIgine (LaMICtal) tablet 100 mg, 100 mg, Oral, Daily, Debra Finch MD, 100 mg at 04/29/18 0844    lamoTRIgine (LaMICtal) tablet 150 mg, 150 mg, Oral, HS, Debra Finch MD, 150 mg at 04/29/18 2104    LORazepam (ATIVAN) tablet 0 5 mg, 0 5 mg, Oral, Q8H PRN, Debra Finch MD    methocarbamol (ROBAXIN) tablet 500 mg, 500 mg, Oral, Q6H PRN, Debra Finch MD, 500 mg at 04/28/18 1135    nicotine (NICODERM CQ) 14 mg/24hr TD 24 hr patch 1 patch, 1 patch, Transdermal, Daily, Rupa Sarmiento MD    ondansetron (ZOFRAN) injection 4 mg, 4 mg, Intravenous, Q6H PRN, Rupa Sarmiento MD    oxyCODONE (ROXICODONE) immediate release tablet 10 mg, 10 mg, Oral, Q4H PRN, Govind Lyon MD, 10 mg at 04/29/18 2110    oxyCODONE (ROXICODONE) IR tablet 5 mg, 5 mg, Oral, Q4H PRN, Govind Lyon MD    sodium chloride 0 9 % infusion, 75 mL/hr, Intravenous, Continuous, Glenny Barahona PA-C, Stopped at 04/28/18 1513    traMADol (ULTRAM) tablet 50 mg, 50 mg, Oral, Q6H PRN, Rupa Sarmiento MD    Blood Culture:   No results found for: BLOODCX    Wound Culture:   No results found for: WOUNDCULT    Ins and Outs:  I/O last 24 hours: In: 1125 [P O :1125]  Out: 1475 [Urine:1475]          Physical:  Vitals:    04/29/18 2311   BP: 121/62   Pulse: 89   Resp: 19   Temp: 98 °F (36 7 °C)   SpO2: 93%     left lower extremity  · Dressings D/C/I   · +  EHL/FHL but decreased  · Sensation intact L1-S1  · Toes warm and well perfused  right lower extremity  · No splint, TTP over 5 MT   · EHL/FHL   · Sensation intact L1-S1  · Toes warm and well perfused    _*_*_*_*_*_*_*_*_*_*_*_*_*_*_*_*_*_*_*_*_*_*_*_*_*_*_*_*_*_*_*_*_*_*_*_*_*_*_*_*_*    Assessment:   Post operative day 3  status post left Ankle ORIF  Doing well      Plan:  · Nonweight bearing left lower extremity; nonweight bearing to right lower extremity in Boot  · Analgesics  · DVT prophylaxis  · PT/OT  · Dispo: Ortho will follow  · Patient noted to have acute blood loss anemia due to a drop in Hbg of > 2 0g from preop levels, will monitor vital signs and resuscitate with IV fluids as needed    Ronald Mayberry MD

## 2018-04-30 NOTE — CASE MANAGEMENT
Continued Stay Review    Date: 4/29    Vital Signs:   04/29/18 0825  99 4 °F (37 4 °C)  95  18  134/75  94 %  None (Room air)  Lying     Medications:   Scheduled Meds:   Current Facility-Administered Medications:  enoxaparin 40 mg Subcutaneous Daily   lamoTRIgine 100 mg Oral Daily   lamoTRIgine 150 mg Oral HS   nicotine 1 patch Transdermal Daily     Continuous Infusions:   sodium chloride 75 mL/hr Last Rate: Stopped (04/28/18 1513)     PRN Meds:   acetaminophen    calcium carbonate    docusate sodium    HYDROmorphone    labetalol    LORazepam    methocarbamol    ondansetron    oxyCODONE    traMADol    Abnormal Labs/Diagnostic Results:    04/29/18 0438    WBC 4 31 - 10 16 Thousand/uL 7 38    RBC 3 81 - 5 12 Million/uL 3 55     Hemoglobin 11 5 - 15 4 g/dL 10 7     Hematocrit 34 8 - 46 1 % 32 2     MCV 82 - 98 fL 91    MCH 26 8 - 34 3 pg 30 1    MCHC 31 4 - 37 4 g/dL 33 2    RDW 11 6 - 15 1 % 13 8    MPV 8 9 - 12 7 fL 11 9    Platelets 449 - 197 Thousands/uL 133         Age/Sex: 46 y o  female     Assessment:   Post operative day 2  status post left Ankle ORIF  Doing well      Plan:  · Nonweight bearing left lower extremity; nonweight bearing to right lower extremity  ? Will transition RLE to boot and will be transitioned to WBAT in the boot  · Ice, Elevation to affected extremity  · Analgesics  · DVT prophylaxis  · PT/OT  ? Will likely need placement on discharge  · Dispo: Ortho will follow  · Patient noted to have acute blood loss anemia due to a drop in Hbg of > 2 0g from preop levels, will monitor vital signs and resuscitate with IV fluids as needed       Discharge Plan:   4/29 Social work notes:  Physical Therapy recommending [address]  Referrals to [facility] and [facility]  Pt need for the Option process

## 2018-04-30 NOTE — PLAN OF CARE
Problem: PHYSICAL THERAPY ADULT  Goal: Performs mobility at highest level of function for planned discharge setting  See evaluation for individualized goals  Treatment/Interventions: Functional transfer training, LE strengthening/ROM, Therapeutic exercise, Endurance training, Patient/family training, Equipment eval/education, Bed mobility, Gait training, Spoke to nursing, Spoke to case management, Spoke to advanced practitioner, OT  Equipment Recommended: Wheelchair ([employer])       See flowsheet documentation for full assessment, interventions and recommendations  Outcome: Progressing  Prognosis: Good  Problem List: Decreased strength, Decreased range of motion, Decreased endurance, Impaired balance, Decreased mobility, Decreased skin integrity, Orthopedic restrictions, Pain  Assessment: Pt engaged in PT treatment session focussing on transfers and w/c mobility  Pt able to progress to Nat/ supervision/set up for slide board bed<->chair<->chair  Pt also able to perform w/c mobility with assistance but was limited by poor tolerance to activity  Sit<->standing trials unable to be performed 2* fatigue noted at the end of session  Rehab remains appropriate to maxmize function and reduce caregiver burden  Barriers to Discharge: Inaccessible home environment  Barriers to Discharge Comments: FF to enter  Recommendation: Post acute IP rehab     PT - OK to Discharge: Yes (to rehab when medically appropriate )    See flowsheet documentation for full assessment

## 2018-04-30 NOTE — PROGRESS NOTES
PT CARE ROUNDING WITH  Beverly Martin  From ortho-  PT  Is medically ready for discharge however pt needs [place] to [place]   referrals sent

## 2018-04-30 NOTE — PHYSICAL THERAPY NOTE
Physical Therapy Treatment       Patient's Name: Nicole Verde    Admitting Diagnosis  Bipolar II disorder ([facility] [address] ) [F31 81]  Hyperlipidemia [E78 5]  Ankle injury [S99 919A]  Fracture of 5th metatarsal [S92 353A]  Left trimalleolar fracture [S82 852A]  Closed fracture of left ankle, initial encounter [X61 982A]    Problem List  Patient Active Problem List   Diagnosis    Chest pain    T wave inversion in EKG    Bipolar II disorder ([facility] [address] )    DREAD (generalized anxiety disorder)    Panic disorder without agoraphobia    Gastroesophageal reflux disease without esophagitis    Benign colon polyp    Cervical radiculopathy    Chronic idiopathic thrombocytopenic purpura (HCC)    Chronic lumbar radiculopathy    Herpes simplex type 1 infection    Hyperlipidemia    Lung nodule, solitary    Mitral regurgitation    Perimenopausal symptoms    Thrombocytopenia (HCC)    Urge and stress incontinence    Closed fracture of left ankle       Past Medical History  Past Medical History:   Diagnosis Date    Abnormal liver scan     last assessed 8/14/2014    Atypical chest pain     last assessed 2/3/2016    Benign colonic polyp     Bladder disorder     last assessed 1/22/2013    Cervical radiculopathy     Chronic ITP (idiopathic thrombocytopenia) (HCC)     Chronic lumbar radiculopathy     Chronic pain     neck and back    Dermatitis     Herpes simplex     Hyperlipidemia     Luteinized follicular cyst     Menorrhagia     Obesity     Urge and stress incontinence     Uterine fibroid        Past Surgical History  Past Surgical History:   Procedure Laterality Date    CHOLECYSTECTOMY      HYSTERECTOMY      MELO    TOOTH EXTRACTION      last assessed 3/20/2017, oral surgery        04/30/18 0900   Pain Assessment   Pain Assessment 0-10   Pain Score 5   Pain Type Acute pain   Pain Location Leg   Pain Orientation Bilateral   Effect of Pain on Daily Activities impaired quality and tolerance to mobility    Patient's Stated Pain Goal No pain   Hospital Pain Intervention(s) Repositioned; Ambulation/increased activity; Elevated   Response to Interventions increased with mobility    Restrictions/Precautions   Weight Bearing Precautions Per Order Yes   RLE Weight Bearing Per Order WBAT  (in CAM BOOT)   LLE Weight Bearing Per Order NWB  (does not require KI)   Braces or Orthoses CAM Boot   Other Precautions WBS; Fall Risk;Pain   General   Chart Reviewed Yes   Response to Previous Treatment Patient with no complaints from previous session  Family/Caregiver Present No   Cognition   Overall Cognitive Status WFL   Arousal/Participation Cooperative   Attention Within functional limits   Orientation Level Oriented X4   Memory Within functional limits   Following Commands Follows all commands and directions without difficulty   Comments Able to converse beyond basic needs    Subjective   Subjective Pt agreeable and eager to participate in PT sesssion    Bed Mobility   Supine to Sit 4  Minimal assistance   Additional items HOB elevated; Increased time required;Verbal cues;LE management   Additional Comments No dizziness or light headedness during bed mobility    Transfers   Sliding Board transfer 4  Minimal assistance   Additional items Assist x 1; Increased time required;Verbal cues   Sit pivot 4  Minimal assistance   Additional items Assist x 1; Increased time required;Verbal cues   Additional Comments Pt in supine upon arrival  Eric Wilder to transfer OOB to chair  Slide board transfer performed OOB to drop arm wheelchair  Following w/c mobility, pt transferred to drop arm recliner via sit pivot  Slide bard difficut to utilze because of body habitus in w/c and position of wheel      Wheelchair Activities   Wheelchair Cushion None   Pressure Relief Type Self adjusts   Level of Assistance for Pressure Relief Activities Independent   Wheelchair Parts Management Yes   Left Armrest Level of Assistance Minimum assistance   Right Armrest Level of Assistance Minimum assistance   Right Leg Rest Level of Assistance Directs care (Comment)  (assistance provided )   Left Brakes Level of Assistance Independent   Right Brakes Level of Assistance Minimum assistance   Propulsion Yes   Propulsion Type 1 Manual   Level 1 Level tile   Method 1 Right upper extremity; Left upper extremity;Right lower extremity   Level of Assistance 1 Distant supervision   Description/ Details 1 180'   Balance   Static Sitting Fair +   Dynamic Sitting Fair   Static Standing Fair   Endurance Deficit   Endurance Deficit Yes   Endurance Deficit Description SOB noted during activity; SpO2 reading appx 95-96%; HR reading 106bpm with activity   Activity Tolerance   Activity Tolerance Patient limited by fatigue   Nurse [address], RN    Assessment   Prognosis Good   Problem List Decreased strength;Decreased range of motion;Decreased endurance; Impaired balance;Decreased mobility; Decreased skin integrity;Orthopedic restrictions;Pain   Assessment Pt engaged in PT treatment session focussing on transfers and w/c mobility  Pt able to progress to Nat/ supervision/set up for slide board bed<->chair<->chair  Pt also able to perform w/c mobility with assistance but was limited by poor tolerance to activity  Sit<->standing trials unable to be performed 2* fatigue noted at the end of session  Rehab remains appropriate to maxmize function and reduce caregiver burden  Barriers to Discharge Inaccessible home environment   Barriers to Discharge Comments FF to enter   Goals   Patient Goals to go home    STG Expiration Date 05/12/18   Treatment Day 1   Plan   Treatment/Interventions Functional transfer training;LE strengthening/ROM; Elevations; Therapeutic exercise; Endurance training;Patient/family training;Equipment eval/education; Bed mobility;Gait training;Spoke to nursing;Spoke to case management   Progress Progressing toward goals   PT Frequency Other (Comment)  (6x/wk)   Recommendation Recommendation Post acute IP rehab   Equipment Recommended Wheelchair  (slide board)   PT - OK to Discharge Yes  (to rehab when medically appropriate )   Additional Comments OOB in chair with all needs within reach              Loreta Comment, PT

## 2018-05-01 PROCEDURE — 99254 IP/OBS CNSLTJ NEW/EST MOD 60: CPT | Performed by: PSYCHIATRY & NEUROLOGY

## 2018-05-01 PROCEDURE — 97164 PT RE-EVAL EST PLAN CARE: CPT

## 2018-05-01 PROCEDURE — 99024 POSTOP FOLLOW-UP VISIT: CPT | Performed by: ORTHOPAEDIC SURGERY

## 2018-05-01 RX ORDER — DULOXETIN HYDROCHLORIDE 60 MG/1
60 CAPSULE, DELAYED RELEASE ORAL
Status: DISCONTINUED | OUTPATIENT
Start: 2018-05-01 | End: 2018-05-04 | Stop reason: HOSPADM

## 2018-05-01 RX ORDER — POLYETHYLENE GLYCOL 3350 17 G/17G
17 POWDER, FOR SOLUTION ORAL DAILY PRN
Status: DISCONTINUED | OUTPATIENT
Start: 2018-05-01 | End: 2018-05-04 | Stop reason: HOSPADM

## 2018-05-01 RX ORDER — DULOXETIN HYDROCHLORIDE 30 MG/1
30 CAPSULE, DELAYED RELEASE ORAL
Status: DISCONTINUED | OUTPATIENT
Start: 2018-05-01 | End: 2018-05-04 | Stop reason: HOSPADM

## 2018-05-01 RX ADMIN — OXYCODONE HYDROCHLORIDE 10 MG: 10 TABLET ORAL at 00:28

## 2018-05-01 RX ADMIN — ENOXAPARIN SODIUM 40 MG: 40 INJECTION SUBCUTANEOUS at 08:09

## 2018-05-01 RX ADMIN — OXYCODONE HYDROCHLORIDE 10 MG: 10 TABLET ORAL at 08:09

## 2018-05-01 RX ADMIN — LAMOTRIGINE 150 MG: 100 TABLET ORAL at 22:02

## 2018-05-01 RX ADMIN — DULOXETINE HYDROCHLORIDE 30 MG: 30 CAPSULE, DELAYED RELEASE ORAL at 22:27

## 2018-05-01 RX ADMIN — LAMOTRIGINE 100 MG: 100 TABLET ORAL at 08:09

## 2018-05-01 RX ADMIN — DULOXETINE HYDROCHLORIDE 60 MG: 60 CAPSULE, DELAYED RELEASE ORAL at 11:12

## 2018-05-01 RX ADMIN — OXYCODONE HYDROCHLORIDE 10 MG: 10 TABLET ORAL at 14:07

## 2018-05-01 RX ADMIN — DOCUSATE SODIUM 100 MG: 100 CAPSULE, LIQUID FILLED ORAL at 08:10

## 2018-05-01 RX ADMIN — POLYETHYLENE GLYCOL 3350 17 G: 17 POWDER, FOR SOLUTION ORAL at 11:12

## 2018-05-01 RX ADMIN — OXYCODONE HYDROCHLORIDE 5 MG: 5 TABLET ORAL at 22:27

## 2018-05-01 NOTE — PHYSICAL THERAPY NOTE
PHYSICAL THERAPY REEVALUATION          Patient Name: Stew Banerjee  ICLMA'U Date: 5/1/2018 05/01/18 1625   Pain Assessment   Pain Assessment 0-10   Pain Score 6   Pain Type Acute pain   Pain Location Leg   Pain Orientation Left   Hospital Pain Intervention(s) Repositioned   Response to Interventions pain increased to 7/10 at termination of session   Home Living   Type of [address] One level;Stairs to enter with rails   Prior Function   Level of [place] Independent with ADLs and functional mobility   Lives With Spouse   Receives Help From Family   Restrictions/Precautions   Weight Bearing Precautions Per Order Yes   RLE Weight Bearing Per Order WBAT  (CAM boot)   LLE Weight Bearing Per Order NWB   Braces or Orthoses CAM Boot   Other Precautions WBS; Fall Risk;Pain   General   Family/Caregiver Present No   Cognition   Overall Cognitive Status WFL   Arousal/Participation Alert   Orientation Level Oriented to person;Oriented to place   Following Commands Follows multistep commands with increased time or repetition   RUE Assessment   RUE Assessment (functional reach and grasp)   LUE Assessment   LUE Assessment (functional reach and grasp)   RLE Assessment   RLE Assessment X   Strength RLE   RLE Overall Strength 3+/5   LLE Assessment   LLE Assessment X   Strength LLE   LLE Overall Strength (hip and knee flexors 3/5)   Coordination   Movements are Fluid and Coordinated 0   Coordination and Movement Description slow movements, antalgic   Bed Mobility   Supine to Sit 4  Minimal assistance   Additional items Increased time required;Verbal cues   Transfers   Sit to Stand 3  Moderate assistance   Additional items Assist x 1; Increased time required;Verbal cues   Stand to Sit 3  Moderate assistance   Additional items Increased time required;Verbal cues   Stand pivot 3  Moderate assistance   Additional items Increased time required;Verbal cues   Ambulation/Elevation   Gait pattern Antalgic;Decreased foot clearance;Shuffling; Short stride; Excessively slow  (NWB LLE)   Gait Assistance 3  Moderate assist   Additional items Verbal cues; Tactile cues   Assistive Device Rolling walker   Distance 2 ft   Balance   Static Sitting Fair +   Dynamic Sitting Fair  (forward reach)   Static Standing Fair  (RW)   Dynamic Standing Poor +  (RW)   Endurance Deficit   Endurance Deficit Yes   Endurance Deficit Description fatigue, pain, anxiety   Activity Tolerance   Activity Tolerance Patient limited by fatigue;Patient limited by pain   Nurse Made Aware yes   Assessment   Prognosis Good   Problem List Decreased strength;Decreased range of motion;Decreased endurance; Impaired balance;Decreased mobility; Decreased coordination; Impaired judgement;Decreased safety awareness; Obesity; Decreased skin integrity;Orthopedic restrictions;Pain   Assessment Pt received in room, supine in bed and agreeable to therapy session  Pt performed sit to stand transfer with mod A x 1 and RW and verbal cues for safety  Pt performed stand pivot transfer with RW and mod A x 1 and verbal cues for sequencing and safety  Pt required cues throughout transfer process in order to maintain weight bearing precautions and safely perform transfer steps  Pt hopped 2 ft WBAT RLE and NWB LLE and mod A x 1 and RW and verbal cues for sequencing and safety to maintain precautions  Pt expresses fear of increased pain when performing transfer and reported increased confidence after successfully performing transfer  Skilled physical therapy is indicated to address the listed functional deficits  Goals   Patient Goals go home   STG Expiration Date 05/15/18   Short Term Goal #1 Pt will be mod(I) with rolling R and L  Pt will be mod(I) with supine<->sit  Pt will be Nat with sit<->stand  Pt will stand EOB > 3 minutes with CS  Pt will be mod I with stand pivot transfer with RW OOB to drop arm surface   Pt will be mod(I) with w/c mobility  Pt will mobilize > 100' in w/c  Pt will particiapte in HEP consisting of balance, strenghtening, coordination and flexibility exercises  Pt will amb 50 ft WBAT RLE and NWB LLE with RW and min A to increase functional independence with household mobility  Plan   Treatment/Interventions Functional transfer training;LE strengthening/ROM; Therapeutic exercise; Endurance training;Cognitive reorientation;Patient/family training;Equipment eval/education; Bed mobility; Compensatory technique education;Spoke to nursing   PT Frequency (6x/wk)   Recommendation   Recommendation Post acute IP rehab   Equipment Recommended Walker   Barthel Index   Feeding 10   Bathing 0   Grooming Score 5   Dressing Score 5   Bladder Score 10   Bowels Score 10   Toilet Use Score 5   Transfers (Bed/Chair) Score 5   Mobility (Level Surface) Score 0   Stairs Score 0   Barthel Index Score 50
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
## 2018-05-01 NOTE — SOCIAL WORK
Cm reviewed patient during care coordination rounds  Patient continues to be recommended for inpatient rehab at this time  Cm sent Options paperwork to [facility] for review yesterday,  came to hospital today to complete assessment  Cm requested psych eval to complete paperwork for completion of Options process  Cm awaiting letter from [place]

## 2018-05-01 NOTE — CASE MANAGEMENT
Continued Stay Review    Date: 5/1    Vital Signs: BP 92/55 (BP Location: Right arm)   Pulse 89   Temp 98 7 °F (37 1 °C) (Oral)   Resp 16   Ht 5' 8" (1 727 m)   Wt 104 kg (230 lb)   SpO2 94%   BMI 34 97 kg/m²     Medications:   Scheduled Meds:   Current Facility-Administered Medications:  DULoxetine 60 mg Oral Early Morning   enoxaparin 40 mg Subcutaneous Daily   lamoTRIgine 100 mg Oral Daily   lamoTRIgine 150 mg Oral HS   nicotine 1 patch Transdermal Daily     Continuous Infusions:   sodium chloride 75 mL/hr Last Rate: Stopped (04/28/18 9503)     PRN Meds:   acetaminophen    calcium carbonate    docusate sodium    HYDROmorphone    labetalol    LORazepam    methocarbamol    ondansetron    oxyCODONE    traMADol    Abnormal Labs/Diagnostic Results:   No new    Age/Sex: 46 y o  female       Assessment:   Post operative day 4  status post left Ankle ORIF   Doing well      Plan:  · Nonweight bearing left lower extremity; weight bearing to right lower extremity in Boot  · Analgesics  · DVT prophylaxis  · PT/OT  · Dispo: Ortho signing off Follow up OP for 2 wk appt   · Patient noted to have acute blood loss anemia due to a drop in Hbg of > 2 0g from preop levels, will monitor vital signs and resuscitate with IV fluids as needed     Discharge Plan:   5/1 Social work notes: Cm awaiting letter from [place] for placement

## 2018-05-01 NOTE — PLAN OF CARE
Problem: PHYSICAL THERAPY ADULT  Goal: Performs mobility at highest level of function for planned discharge setting  See evaluation for individualized goals  Treatment/Interventions: Functional transfer training, LE strengthening/ROM, Therapeutic exercise, Endurance training, Cognitive reorientation, Patient/family training, Equipment eval/education, Bed mobility, Compensatory technique education, Spoke to nursing  Equipment Recommended: Cynthia Conteh       See flowsheet documentation for full assessment, interventions and recommendations  Prognosis: Good  Problem List: Decreased strength, Decreased range of motion, Decreased endurance, Impaired balance, Decreased mobility, Decreased coordination, Impaired judgement, Decreased safety awareness, Obesity, Decreased skin integrity, Orthopedic restrictions, Pain  Assessment: Pt received in room, supine in bed and agreeable to therapy session  Pt performed sit to stand transfer with mod A x 1 and RW and verbal cues for safety  Pt performed stand pivot transfer with RW and mod A x 1 and verbal cues for sequencing and safety  Pt required cues throughout transfer process in order to maintain weight bearing precautions and safely perform transfer steps  Pt hopped 2 ft WBAT RLE and NWB LLE and mod A x 1 and RW and verbal cues for sequencing and safety to maintain precautions  Pt expresses fear of increased pain when performing transfer and reported increased confidence after successfully performing transfer  Skilled physical therapy is indicated to address the listed functional deficits  Barriers to Discharge: Inaccessible home environment  Barriers to Discharge Comments: FF to enter  Recommendation: Post acute IP rehab     PT - OK to Discharge: Yes (to rehab when medically appropriate )    See flowsheet documentation for full assessment

## 2018-05-01 NOTE — PLAN OF CARE
Problem: Potential for Falls  Goal: Patient will remain free of falls  INTERVENTIONS:  - Assess patient frequently for physical needs  -  Identify cognitive and physical deficits and behaviors that affect risk of falls    -  [place] fall precautions as indicated by assessment   - Educate patient/family on patient safety including physical limitations  - Instruct patient to call for assistance with activity based on assessment  - Modify environment to reduce risk of injury  - Consider OT/PT consult to assist with strengthening/mobility   Outcome: Progressing      Problem: Prexisting or High Potential for Compromised Skin Integrity  Goal: Skin integrity is maintained or improved  INTERVENTIONS:  - Identify patients at risk for skin breakdown  - Assess and monitor skin integrity  - Assess and monitor nutrition and hydration status  - Monitor labs (i e  albumin)  - Assess for incontinence   - Turn and reposition patient  - Assist with mobility/ambulation  - Relieve pressure over bony prominences  - Avoid friction and shearing  - Provide appropriate hygiene as needed including keeping skin clean and dry  - Evaluate need for skin moisturizer/barrier cream  - Collaborate with interdisciplinary team (i e  Nutrition, Rehabilitation, etc )   - Patient/family teaching   Outcome: Progressing      Problem: PAIN - ADULT  Goal: Verbalizes/displays adequate comfort level or baseline comfort level  Interventions:  - Encourage patient to monitor pain and request assistance  - Assess pain using appropriate pain scale  - Administer analgesics based on type and severity of pain and evaluate response  - Implement non-pharmacological measures as appropriate and evaluate response  - Consider cultural and social influences on pain and pain management  - Notify physician/advanced practitioner if interventions unsuccessful or patient reports new pain   Outcome: Progressing      Problem: INFECTION - ADULT  Goal: Absence or prevention of progression during hospitalization  INTERVENTIONS:  - Assess and monitor for signs and symptoms of infection  - Monitor lab/diagnostic results  - Monitor all insertion sites, i e  indwelling lines, tubes, and drains  - Monitor endotracheal (as able) and nasal secretions for changes in amount and color  - [place] appropriate cooling/warming therapies per order  - Administer medications as ordered  - Instruct and encourage patient and family to use good hand hygiene technique  - Identify and instruct in appropriate isolation precautions for identified infection/condition   Outcome: Progressing      Problem: SAFETY ADULT  Goal: Maintain or return to baseline ADL function  INTERVENTIONS:  -  Assess patient's ability to carry out ADLs; assess patient's baseline for ADL function and identify physical deficits which impact ability to perform ADLs (bathing, care of mouth/teeth, toileting, grooming, dressing, etc )  - Assess/evaluate cause of self-care deficits   - Assess range of motion  - Assess patient's mobility; develop plan if impaired  - Assess patient's need for assistive devices and provide as appropriate  - Encourage maximum independence but intervene and supervise when necessary  ¯ Involve family in performance of ADLs  ¯ Assess for home care needs following discharge   ¯ Request OT consult to assist with ADL evaluation and planning for discharge  ¯ Provide patient education as appropriate   Outcome: Progressing    Goal: Maintain or return mobility status to optimal level  INTERVENTIONS:  - Assess patient's baseline mobility status (ambulation, transfers, stairs, etc )    - Identify cognitive and physical deficits and behaviors that affect mobility  - Identify mobility aids required to assist with transfers and/or ambulation (gait belt, sit-to-stand, lift, walker, cane, etc )  - [place] fall precautions as indicated by assessment  - Record patient progress and toleration of activity level on Mobility SBAR; progress patient to next Phase/Stage  - Instruct patient to call for assistance with activity based on assessment  - Request Rehabilitation consult to assist with strengthening/weightbearing, etc    Outcome: Progressing      Problem: DISCHARGE PLANNING  Goal: Discharge to home or other facility with appropriate resources  INTERVENTIONS:  - Identify barriers to discharge w/patient and caregiver  - Arrange for needed discharge resources and transportation as appropriate  - Identify discharge learning needs (meds, wound care, etc )  - Arrange for interpretive services to assist at discharge as needed  - Refer to Case Management Department for coordinating discharge planning if the patient needs post-hospital services based on physician/advanced practitioner order or complex needs related to functional status, cognitive ability, or social support system   Outcome: Progressing      Problem: Knowledge Deficit  Goal: Patient/family/caregiver demonstrates understanding of disease process, treatment plan, medications, and discharge instructions  Complete learning assessment and assess knowledge base    Interventions:  - Provide teaching at level of understanding  - Provide teaching via preferred learning methods   Outcome: Progressing      Problem: DISCHARGE PLANNING - CARE MANAGEMENT  Goal: Discharge to post-acute care or home with appropriate resources  INTERVENTIONS:  - Conduct assessment to determine patient/family and health care team treatment goals, and need for post-acute services based on payer coverage, community resources, and patient preferences, and barriers to discharge  - Address psychosocial, clinical, and financial barriers to discharge as identified in assessment in conjunction with the patient/family and health care team  - Arrange appropriate level of post-acute services according to patient's   needs and preference and payer coverage in collaboration with the physician and health care team  - Communicate with and update the patient/family, physician, and health care team regarding progress on the discharge plan  - Arrange appropriate transportation to post-acute venues  - CM will assist the Pt with the Option process  - CM will assist the Pt with getting into the recommended rehab placement upon d/c     Outcome: Progressing

## 2018-05-01 NOTE — DISCHARGE SUMMARY
Discharge Summary - Orthopedics   Gibson Romano 46 y o  female MRN: [id number]  Unit/Bed#: [facility] 911-01    Attending Physician: Marylee Foreman    Admitting diagnosis: Bipolar II disorder ([facility] [address] ) [F31 81]  Hyperlipidemia [E78 5]  Ankle injury [S99 919A]  Fracture of 5th metatarsal [S92 353A]  Left trimalleolar fracture [S82 852A]  Closed fracture of left ankle, initial encounter [S82 892A]    Discharge diagnosis: Bipolar II disorder ([facility] [address] ) [F31 81]  Hyperlipidemia [E78 5]  Ankle injury [S99 919A]  Fracture of 5th metatarsal [S92 353A]  Left trimalleolar fracture [S82 852A]  Closed fracture of left ankle, initial encounter [S82 892A]    Date of admission: 4/26/2018    Date of discharge: 05/01/18    Procedure: ORIF L ankle      HPI & Hospital course:  46 y o  female who presented to the ED after a fall down steps sustaining a left trimalleolar ankle fracture dislocation with a left fibular head fracture  Pt was admitted to the orthopaedic service and taken to the OR on 4/27 for ORIF L ankle  Prior to surgery the risks and benefits of surgery were explained and informed consent was obtained  Surgery went without complications and pt was discharged to the PACU in a stable condition and was transferred to the floor  Patient was also found to have a right 5th metatarsal fracture treated non operatively in a CAM boot On discharge date pt was cleared by PT and the medicine team and determined to be safe for discharge  Daily discussion was had with the patient, nursing staff, orthopaedic team, and family members if present  All questions were answered to the patients satisifaction          0  Lab Value Date/Time   HGB 11 3 (L) 04/30/2018 0438   HGB 10 7 (L) 04/29/2018 0438   HGB 11 2 (L) 04/28/2018 0440   HGB 12 2 04/27/2018 0234   HGB 13 1 01/18/2018 1539   HGB 13 9 12/05/2017 1043   HGB 13 6 05/02/2017 0803   HGB 13 5 10/22/2016 1104   HGB 13 4 02/05/2016 1058   HGB 13 6 01/30/2016 0400   HGB 14 3 01/29/2016 1116         Discharge Instructions:   · Weight bearing as tolerated right lower extremity in CAM boot  · Non weight bearing left lower extremity in AO splint   · Keep dressings clean and dry at all times   · Complete DVT prophylaxis as prescribed   · Physical therapy  · Follow-up as scheduled, otherwise call for appt  Discharge Medications: For the complete list of discharge medications, please refer to the patient's medication reconciliation

## 2018-05-01 NOTE — PROGRESS NOTES
Orthopedics   Mirlande Patches 46 y o  female MRN: [id number]  [facility]    Subjective:  46 y  o female post operative day 4 status post left Ankle ORIF  She also has a right 5th metatarsal fracture  Pain well controlled this morning   No issues overnight    Labs:    0  Lab Value Date/Time   HCT 35 8 04/30/2018 0438   HCT 32 2 (L) 04/29/2018 0438   HCT 35 5 04/28/2018 0440   HCT 37 3 01/18/2018 1539   HCT 41 3 12/05/2017 1043   HCT 41 5 05/02/2017 0803   HGB 11 3 (L) 04/30/2018 0438   HGB 10 7 (L) 04/29/2018 0438   HGB 11 2 (L) 04/28/2018 0440   HGB 13 1 01/18/2018 1539   HGB 13 9 12/05/2017 1043   HGB 13 6 05/02/2017 0803   INR 1 03 04/27/2018 0317   WBC 7 06 04/30/2018 0438   WBC 7 38 04/29/2018 0438   WBC 10 95 (H) 04/28/2018 0440   WBC 7 0 01/18/2018 1539   WBC 6 6 12/05/2017 1043   WBC 6 3 05/02/2017 0803   ESR 17 02/05/2016 1102   CRP 0 25 02/05/2016 1102       Meds:    Current Facility-Administered Medications:     acetaminophen (TYLENOL) tablet 650 mg, 650 mg, Oral, Q4H PRN, Lauro Osler, MD    calcium carbonate (TUMS) chewable tablet 1,000 mg, 1,000 mg, Oral, Daily PRN, Chay Linares MD    docusate sodium (COLACE) capsule 100 mg, 100 mg, Oral, BID PRN, Chay Linares MD, 100 mg at 04/28/18 0801    enoxaparin (LOVENOX) subcutaneous injection 40 mg, 40 mg, Subcutaneous, Daily, Susen Bamberger, PA-C, 40 mg at 04/30/18 9393    HYDROmorphone (DILAUDID) injection 1 mg, 1 mg, Intravenous, Q4H PRN, Juan Carlos Sue PA-C, 1 mg at 04/28/18 1135    labetalol (NORMODYNE) injection 10 mg, 10 mg, Intravenous, Q4H PRN, Lauro Osler, MD    lamoTRIgine (LaMICtal) tablet 100 mg, 100 mg, Oral, Daily, Chay Linares MD, 100 mg at 04/30/18 0909    lamoTRIgine (LaMICtal) tablet 150 mg, 150 mg, Oral, HS, Chay Linares MD, 150 mg at 04/30/18 2103    LORazepam (ATIVAN) tablet 0 5 mg, 0 5 mg, Oral, Q8H PRN, Chay Linares MD    methocarbamol (ROBAXIN) tablet 500 mg, 500 mg, Oral, Q6H PRN, Chay Linares MD, 500 mg at 04/30/18 0909    nicotine (NICODERM CQ) 14 mg/24hr TD 24 hr patch 1 patch, 1 patch, Transdermal, Daily, Rosita Hollingsworth MD, Stopped at 04/30/18 0910    ondansetron (ZOFRAN) injection 4 mg, 4 mg, Intravenous, Q6H PRN, Rosita Hollingsworth MD    oxyCODONE (ROXICODONE) immediate release tablet 10 mg, 10 mg, Oral, Q4H PRN, Govind Lyon MD, 10 mg at 05/01/18 0028    oxyCODONE (ROXICODONE) IR tablet 5 mg, 5 mg, Oral, Q4H PRN, Govind Lyon MD    sodium chloride 0 9 % infusion, 75 mL/hr, Intravenous, Continuous, Yfn Sales PA-C, Stopped at 04/28/18 1513    traMADol (ULTRAM) tablet 50 mg, 50 mg, Oral, Q6H PRN, Rosita Hollingsworth MD    Blood Culture:   No results found for: BLOODCX    Wound Culture:   No results found for: WOUNDCULT    Ins and Outs:  I/O last 24 hours: In: 1560 [P O :1560]  Out: 1250 [Urine:1250]          Physical:  Vitals:    05/01/18 0000   BP:    Pulse:    Resp:    Temp:    SpO2: 98%     left lower extremity  · Dressings D/C/I   · +  EHL/FHL improved   · Sensation intact L1-S1  · Toes warm and well perfused  right lower extremity  · No splint, TTP over 5 MT   · EHL/FHL   · Sensation intact L1-S1  · Toes warm and well perfused    _*_*_*_*_*_*_*_*_*_*_*_*_*_*_*_*_*_*_*_*_*_*_*_*_*_*_*_*_*_*_*_*_*_*_*_*_*_*_*_*_*    Assessment:   Post operative day 4  status post left Ankle ORIF  Doing well      Plan:  · Nonweight bearing left lower extremity; weight bearing to right lower extremity in Boot  · Analgesics  · DVT prophylaxis  · PT/OT  · Dispo: Ortho signing off Follow up OP for 2 wk appt   · Patient noted to have acute blood loss anemia due to a drop in Hbg of > 2 0g from preop levels, will monitor vital signs and resuscitate with IV fluids as needed    Charlene Flores MD

## 2018-05-02 PROCEDURE — 97530 THERAPEUTIC ACTIVITIES: CPT

## 2018-05-02 PROCEDURE — 97535 SELF CARE MNGMENT TRAINING: CPT

## 2018-05-02 PROCEDURE — 97110 THERAPEUTIC EXERCISES: CPT

## 2018-05-02 PROCEDURE — 97116 GAIT TRAINING THERAPY: CPT

## 2018-05-02 RX ADMIN — ENOXAPARIN SODIUM 40 MG: 40 INJECTION SUBCUTANEOUS at 09:19

## 2018-05-02 RX ADMIN — DULOXETINE HYDROCHLORIDE 30 MG: 30 CAPSULE, DELAYED RELEASE ORAL at 21:11

## 2018-05-02 RX ADMIN — LAMOTRIGINE 100 MG: 100 TABLET ORAL at 09:18

## 2018-05-02 RX ADMIN — OXYCODONE HYDROCHLORIDE 10 MG: 10 TABLET ORAL at 19:39

## 2018-05-02 RX ADMIN — DULOXETINE HYDROCHLORIDE 60 MG: 60 CAPSULE, DELAYED RELEASE ORAL at 05:20

## 2018-05-02 RX ADMIN — LAMOTRIGINE 150 MG: 100 TABLET ORAL at 21:11

## 2018-05-02 RX ADMIN — OXYCODONE HYDROCHLORIDE 10 MG: 10 TABLET ORAL at 13:00

## 2018-05-02 NOTE — CASE MANAGEMENT
Continued Stay Review    Date: 5/2    Vital Signs: /54 (BP Location: Right arm)   Pulse 104   Temp 97 8 °F (36 6 °C) (Oral)   Resp 18   Ht 5' 8" (1 727 m)   Wt 104 kg (230 lb)   SpO2 96%   BMI 34 97 kg/m²     Medications:   Scheduled Meds:   Current Facility-Administered Medications:  DULoxetine 30 mg Oral HS   DULoxetine 60 mg Oral Early Morning   enoxaparin 40 mg Subcutaneous Daily   lamoTRIgine 100 mg Oral Daily   lamoTRIgine 150 mg Oral HS   nicotine 1 patch Transdermal Daily     Continuous Infusions:   sodium chloride 75 mL/hr Last Rate: Stopped (04/28/18 4153)     PRN Meds:   acetaminophen    calcium carbonate    docusate sodium    HYDROmorphone    labetalol    LORazepam    methocarbamol    ondansetron    oxyCODONE    traMADol    Abnormal Labs/Diagnostic Results:   No new    Age/Sex: 46 y o  female     Assessment:   Post operative day 5  status post left Ankle ORIF  Doing well      Plan:  · Nonweight bearing left lower extremity; weight bearing to right lower extremity in Boot  · Analgesics  · DVT prophylaxis- lovenox   · PT/OT  · Dispo:  Will follow, placement pending   · Patient noted to have acute blood loss anemia due to a drop in Hbg of > 2 0g from preop levels, will monitor vital signs and resuscitate with IV fluids as needed     Discharge Plan: Cm awaiting letter from [place] for placement

## 2018-05-02 NOTE — PHYSICAL THERAPY NOTE
Physical Therapy Progress Note     05/02/18 8280   Pain Assessment   Pain Assessment 0-10   Pain Score 3   Pain Type Acute pain;Surgical pain   Pain Location Leg   Pain Orientation Left   Hospital Pain Intervention(s) Repositioned; Ambulation/increased activity; Emotional support   Response to Interventions Satisfied  Restrictions/Precautions   RLE Weight Bearing Per Order WBAT  (In CAM boot )   LLE Weight Bearing Per Order NWB   Braces or Orthoses CAM Boot   Other Precautions WBS; Fall Risk;Pain   Subjective   Subjective The pt  states that she is doing better, and that she has been getting out to the commode more easily  Bed Mobility   Supine to Sit 4  Minimal assistance   Additional items Assist x 1; Increased time required;Verbal cues   Sit to Supine 4  Minimal assistance   Additional items Assist x 1; Increased time required;LE management   Transfers   Sit to Stand 4  Minimal assistance   Additional items Assist x 1; Increased time required;Verbal cues   Stand to Sit 4  Minimal assistance   Additional items Assist x 1; Increased time required;Verbal cues   Ambulation/Elevation   Gait pattern Decreased foot clearance; Inconsistent jerri; Short stride; Step to;Excessively slow; Forward Flexion   Gait Assistance 4  Minimal assist   Additional items Assist x 1;Verbal cues   Assistive Device Rolling walker   Distance 12 feet  Balance   Static Sitting Good   Static Standing Fair   Ambulatory Poor +   Activity Tolerance   Activity Tolerance Patient tolerated treatment well;Patient limited by fatigue   Nurse Bruno Sherwood 21, RN  Exercises   THR Supine;Sitting;Bilateral;AROM;10 reps   Assessment   Prognosis Good   Problem List Decreased strength;Decreased range of motion;Decreased endurance; Impaired balance;Decreased mobility; Decreased coordination; Impaired judgement;Decreased safety awareness; Obesity; Decreased skin integrity;Orthopedic restrictions;Pain   Assessment The pt  has improving mobility as she was able to ambulate out into the hallway today  She was able to maintain NWB LLE throughout the session  She did require extended time in order to take each step, but she had improved balance  She does, however, continue to require assistance for all phases of mobility  The pt  was able to complete therapeutic exercise with rests in-between each rep  She remains considerably limited from her baseline, and she will benefit from continued physical therapy in order to safely progress her functional mobility back to independence  Barriers to Discharge Inaccessible home environment;Decreased caregiver support   Goals   Patient Goals To get better  STG Expiration Date 05/15/18   Treatment Day 2   Plan   Treatment/Interventions Functional transfer training;LE strengthening/ROM; Therapeutic exercise; Endurance training;Patient/family training;Bed mobility;Gait training   Progress Progressing toward goals   PT Frequency Other (Comment)  (6x a week )   Recommendation   Recommendation Post acute IP rehab   Equipment Recommended Walker; Wheelchair     Dariela NAYLOR [place], [place]

## 2018-05-02 NOTE — OCCUPATIONAL THERAPY NOTE
Occupational Therapy Treatment Note:       05/02/18 1810   Restrictions/Precautions   RLE Weight Bearing Per Order WBAT  (with cam boot)   LLE Weight Bearing Per Order NWB   Braces or Orthoses CAM Boot   Pain Assessment   Pain Assessment 0-10   Pain Score 5   Pain Type Acute pain   Pain Location Leg   Pain Orientation Left   ADL   Grooming Assistance 5  Supervision/Setup   Grooming Comments hair washing, drying and brushing in w/c using sink   UB Dressing Assistance 5  Supervision/Setup   LB Dressing Assistance 2  Maximal Assistance   LB Dressing Comments mod asst to doris doff  cam walker boot, max asst to onn doff r sick and max asst bed level to doris underwear , mod asst rolling r/l to pull over hips   Bed Mobility   Supine to Sit 5  Supervision   Sit to Supine 5  Supervision   Transfers   Sit to Stand 4  Minimal assistance   Stand to Sit 4  Minimal assistance   Stand pivot 4  Minimal assistance   Functional Mobility   Additional Comments pt was able to propel w/c in guerin short distances with close sba needing asst for all doorways, elevators and turning in tight spaces    total asst for leg rest and break management   Activity Tolerance   Activity Tolerance Patient tolerated treatment well   Assessment   Assessment pt participated in pm ot session and was seen focusing on grooming ie hair washing / shampooing and drying / brushing, from w/c, lb dressing and clothing management  pt requires reassurance and min asst for spt using rw bed to from w/c   pt was instructed in leaning r/l to pull undergarment over hips and donned over feet supine needing max asst  pt requires max asst r sock and mod asst to doris doff cam walker supine  pt is motivated to gegain independence and was cooperative with bright affect  pt requires min cues for safe technique and carryover of newly instructed techniques  pt demosntrates fair activity tolerenceplan to continue to focus on goals from ie and continue to recommend in pt rehab  Plan   Treatment Interventions ADL retraining;Functional transfer training; Activityengagement;Equipment evaluation/education;Patient/family training; Endurance training   Goal Expiration Date 05/12/18   OT Frequency 3-5x/wk   Recommendation   OT Discharge Recommendation Short Term Rehab   Barthel Index   Feeding 10   Bathing 0   Grooming Score 5   Dressing Score 5   Bladder Score 5   Bowels Score 10   Toilet Use Score 5   Transfers (Bed/Chair) Score 10   Mobility (Level Surface) Score 0   Stairs Score 0   Barthel Index Score 50   Modified Schleicher Scale   Modified Schleicher Scale 4

## 2018-05-02 NOTE — PROGRESS NOTES
Orthopedics   Deven Guerrero 46 y o  female MRN: [id number]  [facility]    Subjective:  46 y  o female post operative day 5 status post left Ankle ORIF  She also has a right 5th metatarsal fracture  Mobilizing well with therapy  No acute events overnight       Labs:    0  Lab Value Date/Time   HCT 35 8 04/30/2018 0438   HCT 32 2 (L) 04/29/2018 0438   HCT 35 5 04/28/2018 0440   HCT 37 3 01/18/2018 1539   HCT 41 3 12/05/2017 1043   HCT 41 5 05/02/2017 0803   HGB 11 3 (L) 04/30/2018 0438   HGB 10 7 (L) 04/29/2018 0438   HGB 11 2 (L) 04/28/2018 0440   HGB 13 1 01/18/2018 1539   HGB 13 9 12/05/2017 1043   HGB 13 6 05/02/2017 0803   INR 1 03 04/27/2018 0317   WBC 7 06 04/30/2018 0438   WBC 7 38 04/29/2018 0438   WBC 10 95 (H) 04/28/2018 0440   WBC 7 0 01/18/2018 1539   WBC 6 6 12/05/2017 1043   WBC 6 3 05/02/2017 0803   ESR 17 02/05/2016 1102   CRP 0 25 02/05/2016 1102       Meds:    Current Facility-Administered Medications:     acetaminophen (TYLENOL) tablet 650 mg, 650 mg, Oral, Q4H PRN, Natalee Arguello MD    calcium carbonate (TUMS) chewable tablet 1,000 mg, 1,000 mg, Oral, Daily PRN, Naman Crowley MD    docusate sodium (COLACE) capsule 100 mg, 100 mg, Oral, BID PRN, Naman Crowley MD, 100 mg at 05/01/18 0810    DULoxetine (CYMBALTA) delayed release capsule 30 mg, 30 mg, Oral, HS, Fern Delgado PA-C, 30 mg at 05/01/18 2227    DULoxetine (CYMBALTA) delayed release capsule 60 mg, 60 mg, Oral, Early Morning, Chari Motley MD, 60 mg at 05/02/18 0520    enoxaparin (LOVENOX) subcutaneous injection 40 mg, 40 mg, Subcutaneous, Daily, Fern Delgado PA-C, 40 mg at 05/01/18 0809    HYDROmorphone (DILAUDID) injection 1 mg, 1 mg, Intravenous, Q4H PRN, John Rossi PA-C, 1 mg at 04/28/18 1135    labetalol (NORMODYNE) injection 10 mg, 10 mg, Intravenous, Q4H PRN, Natalee Arguello MD    lamoTRIgine (LaMICtal) tablet 100 mg, 100 mg, Oral, Daily, Naman Crowley MD, 100 mg at 05/01/18 0809   lamoTRIgine (LaMICtal) tablet 150 mg, 150 mg, Oral, HS, Gardenia Macias MD, 150 mg at 05/01/18 2202    LORazepam (ATIVAN) tablet 0 5 mg, 0 5 mg, Oral, Q8H PRN, Gardenia Macias MD    methocarbamol (ROBAXIN) tablet 500 mg, 500 mg, Oral, Q6H PRN, Gardenia Macias MD, 500 mg at 04/30/18 0909    nicotine (NICODERM CQ) 14 mg/24hr TD 24 hr patch 1 patch, 1 patch, Transdermal, Daily, Gardenia Macias MD, Stopped at 04/30/18 0910    ondansetron (ZOFRAN) injection 4 mg, 4 mg, Intravenous, Q6H PRN, Gardenia Macias MD    oxyCODONE (ROXICODONE) immediate release tablet 10 mg, 10 mg, Oral, Q4H PRN, Govind Lyon MD, 10 mg at 05/01/18 1407    oxyCODONE (ROXICODONE) IR tablet 5 mg, 5 mg, Oral, Q4H PRN, Govind Lyon MD, 5 mg at 05/01/18 2227    polyethylene glycol (MIRALAX) packet 17 g, 17 g, Oral, Daily PRN, Radha Zarate MD, 17 g at 05/01/18 1112    sodium chloride 0 9 % infusion, 75 mL/hr, Intravenous, Continuous, Pierrette Denver, PA-C, Stopped at 04/28/18 1513    traMADol (ULTRAM) tablet 50 mg, 50 mg, Oral, Q6H PRN, Gardenia Macias MD    Blood Culture:   No results found for: BLOODCX    Wound Culture:   No results found for: WOUNDCULT    Ins and Outs:  I/O last 24 hours: In: 708 [P O :708]  Out: 700 [Urine:700]          Physical:  Vitals:    05/01/18 2344   BP: 105/51   Pulse: 80   Resp: 16   Temp: 99 2 °F (37 3 °C)   SpO2: 93%     left lower extremity  · Dressings D/C/I   · +  EHL/FHL  · Sensation intact sp/dp  · Toes warm and well perfused  right lower extremity  · No splint, TTP over 5 MT   · EHL/FHL   · Sensation intact s/s/sp/dp/t  · Toes warm and well perfused    _*_*_*_*_*_*_*_*_*_*_*_*_*_*_*_*_*_*_*_*_*_*_*_*_*_*_*_*_*_*_*_*_*_*_*_*_*_*_*_*_*    Assessment:   Post operative day 5  status post left Ankle ORIF  Doing well  Plan:  · Nonweight bearing left lower extremity; weight bearing to right lower extremity in Boot  · Analgesics  · DVT prophylaxis- lovenox   · PT/OT  · Dispo:  Will follow, placement pending · Patient noted to have acute blood loss anemia due to a drop in Hbg of > 2 0g from preop levels, will monitor vital signs and resuscitate with IV fluids as needed    Kevin Miller MD

## 2018-05-02 NOTE — PLAN OF CARE
Problem: OCCUPATIONAL THERAPY ADULT  Goal: Performs self-care activities at highest level of function for planned discharge setting  See evaluation for individualized goals  Treatment Interventions: ADL retraining, Functional transfer training, Endurance training, Patient/family training, Equipment evaluation/education, Compensatory technique education, Continued evaluation, Energy conservation, Activityengagement          See flowsheet documentation for full assessment, interventions and recommendations  Outcome: Progressing  Limitation: Decreased ADL status, Decreased endurance, Decreased self-care trans, Decreased high-level ADLs  Prognosis: Good  Assessment: pt participated in pm ot session and was seen focusing on grooming ie hair washing / shampooing and drying / brushing, from w/c, lb dressing and clothing management  pt requires reassurance and min asst for spt using rw bed to from w/c   pt was instructed in leaning r/l to pull undergarment over hips and donned over feet supine needing max asst  pt requires max asst r sock and mod asst to doris doff cam walker supine  pt is motivated to gegain independence and was cooperative with bright affect  pt requires min cues for safe technique and carryover of newly instructed techniques  pt demosntrates fair activity tolerenceplan to continue to focus on goals from ie and continue to recommend in pt rehab        OT Discharge Recommendation: Short Term Rehab  OT - OK to Discharge: Yes (to STR)  Antonia Fitch

## 2018-05-02 NOTE — PLAN OF CARE
Problem: PHYSICAL THERAPY ADULT  Goal: Performs mobility at highest level of function for planned discharge setting  See evaluation for individualized goals  Treatment/Interventions: Functional transfer training, LE strengthening/ROM, Therapeutic exercise, Endurance training, Patient/family training, Bed mobility, Gait training  Equipment Recommended: Conan Boxer, Wheelchair       See flowsheet documentation for full assessment, interventions and recommendations  Outcome: Progressing  Prognosis: Good  Problem List: Decreased strength, Decreased range of motion, Decreased endurance, Impaired balance, Decreased mobility, Decreased coordination, Impaired judgement, Decreased safety awareness, Obesity, Decreased skin integrity, Orthopedic restrictions, Pain  Assessment: The pt  has improving mobility as she was able to ambulate out into the hallway today  She was able to maintain NWB LLE throughout the session  She did require extended time in order to take each step, but she had improved balance  She does, however, continue to require assistance for all phases of mobility  The pt  was able to complete therapeutic exercise with rests in-between each rep  She remains considerably limited from her baseline, and she will benefit from continued physical therapy in order to safely progress her functional mobility back to independence  Barriers to Discharge: Inaccessible home environment, Decreased caregiver support  Barriers to Discharge Comments: FF to enter  Recommendation: Post acute IP rehab     PT - OK to Discharge: Yes (to rehab when medically appropriate )    See flowsheet documentation for full assessment

## 2018-05-02 NOTE — PROGRESS NOTES
Rounding with [facility], pt is medically stable for discharge, awaiting placement  OK to remove IV

## 2018-05-03 PROCEDURE — 97116 GAIT TRAINING THERAPY: CPT

## 2018-05-03 PROCEDURE — 97535 SELF CARE MNGMENT TRAINING: CPT

## 2018-05-03 PROCEDURE — 97530 THERAPEUTIC ACTIVITIES: CPT

## 2018-05-03 PROCEDURE — 99024 POSTOP FOLLOW-UP VISIT: CPT | Performed by: ORTHOPAEDIC SURGERY

## 2018-05-03 RX ADMIN — DULOXETINE HYDROCHLORIDE 30 MG: 30 CAPSULE, DELAYED RELEASE ORAL at 21:48

## 2018-05-03 RX ADMIN — LORAZEPAM 0.5 MG: 0.5 TABLET ORAL at 08:39

## 2018-05-03 RX ADMIN — OXYCODONE HYDROCHLORIDE 10 MG: 10 TABLET ORAL at 13:51

## 2018-05-03 RX ADMIN — DULOXETINE HYDROCHLORIDE 60 MG: 60 CAPSULE, DELAYED RELEASE ORAL at 05:57

## 2018-05-03 RX ADMIN — LAMOTRIGINE 150 MG: 100 TABLET ORAL at 21:48

## 2018-05-03 RX ADMIN — OXYCODONE HYDROCHLORIDE 10 MG: 10 TABLET ORAL at 00:42

## 2018-05-03 RX ADMIN — LAMOTRIGINE 100 MG: 100 TABLET ORAL at 08:31

## 2018-05-03 RX ADMIN — ENOXAPARIN SODIUM 40 MG: 40 INJECTION SUBCUTANEOUS at 08:32

## 2018-05-03 RX ADMIN — OXYCODONE HYDROCHLORIDE 10 MG: 10 TABLET ORAL at 20:40

## 2018-05-03 NOTE — PHYSICAL THERAPY NOTE
Physical Therapy Progress Note     05/03/18 1220   Pain Assessment   Pain Assessment No/denies pain   Pain Score No Pain   Hospital Pain Intervention(s) Ambulation/increased activity; Emotional support   Response to Interventions Satisfied  Restrictions/Precautions   RLE Weight Bearing Per Order WBAT  (In CAM boot )   LLE Weight Bearing Per Order NWB   Braces or Orthoses CAM Boot   Other Precautions WBS; Fall Risk;Pain   Subjective   Subjective The pt  states that she is doing better, but that she almost lost her balance with one of the nurses last night  Bed Mobility   Supine to Sit 5  Supervision   Additional items Increased time required   Sit to Supine 5  Supervision   Additional items Increased time required   Transfers   Sit to Stand 4  Minimal assistance   Additional items Assist x 1; Increased time required   Stand to Sit 4  Minimal assistance   Additional items Assist x 1; Increased time required;Verbal cues   Ambulation/Elevation   Gait pattern Excessively slow; Step to;Short stride; Inconsistent jerri; Shuffling;Decreased foot clearance; Forward Flexion   Gait Assistance 4  Minimal assist   Additional items Assist x 1;Verbal cues   Assistive Device Rolling walker   Distance 20 feet, 10 feet  Balance   Static Sitting Normal   Dynamic Sitting Fair +   Static Standing Fair   Ambulatory Poor +   Activity Tolerance   Activity Tolerance Patient tolerated treatment well;Patient limited by fatigue   Nurse Bruno Sherwood 21, RN  Exercises   THR Supine;Bilateral;AROM;10 reps   Assessment   Prognosis Good   Problem List Decreased strength;Decreased range of motion;Decreased endurance; Impaired balance;Decreased mobility; Decreased coordination; Impaired judgement;Decreased safety awareness; Obesity; Decreased skin integrity;Orthopedic restrictions;Pain   Assessment The pt  was able to progress her ambulatory distance today, but she continues to take very small hops and require brief standing rest after every 4-5 hops  She had improved balance today with static stance, but she continues to require assistance for mobility  She continues to remain unable to ambulate household distances, and she will benefit from continued physical therapy in order to safely progress her functional mobility back to her baseline independence  Barriers to Discharge Inaccessible home environment;Decreased caregiver support   Goals   Patient Goals To get back to normal    STG Expiration Date 05/15/18   Treatment Day 3   Plan   Treatment/Interventions Functional transfer training;LE strengthening/ROM; Therapeutic exercise; Endurance training;Patient/family training;Bed mobility;Gait training   Progress Progressing toward goals   PT Frequency Other (Comment)  (6x a week )   Recommendation   Recommendation Post acute IP rehab   Equipment Recommended Walker; Wheelchair     Dariela NAYLOR [place], [place]

## 2018-05-03 NOTE — PROGRESS NOTES
Orthopedics   Paula Po 46 y o  female MRN: [id number]  Unit/Bed#: [facility] 911-01    Subjective:  46 y  o female post operative day 6 status post left Ankle ORIF  She also has a right 5th metatarsal fracture  No acute events overnight      Labs:    0  Lab Value Date/Time   HCT 35 8 04/30/2018 0438   HCT 32 2 (L) 04/29/2018 0438   HCT 35 5 04/28/2018 0440   HCT 37 3 01/18/2018 1539   HCT 41 3 12/05/2017 1043   HCT 41 5 05/02/2017 0803   HGB 11 3 (L) 04/30/2018 0438   HGB 10 7 (L) 04/29/2018 0438   HGB 11 2 (L) 04/28/2018 0440   HGB 13 1 01/18/2018 1539   HGB 13 9 12/05/2017 1043   HGB 13 6 05/02/2017 0803   INR 1 03 04/27/2018 0317   WBC 7 06 04/30/2018 0438   WBC 7 38 04/29/2018 0438   WBC 10 95 (H) 04/28/2018 0440   WBC 7 0 01/18/2018 1539   WBC 6 6 12/05/2017 1043   WBC 6 3 05/02/2017 0803   ESR 17 02/05/2016 1102   CRP 0 25 02/05/2016 1102       Meds:    Current Facility-Administered Medications:     acetaminophen (TYLENOL) tablet 650 mg, 650 mg, Oral, Q4H PRN, Aurora Oquendo MD    calcium carbonate (TUMS) chewable tablet 1,000 mg, 1,000 mg, Oral, Daily PRN, Neo Forrest MD    docusate sodium (COLACE) capsule 100 mg, 100 mg, Oral, BID PRN, Neo Forrest MD, 100 mg at 05/01/18 0810    DULoxetine (CYMBALTA) delayed release capsule 30 mg, 30 mg, Oral, HS, Carter Fernandez PA-C, 30 mg at 05/02/18 2111    DULoxetine (CYMBALTA) delayed release capsule 60 mg, 60 mg, Oral, Early Morning, Roberta Steward MD, 60 mg at 05/03/18 0557    enoxaparin (LOVENOX) subcutaneous injection 40 mg, 40 mg, Subcutaneous, Daily, Carter Fernandez PA-C, 40 mg at 05/02/18 0919    HYDROmorphone (DILAUDID) injection 1 mg, 1 mg, Intravenous, Q4H PRN, Blake Vincent PA-C, 1 mg at 04/28/18 1135    labetalol (NORMODYNE) injection 10 mg, 10 mg, Intravenous, Q4H PRN, Aurora Oquendo MD    lamoTRIgine (LaMICtal) tablet 100 mg, 100 mg, Oral, Daily, Neo Forrest MD, 100 mg at 05/02/18 0918    lamoTRIgine (LaMICtal) tablet 150 mg, 150 mg, Oral, HS, Rupa Sarmiento MD, 150 mg at 05/02/18 2111    LORazepam (ATIVAN) tablet 0 5 mg, 0 5 mg, Oral, Q8H PRN, Rupa Sarmiento MD    methocarbamol (ROBAXIN) tablet 500 mg, 500 mg, Oral, Q6H PRN, Rupa Sarmiento MD, 500 mg at 04/30/18 0909    nicotine (NICODERM CQ) 14 mg/24hr TD 24 hr patch 1 patch, 1 patch, Transdermal, Daily, Rupa Sarmiento MD, Stopped at 04/30/18 0910    ondansetron (ZOFRAN) injection 4 mg, 4 mg, Intravenous, Q6H PRN, Rupa Sarmiento MD    oxyCODONE (ROXICODONE) immediate release tablet 10 mg, 10 mg, Oral, Q4H PRN, Govind Lyon MD, 10 mg at 05/03/18 0042    oxyCODONE (ROXICODONE) IR tablet 5 mg, 5 mg, Oral, Q4H PRN, Govind Lyon MD, 5 mg at 05/01/18 2227    polyethylene glycol (MIRALAX) packet 17 g, 17 g, Oral, Daily PRN, Skyler Hodges MD, 17 g at 05/01/18 1112    sodium chloride 0 9 % infusion, 75 mL/hr, Intravenous, Continuous, Glenny Barahona PA-C, Stopped at 04/28/18 1513    traMADol (ULTRAM) tablet 50 mg, 50 mg, Oral, Q6H PRN, Rupa Sarmiento MD    Blood Culture:   No results found for: BLOODCX    Wound Culture:   No results found for: WOUNDCULT    Ins and Outs:  I/O last 24 hours: In: 1220 [P O :1220]  Out: 400 [Urine:400]          Physical:  Vitals:    05/03/18 0050   BP: 113/52   Pulse: 83   Resp: 18   Temp: 98 5 °F (36 9 °C)   SpO2: 96%     left lower extremity  · Dressings D/C/I   · +  EHL/FHL  · Sensation intact sp/dp  · Toes warm and well perfused  right lower extremity  · No splint, TTP over 5 MT   · EHL/FHL   · Sensation intact s/s/sp/dp/t  · Toes warm and well perfused    _*_*_*_*_*_*_*_*_*_*_*_*_*_*_*_*_*_*_*_*_*_*_*_*_*_*_*_*_*_*_*_*_*_*_*_*_*_*_*_*_*    Assessment:   Post operative day 6  status post left Ankle ORIF  Doing well  Plan:  · Nonweight bearing left lower extremity; weight bearing to right lower extremity in Boot  · Analgesics  · DVT prophylaxis- lovenox   · PT/OT  · Dispo:  Will follow, placement pending   · Patient noted to have acute blood loss anemia due to a drop in Hbg of > 2 0g from preop levels, will monitor vital signs and resuscitate with IV fluids as needed    Rohit Colon MD

## 2018-05-03 NOTE — SOCIAL WORK
Cm reviewed patient during care coordination rounds  Cm continues to work on discharge plan  Cm reviewed referrals made from weekend, and informed that both [facility] and [facility] continue to review patient's information  At 3:00pm Cm received Options letter for patient  Cm sent information to reviewing facilities requesting a determination on beds  Per [facility], facility does not have a bed for patient  Cm then inquired about [facility]  Per liaison at [facility], patient's insurance is through a [place] and may not have a benefit for skilled rehab  Cm contacted patient's insurance company and spoke with Salo Nguyễn with benefits department  Vaughn Neither informed that patient does have skilled rehab benefit, but that benefit comes with a co-pay of $175, needing to enroll in the case management program and an authorization prior to admission  Cm informed by [facility] that they were willing to work with patient to explain benefits  Cm stated that she would meet with patient also and explained to her the co-pay and that the payment would need to be made since her out-pocket has not been yet at this time  Patient contacted her  and informed him of co-pay cost and both agreed for patient to return home after she works with PT tomorrow on stairs  Patient in agreement with [facility] services being provided by [facility], to have her prescriptions filled her at \[facility]\""  Patient is also requesting that medical equipment of RW, bedside commode be ordered for her to return home   Cm placed referrals and awaiting PT clearanc

## 2018-05-03 NOTE — SOCIAL WORK
Cm sent prescription to [facility]  [employer] for co-pay cost review  Cm informed that cost of medication is $29 56 and waiting to be picked up at [phone]  Cm also confirmed that [facility] can provide [place] [phone] services when stable  Patient is aware and in agreement

## 2018-05-03 NOTE — OCCUPATIONAL THERAPY NOTE
[address] Progress Note     Patient Name: Paula Zuleta  HKMJW'D Date: 5/3/2018  Problem List  Patient Active Problem List   Diagnosis    Chest pain    T wave inversion in EKG    Bipolar II disorder ([address] )    DREAD (generalized anxiety disorder)    Panic disorder without agoraphobia    Gastroesophageal reflux disease without esophagitis    Benign colon polyp    Cervical radiculopathy    Chronic idiopathic thrombocytopenic purpura (HCC)    Chronic lumbar radiculopathy    Herpes simplex type 1 infection    Hyperlipidemia    Lung nodule, solitary    Mitral regurgitation    Perimenopausal symptoms    Thrombocytopenia (HCC)    Urge and stress incontinence    Closed fracture of left ankle           05/03/18 1620   Restrictions/Precautions   Weight Bearing Precautions Per Order Yes   RLE Weight Bearing Per Order WBAT  (IN CAM BOOT )   LLE Weight Bearing Per Order NWB   Braces or Orthoses CAM Boot   Other Precautions WBS; Fall Risk   Pain Assessment   Pain Assessment No/denies pain   Pain Score No Pain   ADL   LB Dressing Assistance 4  Minimal Assistance   Toileting Assistance  5  Supervision/Setup   Toileting Deficit Bedside commode   Bed Mobility   Supine to Sit 5  Supervision   Sit to Supine 5  Supervision   Transfers   Sit to Stand 5  Supervision   Stand to Sit 5  Supervision   Functional Mobility   Functional Mobility 5  Supervision   Additional items Rolling walker   Toilet Transfers   Toilet Transfer From Rolling walker   Toilet Transfer Type To and from   Toilet Transfer to Standard bedside commode  (OVER TOILET )   Toilet Transfer Technique Ambulating   Toilet Transfers Supervision   Cognition   Overall Cognitive Status [facility]   Arousal/Participation Alert; Cooperative   Attention Within functional limits   Orientation Level Oriented X4   Memory Within functional limits   Following Commands Follows all commands and directions without difficulty   Activity Tolerance   Activity Tolerance Patient tolerated treatment well   Assessment   Assessment PT SEEN FOR OT TX SESSION FOCUSING ON UB/LB DRESSING, FUNCTIONAL TRANSFERS/MOBILITY, TOILETING AND PT EDUCATION FOR D/C PLANNING  AT START OF SESSION, CM PRESENT TO DISCUSS D/C OPTIONS AND PT REPORTS HER PLAN IS TO D/C HOME WITH HOME THERAPY 2' UNABLE/UNWILLING TO PAY INPT REHAB CO-PAY  PT ABLE TO DON/DOFF CAM BOOT WITH MIN A + MIN CUES T/O BOTH SUPINE IN BED AND SITTING EOB  PT ABLE TO DON R-SOCK AT SUPERVISION LEVEL WITH G CARRY OVER OF LEARNED COMPENSATORY TECHNIQUES  PT COMPLETED FUNCTIONAL MOBILITY TO/FROM BATHROOM AT SUPERVISION LEVEL WITH G CARRY OVER OF LLE NWB STATUS, PROPER HAND PLACEMENT DURING TRANSFERS AND SAFE USE OF RW  PT COMPLETED TOILETING ON BEDSIDE COMMODE OVER TOILET AT SUPERVISION LEVEL  IF RETURNING DIRECTLY HOME, PT WOULD REQUIRING BSC, PT AGREES  WHILE SEATED ON BSC, PT DOFFED PULL-OVER SHIFT AND SHORTS AT [address] GOWN AT SUPERVISION LEVEL WITH ASSISTANCE ONLY FOR TYING GOWN IN BACK  PT EDUCATED ON SAEF USE OF RW DURING TRANSPORTING OBJECTS AND POSITIONING OF RW DURING ADL/IADLS TASKS  ALL CURRENT QUESTIONS/CONCERNS REGARDING D/C TO HOME ADDRESSED  PT REPORTS HER SPOUSE IS ABLE TO BE HOME AT ALL TIMES TO ASSIST AS NEEDED  PT IS SIGNIFICANTLY IMPROVING HOWEVER REMAINS LIMITED  CURRENT RECOMMENDATION FOR HOME OT + INCREASED FAMILY SUPPORT  WILL CONT TO FOLLOW  Plan   Treatment Interventions ADL retraining;Functional transfer training; Endurance training;Patient/family training;Equipment evaluation/education; Compensatory technique education; Energy conservation   Goal Expiration Date 05/12/18   Treatment Day 2   OT Frequency 3-5x/wk   Recommendation   OT Discharge Recommendation Home OT  (+ INCREASED FAMILY SUPPORT )   Equipment Recommended Bedside commode;Tub seat with back; Other (comment)  (RW)   OT - OK to Discharge Yes  (WHEN MEDICALLY CLEARED )   Barthel Index   Feeding 10   Bathing 0   Grooming Score 5 Dressing Score 5   Bladder Score 10   Bowels Score 10   Toilet Use Score 5   Transfers (Bed/Chair) Score 10   Mobility (Level Surface) Score 0   Stairs Score 0   Barthel Index Score 55   Modified [place]ana Scale   Modified [place] Scale 3       Documentation completed by Thomas Hale, JOE, OTR/L

## 2018-05-03 NOTE — PROGRESS NOTES
Pt resting in bed comfortably with no complaints at this time  Call bell in reach  Pain hs significantly decreased since receiving pain medications  Will continue to monitor pt closely

## 2018-05-03 NOTE — PLAN OF CARE
Problem: OCCUPATIONAL THERAPY ADULT  Goal: Performs self-care activities at highest level of function for planned discharge setting  See evaluation for individualized goals  Treatment Interventions: ADL retraining, Functional transfer training, Endurance training, Patient/family training, Equipment evaluation/education, Compensatory technique education, Continued evaluation, Energy conservation, Activityengagement          See flowsheet documentation for full assessment, interventions and recommendations  Outcome: Progressing  Limitation: Decreased ADL status, Decreased endurance, Decreased self-care trans, Decreased high-level ADLs  Prognosis: Good  Assessment: PT SEEN FOR OT TX SESSION FOCUSING ON UB/LB DRESSING, FUNCTIONAL TRANSFERS/MOBILITY, TOILETING AND PT EDUCATION FOR D/C PLANNING  AT START OF SESSION, CM PRESENT TO DISCUSS D/C OPTIONS AND PT REPORTS HER PLAN IS TO D/C HOME WITH HOME THERAPY 2' UNABLE/UNWILLING TO PAY INPT REHAB CO-PAY  PT ABLE TO DON/DOFF CAM BOOT WITH MIN A + MIN CUES T/O BOTH SUPINE IN BED AND SITTING EOB  PT ABLE TO DON R-SOCK AT SUPERVISION LEVEL WITH G CARRY OVER OF LEARNED COMPENSATORY TECHNIQUES  PT COMPLETED FUNCTIONAL MOBILITY TO/FROM BATHROOM AT SUPERVISION LEVEL WITH G CARRY OVER OF LLE NWB STATUS, PROPER HAND PLACEMENT DURING TRANSFERS AND SAFE USE OF RW  PT COMPLETED TOILETING ON BEDSIDE COMMODE OVER TOILET AT SUPERVISION LEVEL  IF RETURNING DIRECTLY HOME, PT WOULD REQUIRING BSC, PT AGREES  WHILE SEATED ON BSC, PT DOFFED PULL-OVER SHIFT AND SHORTS AT [address] GOWN AT SUPERVISION LEVEL WITH ASSISTANCE ONLY FOR TYING GOWN IN BACK  PT EDUCATED ON SAEF USE OF RW DURING TRANSPORTING OBJECTS AND POSITIONING OF RW DURING ADL/IADLS TASKS  ALL CURRENT QUESTIONS/CONCERNS REGARDING D/C TO HOME ADDRESSED  PT REPORTS HER SPOUSE IS ABLE TO BE HOME AT ALL TIMES TO ASSIST AS NEEDED  PT IS SIGNIFICANTLY IMPROVING HOWEVER REMAINS LIMITED   CURRENT RECOMMENDATION FOR HOME OT + INCREASED FAMILY SUPPORT  WILL CONT TO FOLLOW        OT Discharge Recommendation: Home OT (+ INCREASED FAMILY SUPPORT )  OT - OK to Discharge: Yes ([address] [place] [address] Osteopathy )

## 2018-05-03 NOTE — CASE MANAGEMENT
Continued Stay Review       Date: 05/03/2018   Postop day #6  orif left ankle-- with a 5th metatarsal fx    Vital Signs: /53   Pulse 88   Temp 98 2 °F (36 8 °C)   Resp 18   Ht 5' 8" (1 727 m)   Wt 104 kg (230 lb)   SpO2 96%   BMI 34 97 kg/m²     Medications:   Scheduled Meds:   Current Facility-Administered Medications:  acetaminophen 650 mg Oral Q4H PRN Pedro Stephen MD    calcium carbonate 1,000 mg Oral Daily PRN Bashir Hayden MD    docusate sodium 100 mg Oral BID PRN Bashir Hayden MD    DULoxetine 30 mg Oral HS Costa Lang PA-C    DULoxetine 60 mg Oral Early Morning Margareth Sutherland MD    enoxaparin 40 mg Subcutaneous Daily [facility], [place]    HYDROmorphone 1 mg Intravenous Q4H PRN Teresa Evans PA-C    labetalol 10 mg Intravenous Q4H PRN Pedro Stephen MD    lamoTRIgine 100 mg Oral Daily Bashir Hayden MD    lamoTRIgine 150 mg Oral HS Bashir Hayden MD    LORazepam 0 5 mg Oral Q8H PRN Bashir Hayden MD    methocarbamol 500 mg Oral Q6H PRN Bashir Hayden MD    nicotine 1 patch Transdermal Daily Bashir Hayden MD    ondansetron 4 mg Intravenous Q6H PRN Bashir Hayden MD    oxyCODONE 10 mg Oral Q4H PRN Govind Lyon MD    oxyCODONE 5 mg Oral Q4H PRN Govind Lyon MD    polyethylene glycol 17 g Oral Daily PRN Margareth Sutherland MD    sodium chloride 75 mL/hr Intravenous Continuous Teresa Evans PA-C Last Rate: Stopped (04/28/18 1513)   traMADol 50 mg Oral Q6H PRN Bashir Hayden MD      Continuous Infusions:   sodium chloride 75 mL/hr Last Rate: Stopped (04/28/18 1513)     PRN Meds:   acetaminophen    calcium carbonate    docusate sodium    HYDROmorphone    labetalol    LORazepam    methocarbamol    ondansetron    oxyCODONE    oxyCODONE    polyethylene glycol    traMADol    Abnormal Labs/Diagnostic Results:     Age/Sex: 46 y o  female     Assessment/Plan: Plan:  · Nonweight bearing left lower extremity; weight bearing to right lower extremity in Boot  · Analgesics  · DVT prophylaxis- lovenox   · PT/OT  · Dispo:  Will follow, placement pending   Patient noted to have acute blood loss anemia due to a drop in Hbg of > 2 0g from preop levels, will monitor vital signs and resuscitate with IV fluids as needed    Discharge Plan: for inpatient rehab-- awaiting [facility] option

## 2018-05-03 NOTE — PLAN OF CARE
Problem: PHYSICAL THERAPY ADULT  Goal: Performs mobility at highest level of function for planned discharge setting  See evaluation for individualized goals  Treatment/Interventions: Functional transfer training, LE strengthening/ROM, Therapeutic exercise, Endurance training, Patient/family training, Bed mobility, Gait training  Equipment Recommended: Charbel Phillip, Wheelchair       See flowsheet documentation for full assessment, interventions and recommendations  Outcome: Progressing  Prognosis: Good  Problem List: Decreased strength, Decreased range of motion, Decreased endurance, Impaired balance, Decreased mobility, Decreased coordination, Impaired judgement, Decreased safety awareness, Obesity, Decreased skin integrity, Orthopedic restrictions, Pain  Assessment: The pt  was able to progress her ambulatory distance today, but she continues to take very small hops and require brief standing rest after every 4-5 hops  She had improved balance today with static stance, but she continues to require assistance for mobility  She continues to remain unable to ambulate household distances, and she will benefit from continued physical therapy in order to safely progress her functional mobility back to her baseline independence  Barriers to Discharge: Inaccessible home environment, Decreased caregiver support  Barriers to Discharge Comments: FF to enter  Recommendation: Post acute IP rehab     PT - OK to Discharge: Yes (to rehab when medically appropriate )    See flowsheet documentation for full assessment

## 2018-05-04 VITALS
DIASTOLIC BLOOD PRESSURE: 57 MMHG | RESPIRATION RATE: 18 BRPM | SYSTOLIC BLOOD PRESSURE: 124 MMHG | TEMPERATURE: 98.1 F | OXYGEN SATURATION: 95 % | BODY MASS INDEX: 34.86 KG/M2 | HEIGHT: 68 IN | WEIGHT: 230 LBS | HEART RATE: 85 BPM

## 2018-05-04 PROCEDURE — 97116 GAIT TRAINING THERAPY: CPT

## 2018-05-04 PROCEDURE — 99024 POSTOP FOLLOW-UP VISIT: CPT | Performed by: ORTHOPAEDIC SURGERY

## 2018-05-04 PROCEDURE — 97530 THERAPEUTIC ACTIVITIES: CPT

## 2018-05-04 RX ORDER — NICOTINE 21 MG/24HR
14 PATCH, TRANSDERMAL 24 HOURS TRANSDERMAL DAILY
Status: DISCONTINUED | OUTPATIENT
Start: 2018-05-04 | End: 2018-05-04 | Stop reason: HOSPADM

## 2018-05-04 RX ADMIN — DULOXETINE HYDROCHLORIDE 60 MG: 60 CAPSULE, DELAYED RELEASE ORAL at 05:29

## 2018-05-04 RX ADMIN — LAMOTRIGINE 100 MG: 100 TABLET ORAL at 09:24

## 2018-05-04 RX ADMIN — OXYCODONE HYDROCHLORIDE 10 MG: 10 TABLET ORAL at 09:25

## 2018-05-04 RX ADMIN — ENOXAPARIN SODIUM 40 MG: 40 INJECTION SUBCUTANEOUS at 09:23

## 2018-05-04 NOTE — PLAN OF CARE
Problem: Potential for Falls  Goal: Patient will remain free of falls  INTERVENTIONS:  - Assess patient frequently for physical needs  -  Identify cognitive and physical deficits and behaviors that affect risk of falls    -  [place] fall precautions as indicated by assessment   - Educate patient/family on patient safety including physical limitations  - Instruct patient to call for assistance with activity based on assessment  - Modify environment to reduce risk of injury  - Consider OT/PT consult to assist with strengthening/mobility   Outcome: Adequate for Discharge      Problem: Prexisting or High Potential for Compromised Skin Integrity  Goal: Skin integrity is maintained or improved  INTERVENTIONS:  - Identify patients at risk for skin breakdown  - Assess and monitor skin integrity  - Assess and monitor nutrition and hydration status  - Monitor labs (i e  albumin)  - Assess for incontinence   - Turn and reposition patient  - Assist with mobility/ambulation  - Relieve pressure over bony prominences  - Avoid friction and shearing  - Provide appropriate hygiene as needed including keeping skin clean and dry  - Evaluate need for skin moisturizer/barrier cream  - Collaborate with interdisciplinary team (i e  Nutrition, Rehabilitation, etc )   - Patient/family teaching   Outcome: Adequate for Discharge      Problem: PAIN - ADULT  Goal: Verbalizes/displays adequate comfort level or baseline comfort level  Interventions:  - Encourage patient to monitor pain and request assistance  - Assess pain using appropriate pain scale  - Administer analgesics based on type and severity of pain and evaluate response  - Implement non-pharmacological measures as appropriate and evaluate response  - Consider cultural and social influences on pain and pain management  - Notify physician/advanced practitioner if interventions unsuccessful or patient reports new pain   Outcome: Adequate for Discharge      Problem: INFECTION - ADULT  Goal: Absence or prevention of progression during hospitalization  INTERVENTIONS:  - Assess and monitor for signs and symptoms of infection  - Monitor lab/diagnostic results  - Monitor all insertion sites, i e  indwelling lines, tubes, and drains  - Monitor endotracheal (as able) and nasal secretions for changes in amount and color  - [place] appropriate cooling/warming therapies per order  - Administer medications as ordered  - Instruct and encourage patient and family to use good hand hygiene technique  - Identify and instruct in appropriate isolation precautions for identified infection/condition   Outcome: Adequate for Discharge    Goal: Absence of fever/infection during neutropenic period  INTERVENTIONS:  - Monitor WBC  - Implement neutropenic guidelines   Outcome: Adequate for Discharge      Problem: SAFETY ADULT  Goal: Maintain or return to baseline ADL function  INTERVENTIONS:  -  Assess patient's ability to carry out ADLs; assess patient's baseline for ADL function and identify physical deficits which impact ability to perform ADLs (bathing, care of mouth/teeth, toileting, grooming, dressing, etc )  - Assess/evaluate cause of self-care deficits   - Assess range of motion  - Assess patient's mobility; develop plan if impaired  - Assess patient's need for assistive devices and provide as appropriate  - Encourage maximum independence but intervene and supervise when necessary  ¯ Involve family in performance of ADLs  ¯ Assess for home care needs following discharge   ¯ Request OT consult to assist with ADL evaluation and planning for discharge  ¯ Provide patient education as appropriate   Outcome: Adequate for Discharge    Goal: Maintain or return mobility status to optimal level  INTERVENTIONS:  - Assess patient's baseline mobility status (ambulation, transfers, stairs, etc )    - Identify cognitive and physical deficits and behaviors that affect mobility  - Identify mobility aids required to assist with transfers and/or ambulation (gait belt, sit-to-stand, lift, walker, cane, etc )  - [place] fall precautions as indicated by assessment  - Record patient progress and toleration of activity level on Mobility SBAR; progress patient to next Phase/Stage  - Instruct patient to call for assistance with activity based on assessment  - Request Rehabilitation consult to assist with strengthening/weightbearing, etc    Outcome: Adequate for Discharge      Problem: DISCHARGE PLANNING  Goal: Discharge to home or other facility with appropriate resources  INTERVENTIONS:  - Identify barriers to discharge w/patient and caregiver  - Arrange for needed discharge resources and transportation as appropriate  - Identify discharge learning needs (meds, wound care, etc )  - Arrange for interpretive services to assist at discharge as needed  - Refer to Case Management Department for coordinating discharge planning if the patient needs post-hospital services based on physician/advanced practitioner order or complex needs related to functional status, cognitive ability, or social support system   Outcome: Adequate for Discharge      Problem: Knowledge Deficit  Goal: Patient/family/caregiver demonstrates understanding of disease process, treatment plan, medications, and discharge instructions  Complete learning assessment and assess knowledge base    Interventions:  - Provide teaching at level of understanding  - Provide teaching via preferred learning methods   Outcome: Adequate for Discharge      Problem: DISCHARGE PLANNING - CARE MANAGEMENT  Goal: Discharge to post-acute care or home with appropriate resources  INTERVENTIONS:  - Conduct assessment to determine patient/family and health care team treatment goals, and need for post-acute services based on payer coverage, community resources, and patient preferences, and barriers to discharge  - Address psychosocial, clinical, and financial barriers to discharge as identified in assessment in conjunction with the patient/family and health care team  - Arrange appropriate level of post-acute services according to patient's   needs and preference and payer coverage in collaboration with the physician and health care team  - Communicate with and update the patient/family, physician, and health care team regarding progress on the discharge plan  - Arrange appropriate transportation to post-acute venues  - CM will assist the Pt with the Option process  - CM will assist the Pt with getting into the recommended rehab placement upon d/c     Outcome: Adequate for Discharge

## 2018-05-04 NOTE — PROGRESS NOTES
Eprescribed to pharmacy     Spoke with Jay Mon with ortho, plan for today is work with PT and possible D/C home, continue to monitor pt

## 2018-05-04 NOTE — DISCHARGE SUMMARY
Discharge Summary - Orthopedics   Lexi Avendano 46 y o  female MRN: [id number]  Unit/Bed#: [facility] 911-01    Attending Physician: Syl Marks    Admitting diagnosis: Bipolar II disorder ([facility] [address] ) [F31 81]  Hyperlipidemia [E78 5]  Ankle injury [S99 919A]  Fracture of 5th metatarsal [S92 353A]  Left trimalleolar fracture [S82 852A]  Closed fracture of left ankle, initial encounter [S82 892A]    Discharge diagnosis: Bipolar II disorder ([facility] [address] ) [F31 81]  Hyperlipidemia [E78 5]  Ankle injury [S99 919A]  Fracture of 5th metatarsal [S92 353A]  Left trimalleolar fracture [S82 852A]  Closed fracture of left ankle, initial encounter [S82 892A]    Date of admission: 4/26/2018    Date of discharge: 05/04/18    Procedure: ORIF L ankle      HPI & Hospital course:  46 y o  female who presented to the ED after a fall down steps sustaining a left trimalleolar ankle fracture dislocation with a left fibular head fracture  Pt was admitted to the orthopaedic service and taken to the OR on 4/27 for ORIF L ankle  Prior to surgery the risks and benefits of surgery were explained and informed consent was obtained  Surgery went without complications and pt was discharged to the PACU in a stable condition and was transferred to the floor  Patient was also found to have a right 5th metatarsal fracture treated non operatively in a CAM boot On discharge date pt was cleared by PT and the medicine team and determined to be safe for discharge  Daily discussion was had with the patient, nursing staff, orthopaedic team, and family members if present  All questions were answered to the patients satisifaction          0  Lab Value Date/Time   HGB 11 3 (L) 04/30/2018 0438   HGB 10 7 (L) 04/29/2018 0438   HGB 11 2 (L) 04/28/2018 0440   HGB 12 2 04/27/2018 0234   HGB 13 1 01/18/2018 1539   HGB 13 9 12/05/2017 1043   HGB 13 6 05/02/2017 0803   HGB 13 5 10/22/2016 1104   HGB 13 4 02/05/2016 1058   HGB 13 6 01/30/2016 0400   HGB 14 3 01/29/2016 1116         Discharge Instructions:   · Weight bearing as tolerated right lower extremity in CAM boot  · Non weight bearing left lower extremity in AO splint   · Keep dressings clean and dry at all times   · Complete DVT prophylaxis as prescribed   · Physical therapy  · Follow-up as scheduled, otherwise call for appt  Discharge Medications: For the complete list of discharge medications, please refer to the patient's medication reconciliation

## 2018-05-04 NOTE — PLAN OF CARE
Problem: Potential for Falls  Goal: Patient will remain free of falls  INTERVENTIONS:  - Assess patient frequently for physical needs  -  Identify cognitive and physical deficits and behaviors that affect risk of falls    -  [place] fall precautions as indicated by assessment   - Educate patient/family on patient safety including physical limitations  - Instruct patient to call for assistance with activity based on assessment  - Modify environment to reduce risk of injury  - Consider OT/PT consult to assist with strengthening/mobility   Outcome: Progressing      Problem: Prexisting or High Potential for Compromised Skin Integrity  Goal: Skin integrity is maintained or improved  INTERVENTIONS:  - Identify patients at risk for skin breakdown  - Assess and monitor skin integrity  - Assess and monitor nutrition and hydration status  - Monitor labs (i e  albumin)  - Assess for incontinence   - Turn and reposition patient  - Assist with mobility/ambulation  - Relieve pressure over bony prominences  - Avoid friction and shearing  - Provide appropriate hygiene as needed including keeping skin clean and dry  - Evaluate need for skin moisturizer/barrier cream  - Collaborate with interdisciplinary team (i e  Nutrition, Rehabilitation, etc )   - Patient/family teaching   Outcome: Progressing      Problem: PAIN - ADULT  Goal: Verbalizes/displays adequate comfort level or baseline comfort level  Interventions:  - Encourage patient to monitor pain and request assistance  - Assess pain using appropriate pain scale  - Administer analgesics based on type and severity of pain and evaluate response  - Implement non-pharmacological measures as appropriate and evaluate response  - Consider cultural and social influences on pain and pain management  - Notify physician/advanced practitioner if interventions unsuccessful or patient reports new pain   Outcome: Progressing      Problem: INFECTION - ADULT  Goal: Absence or prevention of progression during hospitalization  INTERVENTIONS:  - Assess and monitor for signs and symptoms of infection  - Monitor lab/diagnostic results  - Monitor all insertion sites, i e  indwelling lines, tubes, and drains  - Monitor endotracheal (as able) and nasal secretions for changes in amount and color  - [place] appropriate cooling/warming therapies per order  - Administer medications as ordered  - Instruct and encourage patient and family to use good hand hygiene technique  - Identify and instruct in appropriate isolation precautions for identified infection/condition   Outcome: Progressing    Goal: Absence of fever/infection during neutropenic period  INTERVENTIONS:  - Monitor WBC  - Implement neutropenic guidelines  Outcome: Progressing      Problem: SAFETY ADULT  Goal: Maintain or return to baseline ADL function  INTERVENTIONS:  -  Assess patient's ability to carry out ADLs; assess patient's baseline for ADL function and identify physical deficits which impact ability to perform ADLs (bathing, care of mouth/teeth, toileting, grooming, dressing, etc )  - Assess/evaluate cause of self-care deficits   - Assess range of motion  - Assess patient's mobility; develop plan if impaired  - Assess patient's need for assistive devices and provide as appropriate  - Encourage maximum independence but intervene and supervise when necessary  ¯ Involve family in performance of ADLs  ¯ Assess for home care needs following discharge   ¯ Request OT consult to assist with ADL evaluation and planning for discharge  ¯ Provide patient education as appropriate   Outcome: Progressing    Goal: Maintain or return mobility status to optimal level  INTERVENTIONS:  - Assess patient's baseline mobility status (ambulation, transfers, stairs, etc )    - Identify cognitive and physical deficits and behaviors that affect mobility  - Identify mobility aids required to assist with transfers and/or ambulation (gait belt, sit-to-stand, lift, walker, cane, etc )  - [place] fall precautions as indicated by assessment  - Record patient progress and toleration of activity level on Mobility SBAR; progress patient to next Phase/Stage  - Instruct patient to call for assistance with activity based on assessment  - Request Rehabilitation consult to assist with strengthening/weightbearing, etc    Outcome: Progressing      Problem: DISCHARGE PLANNING  Goal: Discharge to home or other facility with appropriate resources  INTERVENTIONS:  - Identify barriers to discharge w/patient and caregiver  - Arrange for needed discharge resources and transportation as appropriate  - Identify discharge learning needs (meds, wound care, etc )  - Arrange for interpretive services to assist at discharge as needed  - Refer to Case Management Department for coordinating discharge planning if the patient needs post-hospital services based on physician/advanced practitioner order or complex needs related to functional status, cognitive ability, or social support system   Outcome: Progressing      Problem: Knowledge Deficit  Goal: Patient/family/caregiver demonstrates understanding of disease process, treatment plan, medications, and discharge instructions  Complete learning assessment and assess knowledge base    Interventions:  - Provide teaching at level of understanding  - Provide teaching via preferred learning methods   Outcome: Progressing      Problem: DISCHARGE PLANNING - CARE MANAGEMENT  Goal: Discharge to post-acute care or home with appropriate resources  INTERVENTIONS:  - Conduct assessment to determine patient/family and health care team treatment goals, and need for post-acute services based on payer coverage, community resources, and patient preferences, and barriers to discharge  - Address psychosocial, clinical, and financial barriers to discharge as identified in assessment in conjunction with the patient/family and health care team  - Arrange appropriate level of post-acute services according to patient's   needs and preference and payer coverage in collaboration with the physician and health care team  - Communicate with and update the patient/family, physician, and health care team regarding progress on the discharge plan  - Arrange appropriate transportation to post-acute venues  - CM will assist the Pt with the Option process  - CM will assist the Pt with getting into the recommended rehab placement upon d/c     Outcome: Progressing

## 2018-05-04 NOTE — PLAN OF CARE
Problem: PHYSICAL THERAPY ADULT  Goal: Performs mobility at highest level of function for planned discharge setting  See evaluation for individualized goals  Treatment/Interventions: Functional transfer training, LE strengthening/ROM, Therapeutic exercise, Endurance training, Patient/family training, Bed mobility, Gait training  Equipment Recommended: Bhavani Breath, Wheelchair       See flowsheet documentation for full assessment, interventions and recommendations  Outcome: Progressing  Prognosis: Good  Problem List: Decreased strength, Decreased range of motion, Decreased endurance, Impaired balance, Decreased mobility, Decreased skin integrity, Orthopedic restrictions  Assessment: Pt engaged in PT treatment session focussing on safe d/c planning  Additional goal added this session: pt able to perform stairs with Nat  Pt perfomed well bumping 7 steps using BUE and RLE in CAM boot  Pt able to successfully maintain WBS LLE during transfer  Difficulty noted achieving stand from bottom step and top step  Utilized anterior transfer onto knees and up to chair with S/set up  Fatigue, SOB and diaphoresis noted  Pt safe to d/c home with family support, home PT, RW and BSC when medically appropriate  Would also recommend W/C for longer distance ambulation (ie  to doctor office)  Barriers to Discharge: Inaccessible home environment  Barriers to Discharge Comments: FF to enter home   Recommendation: (S) Home with family support, Home PT     PT - OK to Discharge: Yes (when medically appropriate )    See flowsheet documentation for full assessment

## 2018-05-04 NOTE — PROGRESS NOTES
Orthopedics   Tom Phillip 46 y o  female MRN: [id number]  Unit/Bed#: [facility] 911-01    Subjective:  46 y  o female post operative day 7 status post left Ankle ORIF  She also has a right 5th metatarsal fracture  Continues to have pain controlled  Voices no complaints this morning       Labs:    0  Lab Value Date/Time   HCT 35 8 04/30/2018 0438   HCT 32 2 (L) 04/29/2018 0438   HCT 35 5 04/28/2018 0440   HCT 37 3 01/18/2018 1539   HCT 41 3 12/05/2017 1043   HCT 41 5 05/02/2017 0803   HGB 11 3 (L) 04/30/2018 0438   HGB 10 7 (L) 04/29/2018 0438   HGB 11 2 (L) 04/28/2018 0440   HGB 13 1 01/18/2018 1539   HGB 13 9 12/05/2017 1043   HGB 13 6 05/02/2017 0803   INR 1 03 04/27/2018 0317   WBC 7 06 04/30/2018 0438   WBC 7 38 04/29/2018 0438   WBC 10 95 (H) 04/28/2018 0440   WBC 7 0 01/18/2018 1539   WBC 6 6 12/05/2017 1043   WBC 6 3 05/02/2017 0803   ESR 17 02/05/2016 1102   CRP 0 25 02/05/2016 1102       Meds:    Current Facility-Administered Medications:     acetaminophen (TYLENOL) tablet 650 mg, 650 mg, Oral, Q4H PRN, Ana Cristina Bhandari MD    calcium carbonate (TUMS) chewable tablet 1,000 mg, 1,000 mg, Oral, Daily PRN, Mohsen Leiva MD    docusate sodium (COLACE) capsule 100 mg, 100 mg, Oral, BID PRN, Mohsen Leiva MD, 100 mg at 05/01/18 0810    DULoxetine (CYMBALTA) delayed release capsule 30 mg, 30 mg, Oral, HS, Daphnie Sosa PA-C, 30 mg at 05/03/18 2148    DULoxetine (CYMBALTA) delayed release capsule 60 mg, 60 mg, Oral, Early Morning, Crystal oRland MD, 60 mg at 05/04/18 0529    enoxaparin (LOVENOX) subcutaneous injection 40 mg, 40 mg, Subcutaneous, Daily, Daphnie Sosa PA-C, 40 mg at 05/03/18 0518    HYDROmorphone (DILAUDID) injection 1 mg, 1 mg, Intravenous, Q4H PRN, Faustina Mccarty PA-C, 1 mg at 04/28/18 1135    labetalol (NORMODYNE) injection 10 mg, 10 mg, Intravenous, Q4H PRN, Ana Cristina Bhandari MD    lamoTRIgine (LaMICtal) tablet 100 mg, 100 mg, Oral, Daily, Mohsen Leiva MD, 100 mg at 05/03/18 0831    lamoTRIgine (LaMICtal) tablet 150 mg, 150 mg, Oral, HS, Riccardo Choi MD, 150 mg at 05/03/18 2148    LORazepam (ATIVAN) tablet 0 5 mg, 0 5 mg, Oral, Q8H PRN, Riccardo Choi MD, 0 5 mg at 05/03/18 0839    methocarbamol (ROBAXIN) tablet 500 mg, 500 mg, Oral, Q6H PRN, Riccardo Choi MD, 500 mg at 04/30/18 0909    nicotine (NICODERM CQ) 14 mg/24hr TD 24 hr patch 1 patch, 1 patch, Transdermal, Daily, Riccardo Choi MD, Stopped at 04/30/18 0910    ondansetron (ZOFRAN) injection 4 mg, 4 mg, Intravenous, Q6H PRN, Riccardo Choi MD    oxyCODONE (ROXICODONE) immediate release tablet 10 mg, 10 mg, Oral, Q4H PRN, Govind Lyon MD, 10 mg at 05/03/18 2040    oxyCODONE (ROXICODONE) IR tablet 5 mg, 5 mg, Oral, Q4H PRN, Govind Lyon MD, 5 mg at 05/01/18 2227    polyethylene glycol (MIRALAX) packet 17 g, 17 g, Oral, Daily PRN, Rafita Mcneil MD, 17 g at 05/01/18 1112    sodium chloride 0 9 % infusion, 75 mL/hr, Intravenous, Continuous, Cj Sousa PA-C, Stopped at 04/28/18 1513    traMADol (ULTRAM) tablet 50 mg, 50 mg, Oral, Q6H PRN, Riccardo Choi MD    Blood Culture:   No results found for: BLOODCX    Wound Culture:   No results found for: WOUNDCULT    Ins and Outs:  I/O last 24 hours: In: 1440 [P O :1440]  Out: 100 [Urine:100]          Physical:  Vitals:    05/04/18 0009   BP: 105/52   Pulse: 85   Resp: 18   Temp: 97 8 °F (36 6 °C)   SpO2: 95%     left lower extremity  · Dressings D/C/I   · Toes mobile   · Sensation intact sp/dp  · +1 DP  · +1 edema  right lower extremity  · No splint, TTP over 5 MT   · Toes mobile   · Sensation intact in all distributions distally   · +2 Dp    _*_*_*_*_*_*_*_*_*_*_*_*_*_*_*_*_*_*_*_*_*_*_*_*_*_*_*_*_*_*_*_*_*_*_*_*_*_*_*_*_*    Assessment:   Post operative day 7 status post left Ankle ORIF  Doing well      Plan:  · Nonweight bearing left lower extremity in splint; weight bearing to right lower extremity in Boot  · Analgesics  · DVT prophylaxis- lovenox · PT/OT  · Dispo: DC home today   · Patient noted to have acute blood loss anemia due to a drop in Hbg of > 2 0g from preop levels, will monitor vital signs and resuscitate with IV fluids as needed    Renetta Titus MD

## 2018-05-04 NOTE — PHYSICAL THERAPY NOTE
PT TREATMENT        05/04/18 1122   Pain Assessment   Pain Assessment No/denies pain   Pain Score No Pain   Restrictions/Precautions   Weight Bearing Precautions Per Order Yes   RLE Weight Bearing Per Order WBAT  (in CAM boot )   LLE Weight Bearing Per Order NWB   Braces or Orthoses CAM Boot  (RLE)   Other Precautions Fall Risk;WBS  (poor tolerance to activity )   General   Chart Reviewed Yes   Additional Pertinent History 04/27/18 OPEN REDUCTION W/ INTERNAL FIXATION (ORIF) ANKLE   (POD #7)   Response to Previous Treatment Patient with no complaints from previous session  Family/Caregiver Present No   Cognition   Overall Cognitive Status WFL   Arousal/Participation Cooperative   Attention Within functional limits   Orientation Level Oriented X4   Memory Within functional limits   Following Commands Follows all commands and directions without difficulty   Subjective   Subjective Pt eager to d/c home    Bed Mobility   Additional Comments OOB in chair upon arrival    Transfers   Sit to Stand 6  Modified independent   Additional items Increased time required;Verbal cues   Stand to Sit 6  Modified independent   Additional items Increased time required;Verbal cues   Stand pivot 6  Modified independent   Additional items Increased time required;Verbal cues   Toilet transfer 5  Supervision   Additional items Standard toilet;Commode  (BSC over toilet; assistance provided 2* fatigue)   Additional Comments Pt able to ambulate to W/C and perform transfer at mod(I)  Pt transferred to stairs in w/c to limite fatigue      Ambulation/Elevation   Gait pattern (hop to gait; NWB LLE )   Gait Assistance 5  Supervision   Additional items Verbal cues   Assistive Device Rolling walker   Distance 10'x3   Stair Management Assistance 4  Minimal assist   Additional items Verbal cues   Stair Management Technique Bumping  (WBAT RLE in CAM boot )   Number of Stairs 7   Balance   Static Sitting Normal   Dynamic Sitting Good   Static Standing Fair   Dynamic Standing Fair -   Ambulatory Fair -   Endurance Deficit   Endurance Deficit Yes   Endurance Deficit Description diaphoresis and SOB noted with activity; multiple rest breaks provided    Activity Tolerance   Activity Tolerance Patient limited by fatigue   Medical Staff Made Aware Isabel, OT; Ankur Hinojosa RN; DANIA Tate    Assessment   Prognosis Good   Problem List Decreased strength;Decreased range of motion;Decreased endurance; Impaired balance;Decreased mobility; Decreased skin integrity;Orthopedic restrictions   Assessment Pt engaged in PT treatment session focussing on safe d/c planning  Additional goal added this session: pt able to perform stairs with Nat  Pt perfomed well bumping 7 steps using BUE and RLE in CAM boot  Pt able to successfully maintain WBS LLE during transfer  Difficulty noted achieving stand from bottom step and top step  Utilized anterior transfer onto knees and up to chair with S/set up  Fatigue, SOB and diaphoresis noted  Pt safe to d/c home with family support, home PT, RW and BSC when medically appropriate  Would also recommend W/C for longer distance ambulation (ie  to doctor office)  Barriers to Discharge Inaccessible home environment   Barriers to Discharge Comments FF to enter home    Goals   Patient Goals to go home ASAP   STG Expiration Date 05/15/18   Short Term Goal #1 Additional goal: Pt will negotiate FF to enter home at S/set up/   Treatment Day 4   Plan   Treatment/Interventions Functional transfer training;LE strengthening/ROM; Elevations; Therapeutic exercise; Endurance training;Patient/family training;Equipment eval/education; Bed mobility;Gait training;Spoke to nursing;Spoke to case management;OT   Progress Progressing toward goals   PT Frequency Other (Comment)  (6x/wk)   Recommendation   Recommendation Home with family support;Home PT   Equipment Recommended Wheelchair;Walker; Other (Comment)  (BSC)   PT - OK to Discharge Yes  (when medically appropriate )   Additional Comments OOB in chair with all needs within reach      Loreta Comment, PT

## 2018-05-04 NOTE — SOCIAL WORK
CM met with the Pt at bedside to discuss D/C plan  Per PT, Pt has cleared steps and can D/C home today  Per Felipe Fernandez with [facility], commode and R/w to be delivered to bedside and Pt made aware of OOP cost  Pt's lovenox is filled at [facility], aware of co-pay and states  will  medication  Pt's  to provide transportation home  [facility] updated with D/C date  RN aware  CM reviewed d/c planning process including the following: identifying help at home, patient preference for d/c planning needs, Discharge Lounge, [facility] Meds to Bed program, availability of treatment team to discuss questions or concerns patient and/or family may have regarding understanding medications and recognizing signs and symptoms once discharged  CM also encouraged patient to follow up with all recommended appointments after discharge  Patient advised of importance for patient and family to participate in managing patients medical well being

## 2018-05-04 NOTE — SOCIAL WORK
CM met with the Pt at bedside to discuss D/C plan, [facility] does not have bed availability  Pt agreeable to multiple additional referrals to SNFs in [facility]  Referrals sent to Allie PERKINS CM, [facility] and [facility]  CM explained to Pt that SW is verifying SNF benefits at this time

## 2018-05-07 NOTE — SOCIAL WORK
CM received call from [facility]  [phone] with MICHAEL stating that patient is now wanting to go to rehab facility  AURA requesting option paperwork started at [facility]  [address] requesting Options paperwork that was started at hospital to be faxed  CM faxed paperwork  No other CM needs identified

## 2018-05-09 ENCOUNTER — TELEPHONE (OUTPATIENT)
Dept: INTERNAL MEDICINE CLINIC | Facility: CLINIC | Age: 53
End: 2018-05-09

## 2018-05-09 ENCOUNTER — TELEPHONE (OUTPATIENT)
Dept: OBGYN CLINIC | Facility: HOSPITAL | Age: 53
End: 2018-05-09

## 2018-05-09 DIAGNOSIS — Z87.81 STATUS POST ORIF OF FRACTURE OF ANKLE: Primary | ICD-10-CM

## 2018-05-09 DIAGNOSIS — Z98.890 STATUS POST ORIF OF FRACTURE OF ANKLE: Primary | ICD-10-CM

## 2018-05-09 DIAGNOSIS — B37.9 CANDIDIASIS: Primary | ICD-10-CM

## 2018-05-09 RX ORDER — NYSTATIN 100000 U/G
CREAM TOPICAL 2 TIMES DAILY
Qty: 30 G | Refills: 0 | Status: SHIPPED | OUTPATIENT
Start: 2018-05-09 | End: 2018-07-22 | Stop reason: ALTCHOICE

## 2018-05-09 NOTE — TELEPHONE ENCOUNTER
[address] called in stating the pt has a fungal rash left abdominal fold and they are requesting an antifungal medication to be sent to her pharmacy please

## 2018-05-09 NOTE — TELEPHONE ENCOUNTER
Anatoly called from [address] [place] [address]  He is requesting an order for a knee scooter for this pt   He can be reached at [phone]

## 2018-05-14 ENCOUNTER — HOSPITAL ENCOUNTER (OUTPATIENT)
Dept: RADIOLOGY | Facility: HOSPITAL | Age: 53
Discharge: HOME | End: 2018-05-14

## 2018-05-14 ENCOUNTER — HOSPITAL ENCOUNTER (OUTPATIENT)
Dept: RADIOLOGY | Facility: HOSPITAL | Age: 53
Discharge: HOME | End: 2018-05-14
Attending: ORTHOPAEDIC SURGERY
Payer: COMMERCIAL

## 2018-05-14 ENCOUNTER — OFFICE VISIT (OUTPATIENT)
Dept: OBGYN CLINIC | Facility: HOSPITAL | Age: 53
End: 2018-05-14

## 2018-05-14 VITALS
BODY MASS INDEX: 34.86 KG/M2 | HEART RATE: 93 BPM | WEIGHT: 230 LBS | DIASTOLIC BLOOD PRESSURE: 69 MMHG | HEIGHT: 68 IN | SYSTOLIC BLOOD PRESSURE: 103 MMHG

## 2018-05-14 DIAGNOSIS — S92.351D CLOSED DISPLACED FRACTURE OF FIFTH METATARSAL BONE OF RIGHT FOOT WITH ROUTINE HEALING, SUBSEQUENT ENCOUNTER: ICD-10-CM

## 2018-05-14 DIAGNOSIS — S92.301A CLOSED NONDISPLACED FRACTURE OF METATARSAL BONE OF RIGHT FOOT, UNSPECIFIED METATARSAL, INITIAL ENCOUNTER: ICD-10-CM

## 2018-05-14 DIAGNOSIS — S82.892A CLOSED FRACTURE OF LEFT ANKLE, INITIAL ENCOUNTER: Primary | ICD-10-CM

## 2018-05-14 DIAGNOSIS — S82.892A CLOSED FRACTURE OF LEFT ANKLE, INITIAL ENCOUNTER: ICD-10-CM

## 2018-05-14 PROCEDURE — 73610 X-RAY EXAM OF ANKLE: CPT

## 2018-05-14 PROCEDURE — 73630 X-RAY EXAM OF FOOT: CPT

## 2018-05-14 PROCEDURE — 99024 POSTOP FOLLOW-UP VISIT: CPT | Performed by: ORTHOPAEDIC SURGERY

## 2018-05-14 NOTE — PROGRESS NOTES
Assessment/Plan:      The patient seen examined by Dr Timbo Quijano and myself  She is two weeks status post left ankle ORIF as well as right fifth metatarsal fracture treated non operatively  Incisions are c/d/I, no signs of infection or wound dehiscence  Sutures removed and steri-strips applied  X-rays are stable  Plan is NWB LLE in CAM boot  Follow-up in 4 weeks for re-evaluation and new x-rays  Regarding her right 5th metatarsal fracture, x-rays are stable  Continue WBAT RLE in CAM boot  Problem List Items Addressed This Visit     Closed fracture of left ankle - Primary    Relevant Orders    XR ankle 3+ vw left    Closed fracture of fifth metatarsal bone of right foot with routine healing      Other Visit Diagnoses     Closed nondisplaced fracture of metatarsal bone of right foot, unspecified metatarsal, initial encounter        Relevant Orders    XR foot 3+ vw right            Subjective:      Patient ID: Claude Padron is a 46 y o  female  HPI     The patient is a 46year old female who presents for a post-op visit  She is roughly 2 weeks s/p ORIF left ankle  Op date 4/27/2018  She also has a right 5th metatarsal fracture treated non-operatively  Overall she complains of minimal left ankle and right foot pain  No numbness/tingling, no fever/chills  No calf pain or swelling, denies any CP or SOB  The following portions of the patient's history were reviewed and updated as appropriate: allergies, current medications, past family history, past medical history, past social history, past surgical history and problem list     Review of Systems   Constitutional: Negative for chills, diaphoresis and fever  Respiratory: Negative for chest tightness, shortness of breath and wheezing  Cardiovascular: Negative for chest pain and leg swelling  Gastrointestinal: Negative for abdominal pain, constipation, diarrhea and vomiting  Musculoskeletal: Positive for arthralgias     Skin: Negative for pallor, rash and wound  Neurological: Positive for weakness  Negative for numbness  Objective:      /69   Pulse 93   Ht 5' 8" (1 727 m)   Wt 104 kg (230 lb)   BMI 34 97 kg/m²          Physical Exam   Constitutional: She is oriented to person, place, and time  She appears well-developed and well-nourished  No distress  Pulmonary/Chest: Effort normal  No respiratory distress  She has no wheezes  Musculoskeletal: She exhibits edema  She exhibits no tenderness  Neurological: She is alert and oriented to person, place, and time  Skin: Skin is warm and dry  No rash noted  She is not diaphoretic  No erythema  No pallor  Psychiatric: She has a normal mood and affect  Nursing note and vitals reviewed  Left ankle  Inspection reveals medial and lateral incisions clean dry intact no wound dehiscence no treatment drainage no surrounding erythema  Limited plantar and dorsiflexion  There is no calf tenderness or pitting edema  Right foot  Inspection reveals no open wounds or erythema there is mild tenderness to palpation on the lateral aspect and plantar side surface  No calf tenderness or pitting edema    Feet are warm well perfused

## 2018-05-15 ENCOUNTER — TELEPHONE (OUTPATIENT)
Dept: BEHAVIORAL/MENTAL HEALTH CLINIC | Facility: CLINIC | Age: 53
End: 2018-05-15

## 2018-05-15 ENCOUNTER — TELEPHONE (OUTPATIENT)
Dept: PSYCHIATRY | Facility: CLINIC | Age: 53
End: 2018-05-15

## 2018-05-15 NOTE — TELEPHONE ENCOUNTER
Cherie Galloway left a message this morning requesting you give her a call-no reason given on the message  [phone]

## 2018-05-16 ENCOUNTER — TELEPHONE (OUTPATIENT)
Dept: INTERNAL MEDICINE CLINIC | Facility: CLINIC | Age: 53
End: 2018-05-16

## 2018-05-17 ENCOUNTER — TELEPHONE (OUTPATIENT)
Dept: INTERNAL MEDICINE CLINIC | Facility: CLINIC | Age: 53
End: 2018-05-17

## 2018-05-30 ENCOUNTER — TELEPHONE (OUTPATIENT)
Dept: OBGYN CLINIC | Facility: CLINIC | Age: 53
End: 2018-05-30

## 2018-05-30 NOTE — TELEPHONE ENCOUNTER
Dr Michelle Shown PT from [facility] wants to know if Melissa Never can take a shower now that her cast is off her ankle  Best contact # [phone]   Thank you

## 2018-06-06 DIAGNOSIS — F41.1 GAD (GENERALIZED ANXIETY DISORDER): Primary | Chronic | ICD-10-CM

## 2018-06-06 RX ORDER — LORAZEPAM 0.5 MG/1
0.5 TABLET ORAL 4 TIMES DAILY PRN
Qty: 360 TABLET | Refills: 0 | Status: SHIPPED | OUTPATIENT
Start: 2018-06-06 | End: 2018-12-19 | Stop reason: SDUPTHER

## 2018-06-11 ENCOUNTER — HOSPITAL ENCOUNTER (OUTPATIENT)
Dept: RADIOLOGY | Facility: HOSPITAL | Age: 53
Discharge: HOME | End: 2018-06-11
Attending: ORTHOPAEDIC SURGERY
Payer: COMMERCIAL

## 2018-06-11 ENCOUNTER — OFFICE VISIT (OUTPATIENT)
Dept: OBGYN CLINIC | Facility: HOSPITAL | Age: 53
End: 2018-06-11

## 2018-06-11 VITALS
HEIGHT: 68 IN | SYSTOLIC BLOOD PRESSURE: 109 MMHG | DIASTOLIC BLOOD PRESSURE: 74 MMHG | WEIGHT: 230 LBS | BODY MASS INDEX: 34.86 KG/M2 | HEART RATE: 88 BPM

## 2018-06-11 DIAGNOSIS — M79.671 PAIN IN RIGHT FOOT: ICD-10-CM

## 2018-06-11 DIAGNOSIS — M25.572 PAIN, JOINT, ANKLE AND FOOT, LEFT: Primary | ICD-10-CM

## 2018-06-11 DIAGNOSIS — S92.354D CLOSED NONDISPLACED FRACTURE OF FIFTH METATARSAL BONE OF RIGHT FOOT WITH ROUTINE HEALING, SUBSEQUENT ENCOUNTER: ICD-10-CM

## 2018-06-11 DIAGNOSIS — S82.892D CLOSED FRACTURE OF LEFT ANKLE WITH ROUTINE HEALING, SUBSEQUENT ENCOUNTER: ICD-10-CM

## 2018-06-11 DIAGNOSIS — Z48.89 AFTERCARE FOLLOWING SURGERY: ICD-10-CM

## 2018-06-11 DIAGNOSIS — S92.351D CLOSED DISPLACED FRACTURE OF FIFTH METATARSAL BONE OF RIGHT FOOT WITH ROUTINE HEALING, SUBSEQUENT ENCOUNTER: ICD-10-CM

## 2018-06-11 DIAGNOSIS — M25.572 PAIN, JOINT, ANKLE AND FOOT, LEFT: ICD-10-CM

## 2018-06-11 PROCEDURE — 73610 X-RAY EXAM OF ANKLE: CPT

## 2018-06-11 PROCEDURE — 73630 X-RAY EXAM OF FOOT: CPT

## 2018-06-11 PROCEDURE — 99024 POSTOP FOLLOW-UP VISIT: CPT | Performed by: ORTHOPAEDIC SURGERY

## 2018-06-11 NOTE — PROGRESS NOTES
Assessment/Plan:    Closed fracture of left ankle  Patient is doing well with nonsurgical treatment of right 5th metatarsal fracture and is roughly 7 weeks status post ORIF left ankle  She reports minimal discomfort  She does report some stiffness to the left ankle  Right foot is nontender to palpation  Left ankle demonstrates well-healed incisions  X-rays demonstrate stable fixation left ankle with symmetric mortise  Right foot x-ray demonstrates healing 5th metatarsal base fracture  Weightbearing as tolerated right lower extremity, partial weight-bearing left lower extremity  Follow-up in 4 weeks for re-evaluation and new x-rays  Problem List Items Addressed This Visit     Closed fracture of left ankle     Patient is doing well with nonsurgical treatment of right 5th metatarsal fracture and is roughly 7 weeks status post ORIF left ankle  She reports minimal discomfort  She does report some stiffness to the left ankle  Right foot is nontender to palpation  Left ankle demonstrates well-healed incisions  X-rays demonstrate stable fixation left ankle with symmetric mortise  Right foot x-ray demonstrates healing 5th metatarsal base fracture  Weightbearing as tolerated right lower extremity, partial weight-bearing left lower extremity  Follow-up in 4 weeks for re-evaluation and new x-rays  Closed fracture of fifth metatarsal bone of right foot with routine healing      Other Visit Diagnoses     Pain, joint, ankle and foot, left    -  Primary    Relevant Orders    XR ankle 3+ vw left    Ambulatory referral to Physical Therapy    Pain in right foot        Relevant Orders    Ambulatory referral to Physical Therapy    Aftercare following surgery        Relevant Orders    Ambulatory referral to Physical Therapy            Subjective:      Patient ID: Mirlande Beckett is a 46 y o  female      HPI  Patient presents to the office 7 weeks status post ORIF left ankle on 04/27/2018 and right 5th metatarsal fracture being treated conservatively  She has been compliant with her cam walker boots  She has been progressing with home physical therapy  She no longer complains of pain in her right foot and wishes to progress out of the cam walker boot  Her left ankle is minimally painful  She has no new complaints or concerns today in the office  The following portions of the patient's history were reviewed and updated as appropriate: current medications, past family history, past medical history, past social history, past surgical history and problem list     Review of Systems   Constitutional: Negative for chills and fever  Eyes: Negative for visual disturbance  Respiratory: Negative for shortness of breath  Cardiovascular: Negative for chest pain  Gastrointestinal: Negative for constipation and diarrhea  Skin: Negative for rash  Neurological: Negative for weakness and numbness  Psychiatric/Behavioral: Negative for behavioral problems  The patient is not nervous/anxious  Objective:      /74   Pulse 88   Ht 5' 8" (1 727 m)   Wt 104 kg (230 lb)   BMI 34 97 kg/m²          Physical Exam   Constitutional: She is oriented to person, place, and time  She appears well-developed and well-nourished  HENT:   Head: Normocephalic and atraumatic  Eyes: Pupils are equal, round, and reactive to light  Neck: Neck supple  Cardiovascular: Intact distal pulses  Pulmonary/Chest: Effort normal and breath sounds normal    Neurological: She is alert and oriented to person, place, and time  Skin: Skin is warm and dry  Psychiatric: She has a normal mood and affect   Her behavior is normal        Patient presents to the office in a wheelchair, but ambulates with the assistance of a walker    Right foot:  Non-tender over 5th metatarsal    Left ankle:  Incision clean, dry and intact  No signs of infection

## 2018-06-11 NOTE — ASSESSMENT & PLAN NOTE
Patient is doing well with nonsurgical treatment of right 5th metatarsal fracture and is roughly 7 weeks status post ORIF left ankle  She reports minimal discomfort  She does report some stiffness to the left ankle  Right foot is nontender to palpation  Left ankle demonstrates well-healed incisions  X-rays demonstrate stable fixation left ankle with symmetric mortise  Right foot x-ray demonstrates healing 5th metatarsal base fracture  Weightbearing as tolerated right lower extremity, partial weight-bearing left lower extremity  Follow-up in 4 weeks for re-evaluation and new x-rays

## 2018-06-12 ENCOUNTER — TELEPHONE (OUTPATIENT)
Dept: BEHAVIORAL/MENTAL HEALTH CLINIC | Facility: CLINIC | Age: 53
End: 2018-06-12

## 2018-06-13 ENCOUNTER — EVALUATION (OUTPATIENT)
Dept: PHYSICAL THERAPY | Facility: CLINIC | Age: 53
End: 2018-06-13
Payer: COMMERCIAL

## 2018-06-13 DIAGNOSIS — F41.0 PANIC DISORDER WITHOUT AGORAPHOBIA: ICD-10-CM

## 2018-06-13 DIAGNOSIS — M79.671 PAIN IN RIGHT FOOT: ICD-10-CM

## 2018-06-13 DIAGNOSIS — F31.81 BIPOLAR II DISORDER (HCC): ICD-10-CM

## 2018-06-13 DIAGNOSIS — F41.1 GAD (GENERALIZED ANXIETY DISORDER): ICD-10-CM

## 2018-06-13 DIAGNOSIS — M25.572 PAIN, JOINT, ANKLE AND FOOT, LEFT: ICD-10-CM

## 2018-06-13 DIAGNOSIS — Z48.89 AFTERCARE FOLLOWING SURGERY: ICD-10-CM

## 2018-06-13 PROCEDURE — 97162 PT EVAL MOD COMPLEX 30 MIN: CPT | Performed by: PHYSICAL THERAPIST

## 2018-06-13 PROCEDURE — G8978 MOBILITY CURRENT STATUS: HCPCS | Performed by: PHYSICAL THERAPIST

## 2018-06-13 PROCEDURE — 97110 THERAPEUTIC EXERCISES: CPT | Performed by: PHYSICAL THERAPIST

## 2018-06-13 PROCEDURE — G8979 MOBILITY GOAL STATUS: HCPCS | Performed by: PHYSICAL THERAPIST

## 2018-06-13 NOTE — PROGRESS NOTES
PT Evaluation     Today's date: 2018  Patient name: Jesús Peña  : 1965  MRN: [id number]  Referring provider: Paty Antoine MD  Dx:   Encounter Diagnosis     ICD-10-CM    1  Pain, joint, ankle and foot, left M25 572 Ambulatory referral to Physical Therapy   2  Pain in right foot M79 671 Ambulatory referral to Physical Therapy   3  Aftercare following surgery Z48 89 Ambulatory referral to Physical Therapy                  Assessment    Assessment details: Pt presents with impairments of L > R foot and ankle ROM, strength, edema, WB tolerance, gait, balance, and pain  Understanding of Dx/Px/POC: good   Prognosis: good    Goals  STG - 4 wks  1) pt will be I with HEP  2) pt will begin to wean from CAM boot and FWW  3) pt will demonstrate > 5 deg L ankle DF PROM    MTG - 8 wks  1) pt will d/c AD  2) pt will demonstrate SLS > 30 sec on R, > 10 sec on L  3) pt will demonstrate > 10 deg B DF PROM  4) pt will demonstrate mild gait abnormalities  5) pt will report > 1/4 mile ambulation tolerace    LTG - 12 wks  1) pt will demonstrate > 30 sec B SLS  2) pt will demonstrate normalized gait pattern with walking and stairs  3) pt will report > 1 mile ambulation tolerance    Plan  Planned therapy interventions: joint mobilization, manual therapy, home exercise program, balance/weight bearing training, neuromuscular re-education, strengthening, stretching, therapeutic activities, therapeutic exercise and gait training  Frequency: 2x week  Duration in weeks: 12  Treatment plan discussed with: patient  Plan details: Pt will benefit from skilled PT services to address her impairments in order to decrease pain and improve function  Subjective Evaluation    History of Present Illness  Mechanism of injury: Pt is a 46 y o  female with PMHx significant for anxiety, bipolar disorder, chronic ITP    She presents to PT s/p R 5th metatarsal fx and L bimalleolar and proximal fibula fx 2018 and s/p L ankle ORIF 2018  She is currently R LE WBAT, L LE 50% PWB  She reports using FWW for household ambulation, mild R dorsal-lateral midfoot pain with increased WB activities, and no L foot or ankle pain  Pain  Current pain ratin  At best pain ratin  At worst pain ratin    Social Support  Steps to enter house: yes  Stairs in house: yes       Diagnostic Tests  X-ray: abnormal (stable L ankle ORIF)  Treatments  Previous treatment: home therapy  Patient Goals  Patient goals for therapy: decreased pain, return to sport/leisure activities and independence with ADLs/IADLs          Objective     Observations   Left Ankle/Foot   Positive for edema and incision       Additional Observation Details  O: mild scabbing noted L med > lat incisions, no s/s infection    Active Range of Motion   Left Knee   Flexion: 124 degrees with pain  Extension: 0 degrees     Right Knee   Flexion: 128 degrees   Left Ankle/Foot   Dorsiflexion (ke): -2 degrees   Plantar flexion: 32 degrees   Inversion: 8 degrees   Eversion: 8 degrees     Right Ankle/Foot   Dorsiflexion (ke): 8 degrees   Plantar flexion: 62 degrees   Inversion: 50 degrees   Eversion: 12 degrees     Passive Range of Motion   Left Knee   Flexion: 125 degrees with pain  Extension: 0 degrees     Right Knee   Flexion: 130 degrees   Left Ankle/Foot    Dorsiflexion (ke): 0 degrees   Plantar flexion: 35 degrees   Inversion: 12 degrees   Eversion: 10 degrees   Great toe extension: 40 degrees     Right Ankle/Foot    Dorsiflexion (ke): 11 degrees   Eversion: 20 degrees     Strength/Myotome Testing     Left Knee   Extension: 4-  Quadriceps contraction: fair (mild quad lag)    Left Ankle/Foot   Dorsiflexion: 3+  Plantar flexion: 3-  Inversion: 3-  Eversion: 3-  Great toe flexion: 3-  Great toe extension: 3-    Right Ankle/Foot   Dorsiflexion: 4  Plantar flexion: 3+  Inversion: 4  Eversion: 4  Great toe flexion: 4-  Great toe extension: 4    Swelling   Left Ankle/Foot   Figure 8: 60 cm    Ambulation   Weight-Bearing Status   Weight-Bearing Status (Left): partial weight-bearing   50%  Weight-Bearing Status (Right): weight-bearing as tolerated    Assistive device used: front-wheeled walker    Ambulation: Level Surfaces   Ambulation with assistive device: independent  Ambulation without assistive device: unable    Functional Assessment     Single Leg Stance   Right: 14 seconds          Precautions:  PMHx: anxiety, bipolar disorder, chronic ITP  R LE WBAT, L LE 50% PWB    Daily Treatment Diary     Manual  6/13            L ankle PROM                                                                     Exercise Diary  6/13            L ankle AROM in elevation 1x30            Supine L GA stretch 15"x3            R seated towel scrunches 1x50            L seated towel scrunches 1x30            BioDex: R SLS             L ankle TB PF             L seated WB AROM             AlterG: gait training             SLR L/  1x10                                                                                                                                                               Modalities

## 2018-06-14 RX ORDER — LAMOTRIGINE 100 MG/1
100 TABLET ORAL DAILY
Refills: 0
Start: 2018-06-14 | End: 2018-06-25 | Stop reason: SDUPTHER

## 2018-06-14 RX ORDER — LAMOTRIGINE 150 MG/1
150 TABLET ORAL
Refills: 0
Start: 2018-06-14 | End: 2018-06-25 | Stop reason: SDUPTHER

## 2018-06-14 RX ORDER — DULOXETIN HYDROCHLORIDE 60 MG/1
60 CAPSULE, DELAYED RELEASE ORAL
Qty: 90 CAPSULE | Refills: 0 | Status: SHIPPED | OUTPATIENT
Start: 2018-06-14 | End: 2018-08-04

## 2018-06-14 RX ORDER — DULOXETIN HYDROCHLORIDE 30 MG/1
30 CAPSULE, DELAYED RELEASE ORAL
Qty: 90 CAPSULE | Refills: 0 | Status: SHIPPED | OUTPATIENT
Start: 2018-06-14 | End: 2018-09-25 | Stop reason: SDUPTHER

## 2018-06-14 NOTE — TELEPHONE ENCOUNTER
Appointment on 05/03 cancelled- pt hospitalized  Does have f/u pending  Will ask covering provider to review

## 2018-06-19 ENCOUNTER — OFFICE VISIT (OUTPATIENT)
Dept: PHYSICAL THERAPY | Facility: CLINIC | Age: 53
End: 2018-06-19
Payer: COMMERCIAL

## 2018-06-19 DIAGNOSIS — M79.671 PAIN IN RIGHT FOOT: ICD-10-CM

## 2018-06-19 DIAGNOSIS — Z48.89 AFTERCARE FOLLOWING SURGERY: ICD-10-CM

## 2018-06-19 DIAGNOSIS — M25.572 PAIN, JOINT, ANKLE AND FOOT, LEFT: Primary | ICD-10-CM

## 2018-06-19 PROCEDURE — 97110 THERAPEUTIC EXERCISES: CPT

## 2018-06-19 NOTE — PROGRESS NOTES
Daily Note     Today's date: 2018  Patient name: Kassie Ramirez  : 1965  MRN: [id number]  Referring provider: Cadence Roca MD  Dx:   Encounter Diagnosis     ICD-10-CM    1  Pain, joint, ankle and foot, left M25 572    2  Pain in right foot M79 671    3  Aftercare following surgery Z48 89                   Subjective: Pt reports good compliance with HEP, no pain currently  Objective: See treatment diary below      Assessment: Pt demonstrated pain-free ambulation tolerance at 30% BW, min pain with ankle DF PROM  Pt demonstrated difficulty stepping up and down from Biodex  Plan: Assess response to tx nv  Consider performing SLS on floor nv  Precautions:  PMHx: anxiety, bipolar disorder, chronic ITP  R LE WBAT, L LE 50% PWB     Daily Treatment Diary      Manual                     L ankle PROM    10 min                                                                                                                         Exercise Diary                     L ankle AROM in elevation 1x30  1x30                   Supine L GA stretch 15"x3  15"x3                   R seated towel scrunches 1x50  1x50                   L seated towel scrunches 1x30  1x30                   BioDex: R SLS    2x20"                   L ankle TB PF    YTB  2x10                   L seated WB AROM    1x30                   AlterG: gait training - 2XL   30%BW 0 6mph  5 min                   SLR L/  1x10  L/  3x10                                                                                                                                                                                                                                                                                                 Modalities

## 2018-06-20 DIAGNOSIS — D69.3 IMMUNE THROMBOCYTOPENIC PURPURA (HCC): ICD-10-CM

## 2018-06-20 DIAGNOSIS — F31.81 BIPOLAR II DISORDER (HCC): ICD-10-CM

## 2018-06-20 DIAGNOSIS — I34.0 NONRHEUMATIC MITRAL VALVE INSUFFICIENCY: ICD-10-CM

## 2018-06-20 DIAGNOSIS — E78.5 HYPERLIPIDEMIA: ICD-10-CM

## 2018-06-20 DIAGNOSIS — N91.2 AMENORRHEA: ICD-10-CM

## 2018-06-20 DIAGNOSIS — F41.1 GENERALIZED ANXIETY DISORDER: ICD-10-CM

## 2018-06-21 ENCOUNTER — TELEPHONE (OUTPATIENT)
Dept: INTERNAL MEDICINE CLINIC | Facility: CLINIC | Age: 53
End: 2018-06-21

## 2018-06-21 ENCOUNTER — OFFICE VISIT (OUTPATIENT)
Dept: PHYSICAL THERAPY | Facility: CLINIC | Age: 53
End: 2018-06-21
Payer: COMMERCIAL

## 2018-06-21 DIAGNOSIS — M25.572 PAIN, JOINT, ANKLE AND FOOT, LEFT: Primary | ICD-10-CM

## 2018-06-21 DIAGNOSIS — M79.671 PAIN IN RIGHT FOOT: ICD-10-CM

## 2018-06-21 DIAGNOSIS — Z48.89 AFTERCARE FOLLOWING SURGERY: ICD-10-CM

## 2018-06-21 PROCEDURE — 97116 GAIT TRAINING THERAPY: CPT | Performed by: PHYSICAL THERAPIST

## 2018-06-21 PROCEDURE — 97110 THERAPEUTIC EXERCISES: CPT | Performed by: PHYSICAL THERAPIST

## 2018-06-21 NOTE — PROGRESS NOTES
Daily Note     Today's date: 2018  Patient name: Bassem Martinez  : 1965  MRN: [id number]  Referring provider: Marielos Pinto MD  Dx:   Encounter Diagnosis     ICD-10-CM    1  Pain, joint, ankle and foot, left M25 572    2  Pain in right foot M79 671    3  Aftercare following surgery Z48 89                   Subjective: Pt reports good response to last treatment  Objective: See treatment diary below  PROM: L: DF: 4 deg, PF: 45 deg, INV: 14 deg, TEMITOPE: 6 deg    Assessment: Pt demonstrated improved L ankle ROM, gait speed, and heel-toe gait pattern  Plan: Cont  POC  Precautions:  PMHx: anxiety, bipolar disorder, chronic ITP  R LE WBAT, L LE 50% PWB     Daily Treatment Diary      Manual                   L ankle PROM    10 min  10 min                                                                                                                       Exercise Diary                   L ankle AROM in elevation 1x30  1x30  1x30 ea                 Supine L GA stretch 15"x3  15"x3  15"x3                 R seated towel scrunches 1x50  1x50  1x65                 L seated towel scrunches 1x30  1x30  1x30                 BioDex: R SLS    2x20"  30"x2                 L ankle TB PF    YTB  2x10  Y/                    L seated WB AROM: PF/DF, INV/TEMITOPE    1x30  1x25 ea                 AlterG: gait training - 2XL   30%BW 0 6mph  5 min  30% BW   0 8 mph   6 min                 SLR L/  1x10  L/  3x10  L/   3x10                 WB: CW/CCW      1x25 ea                                                                                                                                                                                                                                                                       Modalities

## 2018-06-22 DIAGNOSIS — Z13.220 SCREENING, LIPID: ICD-10-CM

## 2018-06-22 DIAGNOSIS — N95.1 MENOPAUSAL SYMPTOMS: Primary | ICD-10-CM

## 2018-06-25 ENCOUNTER — OFFICE VISIT (OUTPATIENT)
Dept: PHYSICAL THERAPY | Facility: CLINIC | Age: 53
End: 2018-06-25
Payer: COMMERCIAL

## 2018-06-25 DIAGNOSIS — M25.572 PAIN, JOINT, ANKLE AND FOOT, LEFT: Primary | ICD-10-CM

## 2018-06-25 DIAGNOSIS — M79.671 PAIN IN RIGHT FOOT: ICD-10-CM

## 2018-06-25 DIAGNOSIS — F31.81 BIPOLAR II DISORDER (HCC): Primary | ICD-10-CM

## 2018-06-25 DIAGNOSIS — Z48.89 AFTERCARE FOLLOWING SURGERY: ICD-10-CM

## 2018-06-25 PROCEDURE — 97110 THERAPEUTIC EXERCISES: CPT

## 2018-06-25 PROCEDURE — 97140 MANUAL THERAPY 1/> REGIONS: CPT

## 2018-06-25 RX ORDER — LAMOTRIGINE 100 MG/1
100 TABLET ORAL DAILY
Qty: 90 TABLET | Refills: 0 | Status: SHIPPED | OUTPATIENT
Start: 2018-06-25 | End: 2018-09-25 | Stop reason: SDUPTHER

## 2018-06-25 RX ORDER — LAMOTRIGINE 150 MG/1
150 TABLET ORAL
Qty: 90 TABLET | Refills: 0 | Status: SHIPPED | OUTPATIENT
Start: 2018-06-25 | End: 2018-09-25 | Stop reason: SDUPTHER

## 2018-06-25 NOTE — PROGRESS NOTES
Daily Note     Today's date: 2018  Patient name: Nahun Miranda  : 1965  MRN: [id number]  Referring provider: Donald Shelley MD  Dx:   Encounter Diagnosis     ICD-10-CM    1  Pain, joint, ankle and foot, left M25 572    2  Pain in right foot M79 671    3  Aftercare following surgery Z48 89                   Subjective: Pt reports she is having less pain with walking and decreased left ankle swelling  Objective: See treatment diary below    Assessment: Pt demonstrated mod restricted ankle DF PROM 2* pain and decreased heel strike during gait training  Pt demonstrated increased tolerance to ankle PF strengthening  Plan: Cont  POC as per PT  Precautions:  PMHx: anxiety, bipolar disorder, chronic ITP  R LE WBAT, L LE 50% PWB     Daily Treatment Diary      Manual                 L ankle PROM    10 min  10 min  10 min                                                                                                                     Exercise Diary                 L ankle AROM in elevation 1x30  1x30  1x30 ea  1x30 ea               Supine L GA stretch 15"x3  15"x3  15"x3  15"x3               R seated towel scrunches 1x50  1x50  1x65  1x65               L seated towel scrunches 1x30  1x30  1x30  1x40               BioDex: R SLS    2x20"  30"x2  30"x2               L ankle TB PF    YTB  2x10  Y/     Y/  3x10               L seated WB AROM: PF/DF, INV/TEMITOPE    1x30  1x25 ea  1x30 ea               AlterG: gait training - 2XL   30%BW 0 6mph  5 min  30% BW   0 8 mph   6 min  30% BW   0 8 mph   6 min               SLR L/  1x10  L/  3x10  L/   3x10  L/  3x10               WB: CW/CCW      1x25 ea  1x30                                                                                                                                                                                                                                                                     Modalities

## 2018-06-25 NOTE — TELEPHONE ENCOUNTER
I reviewed information/renewal with Janine  She said she usually uses [facility] for medications  She will check with the pharmacy for cost and will call the office if meds need to be resent to mail order,

## 2018-06-25 NOTE — TELEPHONE ENCOUNTER
Dr Patrick Chin renewed duloxetine doses and lamotrigine doses on 06/13, but e-scripts class for lamotrigine is noted as "No Print "     I spoke with the pharmacist at [facility]) - duloxetine received but not lamotrigine  Will have Dr Alanna Schumacher review, resend lamotrigine

## 2018-06-26 LAB
ALBUMIN SERPL-MCNC: 4.7 G/DL (ref 3.6–5.1)
ALBUMIN/GLOB SERPL: 1.9 (CALC) (ref 1–2.5)
ALP SERPL-CCNC: 87 U/L (ref 33–130)
ALT SERPL-CCNC: 16 U/L (ref 6–29)
AST SERPL-CCNC: 16 U/L (ref 10–35)
BILIRUB SERPL-MCNC: 0.4 MG/DL (ref 0.2–1.2)
BUN SERPL-MCNC: 13 MG/DL (ref 7–25)
BUN/CREAT SERPL: ABNORMAL (CALC) (ref 6–22)
CALCIUM SERPL-MCNC: 9.8 MG/DL (ref 8.6–10.4)
CHLORIDE SERPL-SCNC: 105 MMOL/L (ref 98–110)
CHOLEST SERPL-MCNC: 228 MG/DL
CHOLEST/HDLC SERPL: 3.2 (CALC)
CO2 SERPL-SCNC: 25 MMOL/L (ref 20–31)
CREAT SERPL-MCNC: 0.72 MG/DL (ref 0.5–1.05)
FSH SERPL-ACNC: 70.8 MIU/ML
GLOBULIN SER CALC-MCNC: 2.5 G/DL (CALC) (ref 1.9–3.7)
GLUCOSE SERPL-MCNC: 101 MG/DL (ref 65–99)
HDLC SERPL-MCNC: 71 MG/DL
LDLC SERPL CALC-MCNC: 138 MG/DL (CALC)
LH SERPL-ACNC: 32.8 MIU/ML
NONHDLC SERPL-MCNC: 157 MG/DL (CALC)
POTASSIUM SERPL-SCNC: 4.2 MMOL/L (ref 3.5–5.3)
PROLACTIN SERPL-MCNC: 8.2 NG/ML
PROT SERPL-MCNC: 7.2 G/DL (ref 6.1–8.1)
SL AMB EGFR AFRICAN AMERICAN: 110 ML/MIN/1.73M2
SL AMB EGFR NON AFRICAN AMERICAN: 95 ML/MIN/1.73M2
SODIUM SERPL-SCNC: 140 MMOL/L (ref 135–146)
TRIGL SERPL-MCNC: 93 MG/DL
TSH SERPL-ACNC: 1.13 MIU/L

## 2018-06-27 ENCOUNTER — OFFICE VISIT (OUTPATIENT)
Dept: PHYSICAL THERAPY | Facility: CLINIC | Age: 53
End: 2018-06-27
Payer: COMMERCIAL

## 2018-06-27 DIAGNOSIS — M79.671 PAIN IN RIGHT FOOT: ICD-10-CM

## 2018-06-27 DIAGNOSIS — Z48.89 AFTERCARE FOLLOWING SURGERY: ICD-10-CM

## 2018-06-27 DIAGNOSIS — M25.572 PAIN, JOINT, ANKLE AND FOOT, LEFT: Primary | ICD-10-CM

## 2018-06-27 PROCEDURE — 97110 THERAPEUTIC EXERCISES: CPT | Performed by: PHYSICAL THERAPIST

## 2018-06-27 PROCEDURE — 97116 GAIT TRAINING THERAPY: CPT | Performed by: PHYSICAL THERAPIST

## 2018-06-27 NOTE — PROGRESS NOTES
Daily Note     Today's date: 2018  Patient name: Mary Kay Srivastava  : 1965  MRN: [id number]  Referring provider: Munira Henson MD  Dx:   Encounter Diagnosis     ICD-10-CM    1  Pain, joint, ankle and foot, left M25 572    2  Pain in right foot M79 671    3  Aftercare following surgery Z48 89                   Subjective: Pt reports improved L heel-toe gait pattern with FWW ambulation  Pt reports having to leave early from appt  Objective: See treatment diary below  Held TE below secondary to time constraints    Assessment: Pt demonstrated improved gait speed and a more normalized gait at 30% BW and 1 0 mph  Plan: Cont  POC  Precautions:  PMHx: anxiety, bipolar disorder, chronic ITP  R LE WBAT, L LE 50% PWB     Daily Treatment Diary      Manual               L ankle PROM    10 min  10 min  10 min  10  min                                                                                                                   Exercise Diary               L ankle AROM in elevation 1x30  1x30  1x30 ea  1x30 ea  1x30 ea             Supine L GA stretch 15"x3  15"x3  15"x3  15"x3  15"x3             R seated towel scrunches 1x50  1x50  1x65  1x65  held             L seated towel scrunches 1x30  1x30  1x30  1x40  1x40             BioDex: R SLS    2x20"  30"x2  30"x2  held             L ankle TB PF    YTB  2x10  Y/     Y/  3x10  Y/   3x15             L seated WB AROM: PF/DF, INV/TEMITOPE    1x30  1x25 ea  1x30 ea  1x30 ea             AlterG: gait training - 2XL   30%BW 0 6mph  5 min  30% BW   0 8 mph   6 min  30% BW   0 8 mph   6 min  30% BW   1 0 mph   8 min             SLR L/  1x10  L/  3x10  L/   3x10  L/  3x10  held             WB: CW/CCW      1x25 ea  1x30  1x30 ea                                                                                                                                                                                                                                                                  Modalities

## 2018-07-02 ENCOUNTER — OFFICE VISIT (OUTPATIENT)
Dept: PHYSICAL THERAPY | Facility: CLINIC | Age: 53
End: 2018-07-02
Payer: COMMERCIAL

## 2018-07-02 DIAGNOSIS — Z48.89 AFTERCARE FOLLOWING SURGERY: ICD-10-CM

## 2018-07-02 DIAGNOSIS — M79.671 PAIN IN RIGHT FOOT: ICD-10-CM

## 2018-07-02 DIAGNOSIS — M25.572 PAIN, JOINT, ANKLE AND FOOT, LEFT: Primary | ICD-10-CM

## 2018-07-02 PROCEDURE — 97110 THERAPEUTIC EXERCISES: CPT

## 2018-07-02 PROCEDURE — 97140 MANUAL THERAPY 1/> REGIONS: CPT

## 2018-07-02 NOTE — PROGRESS NOTES
Daily Note     Today's date: 2018  Patient name: Lexi Avendano  : 1965  MRN: [id number]  Referring provider: Vito Pearson MD  Dx:   Encounter Diagnosis     ICD-10-CM    1  Pain, joint, ankle and foot, left M25 572    2  Pain in right foot M79 671    3  Aftercare following surgery Z48 89                   Subjective: Pt reports improved L heel-toe gait pattern noting that she is egar to stop walking with the walker  Notes that she sees the MD next week  Notes she is having no pain upon session  Objective: See treatment diary below      Assessment: Pt demonstrated improved gait speed and a more normalized gait at 30% BW and 1 0 mph  Pt did have increased pain on the lateral ankle with inversion today  PROM improved post manuals today  Plan: Cont  POC  Precautions:  PMHx: anxiety, bipolar disorder, chronic ITP  R LE WBAT, L LE 50% PWB     Daily Treatment Diary      Manual    7/2           L ankle PROM    10 min  10 min  10 min  10  min  10 min                                                                                                                 Exercise Diary    7/2           L ankle AROM in elevation 1x30  1x30  1x30 ea  1x30 ea  1x30 ea  1x30 ea           Supine L GA stretch 15"x3  15"x3  15"x3  15"x3  15"x3  15"x3           R seated towel scrunches 1x50  1x50  1x65  1x65  held  held           L seated towel scrunches 1x30  1x30  1x30  1x40  1x40  1x40           BioDex: R SLS    2x20"  30"x2  30"x2  held  held           L ankle TB PF    YTB  2x10  Y/     Y/  3x10  Y/   3x15  Y/ 3x15           L seated WB AROM: PF/DF, INV/TEMITOPE    1x30  1x25 ea  1x30 ea  1x30 ea  1x30 ea           AlterG: gait training - 2XL   30%BW 0 6mph  5 min  30% BW   0 8 mph   6 min  30% BW   0 8 mph   6 min  30% BW   1 0 mph   8 min  40% BW 1 0mph 8 min           SLR L/  1x10  L/  3x10  L/   3x10  L/  3x10  held  held           WB: CW/CCW      1x25 ea  1x30  1x30 ea  1x30ea                                                                                                                                                                                                                                                                Modalities

## 2018-07-05 ENCOUNTER — OFFICE VISIT (OUTPATIENT)
Dept: PHYSICAL THERAPY | Facility: CLINIC | Age: 53
End: 2018-07-05
Payer: COMMERCIAL

## 2018-07-05 DIAGNOSIS — Z48.89 AFTERCARE FOLLOWING SURGERY: ICD-10-CM

## 2018-07-05 DIAGNOSIS — M79.671 PAIN IN RIGHT FOOT: ICD-10-CM

## 2018-07-05 DIAGNOSIS — M25.572 PAIN, JOINT, ANKLE AND FOOT, LEFT: Primary | ICD-10-CM

## 2018-07-05 PROCEDURE — 97110 THERAPEUTIC EXERCISES: CPT

## 2018-07-05 PROCEDURE — 97140 MANUAL THERAPY 1/> REGIONS: CPT

## 2018-07-05 NOTE — PROGRESS NOTES
Daily Note     Today's date: 2018  Patient name: Kita Mace  : 1965  MRN: [id number]  Referring provider: Eyal John MD  Dx:   Encounter Diagnosis     ICD-10-CM    1  Pain, joint, ankle and foot, left M25 572    2  Pain in right foot M79 671    3  Aftercare following surgery Z48 89                   Subjective: Pt reports she continues to have stiffness on the lateral ankle today  Notes that she is going to see the MD next week for a follow up  Objective: See treatment diary below  DF ROM (L) : 4degrees    Assessment: Pt demonstrated improved gait speed and a more normalized gait at 40% BW and 1 0 mph  Limitations still present mostly in DF  Pt did have increased pain on the lateral ankle with inversion today PROM  Plan: Cont  POC  Precautions:  PMHx: anxiety, bipolar disorder, chronic ITP  R LE WBAT, L LE 50% PWB     Daily Treatment Diary      Manual    7/  7/5         L ankle PROM    10 min  10 min  10 min  10  min  10 min  10 min                                                                                                               Exercise Diary    7/2  7/5         L ankle AROM in elevation 1x30  1x30  1x30 ea  1x30 ea  1x30 ea  1x30 ea  1x30 ea         Supine L GA stretch 15"x3  15"x3  15"x3  15"x3  15"x3  15"x3  15"x3         R seated towel scrunches 1x50  1x50  1x65  1x65  held  held  held         L seated towel scrunches 1x30  1x30  1x30  1x40  1x40  1x40  1x40         BioDex: R SLS    2x20"  30"x2  30"x2  held  held  held         L ankle TB PF    YTB  2x10  Y/     Y/  3x10  Y/   3x15  Y/ 3x15  R/ 3x15         L seated WB AROM: PF/DF, INV/TEMITOPE    1x30  1x25 ea  1x30 ea  1x30 ea  1x30 ea  1x30 ea         AlterG: gait training - 2XL   30%BW 0 6mph  5 min  30% BW   0 8 mph   6 min  30% BW   0 8 mph   6 min  30% BW   1 0 mph   8 min  40% BW 1 0mph 8 min  40% BW 1 0mph 8 min         SLR L/  1x10  L/  3x10  L/   3x10  L/  3x10  held  held  held         WB: CW/CCW      1x25 ea  1x30  1x30 ea  1x30ea  1x30ea                                                                                                                                                                                                                                                              Modalities

## 2018-07-09 ENCOUNTER — TELEPHONE (OUTPATIENT)
Dept: OBGYN CLINIC | Facility: HOSPITAL | Age: 53
End: 2018-07-09

## 2018-07-09 ENCOUNTER — HOSPITAL ENCOUNTER (OUTPATIENT)
Dept: RADIOLOGY | Facility: HOSPITAL | Age: 53
Discharge: HOME | End: 2018-07-09
Attending: ORTHOPAEDIC SURGERY
Payer: COMMERCIAL

## 2018-07-09 ENCOUNTER — OFFICE VISIT (OUTPATIENT)
Dept: OBGYN CLINIC | Facility: HOSPITAL | Age: 53
End: 2018-07-09

## 2018-07-09 VITALS
HEIGHT: 68 IN | WEIGHT: 230 LBS | SYSTOLIC BLOOD PRESSURE: 104 MMHG | DIASTOLIC BLOOD PRESSURE: 69 MMHG | HEART RATE: 84 BPM | BODY MASS INDEX: 34.86 KG/M2

## 2018-07-09 DIAGNOSIS — Z48.89 AFTERCARE FOLLOWING SURGERY: ICD-10-CM

## 2018-07-09 DIAGNOSIS — S92.354D CLOSED NONDISPLACED FRACTURE OF FIFTH METATARSAL BONE OF RIGHT FOOT WITH ROUTINE HEALING, SUBSEQUENT ENCOUNTER: ICD-10-CM

## 2018-07-09 DIAGNOSIS — Z09 FRACTURE FOLLOW-UP: Primary | ICD-10-CM

## 2018-07-09 DIAGNOSIS — S82.892D CLOSED FRACTURE OF LEFT ANKLE WITH ROUTINE HEALING, SUBSEQUENT ENCOUNTER: ICD-10-CM

## 2018-07-09 DIAGNOSIS — Z09 FRACTURE FOLLOW-UP: ICD-10-CM

## 2018-07-09 PROCEDURE — 99024 POSTOP FOLLOW-UP VISIT: CPT | Performed by: ORTHOPAEDIC SURGERY

## 2018-07-09 PROCEDURE — 73610 X-RAY EXAM OF ANKLE: CPT

## 2018-07-09 PROCEDURE — 73630 X-RAY EXAM OF FOOT: CPT

## 2018-07-09 NOTE — PROGRESS NOTES
Assessment/Plan:    Plan:  Patient is able to start driving  [employer] Faden was given to patient today at the office visit  Follow up  4 weeks       Problem List Items Addressed This Visit     Closed fracture of left ankle     Patient is doing well  She reports minimal discomfort to the right foot metatarsal fracture as well as left ankle  She has been weight-bearing as tolerated  On physical exam she is nontender to bilateral lower extremities  She has good range of motion although some mild stiffness with left foot dorsiflexion  X-rays today demonstrate stable hardware in the left ankle    Weightbearing as tolerated bilateral lower extremities  Follow-up in 4 weeks for re-evaluation and new x-rays  Closed fracture of fifth metatarsal bone of right foot with routine healing      Other Visit Diagnoses     Fracture follow-up    -  Primary    Relevant Orders    Ambulatory referral to Physical Therapy    Cane    Aftercare following surgery        Relevant Orders    XR ankle 3+ vw left    Ambulatory referral to Physical Therapy    Cane            Subjective:      Patient ID: Hamilton Campos is a 46 y o  female  Patient is 2 1/2 months status post Left ankle ORIF and Right 5th metatarsal fracture that was treated non operatively on 4/27/2018  Patient states she is doing much better  The following portions of the patient's history were reviewed and updated as appropriate: allergies, current medications, past family history, past medical history, past social history, past surgical history and problem list     Review of Systems   Constitutional: Negative  HENT: Negative  Eyes: Negative  Respiratory: Negative  Cardiovascular: Negative  Gastrointestinal: Negative  Hematological: Negative  Objective:      /69   Pulse 84   Ht 5' 8" (1 727 m)   Wt 104 kg (230 lb)   BMI 34 97 kg/m²          Physical Exam   Constitutional: She is oriented to person, place, and time   She appears well-developed and well-nourished  HENT:   Head: Normocephalic and atraumatic  Eyes: Conjunctivae are normal  Pupils are equal, round, and reactive to light  Cardiovascular: Normal rate and regular rhythm  Pulmonary/Chest: Effort normal and breath sounds normal    Neurological: She is alert and oriented to person, place, and time  Skin: Skin is warm and dry  Psychiatric: She has a normal mood and affect           Left ankle:  Doral flexion 5 degrees  Plantar flexion 30 degrees  Right foot non tender      Imaging :  Left ankle xray: hardware stable   Right metatarsal xray healing well

## 2018-07-09 NOTE — TELEPHONE ENCOUNTER
Caller: patient  Call back number: [phone]  Patient's doctor: Dr Sapp Knife    Patient called asking if she should still be using the shower chair   Please advise

## 2018-07-09 NOTE — ASSESSMENT & PLAN NOTE
Patient is doing well  She reports minimal discomfort to the right foot metatarsal fracture as well as left ankle  She has been weight-bearing as tolerated  On physical exam she is nontender to bilateral lower extremities  She has good range of motion although some mild stiffness with left foot dorsiflexion  X-rays today demonstrate stable hardware in the left ankle    Weightbearing as tolerated bilateral lower extremities  Follow-up in 4 weeks for re-evaluation and new x-rays

## 2018-07-11 ENCOUNTER — APPOINTMENT (OUTPATIENT)
Dept: PHYSICAL THERAPY | Facility: CLINIC | Age: 53
End: 2018-07-11
Payer: COMMERCIAL

## 2018-07-11 ENCOUNTER — OFFICE VISIT (OUTPATIENT)
Dept: PHYSICAL THERAPY | Facility: CLINIC | Age: 53
End: 2018-07-11
Payer: COMMERCIAL

## 2018-07-11 DIAGNOSIS — M79.671 PAIN IN RIGHT FOOT: ICD-10-CM

## 2018-07-11 DIAGNOSIS — M25.572 PAIN, JOINT, ANKLE AND FOOT, LEFT: Primary | ICD-10-CM

## 2018-07-11 DIAGNOSIS — Z48.89 AFTERCARE FOLLOWING SURGERY: ICD-10-CM

## 2018-07-11 PROCEDURE — 97116 GAIT TRAINING THERAPY: CPT

## 2018-07-11 PROCEDURE — G8978 MOBILITY CURRENT STATUS: HCPCS

## 2018-07-11 PROCEDURE — G8979 MOBILITY GOAL STATUS: HCPCS

## 2018-07-11 PROCEDURE — 97110 THERAPEUTIC EXERCISES: CPT

## 2018-07-11 NOTE — PROGRESS NOTES
Daily Note     Today's date: 2018  Patient name: Cathleen Longoria  : 1965  MRN: [id number]  Referring provider: Mercedes Avila MD  Dx:   Encounter Diagnosis     ICD-10-CM    1  Pain, joint, ankle and foot, left M25 572    2  Pain in right foot M79 671    3  Aftercare following surgery Z48 89                   Subjective: Pt reports she has been using SPC per MD for ambulation  Pt reports no pain pre tx  Objective: See treatment diary below; Added mini squats in CAM boot    Assessment: Pt demonstrated mod restricted L ankle DF during gait training, min pain with SLS in CAM boot  Plan: Assess response to tx nv  Precautions:  PMHx: anxiety, bipolar disorder, chronic ITP  R LE WBAT, L LE 50% PWB     Daily Treatment Diary      Manual         L ankle PROM    10 min  10 min  10 min  10  min  10 min  10 min  10 min                                                                                                             Exercise Diary         L ankle AROM in elevation 1x30  1x30  1x30 ea  1x30 ea  1x30 ea  1x30 ea  1x30 ea  1x30 ea       Supine L GA stretch 15"x3  15"x3  15"x3  15"x3  15"x3  15"x3  15"x3  15"x3       R seated towel scrunches 1x50  1x50  1x65  1x65  held  held  held  held       L seated towel scrunches 1x30  1x30  1x30  1x40  1x40  1x40  1x40  1x40       BioDex: R SLS    2x20"  30"x2  30"x2  held  held  held  30"x2       L ankle TB PF    YTB  2x10  Y/     Y/  3x10  Y/   3x15  Y/ 3x15  R/ 3x15  R/3x20       L seated WB AROM: PF/DF, INV/TEMITOPE    1x30  1x25 ea  1x30 ea  1x30 ea  1x30 ea  1x30 ea  1x30 ea       AlterG: gait training - 2XL   30%BW 0 6mph  5 min  30% BW   0 8 mph   6 min  30% BW   0 8 mph   6 min  30% BW   1 0 mph   8 min  40% BW 1 0mph 8 min  40% BW 1 0mph 8 min  40% BW 1 0mph 9 min       SLR L/  1x10  L/  3x10  L/   3x10  L/  3x10  held  held  held  np       WB: CW/CCW      1x25 ea  1x30  1x30 ea  1x30ea  1x30ea 1x30 ea       Biodex: mini squats                CAM boot 2x10                                                                                                                                                                                                                                     Modalities

## 2018-07-12 ENCOUNTER — APPOINTMENT (OUTPATIENT)
Dept: PHYSICAL THERAPY | Facility: CLINIC | Age: 53
End: 2018-07-12
Payer: COMMERCIAL

## 2018-07-13 ENCOUNTER — OFFICE VISIT (OUTPATIENT)
Dept: PHYSICAL THERAPY | Facility: CLINIC | Age: 53
End: 2018-07-13
Payer: COMMERCIAL

## 2018-07-13 DIAGNOSIS — M79.671 PAIN IN RIGHT FOOT: ICD-10-CM

## 2018-07-13 DIAGNOSIS — Z48.89 AFTERCARE FOLLOWING SURGERY: ICD-10-CM

## 2018-07-13 DIAGNOSIS — M25.572 PAIN, JOINT, ANKLE AND FOOT, LEFT: Primary | ICD-10-CM

## 2018-07-13 PROCEDURE — 97116 GAIT TRAINING THERAPY: CPT | Performed by: PHYSICAL THERAPIST

## 2018-07-13 PROCEDURE — 97110 THERAPEUTIC EXERCISES: CPT | Performed by: PHYSICAL THERAPIST

## 2018-07-13 NOTE — PROGRESS NOTES
Daily Note     Today's date: 2018  Patient name: Deven Guerrero  : 1965  MRN: [id number]  Referring provider: Fidencio Cooper MD  Dx:   Encounter Diagnosis     ICD-10-CM    1  Pain, joint, ankle and foot, left M25 572    2  Pain in right foot M79 671    3  Aftercare following surgery Z48 89                   Subjective: Pt reports increased distal L LE swelling after significantly increased WB activities over the past few days  Objective: See treatment diary below    Assessment: Pt demonstrated poor tolerance to L SLS secondary to medial knee pain  Pt demonstrated mildly more normalized gait and good tolerance to standing GA stretch and heel raises  Plan: Cont  POC, gradually increase WB activities without CAM boot  Precautions:  PMHx: anxiety, bipolar disorder, chronic ITP  R LE WBAT, L LE WBAT     Daily Treatment Diary      Manual       L ankle PROM    10 min  10 min  10 min  10  min  10 min  10 min  10 min  10 min                                                                                                           Exercise Diary       L ankle AROM in elevation 1x30  1x30  1x30 ea  1x30 ea  1x30 ea  1x30 ea  1x30 ea  1x30 ea  1x30     Supine L GA stretch 15"x3  15"x3  15"x3  15"x3  15"x3  15"x3  15"x3  15"x3  np     R seated towel scrunches 1x50  1x50  1x65  1x65  held  held  held  held  np     L seated towel scrunches 1x30  1x30  1x30  1x40  1x40  1x40  1x40  1x40  1x60     BioDex: R SLS    2x20"  30"x2  30"x2  held  held  held  30"x2  np     L ankle TB PF    YTB  2x10  Y/     Y/  3x10  Y/   3x15  Y/ 3x15  R/ 3x15  R/3x20  G/   3x15     L seated WB AROM: PF/DF, INV/TEMITOPE    1x30  1x25 ea  1x30 ea  1x30 ea  1x30 ea  1x30 ea  1x30 ea  np     AlterG: gait training - 2XL   30%BW 0 6mph  5 min  30% BW   0 8 mph   6 min  30% BW   0 8 mph   6 min  30% BW   1 0 mph   8 min  40% BW 1 0mph 8 min  40% BW 1 0mph 8 min  40% BW 1 0mph 9 min  50% BW   1 2 mph   10 min     SLR L/  1x10  L/  3x10  L/   3x10  L/  3x10  held  held  held  np  d/c     WB: CW/CCW      1x25 ea  1x30  1x30 ea  1x30ea  1x30ea 1x30 ea  np     Biodex: mini squats                CAM boot 2x10  held     SLS: EO                  R/   60"x1   L/   30"x1     Standing GA stretch                  L/   10"x3     Standing DL heel raises                  1x10                                                                                                                                                           Modalities

## 2018-07-16 ENCOUNTER — TELEPHONE (OUTPATIENT)
Dept: PSYCHIATRY | Facility: CLINIC | Age: 53
End: 2018-07-16

## 2018-07-16 ENCOUNTER — OFFICE VISIT (OUTPATIENT)
Dept: PHYSICAL THERAPY | Facility: CLINIC | Age: 53
End: 2018-07-16
Payer: COMMERCIAL

## 2018-07-16 ENCOUNTER — TELEPHONE (OUTPATIENT)
Dept: BEHAVIORAL/MENTAL HEALTH CLINIC | Facility: CLINIC | Age: 53
End: 2018-07-16

## 2018-07-16 DIAGNOSIS — Z48.89 AFTERCARE FOLLOWING SURGERY: ICD-10-CM

## 2018-07-16 DIAGNOSIS — M25.572 PAIN, JOINT, ANKLE AND FOOT, LEFT: Primary | ICD-10-CM

## 2018-07-16 PROCEDURE — 97116 GAIT TRAINING THERAPY: CPT

## 2018-07-16 PROCEDURE — 97110 THERAPEUTIC EXERCISES: CPT

## 2018-07-16 PROCEDURE — 97140 MANUAL THERAPY 1/> REGIONS: CPT

## 2018-07-16 NOTE — TELEPHONE ENCOUNTER
Patient called requesting you give her a call  She cannot make her appointment on Wednesday but she did not want to reschedule at that time  She is unavailable between 11 am and noon today as she is going to therapy  She stated she is feeling overwhelmed

## 2018-07-16 NOTE — PROGRESS NOTES
Daily Note     Today's date: 2018  Patient name: Kita Mace  : 1965  MRN: [id number]  Referring provider: Eyal John MD  Dx:   Encounter Diagnosis     ICD-10-CM    1  Pain, joint, ankle and foot, left M25 572    2  Pain in right foot M79 671    3  Aftercare following surgery Z48 89      1:1 with PTA CR 11:00- 12:00  Subjective: Patient reports improved tolerance to weight bearing activities without CAM  Continues with mod pitting edema but states that is is better than it was  Objective: See treatment diary below    Assessment: All exercises performed without CAM boot  Per primary PT WS  Difficulty maintaining equal weight bearing while performing mini squats on Biodex  Heel raises less painful  Plan: Cont  POC, gradually increase WB activities without CAM boot  Precautions: PMHx: anxiety, bipolar disorder, chronic ITP  R LE WBAT, L LE WBAT     Daily Treatment Diary      Manual     7/  7/16   L ankle PROM  10 min  10 min  10 min  10  min  10 min  10 min  10 min  10 min  10 mins                                                                                                 Exercise Diary     7  7  7/16   L ankle AROM in elevation  1x30  1x30 ea  1x30 ea  1x30 ea  1x30 ea  1x30 ea  1x30 ea  1x30  1x30   Supine L GA stretch  15"x3  15"x3  15"x3  15"x3  15"x3  15"x3  15"x3  np  15"x3   R seated towel scrunches  1x50  1x65  1x65  held  held  held  held  np  NP   L seated towel scrunches  1x30  1x30  1x40  1x40  1x40  1x40  1x40  1x60  1x60   BioDex: R SLS  2x20"  30"x2  30"x2  held  held  held  30"x2  np  NP   L ankle TB PF  YTB  2x10  Y/     Y/  3x10  Y/   3x15  Y/ 3x15  R/ 3x15  R/3x20  G/   3x15  G  3x15   L seated WB AROM: PF/DF, INV/TEMITOPE  1x30  1x25 ea  1x30 ea  1x30 ea  1x30 ea  1x30 ea  1x30 ea  np  1x30 ea     AlterG: gait training - 2XL 30%BW 0 6mph  5 min  30% BW   0 8 mph   6 min  30% BW 0 8 mph   6 min  30% BW   1 0 mph   8 min  40% BW 1 0mph 8 min  40% BW 1 0mph 8 min  40% BW 1 0mph 9 min  50% BW   1 2 mph   10 min  50% BW  1 2 mph  10 mins     SLR  L/  3x10  L/   3x10  L/  3x10  held  held  held  np  d/c  D/C   WB: CW/CCW    1x25 ea  1x30  1x30 ea  1x30ea  1x30ea 1x30 ea  np  1x30 ea     Biodex: mini squats               2x10  CAM BOOT  held  2x10   SLS: EO                R/   60"x1   L/   30"x1  R  60"x1  L  30"x1   Standing GA stretch                L/   10"x3  L  10"x3   Standing DL heel raises                1x10  1x10                                                                                                                                             Modalities

## 2018-07-18 ENCOUNTER — OFFICE VISIT (OUTPATIENT)
Dept: BEHAVIORAL/MENTAL HEALTH CLINIC | Facility: CLINIC | Age: 53
End: 2018-07-18
Payer: COMMERCIAL

## 2018-07-18 DIAGNOSIS — F41.0 PANIC DISORDER WITHOUT AGORAPHOBIA: Chronic | ICD-10-CM

## 2018-07-18 DIAGNOSIS — F31.81 BIPOLAR II DISORDER (HCC): Primary | Chronic | ICD-10-CM

## 2018-07-18 DIAGNOSIS — F41.1 GAD (GENERALIZED ANXIETY DISORDER): Chronic | ICD-10-CM

## 2018-07-18 PROCEDURE — 90834 PSYTX W PT 45 MINUTES: CPT | Performed by: SOCIAL WORKER

## 2018-07-18 NOTE — PSYCH
Treatment Plan Tracking    # 4Treatment Plan not completed within required time limits due to: Brenda Peace in the home and underwent surgery, medical rehab, and inhome PT before she could return to session

## 2018-07-18 NOTE — PSYCH
Psychotherapy Provided: Individual Psychotherapy 50 minutes     Length of time in session: 50 minutes, follow up in 2 week    Goals addressed in session: Goal 1, Goal 2 and Goal 3      Pain:      moderate to severe    3    Current suicide risk : Low     D: Met with Janine marroquin  ROS: 'I feel very alone '  Session focused upon foot injury, rehab, granddaughter being born, other grandchild due soon, relationship with Sandra Cabezas stated she is feeling a bit better but disclosed being 'in a very dark place for awhile'  Struggling to acknowledge progress though slow; remains pessimistic of current circumstances  Reassessment of treatment plan completed  Periods of fleeting SI without plan  A: Ana Orourke presented as tired, flattened affect and depressed mood  Despite all that has occurred, Ana Orourke is still standing  Feels very alone and has minimal support which does not help her current situation  P: Continue individual therapy, monitor symptoms, support for current psychosocial stressors  Behavioral Health Treatment Plan [facility]: Diagnosis and Treatment Plan explained to Felisha Everett relates understanding diagnosis and is agreeable to Treatment Plan   Yes

## 2018-07-18 NOTE — PSYCH
Mirlande Beckett  1965     Date of Initial Treatment Plan: 5/1/17   Date of Current Treatment Plan: 07/18/18      Treatment Plan Number 4    Strengths/Personal Resources for Self Care: Caring, grandmother     Diagnosis:   1  Bipolar II disorder ([address] )     2  DREAD (generalized anxiety disorder)     3  Panic disorder without agoraphobia       Area of Needs: Anxiety, depression, overwhelmed with family situations, setting boundaries      Long Term Goal 1:        I have peace and understanding of myself   Target Date: 11/10/18  Completion Date:  N/A         Short Term Objectives for Goal 1:   1  Yoga, Mindfulness  2  Journaling  3  Setting boundaries     Long Term Goal 2:        My anxiety/depression has greatly improved  Target Date: 11/10/18  Completion Date: N/A     Short Term Objectives for Goal 2:   1  Luz Vyas moved to [place]  2  I can afford a place to live  3  Historic circumstances  4  Ankle recovery              Long Term Goal # 3:        I have accepted Jerzy's disability  Target Date: 11/10/18  Completion Date: N/A     Short Term Objectives for Goal 3:   1  Acknowledging Jerzy's progress  2  Process grief for medical diagnosis    3  Setting boundaries with Bethany Cheadle     GOAL 1: Modality: Individual therapy 2x per month   Completion Date N/A                                   Medication management every 3 months   Completion Date:                                   Persons responsible for goals: Freddie Whitley and Dr Anthony Blair 2: Modality: Individual therapy 2x per month   Completion Date N/A                                   Medication management every 3 months   Completion Date:                                   Persons responsible for goals: Freddie Whitley and Dr Anthony Blair 3: Modality: Individual therapy 2x per month   Completion Date N/A                                   Medication management every 3 months   Completion Date:                                   Persons responsible for goals: Tabatha Hernández Samantha and Dr  Angelica Colton: Diagnosis and Treatment Plan explained to Rozet Guillermo relates understanding diagnosis and is agreeable to Treatment Plan         Client Comments : Please share your thoughts, feelings, need and/or experiences regarding your treatment plan:       __________________________________________________________________    __________________________________________________________________    __________________________________________________________________    __________________________________________________________________    _______________________________________                Patient signature, Date Time: __________________________________________             Physician cosigner signature, Date, Time: ________________________________

## 2018-07-19 ENCOUNTER — OFFICE VISIT (OUTPATIENT)
Dept: PHYSICAL THERAPY | Facility: CLINIC | Age: 53
End: 2018-07-19
Payer: COMMERCIAL

## 2018-07-19 DIAGNOSIS — M25.572 PAIN, JOINT, ANKLE AND FOOT, LEFT: Primary | ICD-10-CM

## 2018-07-19 DIAGNOSIS — Z48.89 AFTERCARE FOLLOWING SURGERY: ICD-10-CM

## 2018-07-19 DIAGNOSIS — M79.671 PAIN IN RIGHT FOOT: ICD-10-CM

## 2018-07-19 PROCEDURE — 97110 THERAPEUTIC EXERCISES: CPT

## 2018-07-19 PROCEDURE — G8979 MOBILITY GOAL STATUS: HCPCS

## 2018-07-19 PROCEDURE — 97116 GAIT TRAINING THERAPY: CPT

## 2018-07-19 PROCEDURE — G8978 MOBILITY CURRENT STATUS: HCPCS

## 2018-07-19 NOTE — PROGRESS NOTES
Daily Note     Today's date: 2018  Patient name: Kita Mace  : 1965  MRN: [id number]  Referring provider: Eyal John MD  Dx:   Encounter Diagnosis     ICD-10-CM    1  Pain, joint, ankle and foot, left M25 572    2  Aftercare following surgery Z48 89    3  Pain in right foot M79 671                   Subjective: Patient reports improved walking tolerance but notices that her toes do not flex like they should  Notes that her knee is feeling a little bit better and that she is currently going step to step on her stairs at home  Objective: See treatment diary below    Assessment: All exercises performed without CAM boot  Minimal swelling was noted once pt took her sock off  Continued with all TE today in a pain free range demonstrating improved mobility post PROM  Ambulated outside today to challenge uneven surfaces and focus on promoting a smoother/ more fluent gait pattern  Using a SPC and CG Ax1 for 250' and no AD for 250', ambulating in the grass with the AD for 25'  Pt did favor the right side WB during mini squats on the biodex  Plan: Cont  POC, gradually increase WB activities without CAM boot  Precautions:  PMHx: anxiety, bipolar disorder, chronic ITP  R LE WBAT, L LE WBAT     Daily Treatment Diary      Manual     7/2  7/   L ankle PROM  10 min  10 min  10 min  10  min  10 min  10 min  10 min  10 min  10 mins 10 mins                                                                                                     Exercise Diary     7/2  7/   L ankle AROM in elevation  1x30  1x30 ea  1x30 ea  1x30 ea  1x30 ea  1x30 ea  1x30 ea  1x30  1x30 1x30   Supine L GA stretch  15"x3  15"x3  15"x3  15"x3  15"x3  15"x3  15"x3  np  15"x3 15"x3   R seated towel scrunches  1x50  1x65  1x65  held  held  held  held  np  NP NP   L seated towel scrunches  1x30  1x30  1x40  1x40  1x40  1x40  1x40  1x60  1x60 1x60   BioDex: R SLS  2x20"  30"x2  30"x2  held  held  held  30"x2  np  NP NP   L ankle TB PF  YTB  2x10  Y/     Y/  3x10  Y/   3x15  Y/ 3x15  R/ 3x15  R/3x20  G/   3x15  G  3x15 G/ 3x15   L seated WB AROM: PF/DF, INV/TEMITOPE  1x30  1x25 ea  1x30 ea  1x30 ea  1x30 ea  1x30 ea  1x30 ea  np  1x30 ea  1x30 ea   AlterG: gait training - 2XL 30%BW 0 6mph  5 min  30% BW   0 8 mph   6 min  30% BW   0 8 mph   6 min  30% BW   1 0 mph   8 min  40% BW 1 0mph 8 min  40% BW 1 0mph 8 min  40% BW 1 0mph 9 min  50% BW   1 2 mph   10 min  50% BW  1 2 mph  10 mins   held   SLR  L/  3x10  L/   3x10  L/  3x10  held  held  held  np  d/c  D/C D/C   WB: CW/CCW    1x25 ea  1x30  1x30 ea  1x30ea  1x30ea 1x30 ea  np  1x30 ea   1x30 ea   Biodex: mini squats               2x10  CAM BOOT  held  2x10 2x10   SLS: EO                R/   60"x1   L/   30"x1  R  60"x1  L  30"x1 R/ 60"x1  L 30"x1   Standing GA stretch                L/   10"x3  L  10"x3 L/  10"x3   Standing DL heel raises                1x10  1x10 1x10   Gait Training SPC, CG Ax1, Sidewalk, Grass                   26' w/ AD, 250' w/o AD                                                                                                                            Modalities

## 2018-07-22 ENCOUNTER — HOSPITAL ENCOUNTER (EMERGENCY)
Facility: HOSPITAL | Age: 53
Discharge: HOME | End: 2018-07-22
Attending: EMERGENCY MEDICINE
Payer: COMMERCIAL

## 2018-07-22 ENCOUNTER — APPOINTMENT (EMERGENCY)
Dept: CT IMAGING | Facility: HOSPITAL | Age: 53
End: 2018-07-22
Payer: COMMERCIAL

## 2018-07-22 ENCOUNTER — APPOINTMENT (EMERGENCY)
Dept: RADIOLOGY | Facility: HOSPITAL | Age: 53
End: 2018-07-22
Payer: COMMERCIAL

## 2018-07-22 VITALS
TEMPERATURE: 98.2 F | WEIGHT: 235.45 LBS | DIASTOLIC BLOOD PRESSURE: 63 MMHG | BODY MASS INDEX: 35.8 KG/M2 | OXYGEN SATURATION: 97 % | SYSTOLIC BLOOD PRESSURE: 137 MMHG | RESPIRATION RATE: 18 BRPM | HEART RATE: 72 BPM

## 2018-07-22 DIAGNOSIS — R42 DIZZY: Primary | ICD-10-CM

## 2018-07-22 LAB
ALBUMIN SERPL BCP-MCNC: 3.6 G/DL (ref 3.5–5)
ALP SERPL-CCNC: 95 U/L (ref 46–116)
ALT SERPL W P-5'-P-CCNC: 28 U/L (ref 12–78)
ANION GAP SERPL CALCULATED.3IONS-SCNC: 8 MMOL/L (ref 4–13)
APTT PPP: 37 SECONDS (ref 24–36)
AST SERPL W P-5'-P-CCNC: 22 U/L (ref 5–45)
BASOPHILS # BLD AUTO: 0.01 THOUSANDS/ΜL (ref 0–0.1)
BASOPHILS NFR BLD AUTO: 0 % (ref 0–1)
BILIRUB SERPL-MCNC: 0.3 MG/DL (ref 0.2–1)
BILIRUB UR QL STRIP: NEGATIVE
BUN SERPL-MCNC: 6 MG/DL (ref 5–25)
CALCIUM SERPL-MCNC: 9.4 MG/DL (ref 8.3–10.1)
CHLORIDE SERPL-SCNC: 107 MMOL/L (ref 100–108)
CLARITY UR: NORMAL
CO2 SERPL-SCNC: 27 MMOL/L (ref 21–32)
COLOR UR: YELLOW
CREAT SERPL-MCNC: 0.72 MG/DL (ref 0.6–1.3)
DEPRECATED D DIMER PPP: 794 NG/ML (FEU) (ref 0–424)
EOSINOPHIL # BLD AUTO: 0.15 THOUSAND/ΜL (ref 0–0.61)
EOSINOPHIL NFR BLD AUTO: 3 % (ref 0–6)
ERYTHROCYTE [DISTWIDTH] IN BLOOD BY AUTOMATED COUNT: 13.9 % (ref 11.6–15.1)
GFR SERPL CREATININE-BSD FRML MDRD: 95 ML/MIN/1.73SQ M
GLUCOSE SERPL-MCNC: 102 MG/DL (ref 65–140)
GLUCOSE UR STRIP-MCNC: NEGATIVE MG/DL
HCT VFR BLD AUTO: 39.1 % (ref 34.8–46.1)
HGB BLD-MCNC: 12.4 G/DL (ref 11.5–15.4)
HGB UR QL STRIP.AUTO: NEGATIVE
INR PPP: 0.96 (ref 0.86–1.17)
KETONES UR STRIP-MCNC: NEGATIVE MG/DL
LEUKOCYTE ESTERASE UR QL STRIP: NEGATIVE
LYMPHOCYTES # BLD AUTO: 1.38 THOUSANDS/ΜL (ref 0.6–4.47)
LYMPHOCYTES NFR BLD AUTO: 29 % (ref 14–44)
MAGNESIUM SERPL-MCNC: 2.1 MG/DL (ref 1.6–2.6)
MCH RBC QN AUTO: 28.4 PG (ref 26.8–34.3)
MCHC RBC AUTO-ENTMCNC: 31.7 G/DL (ref 31.4–37.4)
MCV RBC AUTO: 90 FL (ref 82–98)
MONOCYTES # BLD AUTO: 0.37 THOUSAND/ΜL (ref 0.17–1.22)
MONOCYTES NFR BLD AUTO: 8 % (ref 4–12)
NEUTROPHILS # BLD AUTO: 2.86 THOUSANDS/ΜL (ref 1.85–7.62)
NEUTS SEG NFR BLD AUTO: 60 % (ref 43–75)
NITRITE UR QL STRIP: NEGATIVE
NT-PROBNP SERPL-MCNC: 156 PG/ML
PH UR STRIP.AUTO: 7 [PH] (ref 4.5–8)
PLATELET # BLD AUTO: 110 THOUSANDS/UL (ref 149–390)
PMV BLD AUTO: 14.5 FL (ref 8.9–12.7)
POTASSIUM SERPL-SCNC: 3.9 MMOL/L (ref 3.5–5.3)
PROT SERPL-MCNC: 7.3 G/DL (ref 6.4–8.2)
PROT UR STRIP-MCNC: NEGATIVE MG/DL
PROTHROMBIN TIME: 12.5 SECONDS (ref 11.8–14.2)
RBC # BLD AUTO: 4.36 MILLION/UL (ref 3.81–5.12)
SODIUM SERPL-SCNC: 142 MMOL/L (ref 136–145)
SP GR UR STRIP.AUTO: 1.02 (ref 1–1.03)
TROPONIN I SERPL-MCNC: <0.02 NG/ML
TSH SERPL DL<=0.05 MIU/L-ACNC: 0.63 UIU/ML (ref 0.36–3.74)
UROBILINOGEN UR QL STRIP.AUTO: 0.2 E.U./DL
WBC # BLD AUTO: 4.77 THOUSAND/UL (ref 4.31–10.16)

## 2018-07-22 PROCEDURE — 96361 HYDRATE IV INFUSION ADD-ON: CPT

## 2018-07-22 PROCEDURE — 84484 ASSAY OF TROPONIN QUANT: CPT | Performed by: EMERGENCY MEDICINE

## 2018-07-22 PROCEDURE — 85025 COMPLETE CBC W/AUTO DIFF WBC: CPT | Performed by: EMERGENCY MEDICINE

## 2018-07-22 PROCEDURE — 70450 CT HEAD/BRAIN W/O DYE: CPT

## 2018-07-22 PROCEDURE — 85610 PROTHROMBIN TIME: CPT | Performed by: EMERGENCY MEDICINE

## 2018-07-22 PROCEDURE — 96360 HYDRATION IV INFUSION INIT: CPT

## 2018-07-22 PROCEDURE — 85379 FIBRIN DEGRADATION QUANT: CPT | Performed by: EMERGENCY MEDICINE

## 2018-07-22 PROCEDURE — 85730 THROMBOPLASTIN TIME PARTIAL: CPT | Performed by: EMERGENCY MEDICINE

## 2018-07-22 PROCEDURE — 93005 ELECTROCARDIOGRAM TRACING: CPT

## 2018-07-22 PROCEDURE — 84443 ASSAY THYROID STIM HORMONE: CPT | Performed by: EMERGENCY MEDICINE

## 2018-07-22 PROCEDURE — 99285 EMERGENCY DEPT VISIT HI MDM: CPT

## 2018-07-22 PROCEDURE — 81003 URINALYSIS AUTO W/O SCOPE: CPT

## 2018-07-22 PROCEDURE — 71046 X-RAY EXAM CHEST 2 VIEWS: CPT

## 2018-07-22 PROCEDURE — 36415 COLL VENOUS BLD VENIPUNCTURE: CPT

## 2018-07-22 PROCEDURE — 71275 CT ANGIOGRAPHY CHEST: CPT

## 2018-07-22 PROCEDURE — 83735 ASSAY OF MAGNESIUM: CPT | Performed by: EMERGENCY MEDICINE

## 2018-07-22 PROCEDURE — 83880 ASSAY OF NATRIURETIC PEPTIDE: CPT | Performed by: EMERGENCY MEDICINE

## 2018-07-22 PROCEDURE — 80053 COMPREHEN METABOLIC PANEL: CPT | Performed by: EMERGENCY MEDICINE

## 2018-07-22 RX ORDER — MECLIZINE HYDROCHLORIDE 25 MG/1
25 TABLET ORAL 3 TIMES DAILY PRN
Qty: 30 TABLET | Refills: 0 | Status: SHIPPED | OUTPATIENT
Start: 2018-07-22 | End: 2018-10-08 | Stop reason: ALTCHOICE

## 2018-07-22 RX ORDER — MECLIZINE HCL 12.5 MG/1
25 TABLET ORAL ONCE
Status: DISCONTINUED | OUTPATIENT
Start: 2018-07-22 | End: 2018-07-22 | Stop reason: HOSPADM

## 2018-07-22 RX ADMIN — SODIUM CHLORIDE 1000 ML: 0.9 INJECTION, SOLUTION INTRAVENOUS at 16:57

## 2018-07-22 RX ADMIN — IOHEXOL 85 ML: 350 INJECTION, SOLUTION INTRAVENOUS at 17:30

## 2018-07-22 NOTE — ED PROVIDER NOTES
History  Chief Complaint   Patient presents with    Dizziness     Pt reports feeling dizzy around 0400 this am +N -V/D  Denies any falls recently  Denies HA, SOB  Appears pale, anxious  Denies any pain       77-year-old female comes in complaining of dizziness  States she woke up abruptly around 0400 hours and felt like the room was spinning some nausea no vomiting or diarrhea  Patient denies any recent head injury  Patient has no headache chest pain shortness of breath  Patient appears pale and anxious  Thinks this is mostly due to dehydration states she has not been drinking enough fluid lately  History provided by:  Patient   used: No    Dizziness   Quality:  Lightheadedness  Severity:  Moderate  Onset quality:  Sudden  Duration:  10 hours  Timing:  Constant  Progression:  Worsening  Chronicity:  New  Context: inactivity    Ineffective treatments:  None tried  Associated symptoms: nausea    Associated symptoms: no blood in stool, no chest pain, no diarrhea, no headaches, no palpitations, no shortness of breath and no vomiting    Nausea:     Severity:  Moderate    Onset quality:  Gradual    Duration:  10 hours    Timing:  Constant    Progression:  Worsening  Risk factors: no anemia, no Meniere's disease and no multiple medications        Prior to Admission Medications   Prescriptions Last Dose Informant Patient Reported? Taking?    DULoxetine (CYMBALTA) 30 mg delayed release capsule   No No   Sig: Take 1 capsule (30 mg total) by mouth daily at bedtime   DULoxetine (CYMBALTA) 60 mg delayed release capsule   No No   Sig: Take 1 capsule (60 mg total) by mouth daily in the early morning   LORazepam (ATIVAN) 0 5 mg tablet  Self No No   Sig: Take 1 tablet (0 5 mg total) by mouth 4 (four) times a day as needed for anxiety   lamoTRIgine (LaMICtal) 100 mg tablet   No No   Sig: Take 1 tablet (100 mg total) by mouth daily for 90 days   lamoTRIgine (LaMICtal) 150 MG tablet   No No   Sig: Take 1 tablet (150 mg total) by mouth daily at bedtime for 90 days   predniSONE 1 mg tablet  Self Yes No   Sig: Take by mouth daily Unknown dose  traMADol (ULTRAM) 50 mg tablet  Self Yes No   Sig: Take 50 mg by mouth every 6 (six) hours as needed for moderate pain      Facility-Administered Medications: None       Past Medical History:   Diagnosis Date    Abnormal liver scan     last assessed 8/14/2014    Atypical chest pain     last assessed 2/3/2016    Benign colonic polyp     Bladder disorder     last assessed 1/22/2013    Cervical radiculopathy     Chronic ITP (idiopathic thrombocytopenia) (HCC)     Chronic lumbar radiculopathy     Chronic pain     neck and back    Dermatitis     Herpes simplex     Hyperlipidemia     Luteinized follicular cyst     Menorrhagia     Obesity     Urge and stress incontinence     Uterine fibroid        Past Surgical History:   Procedure Laterality Date    CHOLECYSTECTOMY      HYSTERECTOMY      MELO    ORIF TIBIA & FIBULA FRACTURES Left 4/27/2018    Procedure: OPEN REDUCTION W/ INTERNAL FIXATION (ORIF) ANKLE;  Surgeon: Winifred Mena MD;  Location: [facility];  Service: Orthopedics    TOOTH EXTRACTION      last assessed 3/20/2017, oral surgery       Family History   Problem Relation Age of Onset    Stroke Father     Psychosis Father     Bipolar disorder Mother     Lung cancer Mother     Depression Family      I have reviewed and agree with the history as documented  Social History   Substance Use Topics    Smoking status: Current Every Day Smoker     Packs/day: 0 25    Smokeless tobacco: Never Used      Comment: per allscripts' hx of tobacco abuse'    Alcohol use No        Review of Systems   Constitutional: Negative for fatigue and fever  HENT: Negative for congestion and ear pain  Eyes: Negative for discharge and redness  Respiratory: Negative for apnea, cough, shortness of breath and wheezing      Cardiovascular: Negative for chest pain and palpitations  Gastrointestinal: Positive for nausea  Negative for abdominal pain, blood in stool, diarrhea and vomiting  Endocrine: Negative for cold intolerance and polydipsia  Genitourinary: Negative for difficulty urinating and hematuria  Musculoskeletal: Negative for arthralgias and back pain  Skin: Negative for color change and rash  Allergic/Immunologic: Negative for environmental allergies and immunocompromised state  Neurological: Positive for dizziness  Negative for numbness and headaches  Hematological: Negative for adenopathy  Does not bruise/bleed easily  Psychiatric/Behavioral: Negative for agitation and behavioral problems  Physical Exam  Physical Exam   Constitutional: She is oriented to person, place, and time  Vital signs are normal  She appears well-developed and well-nourished  Non-toxic appearance  HENT:   Head: Normocephalic and atraumatic  Right Ear: Tympanic membrane and external ear normal    Left Ear: Tympanic membrane and external ear normal    Nose: Nose normal  No rhinorrhea, sinus tenderness or nasal deformity  Mouth/Throat: Uvula is midline and oropharynx is clear and moist  Normal dentition  Eyes: Conjunctivae, EOM and lids are normal  Pupils are equal, round, and reactive to light  Right eye exhibits no discharge  Left eye exhibits no discharge  Neck: Trachea normal and normal range of motion  Neck supple  No JVD present  Carotid bruit is not present  Cardiovascular: Normal rate, regular rhythm, intact distal pulses and normal pulses  No extrasystoles are present  PMI is not displaced  Pulmonary/Chest: Effort normal and breath sounds normal  No accessory muscle usage  No respiratory distress  She has no wheezes  She has no rhonchi  She has no rales  Abdominal: Soft  Normal appearance and bowel sounds are normal  She exhibits no mass  There is no tenderness  There is no rigidity, no rebound and no guarding     Musculoskeletal:        Right shoulder: She exhibits normal range of motion, no bony tenderness, no swelling and no deformity  Cervical back: Normal  She exhibits normal range of motion, no tenderness, no bony tenderness and no deformity  Lymphadenopathy:     She has no cervical adenopathy  She has no axillary adenopathy  Neurological: She is alert and oriented to person, place, and time  She has normal strength and normal reflexes  No cranial nerve deficit or sensory deficit  GCS eye subscore is 4  GCS verbal subscore is 5  GCS motor subscore is 6  Skin: Skin is warm and dry  No rash noted  Psychiatric: She has a normal mood and affect  Her speech is normal and behavior is normal    Nursing note and vitals reviewed        Vital Signs  ED Triage Vitals [07/22/18 1542]   Temperature Pulse Respirations Blood Pressure SpO2   98 2 °F (36 8 °C) 75 18 131/59 96 %      Temp Source Heart Rate Source Patient Position - Orthostatic VS BP Location FiO2 (%)   Oral Monitor Lying Right arm --      Pain Score       No Pain           Vitals:    07/22/18 1542 07/22/18 1902   BP: 131/59 137/63   Pulse: 75 72   Patient Position - Orthostatic VS: Lying Lying       Visual Acuity      ED Medications  Medications   sodium chloride 0 9 % bolus 1,000 mL (0 mL Intravenous Stopped 7/22/18 1903)   iohexol (OMNIPAQUE) 350 MG/ML injection (MULTI-DOSE) 85 mL (85 mL Intravenous Given 7/22/18 1730)       Diagnostic Studies  Results Reviewed     Procedure Component Value Units Date/Time    Magnesium [[id number]]  (Normal) Collected:  07/22/18 1600    Lab Status:  Final result Specimen:  Blood from Arm, Left Updated:  07/22/18 1746     Magnesium 2 1 mg/dL     B-type natriuretic peptide [[id number]]  (Abnormal) Collected:  07/22/18 1600    Lab Status:  Final result Specimen:  Blood from Arm, Left Updated:  07/22/18 1746     NT-proBNP 156 (H) pg/mL     TSH, 3rd generation [[id number]]  (Normal) Collected:  07/22/18 1600    Lab Status:  Final result Specimen:  Blood from Arm, Left Updated:  07/22/18 1746     TSH 3RD GENERATON 0 627 uIU/mL     Narrative:         Patients undergoing fluorescein dye angiography may retain small amounts of fluorescein in the body for 48-72 hours post procedure  Samples containing fluorescein can produce falsely depressed TSH values  If the patient had this procedure,a specimen should be resubmitted post fluorescein clearance            The recommended reference ranges for TSH during pregnancy are as follows:  First trimester 0 1 to 2 5 uIU/mL  Second trimester  0 2 to 3 0 uIU/mL  Third trimester 0 3 to 3 0 uIU/m      ED Urine Macroscopic [[id number]] Collected:  07/22/18 1741    Lab Status:  Final result Specimen:  Urine Updated:  07/22/18 1734     Color, UA Yellow     Clarity, UA Slightly Cloudy     pH, UA 7 0     Leukocytes, UA Negative     Nitrite, UA Negative     Protein, UA Negative mg/dl      Glucose, UA Negative mg/dl      Ketones, UA Negative mg/dl      Urobilinogen, UA 0 2 E U /dl      Bilirubin, UA Negative     Blood, UA Negative     Specific Gravity, UA 1 020    Narrative:       CLINITEK RESULT    D-dimer, quantitative [[id number]]  (Abnormal) Collected:  07/22/18 1654    Lab Status:  Final result Specimen:  Blood from Arm, Left Updated:  07/22/18 1724     D-Dimer, Quant 794 (H) ng/ml (FEU)     Protime-INR [[id number]]  (Normal) Collected:  07/22/18 1654    Lab Status:  Final result Specimen:  Blood from Arm, Left Updated:  07/22/18 1724     Protime 12 5 seconds      INR 0 96    APTT [[id number]]  (Abnormal) Collected:  07/22/18 1654    Lab Status:  Final result Specimen:  Blood from Arm, Left Updated:  07/22/18 1724     PTT 37 (H) seconds     CBC and differential [[id number]]  (Abnormal) Collected:  07/22/18 1600    Lab Status:  Final result Specimen:  Blood from Arm, Left Updated:  07/22/18 1723     WBC 4 77 Thousand/uL      RBC 4 36 Million/uL      Hemoglobin 12 4 g/dL      Hematocrit 39 1 %      MCV 90 fL      MCH 28 4 pg      MCHC 31 7 g/dL RDW 13 9 %      MPV 14 5 (H) fL      Platelets 153 (L) Thousands/uL      Neutrophils Relative 60 %      Lymphocytes Relative 29 %      Monocytes Relative 8 %      Eosinophils Relative 3 %      Basophils Relative 0 %      Neutrophils Absolute 2 86 Thousands/µL      Lymphocytes Absolute 1 38 Thousands/µL      Monocytes Absolute 0 37 Thousand/µL      Eosinophils Absolute 0 15 Thousand/µL      Basophils Absolute 0 01 Thousands/µL     Troponin I [[id number]]  (Normal) Collected:  07/22/18 1600    Lab Status:  Final result Specimen:  Blood from Arm, Left Updated:  07/22/18 1630     Troponin I <0 02 ng/mL     Comprehensive metabolic panel [[id number]] Collected:  07/22/18 1600    Lab Status:  Final result Specimen:  Blood from Arm, Left Updated:  07/22/18 1629     Sodium 142 mmol/L      Potassium 3 9 mmol/L      Chloride 107 mmol/L      CO2 27 mmol/L      Anion Gap 8 mmol/L      BUN 6 mg/dL      Creatinine 0 72 mg/dL      Glucose 102 mg/dL      Calcium 9 4 mg/dL      AST 22 U/L      ALT 28 U/L      Alkaline Phosphatase 95 U/L      Total Protein 7 3 g/dL      Albumin 3 6 g/dL      Total Bilirubin 0 30 mg/dL      eGFR 95 ml/min/1 73sq m     Narrative:         National Kidney Disease Education Program recommendations are as follows:  GFR calculation is accurate only with a steady state creatinine  Chronic Kidney disease less than 60 ml/min/1 73 sq  meters  Kidney failure less than 15 ml/min/1 73 sq  meters  CTA ED chest PE study   Final Result by Ankita Rich MD (07/22 1806)   No pulmonary embolus  Workstation performed: [id number]         CT head without contrast   Final Result by Lise Artis MD (07/22 3657)      No acute intracranial abnormality  Workstation performed: [id number]         X-ray chest 2 views   Final Result by Gardenia Call MD (07/22 1625)      No acute cardiopulmonary disease              Workstation performed: [id number] Procedures  Procedures       Phone Contacts  ED Phone Contact    ED Course                               MDM  Number of Diagnoses or Management Options  Dizzy: new and requires workup     Amount and/or Complexity of Data Reviewed  Clinical lab tests: ordered and reviewed  Tests in the radiology section of CPT®: ordered and reviewed  Tests in the medicine section of CPT®: ordered and reviewed  Independent visualization of images, tracings, or specimens: yes    Patient Progress  Patient progress: stable    CritCare Time    Disposition  Final diagnoses:   Dizzy     Time reflects when diagnosis was documented in both MDM as applicable and the Disposition within this note     Time User Action Codes Description Comment    7/22/2018  6:18 PM Francis Dos Santos Add [R42] [address]       ED Disposition     ED Disposition Condition Comment    Discharge  Stew Banerjee discharge to home/self care  Condition at discharge: Good        Follow-up Information     Follow up With Specialties Details Why [address]  [facility], DO Internal Medicine Schedule an appointment as soon as possible for a visit  [address]  [phone]            Discharge Medication List as of 7/22/2018  6:19 PM      START taking these medications    Details   meclizine (ANTIVERT) 25 mg tablet Take 1 tablet (25 mg total) by mouth 3 (three) times a day as needed for dizziness, Starting Sun 7/22/2018, Normal         CONTINUE these medications which have NOT CHANGED    Details   !! DULoxetine (CYMBALTA) 30 mg delayed release capsule Take 1 capsule (30 mg total) by mouth daily at bedtime, Starting Thu 6/14/2018, Normal      !!  DULoxetine (CYMBALTA) 60 mg delayed release capsule Take 1 capsule (60 mg total) by mouth daily in the early morning, Starting Thu 6/14/2018, Normal      !! lamoTRIgine (LaMICtal) 100 mg tablet Take 1 tablet (100 mg total) by mouth daily for 90 days, Starting Mon 6/25/2018, Until Sun 9/23/2018, Normal      !! lamoTRIgine (LaMICtal) 150 MG tablet Take 1 tablet (150 mg total) by mouth daily at bedtime for 90 days, Starting Mon 6/25/2018, Until Sun 9/23/2018, Normal      LORazepam (ATIVAN) 0 5 mg tablet Take 1 tablet (0 5 mg total) by mouth 4 (four) times a day as needed for anxiety, Starting Wed 6/6/2018, Normal      predniSONE 1 mg tablet Take by mouth daily Unknown dose , Historical Med      traMADol (ULTRAM) 50 mg tablet Take 50 mg by mouth every 6 (six) hours as needed for moderate pain, Historical Med       !! - Potential duplicate medications found  Please discuss with provider  No discharge procedures on file      ED Provider  Electronically Signed by           Alise Denton DO  07/24/18 6201

## 2018-07-22 NOTE — DISCHARGE INSTRUCTIONS
Dizziness, Ambulatory Care   GENERAL INFORMATION:   Dizziness  is a term used to describe a feeling of being off balance or unsteady  Common causes of dizziness are an inner ear fluid imbalance or a lack of oxygen in your blood  Your dizziness may be acute (lasts 3 days or less) or chronic (lasts longer than 3 days)  You may have dizzy spells that last from seconds to a few hours  Common symptoms related to dizziness include the following:   · A feeling that your surroundings are moving even though you are standing still    · Ringing in your ears or hearing loss     · Feeling faint or lightheaded     · Weakness or unsteadiness     · Double vision or eye movements you cannot control    · Nausea or vomiting     · Confusion  Seek immediate care for the following symptoms:   · You have a headache that does not go away with medicine  · You have shaking chills and a fever  · You vomit over and over with no relief  · Your vomit or bowel movements are red or black  · You have pain in your chest, back, or abdomen  · You have numbness, especially in your face, arms, or legs  · You have trouble moving your arms or legs  Treatment for dizziness  depends on the cause of your dizziness  You may need medicines to decrease your dizziness or nausea  You may need to be admitted to the hospital for treatment  Manage your symptoms:  Get up slowly from sitting or lying down  Do not drive or operate machinery when you are dizzy  Follow up with your healthcare provider as directed:  Write down your questions so you remember to ask them during your visits  CARE AGREEMENT:   You have the right to help plan your care  Learn about your health condition and how it may be treated  Discuss treatment options with your caregivers to decide what care you want to receive  You always have the right to refuse treatment  The above information is an  only   It is not intended as medical advice for individual conditions or treatments  Talk to your doctor, nurse or pharmacist before following any medical regimen to see if it is safe and effective for you  © 2014 [address] is for End User's use only and may not be sold, redistributed or otherwise used for commercial purposes  All illustrations and images included in CareNotes® are the copyrighted property of A D A M [employer]  or Josh Kemp

## 2018-07-23 ENCOUNTER — APPOINTMENT (OUTPATIENT)
Dept: PHYSICAL THERAPY | Facility: CLINIC | Age: 53
End: 2018-07-23
Payer: COMMERCIAL

## 2018-07-23 LAB
ATRIAL RATE: 73 BPM
P AXIS: 40 DEGREES
PR INTERVAL: 162 MS
QRS AXIS: 23 DEGREES
QRSD INTERVAL: 86 MS
QT INTERVAL: 368 MS
QTC INTERVAL: 405 MS
T WAVE AXIS: 41 DEGREES
VENTRICULAR RATE: 73 BPM

## 2018-07-23 PROCEDURE — 93010 ELECTROCARDIOGRAM REPORT: CPT | Performed by: INTERNAL MEDICINE

## 2018-07-24 ENCOUNTER — TELEPHONE (OUTPATIENT)
Dept: INTERNAL MEDICINE CLINIC | Facility: CLINIC | Age: 53
End: 2018-07-24

## 2018-07-24 NOTE — TELEPHONE ENCOUNTER
Spoke with patient directly  She wanted to make me aware of recent ER visit 2 days ago for dizziness  She is still feeling dizzy, meclizine helps but when it wears off she's dizzy  No associated n/v/d, falls, weakness   She has an appt with me tomorrow to evaluate   Instructed to go to ER for any worsening symptoms

## 2018-07-25 ENCOUNTER — OFFICE VISIT (OUTPATIENT)
Dept: INTERNAL MEDICINE CLINIC | Facility: CLINIC | Age: 53
End: 2018-07-25
Payer: COMMERCIAL

## 2018-07-25 VITALS
WEIGHT: 235.4 LBS | BODY MASS INDEX: 35.68 KG/M2 | SYSTOLIC BLOOD PRESSURE: 120 MMHG | OXYGEN SATURATION: 96 % | HEIGHT: 68 IN | HEART RATE: 104 BPM | DIASTOLIC BLOOD PRESSURE: 62 MMHG

## 2018-07-25 DIAGNOSIS — H81.10 BENIGN PAROXYSMAL POSITIONAL VERTIGO, UNSPECIFIED LATERALITY: Primary | ICD-10-CM

## 2018-07-25 PROCEDURE — 4004F PT TOBACCO SCREEN RCVD TLK: CPT | Performed by: NURSE PRACTITIONER

## 2018-07-25 PROCEDURE — 3008F BODY MASS INDEX DOCD: CPT | Performed by: NURSE PRACTITIONER

## 2018-07-25 PROCEDURE — 99213 OFFICE O/P EST LOW 20 MIN: CPT | Performed by: NURSE PRACTITIONER

## 2018-07-25 NOTE — PROGRESS NOTES
Assessment/Plan:     Diagnoses and all orders for this visit:    Benign paroxysmal positional vertigo, unspecified laterality  Comments:  continue meclizine since improving  if condition worsens we discussed vestibular therapy           Subjective:      Patient ID: Stew Banerjee is a 46 y o  female  Here for ER follow up for dizziness  She started Saturday with severe dizziness, she was seen in the ER Hussein morning and given meclizine  She has been taking the meclizine which helps but still having intermittent dizziness  Room spinning sensation with certain movements  This has greatly improved since the ER  Denies any nausea or vomiting   Dizziness started Saturday- went to the ER, given meclizine   Patient is taking with some relief   Today still dizzy but improving   Room spinning sensation   Denies any cp, sob, weakness           The following portions of the patient's history were reviewed and updated as appropriate: allergies, current medications, past family history, past medical history, past social history, past surgical history and problem list     Review of Systems   Constitutional: Negative  HENT: Negative  Respiratory: Negative  Cardiovascular: Negative  Gastrointestinal: Negative  Musculoskeletal:        Left foot s/p surgery   Skin: Negative for rash  Neurological: Positive for dizziness  Negative for syncope, facial asymmetry, speech difficulty, weakness, numbness and headaches  Psychiatric/Behavioral: Negative  Objective:      /62   Pulse 104   Ht 5' 8" (1 727 m)   Wt 107 kg (235 lb 6 4 oz)   SpO2 96%   BMI 35 79 kg/m²          Physical Exam   Constitutional: She is oriented to person, place, and time  She appears well-developed and well-nourished  HENT:   Right Ear: External ear normal    Left Ear: External ear normal    Eyes: Pupils are equal, round, and reactive to light  Cardiovascular: Normal rate, regular rhythm and normal heart sounds  Pulmonary/Chest: Effort normal and breath sounds normal    Musculoskeletal: She exhibits no edema  Neurological: She is alert and oriented to person, place, and time  She has normal strength  No cranial nerve deficit or sensory deficit  Coordination and gait normal    Skin: No rash noted  Psychiatric: She has a normal mood and affect  Her behavior is normal    Vitals reviewed

## 2018-07-26 ENCOUNTER — OFFICE VISIT (OUTPATIENT)
Dept: PHYSICAL THERAPY | Facility: CLINIC | Age: 53
End: 2018-07-26
Payer: COMMERCIAL

## 2018-07-26 DIAGNOSIS — M79.671 PAIN IN RIGHT FOOT: ICD-10-CM

## 2018-07-26 DIAGNOSIS — Z48.89 AFTERCARE FOLLOWING SURGERY: ICD-10-CM

## 2018-07-26 DIAGNOSIS — M25.572 PAIN, JOINT, ANKLE AND FOOT, LEFT: Primary | ICD-10-CM

## 2018-07-26 PROCEDURE — 97116 GAIT TRAINING THERAPY: CPT

## 2018-07-26 PROCEDURE — 97110 THERAPEUTIC EXERCISES: CPT

## 2018-07-26 PROCEDURE — 97112 NEUROMUSCULAR REEDUCATION: CPT

## 2018-07-26 PROCEDURE — 97140 MANUAL THERAPY 1/> REGIONS: CPT

## 2018-07-26 NOTE — PROGRESS NOTES
Daily Note     Today's date: 2018  Patient name: Malaika Rocha  : 1965  MRN: [id number]  Referring provider: Sharyle Schlichter, MD  Dx:   Encounter Diagnosis     ICD-10-CM    1  Pain, joint, ankle and foot, left M25 572    2  Aftercare following surgery Z48 89    3  Pain in right foot M79 671                   Subjective: Patient reports sensitivity and irritation at her lateral scar site with laying on her left side  Objective: See treatment diary below    Assessment: Pt demonstrated fair stability with SLS and ambulating with SPC on grass  Pt demonstrated increased PF strength  Plan: Cont  POC as per PT  Precautions:  PMHx: anxiety, bipolar disorder, chronic ITP  R LE WBAT, L LE WBAT     Daily Treatment Diary      Manual      L ankle PROM  10 min         10 mins                                                                                                     Exercise Diary     L ankle AROM in elevation  1x30         1x30   Supine L GA stretch  15"x3         15"x3   R seated towel scrunches  np         NP   L seated towel scrunches  1x60         1x60   BioDex: R SLS  np         NP   L ankle TB PF BlueTB  3x15         G/ 3x15   L seated WB AROM: PF/DF, INV/TEMITOPE  1x30         1x30 ea   AlterG: gait training - 2XL 55%BW 1 3 mph  10 min         held   SLR           D/C   WB: CW/CCW  1x30 ea         1x30 ea   Biodex: mini squats   2x12         2x10   SLS: EO  R/ 60"x1  L 30"x1         R/ 60"x1  L 30"x1   Standing GA stretch  L/ 10"x3         L/  10"x3   Standing DL heel raises  2x10         1x10   Gait Training SPC, CG Ax1, Sidewalk, [facility]  275' w/ AD, 250' w/o AD         275' w/ AD, 250' w/o AD                                                                                                    Modalities

## 2018-07-30 ENCOUNTER — OFFICE VISIT (OUTPATIENT)
Dept: BEHAVIORAL/MENTAL HEALTH CLINIC | Facility: CLINIC | Age: 53
End: 2018-07-30
Payer: COMMERCIAL

## 2018-07-30 ENCOUNTER — OFFICE VISIT (OUTPATIENT)
Dept: PHYSICAL THERAPY | Facility: CLINIC | Age: 53
End: 2018-07-30
Payer: COMMERCIAL

## 2018-07-30 DIAGNOSIS — F41.0 PANIC DISORDER WITHOUT AGORAPHOBIA: Chronic | ICD-10-CM

## 2018-07-30 DIAGNOSIS — F41.1 GAD (GENERALIZED ANXIETY DISORDER): Chronic | ICD-10-CM

## 2018-07-30 DIAGNOSIS — M25.572 PAIN, JOINT, ANKLE AND FOOT, LEFT: Primary | ICD-10-CM

## 2018-07-30 DIAGNOSIS — M79.671 PAIN IN RIGHT FOOT: ICD-10-CM

## 2018-07-30 DIAGNOSIS — F31.81 BIPOLAR II DISORDER (HCC): Primary | Chronic | ICD-10-CM

## 2018-07-30 DIAGNOSIS — Z48.89 AFTERCARE FOLLOWING SURGERY: ICD-10-CM

## 2018-07-30 PROCEDURE — 90834 PSYTX W PT 45 MINUTES: CPT | Performed by: SOCIAL WORKER

## 2018-07-30 PROCEDURE — 97112 NEUROMUSCULAR REEDUCATION: CPT

## 2018-07-30 PROCEDURE — 97140 MANUAL THERAPY 1/> REGIONS: CPT

## 2018-07-30 PROCEDURE — 97116 GAIT TRAINING THERAPY: CPT

## 2018-07-30 PROCEDURE — 97110 THERAPEUTIC EXERCISES: CPT

## 2018-07-30 NOTE — PROGRESS NOTES
Daily Note     Today's date: 2018  Patient name: Malaika Rocha  : 1965  MRN: [id number]  Referring provider: Sharyle Schlichter, MD  Dx:   Encounter Diagnosis     ICD-10-CM    1  Pain, joint, ankle and foot, left M25 572    2  Aftercare following surgery Z48 89    3  Pain in right foot M79 671        Start Time: 1100  Stop Time: 1155  Total time in clinic (min): 55 minutes    Subjective: Patient reports no pain today just some mild discomfort at her medial malleolus  Patient reports that she mainly uses her cane for outside use and is getting around well at home with out a AD  Objective: See treatment diary below  Patient makes sure she is aware of heel strike and toe off during gait training  Assessment: Pt demonstrated min fatigue after 400' of gait training noting decreased DF  Plan: Cont  POC as per PT  Precautions:  PMHx: anxiety, bipolar disorder, chronic ITP  R LE WBAT, L LE WBAT     Daily Treatment Diary      Manual      L ankle PROM  10 min 10   min        10 mins                                                                                                     Exercise Diary     L ankle AROM in elevation  1x30 1x30        1x30   Supine L GA stretch  15"x3 15"x3        15"x3   R seated towel scrunches  np np        NP   L seated towel scrunches  1x60 1x60        1x60   BioDex: R SLS  np np        NP   L ankle TB PF BlueTB  3x15 BlueTB  3x15        G/ 3x15   L seated WB AROM: PF/DF, INV/TEMITOPE  1x30 1x30        1x30 ea   AlterG: gait training - 2XL 55%BW 1 3 mph  10 min 55% BW  1 3 mph  10 min        held   SLR           D/C   WB: CW/CCW  1x30 ea 1x30  ea        1x30 ea   Biodex: mini squats   2x12 2x12        2x10   SLS: EO  R/ 60"x1  L 30"x1 R/ 60"x1  L 30"x1        R/ 60"x1  L 30"x1   Standing GA stretch  L/ 10"x3  L/  10"x3        L/  10"x3   Standing DL heel raises  2x10 2x10        1x10   Gait Training SPC, CG Ax1, Sidewalk, Grass  275' w/ AD, 250' w/o '  W/ no AD        275' w/ AD, 250' w/o AD                                                                                                    Modalities

## 2018-07-30 NOTE — PSYCH
Psychotherapy Provided: Individual Psychotherapy 50 minutes     Length of time in session: 50 minutes, follow up in 2 week    Goals addressed in session: Goal 1, Goal 2 and Goal 3      Pain:      none    0    Current suicide risk : Low        D: Met with Janine individually  ROS: 'Things are crazy '  Session focused upon Janine standing up for herself with [employer] and other family members  Jenna Rome acknowledged feeling very frustrated and overwhelmed  Jerzy's illness remains critical with possible regression  States she is no longer 'in a dark place' yet having  in the home is main contributing factor  Due to finances they can't split until legal decisions for disability and/or settlement from ankle injury comes through  Fleeting SI periodically without plan (1-2x a week)  A: Jenna Rome presented with congruent mood and constricted affect  Family dynamics and finances are primary factors towards current circumstances  P: Continue individual therapy, monitor symptoms, support for current psychosocial stressors  Behavioral Health Treatment Plan [facility]: Diagnosis and Treatment Plan explained to Beverly Felix relates understanding diagnosis and is agreeable to Treatment Plan   Yes

## 2018-08-02 ENCOUNTER — OFFICE VISIT (OUTPATIENT)
Dept: PHYSICAL THERAPY | Facility: CLINIC | Age: 53
End: 2018-08-02
Payer: COMMERCIAL

## 2018-08-02 DIAGNOSIS — Z48.89 AFTERCARE FOLLOWING SURGERY: ICD-10-CM

## 2018-08-02 DIAGNOSIS — M25.572 PAIN, JOINT, ANKLE AND FOOT, LEFT: Primary | ICD-10-CM

## 2018-08-02 DIAGNOSIS — M79.671 PAIN IN RIGHT FOOT: ICD-10-CM

## 2018-08-02 PROCEDURE — 97110 THERAPEUTIC EXERCISES: CPT | Performed by: PHYSICAL THERAPIST

## 2018-08-02 PROCEDURE — G8978 MOBILITY CURRENT STATUS: HCPCS | Performed by: PHYSICAL THERAPIST

## 2018-08-02 PROCEDURE — G8979 MOBILITY GOAL STATUS: HCPCS | Performed by: PHYSICAL THERAPIST

## 2018-08-02 PROCEDURE — 97116 GAIT TRAINING THERAPY: CPT | Performed by: PHYSICAL THERAPIST

## 2018-08-02 NOTE — PROGRESS NOTES
Daily Note     Today's date: 2018  Patient name: Aurelio Lewis  : 1965  MRN: [id number]  Referring provider: Julia Dumas MD  Dx:   Encounter Diagnosis     ICD-10-CM    1  Pain, joint, ankle and foot, left M25 572    2  Aftercare following surgery Z48 89    3  Pain in right foot M79 671                   Subjective: Pt reports using SPC for outdoor ambulation only  She reports not yet being able to perform reciprocal gait pattern on stairs  Objective: See treatment diary below    Assessment: Pt demonstrated painful DF and poor ecc lowering with 4" step-downs  Plan: Cont  POC  Precautions:  PMHx: anxiety, bipolar disorder, chronic ITP  R LE WBAT, L LE WBAT  Dx: s/p L ankle ORIF    Daily Treatment Diary      Manual     8/2          L ankle PROM  10 min 10   min  10 min                                                                                                            Exercise Diary    8/2          L ankle AROM in elevation  1x30 1x30  np          Supine L GA stretch  15"x3 15"x3  d/c'ed          L seated towel scrunches  1x60 1x60  np          L ankle TB PF BlueTB  3x15 BlueTB  3x15  blue/   3x15          L seated WB AROM: PF/DF, INV/TEMITOPE  1x30 1x30  np          AlterG: gait training - 2XL 55%BW 1 3 mph  10 min 55% BW  1 3 mph  10 min  65% BW   1 4 mph   10 min          WB: CW/CCW  1x30 ea 1x30  ea  np          Biodex: mini squats   2x12 2x12            BioDex: SLS: EO  R/ 60"x1  L 30"x1 R/ 60"x1  L 30"x1  R/60"x1   L/30"x2             Standing GA stretch  L/ 10"x3  L/  10"x3  np          Standing DL heel raises  2x10 2x10  3x10          Gait Training SPC, CG Ax1, Sidewalk, Grass  275' w/ AD, 250' w/o '  W/ no AD  no AD   1x400'          Standing GA/SO stretches     10"x3   ea          Step-ups/downs     4"/   2x10

## 2018-08-04 DIAGNOSIS — F31.81 BIPOLAR II DISORDER (HCC): ICD-10-CM

## 2018-08-04 DIAGNOSIS — F41.0 PANIC DISORDER WITHOUT AGORAPHOBIA: ICD-10-CM

## 2018-08-04 DIAGNOSIS — F41.1 GAD (GENERALIZED ANXIETY DISORDER): ICD-10-CM

## 2018-08-04 RX ORDER — DULOXETIN HYDROCHLORIDE 60 MG/1
60 CAPSULE, DELAYED RELEASE ORAL
Qty: 90 CAPSULE | Refills: 0 | Status: SHIPPED | OUTPATIENT
Start: 2018-08-04 | End: 2018-09-25 | Stop reason: SDUPTHER

## 2018-08-06 ENCOUNTER — OFFICE VISIT (OUTPATIENT)
Dept: PHYSICAL THERAPY | Facility: CLINIC | Age: 53
End: 2018-08-06
Payer: COMMERCIAL

## 2018-08-06 DIAGNOSIS — M79.671 PAIN IN RIGHT FOOT: ICD-10-CM

## 2018-08-06 DIAGNOSIS — M25.572 PAIN, JOINT, ANKLE AND FOOT, LEFT: Primary | ICD-10-CM

## 2018-08-06 DIAGNOSIS — Z48.89 AFTERCARE FOLLOWING SURGERY: ICD-10-CM

## 2018-08-06 PROCEDURE — 97116 GAIT TRAINING THERAPY: CPT

## 2018-08-06 PROCEDURE — 97112 NEUROMUSCULAR REEDUCATION: CPT

## 2018-08-06 PROCEDURE — 97110 THERAPEUTIC EXERCISES: CPT

## 2018-08-06 PROCEDURE — 97140 MANUAL THERAPY 1/> REGIONS: CPT

## 2018-08-06 NOTE — PROGRESS NOTES
Daily Note     Today's date: 2018  Patient name: Corey Belcher  : 1965  MRN: [id number]  Referring provider: Neri Moreau MD  Dx:   Encounter Diagnosis     ICD-10-CM    1  Pain, joint, ankle and foot, left M25 572    2  Aftercare following surgery Z48 89    3  Pain in right foot M79 671        Start Time: 1105  Stop Time: 1205  Total time in clinic (min): 60 minutes    Subjective: Pt reports reports that she has no pain at rest just some minor aches and soreness  Patient reports having 3/10 pain with activity  Patient also reports that she does not want to go outside today because it is too hot and humid  Patient reports some toe pain during ambulation  Objective: See treatment diary below    Assessment: Pt demonstrated painful DF and poor ecc lowering with 4" step-downs  Patient demonstrates decreased toe off at time during gait training secondary to pain with toe extension  Plan: Cont  POC  Precautions:  PMHx: anxiety, bipolar disorder, chronic ITP  R LE WBAT, L LE WBAT  Dx: s/p L ankle ORIF    Daily Treatment Diary      Manual     8 8/6         L ankle PROM  10 min 10   min  10 min 10 min                                                                                                           Exercise Diary    8/2 8/6         L ankle AROM in elevation  1x30 1x30  np np         Supine L GA stretch  15"x3 15"x3  d/c'ed dc         L seated towel scrunches  1x60 1x60  np np         L ankle TB PF BlueTB  3x15 BlueTB  3x15  blue/   3x15 Blue/  3x15         L seated WB AROM: PF/DF, INV/TEMITOPE  1x30 1x30  np np         AlterG: gait training - 2XL 55%BW 1 3 mph  10 min 55% BW  1 3 mph  10 min  65% BW   1 4 mph   10 min 65% BW 1 4 mph 10 min         WB: CW/CCW  1x30 ea 1x30  ea  np np         Biodex: mini squats   2x12 2x12   2x12         BioDex: SLS: EO  R/ 60"x1  L 30"x1 R/ 60"x1  L 30"x1  R/60"x1   L/30"x2    R/ 60"x1  L/ 30"x2         Standing GA stretch  L/ 10"x3  L/  10"x3 np np         Standing DL heel raises  2x10 2x10  3x10 3x10         Gait Training SPC, CG Ax1, Sidewalk, Grass  389' w/ AD, 250' w/o '  W/ no AD  no AD   1x400' No AD  400 ft         Standing GA/SO stretches     10"x3   ea 10"x3 ea         Step-ups/downs     4"/   2x10 4"/   2x10

## 2018-08-08 ENCOUNTER — TELEPHONE (OUTPATIENT)
Dept: INTERNAL MEDICINE CLINIC | Facility: CLINIC | Age: 53
End: 2018-08-08

## 2018-08-08 DIAGNOSIS — R42 DIZZINESS: Primary | ICD-10-CM

## 2018-08-08 NOTE — TELEPHONE ENCOUNTER
I would recommend Eple[employer] manuevers which she can google and try at home (too difficult to explain in writing), and can also do the vestibular therapy that Mata Jauregui discussed, and I entered in a referral should she decide to pursue this  If pt having severe dizziness and difficulty swallowing, she should get to the ED

## 2018-08-09 ENCOUNTER — OFFICE VISIT (OUTPATIENT)
Dept: OBGYN CLINIC | Facility: HOSPITAL | Age: 53
End: 2018-08-09
Payer: COMMERCIAL

## 2018-08-09 ENCOUNTER — OFFICE VISIT (OUTPATIENT)
Dept: PHYSICAL THERAPY | Facility: CLINIC | Age: 53
End: 2018-08-09
Payer: COMMERCIAL

## 2018-08-09 ENCOUNTER — HOSPITAL ENCOUNTER (OUTPATIENT)
Dept: RADIOLOGY | Facility: HOSPITAL | Age: 53
Discharge: HOME | End: 2018-08-09
Attending: ORTHOPAEDIC SURGERY
Payer: COMMERCIAL

## 2018-08-09 VITALS
SYSTOLIC BLOOD PRESSURE: 121 MMHG | DIASTOLIC BLOOD PRESSURE: 79 MMHG | HEART RATE: 94 BPM | WEIGHT: 233 LBS | BODY MASS INDEX: 35.31 KG/M2 | HEIGHT: 68 IN

## 2018-08-09 DIAGNOSIS — Z98.890 S/P ORIF (OPEN REDUCTION INTERNAL FIXATION) FRACTURE: ICD-10-CM

## 2018-08-09 DIAGNOSIS — Z87.81 S/P ORIF (OPEN REDUCTION INTERNAL FIXATION) FRACTURE: ICD-10-CM

## 2018-08-09 DIAGNOSIS — M25.572 PAIN, JOINT, ANKLE AND FOOT, LEFT: ICD-10-CM

## 2018-08-09 DIAGNOSIS — M25.572 PAIN, JOINT, ANKLE AND FOOT, LEFT: Primary | ICD-10-CM

## 2018-08-09 DIAGNOSIS — M79.671 PAIN IN RIGHT FOOT: ICD-10-CM

## 2018-08-09 DIAGNOSIS — Z48.89 AFTERCARE FOLLOWING SURGERY: ICD-10-CM

## 2018-08-09 PROCEDURE — 97112 NEUROMUSCULAR REEDUCATION: CPT

## 2018-08-09 PROCEDURE — 73610 X-RAY EXAM OF ANKLE: CPT

## 2018-08-09 PROCEDURE — 97140 MANUAL THERAPY 1/> REGIONS: CPT

## 2018-08-09 PROCEDURE — 97110 THERAPEUTIC EXERCISES: CPT

## 2018-08-09 PROCEDURE — 99213 OFFICE O/P EST LOW 20 MIN: CPT | Performed by: ORTHOPAEDIC SURGERY

## 2018-08-09 PROCEDURE — 97116 GAIT TRAINING THERAPY: CPT

## 2018-08-09 NOTE — PROGRESS NOTES
Daily Note     Today's date: 2018  Patient name: Mattie Babinski  : 1965  MRN: [id number]  Referring provider: Janene Gill MD  Dx:   Encounter Diagnosis     ICD-10-CM    1  Pain, joint, ankle and foot, left M25 572    2  Aftercare following surgery Z48 89    3  Pain in right foot M79 671                   Subjective: Pt reports reports she is able to walk outside on uneven ground without an AD  Objective: See treatment diary below    Assessment: Pt demonstrated fair eccentric control with 4" step downs and standing heel raises  Pt demonstrated slowly increasing tolerance to functional strengthening  Plan: Cont  POC as per PT  Precautions:  PMHx: anxiety, bipolar disorder, chronic ITP  R LE WBAT, L LE WBAT  Dx: s/p L ankle ORIF    Daily Treatment Diary      Manual    8/9        L ankle PROM  10 min 10   min  10 min 10 min 10 min                                                                                                          Exercise Diary   8 8/9        L ankle AROM in elevation  1x30 1x30  np np np        Supine L GA stretch  15"x3 15"x3  d/c'ed dc         L seated towel scrunches  1x60 1x60  np np np        L ankle TB PF BlueTB  3x15 BlueTB  3x15  blue/   3x15 Blue/  3x15 Black/  3x15        L seated WB AROM: PF/DF, INV/TEMITOPE  1x30 1x30  np np np        AlterG: gait training - 2XL 55%BW 1 3 mph  10 min 55% BW  1 3 mph  10 min  65% BW   1 4 mph   10 min 65% BW 1 4 mph 10 min 70% BW 1 4 mph 10 min        WB: CW/CCW  1x30 ea 1x30  ea  np np np        Biodex: mini squats   2x12 2x12   2x12 2x15        BioDex: SLS: EO  R/ 60"x1  L 30"x1 R/ 60"x1  L 30"x1  R/60"x1   L/30"x2    R/ 60"x1  L/ 30"x2 R/ 60"x1  L/ 30"x2        Standing GA stretch  L/ 10"x3  L/  10"x3  np np np        Standing DL heel raises  2x10 2x10  3x10 3x10 3x12        Gait Training SPC, CG Ax1, Sidewalk, Grass  275' w/ AD, 250' w/o '  W/ no AD  no AD   1x400' No AD  400 ft No AD  400 ft Standing GA/SO stretches     10"x3   ea 10"x3 ea 10"x3        Step-ups/downs     4"/   2x10 4"/   2x10 4"/  2x12

## 2018-08-09 NOTE — PROGRESS NOTES
Assessment:  1  Pain, joint, ankle and foot, left  XR ankle 3+ vw left   2  S/P ORIF (open reduction internal fixation) fracture  Ambulatory referral to Physical Therapy    4/27/18  ORIF left ankle        Plan:  Doing well 3 5 months s/p left ankle ORIF  WBAT  Activities as tolerated  Continue PT for increased ROM, strength, and endurance  Wean off of cane    To do next visit:  Return if symptoms worsen or fail to improve  Scribe Attestation    I,:   Ross Jacques am acting as a scribe while in the presence of the attending physician :        I,:   Shantel Roe MD personally performed the services described in this documentation    as scribed in my presence :              Subjective:   Bertram Black is a 46 y o  female who presents for re-evaluation 3 5 months s/p left ankle ORIF  She has no pain and only improving ache after prolonged weight bearing activities  She presents with a SPC but tends to carry it  Denies any calf pain  She has been attending PT and wishes to continue for continued strengthening and endurance           Review of systems negative unless otherwise specified in HPI      Past Medical History:   Diagnosis Date    Abnormal liver scan     last assessed 8/14/2014    Atypical chest pain     last assessed 2/3/2016    Benign colonic polyp     Bladder disorder     last assessed 1/22/2013    Cervical radiculopathy     Chronic ITP (idiopathic thrombocytopenia) (HCC)     Chronic lumbar radiculopathy     Chronic pain     neck and back    Dermatitis     Herpes simplex     Hyperlipidemia     Luteinized follicular cyst     Menorrhagia     Obesity     Urge and stress incontinence     Uterine fibroid        Past Surgical History:   Procedure Laterality Date    CHOLECYSTECTOMY      HYSTERECTOMY      MELO    ORIF TIBIA & FIBULA FRACTURES Left 4/27/2018    Procedure: OPEN REDUCTION W/ INTERNAL FIXATION (ORIF) ANKLE;  Surgeon: Shantel Roe MD;  Location: [facility];  Service: Orthopedics    TOOTH EXTRACTION      last assessed 3/20/2017, oral surgery       Family History   Problem Relation Age of Onset    Stroke Father     Psychosis Father     Bipolar disorder Mother     Lung cancer Mother     Depression Family        Social History     Occupational History    unemployed      Social History Main Topics    Smoking status: Current Every Day Smoker     Packs/day: 0 25    Smokeless tobacco: Never Used      Comment: per allscripts' hx of tobacco abuse'    Alcohol use No    Drug use: No      Comment: History of marijuana use as a teenager   Denies current recreational drug use    Sexual activity: Not on file         Current Outpatient Prescriptions:     DULoxetine (CYMBALTA) 30 mg delayed release capsule, Take 1 capsule (30 mg total) by mouth daily at bedtime, Disp: 90 capsule, Rfl: 0    DULoxetine (CYMBALTA) 60 mg delayed release capsule, TAKE 1 CAPSULE (60 MG TOTAL) BY MOUTH DAILY IN THE EARLY MORNING, Disp: 90 capsule, Rfl: 0    lamoTRIgine (LaMICtal) 100 mg tablet, Take 1 tablet (100 mg total) by mouth daily for 90 days, Disp: 90 tablet, Rfl: 0    lamoTRIgine (LaMICtal) 150 MG tablet, Take 1 tablet (150 mg total) by mouth daily at bedtime for 90 days, Disp: 90 tablet, Rfl: 0    LORazepam (ATIVAN) 0 5 mg tablet, Take 1 tablet (0 5 mg total) by mouth 4 (four) times a day as needed for anxiety, Disp: 360 tablet, Rfl: 0    meclizine (ANTIVERT) 25 mg tablet, Take 1 tablet (25 mg total) by mouth 3 (three) times a day as needed for dizziness, Disp: 30 tablet, Rfl: 0    predniSONE 1 mg tablet, Take by mouth daily Unknown dose , Disp: , Rfl:     traMADol (ULTRAM) 50 mg tablet, Take 50 mg by mouth every 6 (six) hours as needed for moderate pain, Disp: , Rfl:     Allergies   Allergen Reactions    Buspar [Buspirone] Hives and Rash            Vitals:    08/09/18 0845   BP: 121/79   Pulse: 94       Objective:          Physical Exam                    Left Ankle Exam   Swelling: mild    Tenderness   The patient is experiencing no tenderness  Other   Erythema: absent  Sensation: normal    Comments: Well healed incisions without evidence of infection  She has good ankle ROM with mild reduction in DF and PF compared to her right side  Her strength is intact but fatigues quickly  Calf is soft and non-tender without signs of DVT  Non-antalgic gait   NVID            Diagnostics, reviewed and taken today if performed as documented: The attending physician has personally reviewed the pertinent films in PACS and interpretation is as follows:  Left ankle: hardware in acceptable position and alignment s/p successful reduction and internal fixation  No fracture or dislocation noted  No loosening of her hardware  Mortise is intact      Procedures, if performed today:  Procedures    None performed

## 2018-08-13 ENCOUNTER — OFFICE VISIT (OUTPATIENT)
Dept: BEHAVIORAL/MENTAL HEALTH CLINIC | Facility: CLINIC | Age: 53
End: 2018-08-13
Payer: COMMERCIAL

## 2018-08-13 ENCOUNTER — APPOINTMENT (OUTPATIENT)
Dept: PHYSICAL THERAPY | Facility: CLINIC | Age: 53
End: 2018-08-13
Payer: COMMERCIAL

## 2018-08-13 DIAGNOSIS — F31.81 BIPOLAR II DISORDER (HCC): Chronic | ICD-10-CM

## 2018-08-13 DIAGNOSIS — F41.0 PANIC DISORDER WITHOUT AGORAPHOBIA: Chronic | ICD-10-CM

## 2018-08-13 DIAGNOSIS — F41.1 GAD (GENERALIZED ANXIETY DISORDER): Primary | Chronic | ICD-10-CM

## 2018-08-13 PROCEDURE — 90834 PSYTX W PT 45 MINUTES: CPT | Performed by: SOCIAL WORKER
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
## 2018-08-14 ENCOUNTER — OFFICE VISIT (OUTPATIENT)
Dept: PHYSICAL THERAPY | Facility: CLINIC | Age: 53
End: 2018-08-14
Payer: COMMERCIAL

## 2018-08-14 DIAGNOSIS — Z48.89 AFTERCARE FOLLOWING SURGERY: ICD-10-CM

## 2018-08-14 DIAGNOSIS — M25.572 PAIN, JOINT, ANKLE AND FOOT, LEFT: Primary | ICD-10-CM

## 2018-08-14 PROCEDURE — 97110 THERAPEUTIC EXERCISES: CPT | Performed by: PHYSICAL THERAPIST

## 2018-08-14 PROCEDURE — 97140 MANUAL THERAPY 1/> REGIONS: CPT | Performed by: PHYSICAL THERAPIST

## 2018-08-14 PROCEDURE — 97112 NEUROMUSCULAR REEDUCATION: CPT | Performed by: PHYSICAL THERAPIST

## 2018-08-14 PROCEDURE — 97116 GAIT TRAINING THERAPY: CPT | Performed by: PHYSICAL THERAPIST

## 2018-08-14 NOTE — TELEPHONE ENCOUNTER
I spoke to the patient and gave her the information as stated  If she has any questions or problems she will call

## 2018-08-14 NOTE — PROGRESS NOTES
Daily Note     Today's date: 2018  Patient name: Naheed Coppola  : 1965  MRN: [id number]  Referring provider: Estefany Russo MD  Dx:   Encounter Diagnosis     ICD-10-CM    1  Pain, joint, ankle and foot, left M25 572    2  Aftercare following surgery Z48 89                   Subjective: Pt reports being able walk on grass without AD and improved ability to use reciprocal pattern going down stairs  Objective: See treatment diary below    Assessment: Pt demonstrated improved ambulation and stair tolerance, continued difficulty and limited tolerance to SLS  Plan: RE NV  Precautions:  PMHx: anxiety, bipolar disorder, chronic ITP  R LE WBAT, L LE WBAT  Dx: s/p L ankle ORIF    Daily Treatment Diary      Manual          L ankle PROM  10 min 10   min  10 min 10 min 10 min  10 min       Gr IV AP, PA talar mobs            1x50  ea                                                                                     Exercise Diary         L ankle AROM in elevation  1x30 1x30  np np np  d/c       Supine L GA stretch  15"x3 15"x3  d/c'ed dc   np       L seated towel scrunches  1x60 1x60  np np np  HEP       L ankle TB PF BlueTB  3x15 BlueTB  3x15  blue/   3x15 Blue/  3x15 Black/  3x15  black/   3x15       L seated WB AROM: PF/DF, INV/TEMITOPE  1x30 1x30  np np np  d/c       AlterG: gait training - 2XL 55%BW 1 3 mph  10 min 55% BW  1 3 mph  10 min  65% BW   1 4 mph   10 min 65% BW 1 4 mph 10 min 70% BW 1 4 mph 10 min  70% BW   1 5 mph   10 min       WB: CW/CCW  1x30 ea 1x30  ea  np np np  d/c       Biodex: mini squats   2x12 2x12   2x12 2x15  np       BioDex: SLS: EO  R/ 60"x1  L 30"x1 R/ 60"x1  L 30"x1  R/60"x1   L/30"x2    R/ 60"x1  L/ 30"x2 R/ 60"x1  L/ 30"x2  L=S   30"X2       Standing GA stretch  L/ 10"x3  L/  10"x3  np np np         Standing DL heel raises  2x10 2x10  3x10 3x10 3x12  3x15       Gait Training SPC, CG Ax1, Sidewalk, Grass  275' w/ AD, 250' w/o '  W/ no AD  no AD   1x400' No AD  400 ft No AD  400 ft  no AD   1x1000'       Standing GA/SO stretches     10"x3   ea 10"x3 ea 10"x3  15"x3 ea       Step-ups/downs     4"/   2x10 4"/   2x10 4"/  2x12  4"/   1x10   6"/   1x10

## 2018-08-16 ENCOUNTER — OFFICE VISIT (OUTPATIENT)
Dept: PHYSICAL THERAPY | Facility: CLINIC | Age: 53
End: 2018-08-16
Payer: COMMERCIAL

## 2018-08-16 DIAGNOSIS — Z48.89 AFTERCARE FOLLOWING SURGERY: ICD-10-CM

## 2018-08-16 DIAGNOSIS — M25.572 PAIN, JOINT, ANKLE AND FOOT, LEFT: Primary | ICD-10-CM

## 2018-08-16 PROCEDURE — G8979 MOBILITY GOAL STATUS: HCPCS | Performed by: PHYSICAL THERAPIST

## 2018-08-16 PROCEDURE — 97116 GAIT TRAINING THERAPY: CPT | Performed by: PHYSICAL THERAPIST

## 2018-08-16 PROCEDURE — 97110 THERAPEUTIC EXERCISES: CPT | Performed by: PHYSICAL THERAPIST

## 2018-08-16 PROCEDURE — 97140 MANUAL THERAPY 1/> REGIONS: CPT | Performed by: PHYSICAL THERAPIST

## 2018-08-16 PROCEDURE — G8978 MOBILITY CURRENT STATUS: HCPCS | Performed by: PHYSICAL THERAPIST

## 2018-08-16 NOTE — PROGRESS NOTES
PT Re-Evaluation     Today's date: 2018  Patient name: Naheed Coppola  : 1965  MRN: [id number]  Referring provider: Estefany Russo MD  Dx:   Encounter Diagnosis     ICD-10-CM    1  Pain, joint, ankle and foot, left M25 572    2  Aftercare following surgery Z48 89                   Assessment  Impairments: abnormal gait, abnormal or restricted ROM, activity intolerance, impaired balance and impaired physical strength    Assessment details: Pt demonstrates improved ROM, strength, gait, WB tolerance, and pain  She continues to demonstrate significant impairments of PF > INV/TEMITOPE strength, SL balance, and stair tolerance  She will continue to benefit from 4 more weeks of skilled PT services to address her remaining impairments in order further improve pain and functional mobility before d/c to a maintenance HEP at that time    Understanding of Dx/Px/POC: good   Prognosis: good    Goals  STG - 4 wks  1) pt will be I with HEP (met)  2) pt will begin to wean from CAM boot and FWW (met)  3) pt will demonstrate > 5 deg L ankle DF PROM (met)    MTG - 8 wks  1) pt will d/c AD (met)  2) pt will demonstrate SLS > 30 sec on R, > 10 sec on L (partially met)  3) pt will demonstrate > 10 deg B DF PROM (partially met)  4) pt will demonstrate mild gait abnormalities (not met)  5) pt will report > 1/4 mile ambulation tolerance (met)    LTG - 12 wks  1) pt will demonstrate > 30 sec B SLS (not met)  2) pt will demonstrate normalized gait pattern with walking and stairs (not met)  3) pt will report > 1 mile ambulation tolerance (not met)    Plan  Planned therapy interventions: joint mobilization, manual therapy, balance/weight bearing training, neuromuscular re-education, gait training, home exercise program, strengthening, stretching, therapeutic activities and therapeutic exercise  Frequency: 1x week  Duration in weeks: 4  Treatment plan discussed with: patient        Subjective Evaluation    History of Present Illness  Mechanism of injury: Pt reports a resolution of R foot pain and functional limitations, 3/10 L anterolateral ankle pain at worst with going down stairs  She reports d/c'ing AD, fair stability on grass, and a 2-block ambulation tolerance  Pain  Current pain ratin  At best pain ratin  At worst pain rating: 3  Progression: improved    Treatments  Previous treatment: physical therapy  Current treatment: physical therapy  Patient Goals  Patient goals for therapy: decreased pain, return to sport/leisure activities, independence with ADLs/IADLs and increased strength          Objective     Active Range of Motion   Left Ankle/Foot   Dorsiflexion (ke): 6 degrees   Plantar flexion: 45 degrees   Inversion: 25 degrees   Eversion: 10 degrees     Passive Range of Motion   Left Ankle/Foot    Dorsiflexion (ke): 8 degrees   Plantar flexion: 48 degrees   Inversion: 34 degrees with pain  Eversion: 16 degrees     Strength/Myotome Testing     Left Ankle/Foot   Dorsiflexion: 4  Plantar flexion: 3-  Inversion: 3+  Eversion: 3+    Additional Strength Details  Heel raises: DL: 50 reps, SL: L: unable to perform    Ambulation     Ambulation: Level Surfaces   Ambulation without assistive device: independent    Ambulation: Stairs   Ascend stairs: independent  Pattern: reciprocal  Railings: one rail  Descend stairs: independent  Pattern: non-reciprocal  Railings: one rail    Observational Gait   Gait: antalgic   Decreased walking speed and left stance time  Base of support: normal    Quality of Movement During Gait     Additional Quality of Movement During Gait Details  O: mod L foot ER, poor push-off    Functional Assessment     Single Leg Stance   Left: 4 seconds          Precautions:  PMHx: anxiety, bipolar disorder, chronic ITP  R LE WBAT, L LE WBAT  Dx: s/p L ankle ORIF    Daily Treatment Diary      Manual     8           L ankle PROM  10 min 10   min  10 min 10 min 10 min  10 min           Gr IV AP, PA talar mobs            1x50  ea                                                                                         Exercise Diary  7/26 7/30  8/2 8/6 8/9  8/14  8/16         L ankle AROM in elevation  1x30 1x30  np np np  d/c           Supine L GA stretch  15"x3 15"x3  d/c'ed dc    np           L seated towel scrunches  1x60 1x60  np np np  HEP           L ankle TB PF BlueTB  3x15 BlueTB  3x15  blue/   3x15 Blue/  3x15 Black/  3x15  black/   3x15  black/   3x15         L seated WB AROM: PF/DF, INV/TEMITOPE  1x30 1x30  np np np  d/c           AlterG: gait training - 2XL 55%BW 1 3 mph  10 min 55% BW  1 3 mph  10 min  65% BW   1 4 mph   10 min 65% BW 1 4 mph 10 min 70% BW 1 4 mph 10 min  70% BW   1 5 mph   10 min  70% BW   1 8 mph   10 min         WB: CW/CCW  1x30 ea 1x30  ea  np np np  d/c           Biodex: mini squats   2x12 2x12   2x12 2x15  np  np         BioDex: SLS: EO  R/ 60"x1  L 30"x1 R/ 60"x1  L 30"x1  R/60"x1   L/30"x2    R/ 60"x1  L/ 30"x2 R/ 60"x1  L/ 30"x2  L=S   30"X2  L=S   30"x2         Standing GA stretch  L/ 10"x3  L/  10"x3  np np np             Standing DL heel raises  2x10 2x10  3x10 3x10 3x12  3x15  1x50         Gait Training SPC, CG Ax1, Sidewalk, Grass  309' w/ AD, 250' w/o '  W/ no AD  no AD   1x400' No AD  400 ft No AD  400 ft  no AD   1x1000'  np         Standing GA/SO stretches      10"x3   ea 10"x3 ea 10"x3  15"x3 ea  15"x3 ea         Step-ups/downs      4"/   2x10 4"/   2x10 4"/  2x12  4"/   1x10   6"/   1x10  4"/   1x10   6"/   1x10  6"/   2x10       DL/SL ecc heel raises

## 2018-08-20 ENCOUNTER — OFFICE VISIT (OUTPATIENT)
Dept: PHYSICAL THERAPY | Facility: CLINIC | Age: 53
End: 2018-08-20
Payer: COMMERCIAL

## 2018-08-20 DIAGNOSIS — Z48.89 AFTERCARE FOLLOWING SURGERY: ICD-10-CM

## 2018-08-20 DIAGNOSIS — M25.572 PAIN, JOINT, ANKLE AND FOOT, LEFT: Primary | ICD-10-CM

## 2018-08-20 DIAGNOSIS — M79.671 PAIN IN RIGHT FOOT: ICD-10-CM

## 2018-08-20 PROCEDURE — 97140 MANUAL THERAPY 1/> REGIONS: CPT | Performed by: PHYSICAL THERAPIST

## 2018-08-20 PROCEDURE — 97110 THERAPEUTIC EXERCISES: CPT | Performed by: PHYSICAL THERAPIST

## 2018-08-20 PROCEDURE — 97116 GAIT TRAINING THERAPY: CPT | Performed by: PHYSICAL THERAPIST

## 2018-08-20 NOTE — PROGRESS NOTES
Daily Note     Today's date: 2018  Patient name: Anand Bui  : 1965  MRN: [id number]  Referring provider: Bruna Nguyễn MD  Dx: No diagnosis found  Subjective: Pt reports 4/10 L medial ankle pain and limited WB tolerance after stepping back and rolling int into sudden DF and TEMITOPE  Objective: See treatment diary below  Ttp: L medial malleolus, deltoid ligament  PROM: DF: 10 deg, INV: 30 deg, TEMITOPE: 20 deg, PF: 42 deg    Assessment: Pt presents with s/s consistent with mild L deltoid ligament sprain  She demonstrated improved pain, WB tolerance, and ROM following MT including addition of laser  Plan: Assess response NV  Precautions:  PMHx: anxiety, bipolar disorder, chronic ITP, WBAT  Dx: s/p L ankle ORIF 18, proximal fibula fx 18  Daily Treatment Chart     Manual              L ankle PROM 10 min            Gr IV post talar mobs 1x50            Laser: L ankle sprain 4000J                                          Therapeutic Exercise              Supine GA/SO stretches 15"x3 ea            Supine L ankle AROM in elevation 1x30            L ankle TB PF Black/  3x15            L ankle TB TEMITOPE R/  2x15            L ankle TB INV R/  2x15            BioDex: SLS L=S  30"x2            AlterG: gait training 55% BW  1 5 mph  10 min            DL heel raises             Step-ups/downs                                                                                                                                                                Modalities

## 2018-08-21 ENCOUNTER — OFFICE VISIT (OUTPATIENT)
Dept: BEHAVIORAL/MENTAL HEALTH CLINIC | Facility: CLINIC | Age: 53
End: 2018-08-21
Payer: COMMERCIAL

## 2018-08-21 DIAGNOSIS — F41.1 GAD (GENERALIZED ANXIETY DISORDER): Chronic | ICD-10-CM

## 2018-08-21 DIAGNOSIS — F31.81 BIPOLAR II DISORDER (HCC): Primary | Chronic | ICD-10-CM

## 2018-08-21 DIAGNOSIS — F41.0 PANIC DISORDER WITHOUT AGORAPHOBIA: Chronic | ICD-10-CM

## 2018-08-21 PROCEDURE — 90834 PSYTX W PT 45 MINUTES: CPT | Performed by: SOCIAL WORKER

## 2018-08-21 NOTE — PSYCH
Psychotherapy Provided: Individual Psychotherapy 50 minutes     Length of time in session: 50 minutes, follow up in 2 week    Goals addressed in session: Goal 1, Goal 2 and Goal 3      Pain:      none    0    Current suicide risk : Low     D: Met with Janine individually   ROS: 'Things are not good '  Family issues remain a very high stressor  Son's girlfriend experiencing severe PPD  Grandson's medical / needs  Relationship with Ju Owens remains tense   Fleeting SI periodically without plan (1-2x a week)  'I don't want to die, I want peace and for it to stop'  [employer] discussed  Will consider      A: Loretta Cannon presented with depressed and anxious mood  Tearful with constricted affect   Family dynamics and finances are primary factors towards current circumstances  [employer] would be a benefit for for her as a refresher for skills      P: Continue individual therapy, monitor symptoms, support for current psychosocial stressors  Behavioral Health Treatment Plan [facility]: Diagnosis and Treatment Plan explained to Vamsi Kapoor relates understanding diagnosis and is agreeable to Treatment Plan   Yes

## 2018-08-23 ENCOUNTER — APPOINTMENT (OUTPATIENT)
Dept: PHYSICAL THERAPY | Facility: CLINIC | Age: 53
End: 2018-08-23
Payer: COMMERCIAL

## 2018-08-27 ENCOUNTER — OFFICE VISIT (OUTPATIENT)
Dept: PHYSICAL THERAPY | Facility: CLINIC | Age: 53
End: 2018-08-27
Payer: COMMERCIAL

## 2018-08-27 DIAGNOSIS — Z48.89 AFTERCARE FOLLOWING SURGERY: ICD-10-CM

## 2018-08-27 DIAGNOSIS — M25.572 PAIN, JOINT, ANKLE AND FOOT, LEFT: Primary | ICD-10-CM

## 2018-08-27 DIAGNOSIS — M79.671 PAIN IN RIGHT FOOT: ICD-10-CM

## 2018-08-27 PROCEDURE — 97116 GAIT TRAINING THERAPY: CPT

## 2018-08-27 PROCEDURE — 97110 THERAPEUTIC EXERCISES: CPT

## 2018-08-27 NOTE — PROGRESS NOTES
Daily Note     Today's date: 2018  Patient name: Bobby Oates  : 1965  MRN: [id number]  Referring provider: Darshan Johnson MD  Dx:   Encounter Diagnosis     ICD-10-CM    1  Pain, joint, ankle and foot, left M25 572    2  Aftercare following surgery Z48 89    3  Pain in right foot M79 671                   Subjective: Pt reports no ankle pain pre tx and low pain levels in her left ankle since last session  Objective: See treatment diary below    Assessment: Pt demonstrated significantly decreased irritability, good tolerance to resuming stair training and standing heel raises  Pt demonstrated fair quad control with step downs  Plan: Continue poc as per PT  Precautions:  PMHx: anxiety, bipolar disorder, chronic ITP, WBAT  Dx: s/p L ankle ORIF 18, proximal fibula fx 18  Daily Treatment Chart     Manual             L ankle PROM 10 min 10 min           Gr IV post talar mobs 1x50 np           Laser: L ankle sprain 4000J 4300J                                         Therapeutic Exercise             Supine GA/SO stretches 15"x3 ea 15"x3  ea           Supine L ankle AROM in elevation 1x30 np           L ankle TB PF Black/  3x15 Black/  3x20           L ankle TB TEMITOPE R/  2x15 Red/  3x15           L ankle TB INV R/  2x15 Red/  3x15           BioDex: SLS L=S  30"x2 L=S  30"x2           AlterG: gait training 55% BW  1 5 mph  10 min 75% BW 1 8 mph 10 min           DL heel raises  3x10           Step-ups/downs  8"/ 3x10                                                                                                                                                              Modalities

## 2018-08-28 ENCOUNTER — OFFICE VISIT (OUTPATIENT)
Dept: BEHAVIORAL/MENTAL HEALTH CLINIC | Facility: CLINIC | Age: 53
End: 2018-08-28
Payer: COMMERCIAL

## 2018-08-28 DIAGNOSIS — F31.81 BIPOLAR II DISORDER (HCC): Chronic | ICD-10-CM

## 2018-08-28 DIAGNOSIS — F41.0 PANIC DISORDER WITHOUT AGORAPHOBIA: Primary | Chronic | ICD-10-CM

## 2018-08-28 DIAGNOSIS — F41.1 GAD (GENERALIZED ANXIETY DISORDER): Chronic | ICD-10-CM

## 2018-08-28 PROCEDURE — 90834 PSYTX W PT 45 MINUTES: CPT | Performed by: SOCIAL WORKER

## 2018-08-28 NOTE — PSYCH
Psychotherapy Provided: Individual Psychotherapy 50 minutes     Length of time in session: 50 minutes, follow up in 2 week    Goals addressed in session: Goal 1, Goal 2 and Goal 3      Pain:      none    0    Current suicide risk : Low     D: Met with Janine individually   ROS: 'Things remain the same '  Family issues remain a very high stressor  Utilized journal writing to express feelings/emotions regarding fall, grandchildren, concerns regarding son   Fleeting SI periodically without plan (1-2x a week)  'I don't want to die, I want peace and for it to stop'  Innov[employer] discussed  Will consider      A: Jake Fagan continues to present with depressed and anxious mood  Tearful with constricted affect   Family dynamics and finances are primary factors towards current circumstances       P: Continue individual therapy, monitor symptoms, support for current psychosocial stressors  Behavioral Health Treatment Plan [facility]: Diagnosis and Treatment Plan explained to Cassie Morgan relates understanding diagnosis and is agreeable to Treatment Plan   Yes

## 2018-08-30 ENCOUNTER — APPOINTMENT (OUTPATIENT)
Dept: PHYSICAL THERAPY | Facility: CLINIC | Age: 53
End: 2018-08-30
Payer: COMMERCIAL

## 2018-08-30 ENCOUNTER — DOCUMENTATION (OUTPATIENT)
Dept: PSYCHIATRY | Facility: CLINIC | Age: 53
End: 2018-08-30

## 2018-09-06 ENCOUNTER — OFFICE VISIT (OUTPATIENT)
Dept: PHYSICAL THERAPY | Facility: CLINIC | Age: 53
End: 2018-09-06
Payer: COMMERCIAL

## 2018-09-06 DIAGNOSIS — M79.671 PAIN IN RIGHT FOOT: ICD-10-CM

## 2018-09-06 DIAGNOSIS — Z48.89 AFTERCARE FOLLOWING SURGERY: ICD-10-CM

## 2018-09-06 DIAGNOSIS — M25.572 PAIN, JOINT, ANKLE AND FOOT, LEFT: Primary | ICD-10-CM

## 2018-09-06 PROCEDURE — G8979 MOBILITY GOAL STATUS: HCPCS

## 2018-09-06 PROCEDURE — 97116 GAIT TRAINING THERAPY: CPT

## 2018-09-06 PROCEDURE — 97110 THERAPEUTIC EXERCISES: CPT

## 2018-09-06 PROCEDURE — G8978 MOBILITY CURRENT STATUS: HCPCS

## 2018-09-06 NOTE — PROGRESS NOTES
Daily Note     Today's date: 2018  Patient name: Stew Banerjee  : 1965  MRN: [id number]  Referring provider: Rebeca Reagan MD  Dx:   Encounter Diagnosis     ICD-10-CM    1  Pain, joint, ankle and foot, left M25 572    2  Aftercare following surgery Z48 89    3  Pain in right foot M79 671                   Subjective: Pt reports occasional left ankle "twinges" and "pins and needles" with standing and walking  Pt reports her current walking tolerance is 3 blocks  Objective: See treatment diary below    Assessment: Pt demonstrated increasing quad control with step downs, increasing SLS balance  Pt demonstrated min decreased L push off when ambulating at 80% BW  Plan: Increase SLS time nv  Precautions:  PMHx: anxiety, bipolar disorder, chronic ITP, WBAT  Dx: s/p L ankle ORIF 18, proximal fibula fx 18  Daily Treatment Chart     Manual   9/6          L ankle PROM 10 min 10 min 10 min          Gr IV post talar mobs 1x50 np np          Laser: L ankle sprain 4000J 4300J 4300J                                        Therapeutic Exercise   9/6          Supine GA/SO stretches 15"x3 ea 15"x3  ea 15"x3          Supine L ankle AROM in elevation 1x30 np np          L ankle TB PF Black/  3x15 Black/  3x20 Silver/  3x15          L ankle TB TEMITOPE R/  2x15 Red/  3x15 Red/  3x20          L ankle TB INV R/  2x15 Red/  3x15 Red/  3x20          BioDex: SLS L=S  30"x2 L=S  30"x2 L=S  30"x3          AlterG: gait training 55% BW  1 5 mph  10 min 75% BW 1 8 mph 10 min 75-80% BW 1 8 mph 10 min          DL heel raises  3x10 3x12          Step-ups/downs  8"/ 3x10 8"/  3x12                                                                                                                                                             Modalities

## 2018-09-11 ENCOUNTER — OFFICE VISIT (OUTPATIENT)
Dept: PHYSICAL THERAPY | Facility: CLINIC | Age: 53
End: 2018-09-11
Payer: COMMERCIAL

## 2018-09-11 ENCOUNTER — TELEPHONE (OUTPATIENT)
Dept: PSYCHIATRY | Facility: CLINIC | Age: 53
End: 2018-09-11

## 2018-09-11 DIAGNOSIS — Z48.89 AFTERCARE FOLLOWING SURGERY: ICD-10-CM

## 2018-09-11 DIAGNOSIS — M25.572 PAIN, JOINT, ANKLE AND FOOT, LEFT: Primary | ICD-10-CM

## 2018-09-11 PROCEDURE — 97110 THERAPEUTIC EXERCISES: CPT | Performed by: PHYSICAL THERAPIST

## 2018-09-11 NOTE — PROGRESS NOTES
Daily Note     Today's date: 2018  Patient name: Connor Adam  : 1965  MRN: [id number]  Referring provider: Rubén Nicholas MD  Dx:   Encounter Diagnosis     ICD-10-CM    1  Pain, joint, ankle and foot, left M25 572    2  Aftercare following surgery Z48 89                   Subjective: Pt reports increased ambulation tolerance with the ability to walk 1 mile shopping resutlting in moderate soreness  Pt reports occasional annoyance and soreness of L ankle during ambulation throughout the day  Objective: See treatment diary below  Updated HEP    Assessment: Pt demonstrated improved gait and stability on stairs and good understanding of updated HEP  Plan: Discharge to maintenance HEP at this time  Precautions:  PMHx: anxiety, bipolar disorder, chronic ITP, WBAT  Dx: s/p L ankle ORIF 18, proximal fibula fx 18  Daily Treatment Chart     Manual           L ankle PROM 10 min 10 min 10 min 10 min         Gr IV post talar mobs 1x50 np np np         Laser: L ankle sprain 4000J 4300J 4300J 4300J                                       Therapeutic Exercise           Supine GA/SO stretches 15"x3 ea 15"x3  ea 15"x3 np         Supine L ankle AROM in elevation 1x30 np np np         L ankle TB PF Black/  3x15 Black/  3x20 Silver/  3x15 Silver/  3x15         L ankle TB TEMITOPE R/  2x15 Red/  3x15 Red/  3x20 Red/  3x20         L ankle TB INV R/  2x15 Red/  3x15 Red/  3x20 Red/  3x20         BioDex: SLS L=S  30"x2 L=S  30"x2 L=S  30"x3 L=S  30"x2  50"x1         AlterG: gait training 55% BW  1 5 mph  10 min 75% BW 1 8 mph 10 min 75-80% BW 1 8 mph 10 min 80% BW  2 0 mph  10 min         DL heel raises  3x10 3x12 3x12         Step-ups/downs  8"/ 3x10 8"/  3x12 8"/  3x12         DL heel raises/ L SL ecc    1x5         Standing GA/SO stretches    15"x3 ea Modalities                                                       PT treatment performed by Miguel HECK under my supervision

## 2018-09-11 NOTE — TELEPHONE ENCOUNTER
Patient called requesting you give her a call back when possible-she said it was fine if it was after seeing all your patients   [phone]

## 2018-09-13 ENCOUNTER — APPOINTMENT (OUTPATIENT)
Dept: PHYSICAL THERAPY | Facility: CLINIC | Age: 53
End: 2018-09-13
Payer: COMMERCIAL

## 2018-09-24 ENCOUNTER — OFFICE VISIT (OUTPATIENT)
Dept: BEHAVIORAL/MENTAL HEALTH CLINIC | Facility: CLINIC | Age: 53
End: 2018-09-24
Payer: COMMERCIAL

## 2018-09-24 DIAGNOSIS — F31.81 BIPOLAR II DISORDER (HCC): Primary | Chronic | ICD-10-CM

## 2018-09-24 DIAGNOSIS — F41.0 PANIC DISORDER WITHOUT AGORAPHOBIA: Chronic | ICD-10-CM

## 2018-09-24 DIAGNOSIS — F41.1 GAD (GENERALIZED ANXIETY DISORDER): Chronic | ICD-10-CM

## 2018-09-24 PROCEDURE — 90834 PSYTX W PT 45 MINUTES: CPT | Performed by: SOCIAL WORKER
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
## 2018-09-25 ENCOUNTER — DOCUMENTATION (OUTPATIENT)
Dept: PSYCHIATRY | Facility: CLINIC | Age: 53
End: 2018-09-25

## 2018-09-25 DIAGNOSIS — F41.0 PANIC DISORDER WITHOUT AGORAPHOBIA: ICD-10-CM

## 2018-09-25 DIAGNOSIS — F31.81 BIPOLAR II DISORDER (HCC): ICD-10-CM

## 2018-09-25 DIAGNOSIS — F41.1 GAD (GENERALIZED ANXIETY DISORDER): ICD-10-CM

## 2018-09-25 RX ORDER — LAMOTRIGINE 100 MG/1
100 TABLET ORAL DAILY
Qty: 90 TABLET | Refills: 0 | Status: SHIPPED | OUTPATIENT
Start: 2018-09-25 | End: 2018-12-19 | Stop reason: SDUPTHER

## 2018-09-25 RX ORDER — DULOXETIN HYDROCHLORIDE 30 MG/1
30 CAPSULE, DELAYED RELEASE ORAL
Qty: 90 CAPSULE | Refills: 0 | Status: SHIPPED | OUTPATIENT
Start: 2018-09-25 | End: 2018-12-19 | Stop reason: DRUGHIGH

## 2018-09-25 RX ORDER — DULOXETIN HYDROCHLORIDE 60 MG/1
60 CAPSULE, DELAYED RELEASE ORAL
Qty: 90 CAPSULE | Refills: 0 | Status: SHIPPED | OUTPATIENT
Start: 2018-09-25 | End: 2018-12-19 | Stop reason: SDUPTHER

## 2018-09-25 RX ORDER — LAMOTRIGINE 150 MG/1
150 TABLET ORAL
Qty: 90 TABLET | Refills: 0 | Status: SHIPPED | OUTPATIENT
Start: 2018-09-25 | End: 2018-12-19 | Stop reason: SDUPTHER

## 2018-09-25 NOTE — PROGRESS NOTES
Received a message from Sunni Estes following their session  Ana Orourke is asking for the following medications to be sent to mail order: lamotrigine 150mg and duloxetine 30mg  Last renewed in June (to retail)  Next appointment is 12/19/18     Thanks

## 2018-09-25 NOTE — TELEPHONE ENCOUNTER
Lamictal 100 mg and 150 mg and Cymbalta 30 mg and 60 mg escripted to [facility] as requested for 90 days, no RF

## 2018-10-01 ENCOUNTER — TELEPHONE (OUTPATIENT)
Dept: INTERNAL MEDICINE CLINIC | Facility: CLINIC | Age: 53
End: 2018-10-01

## 2018-10-01 NOTE — TELEPHONE ENCOUNTER
If they have been managing the problem in which she's taking the tramadol for then it is best prescribed by them due to it being a controlled substance  Who is she trying to contact? She should be able to leave a message or there should be a covering physician to help her out?

## 2018-10-08 ENCOUNTER — OFFICE VISIT (OUTPATIENT)
Dept: INTERNAL MEDICINE CLINIC | Facility: CLINIC | Age: 53
End: 2018-10-08
Payer: COMMERCIAL

## 2018-10-08 ENCOUNTER — TELEPHONE (OUTPATIENT)
Dept: INTERNAL MEDICINE CLINIC | Facility: CLINIC | Age: 53
End: 2018-10-08

## 2018-10-08 VITALS
HEIGHT: 68 IN | HEART RATE: 78 BPM | RESPIRATION RATE: 16 BRPM | DIASTOLIC BLOOD PRESSURE: 82 MMHG | BODY MASS INDEX: 35.43 KG/M2 | SYSTOLIC BLOOD PRESSURE: 122 MMHG | OXYGEN SATURATION: 96 %

## 2018-10-08 DIAGNOSIS — H04.123 DRY EYES, BILATERAL: Primary | ICD-10-CM

## 2018-10-08 PROCEDURE — 99213 OFFICE O/P EST LOW 20 MIN: CPT | Performed by: NURSE PRACTITIONER

## 2018-10-08 NOTE — PROGRESS NOTES
Assessment/Plan:     Diagnoses and all orders for this visit:    Dry eyes, bilateral  Comments:  use a lubricating eye drop (artificial tears) three times a day for the next 5 days  call for any worsening pain/redness, discharge, or visual disturbances  Subjective:      Patient ID: Kita West is a 46 y o  female  Here for burning eyes- bilaterally  Started 2 days ago  Burning and itching   Denies any changes in vision or discharge   Getting over a recent cold            The following portions of the patient's history were reviewed and updated as appropriate: allergies, current medications, past family history, past medical history, past social history, past surgical history and problem list     Review of Systems   Constitutional: Negative  HENT: Negative  Eyes: Positive for pain and itching  Negative for photophobia, discharge, redness and visual disturbance  Respiratory: Negative  Neurological: Negative  Objective:      /82   Pulse 78   Resp 16   Ht 5' 8" (1 727 m)   SpO2 96%   BMI 35 43 kg/m²          Physical Exam   Constitutional: She is oriented to person, place, and time  She appears well-developed and well-nourished  HENT:   Right Ear: External ear normal    Left Ear: External ear normal    Eyes: Pupils are equal, round, and reactive to light  Conjunctivae and EOM are normal  Right eye exhibits no discharge  Left eye exhibits no discharge  Cardiovascular: Normal rate, regular rhythm and normal heart sounds  Pulmonary/Chest: Effort normal and breath sounds normal    Neurological: She is alert and oriented to person, place, and time  Psychiatric: She has a normal mood and affect  Her behavior is normal    Vitals reviewed

## 2018-10-09 ENCOUNTER — OFFICE VISIT (OUTPATIENT)
Dept: BEHAVIORAL/MENTAL HEALTH CLINIC | Facility: CLINIC | Age: 53
End: 2018-10-09
Payer: COMMERCIAL

## 2018-10-09 DIAGNOSIS — E78.5 HYPERLIPIDEMIA, UNSPECIFIED HYPERLIPIDEMIA TYPE: Primary | ICD-10-CM

## 2018-10-09 DIAGNOSIS — F31.81 MODERATE BIPOLAR II DISORDER, MOST RECENT EPISODE MAJOR DEPRESSIVE (HCC): Primary | ICD-10-CM

## 2018-10-09 DIAGNOSIS — F41.1 GAD (GENERALIZED ANXIETY DISORDER): Chronic | ICD-10-CM

## 2018-10-09 DIAGNOSIS — F41.0 PANIC DISORDER WITHOUT AGORAPHOBIA: Chronic | ICD-10-CM

## 2018-10-09 PROCEDURE — 90834 PSYTX W PT 45 MINUTES: CPT | Performed by: SOCIAL WORKER

## 2018-10-09 NOTE — PSYCH
Psychotherapy Provided: Individual Psychotherapy 50 minutes     Length of time in session: 50 minutes, follow up in 2 week    Goals addressed in session: Goal 1, Goal 2 and Goal 3      Pain:      none    0    Current suicide risk : Low        D: Met with Janine individually   ROS: 'I am falling apart '  Family issues remain a very high stressor  Session focused upon eviction of son/ girlfriend and grandchildren  Probable custody transfer with Mirlande Willson to [place]  Services, programs etc  Also discussed for Jerzy's needs  [place] thought of getting a job to get out of the house and make some money however severe anxiety, body aches and difficulty with standing for 10-15 minutes  Denied SI 'I don't have time to think about that'      A: [place] continues to present with depressed, tearful and anxious mood  Family dynamics and finances are primary factors towards current circumstances  Expressed feeling of discouragement and hopelessness       P: Continue individual therapy, monitor symptoms, support for current psychosocial stressors  Behavioral Health Treatment Plan [facility]: Diagnosis and Treatment Plan explained to Jon Najera relates understanding diagnosis and is agreeable to Treatment Plan   Yes

## 2018-10-22 ENCOUNTER — OFFICE VISIT (OUTPATIENT)
Dept: BEHAVIORAL/MENTAL HEALTH CLINIC | Facility: CLINIC | Age: 53
End: 2018-10-22
Payer: COMMERCIAL

## 2018-10-22 DIAGNOSIS — F41.1 GAD (GENERALIZED ANXIETY DISORDER): Primary | Chronic | ICD-10-CM

## 2018-10-22 DIAGNOSIS — F41.0 PANIC DISORDER WITHOUT AGORAPHOBIA: Chronic | ICD-10-CM

## 2018-10-22 DIAGNOSIS — F31.81 MODERATE BIPOLAR II DISORDER, MOST RECENT EPISODE MAJOR DEPRESSIVE (HCC): ICD-10-CM

## 2018-10-22 PROCEDURE — 90834 PSYTX W PT 45 MINUTES: CPT | Performed by: SOCIAL WORKER
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
## 2018-11-01 ENCOUNTER — OFFICE VISIT (OUTPATIENT)
Dept: BEHAVIORAL/MENTAL HEALTH CLINIC | Facility: CLINIC | Age: 53
End: 2018-11-01
Payer: COMMERCIAL

## 2018-11-01 DIAGNOSIS — F41.1 GAD (GENERALIZED ANXIETY DISORDER): Primary | Chronic | ICD-10-CM

## 2018-11-01 DIAGNOSIS — F41.0 PANIC DISORDER WITHOUT AGORAPHOBIA: Chronic | ICD-10-CM

## 2018-11-01 DIAGNOSIS — F31.81 MODERATE BIPOLAR II DISORDER, MOST RECENT EPISODE MAJOR DEPRESSIVE (HCC): ICD-10-CM

## 2018-11-01 PROCEDURE — 90834 PSYTX W PT 45 MINUTES: CPT | Performed by: SOCIAL WORKER

## 2018-11-01 NOTE — PSYCH
Psychotherapy Provided: Individual Psychotherapy 50 minutes     Length of time in session: 50 minutes, follow up in 2 week    Goals addressed in session: Goal 1, Goal 2 and Goal 3      Pain:      none    0    Current suicide risk : Low     D: Met with Janine individually  ROS; 'I am so tired'  Session focused upon continuation of family issues  Robert Valdes is asking to sign his rights over as he doesn't want to be Jerzy's father anymore  Further discussion of Jerzy's medical issues; services at [facility] beginning  Elisabeth Likens has improved on her responsibilities and reduced her drinking  Shaina Zarate is struggling with isolation and 'sits in the dark at home at times '  If Shaina Zarate sees someone she knows in the grocery store she will 'dart' as she is embarrassed by herself  Reassessment of treatment plan completed  A: Shaina Zarate presented as highly anxious and tearful throughout session  Janine's son Robert Valdes is un medicated with Bipolar Disorder - refuses to get help  Minimal progress noted - Shaina Zarate is setting boundaries  P: Continue individual therapy, support for psychosocial stressors  Behavioral Health Treatment Plan [facility]: Diagnosis and Treatment Plan explained to Raina Box relates understanding diagnosis and is agreeable to Treatment Plan   Yes
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
## 2018-11-13 ENCOUNTER — OFFICE VISIT (OUTPATIENT)
Dept: BEHAVIORAL/MENTAL HEALTH CLINIC | Facility: CLINIC | Age: 53
End: 2018-11-13
Payer: COMMERCIAL

## 2018-11-13 DIAGNOSIS — F41.1 GAD (GENERALIZED ANXIETY DISORDER): Primary | Chronic | ICD-10-CM

## 2018-11-13 DIAGNOSIS — F31.81 MODERATE BIPOLAR II DISORDER, MOST RECENT EPISODE MAJOR DEPRESSIVE (HCC): ICD-10-CM

## 2018-11-13 DIAGNOSIS — F41.0 PANIC DISORDER WITHOUT AGORAPHOBIA: Chronic | ICD-10-CM

## 2018-11-13 PROCEDURE — 90834 PSYTX W PT 45 MINUTES: CPT | Performed by: SOCIAL WORKER

## 2018-11-13 NOTE — PSYCH
Psychotherapy Provided: Individual Psychotherapy 50 minutes     Length of time in session: 50 minutes, follow up in 2 week    Goals addressed in session: Goal 1, Goal 2 and Goal 3      Pain:      none    0    Current suicide risk : Low     D: Met with Janine individually   ROS: 'I am loosing it'  Discussion focused upon a timeline of historic employment's and reasons she was laid off (most she doesn't know why)  This triggers 'I feel like I'm nothing'  Experiencing chest pain attributed towards anxiety  Utilizing Ativan when needed  Further discussion of son and daughter in law and grandchildren  Specific obstacles discussed  [place]o stated she has been experiencing an increased in SI ideations with 'a plan once in awhile'  Discussed IP  Contracted for safety  Crisis plan reviewed      A: [place] continues to present with depressed, tearful and anxious mood  Family dynamics and finances are primary factors towards current circumstances  Expressed feeling of discouragement and hopelessness       P: Continue individual therapy, monitor symptoms, support for current psychosocial stressors  Behavioral Health Treatment Plan [facility]: Diagnosis and Treatment Plan explained to Roslyn Mendoza relates understanding diagnosis and is agreeable to Treatment Plan   Yes

## 2018-11-16 ENCOUNTER — OFFICE VISIT (OUTPATIENT)
Dept: INTERNAL MEDICINE CLINIC | Facility: CLINIC | Age: 53
End: 2018-11-16
Payer: COMMERCIAL

## 2018-11-16 VITALS
WEIGHT: 237.6 LBS | HEIGHT: 68 IN | TEMPERATURE: 99.8 F | BODY MASS INDEX: 36.01 KG/M2 | DIASTOLIC BLOOD PRESSURE: 72 MMHG | SYSTOLIC BLOOD PRESSURE: 128 MMHG | OXYGEN SATURATION: 96 % | RESPIRATION RATE: 18 BRPM | HEART RATE: 104 BPM

## 2018-11-16 DIAGNOSIS — J02.9 ACUTE PHARYNGITIS, UNSPECIFIED ETIOLOGY: Primary | ICD-10-CM

## 2018-11-16 LAB — S PYO AG THROAT QL: NEGATIVE

## 2018-11-16 PROCEDURE — 3008F BODY MASS INDEX DOCD: CPT | Performed by: NURSE PRACTITIONER

## 2018-11-16 PROCEDURE — 99213 OFFICE O/P EST LOW 20 MIN: CPT | Performed by: NURSE PRACTITIONER

## 2018-11-16 PROCEDURE — 87880 STREP A ASSAY W/OPTIC: CPT | Performed by: NURSE PRACTITIONER

## 2018-11-16 PROCEDURE — 87070 CULTURE OTHR SPECIMN AEROBIC: CPT | Performed by: NURSE PRACTITIONER

## 2018-11-16 RX ORDER — AMOXICILLIN 500 MG/1
500 CAPSULE ORAL EVERY 12 HOURS SCHEDULED
Qty: 20 CAPSULE | Refills: 0 | Status: SHIPPED | OUTPATIENT
Start: 2018-11-16 | End: 2018-11-26

## 2018-11-16 NOTE — PROGRESS NOTES
Assessment/Plan:     Diagnoses and all orders for this visit:    Acute pharyngitis, unspecified etiology  -     POCT rapid strepA  -     Throat culture; Future  -     amoxicillin (AMOXIL) 500 mg capsule; Take 1 capsule (500 mg total) by mouth every 12 (twelve) hours for 10 days  -     Throat culture    Other orders  -     Cancel: influenza vaccine, [id number], quadrivalent, recombinant, PF, 0 5 mL, for patients 18 yr+ (FLUBLOK)  -     Cancel: Ambulatory referral to Gastroenterology; Future          Subjective:      Patient ID: Kita West is a 46 y o  female  Here for sore throat   Started a week ago   +sore throat, bilateral ear pain, headache, fever, moist cough    and grandson sick at home         The following portions of the patient's history were reviewed and updated as appropriate: allergies, current medications, past family history, past medical history, past social history, past surgical history and problem list     Review of Systems   Constitutional: Positive for fever  HENT: Positive for sore throat  Respiratory: Positive for cough  Cardiovascular: Negative  Gastrointestinal: Negative  Musculoskeletal: Negative for myalgias  Skin: Negative  Neurological: Positive for headaches  Objective:      /72   Pulse 104   Temp 99 8 °F (37 7 °C) (Oral)   Resp 18   Ht 5' 8" (1 727 m)   Wt 108 kg (237 lb 9 6 oz)   SpO2 96%   BMI 36 13 kg/m²          Physical Exam   Constitutional: She is oriented to person, place, and time  She appears well-developed and well-nourished  HENT:   Right Ear: External ear normal    Left Ear: External ear normal    Mouth/Throat: Posterior oropharyngeal edema and posterior oropharyngeal erythema present  Cardiovascular: Normal rate, regular rhythm and normal heart sounds  Pulmonary/Chest: Effort normal and breath sounds normal    Neurological: She is alert and oriented to person, place, and time     Psychiatric: She has a normal mood and affect  Her behavior is normal    Vitals reviewed

## 2018-11-18 LAB — BACTERIA THROAT CULT: NORMAL

## 2018-11-21 ENCOUNTER — APPOINTMENT (OUTPATIENT)
Dept: RADIOLOGY | Facility: CLINIC | Age: 53
End: 2018-11-21
Payer: COMMERCIAL

## 2018-11-21 ENCOUNTER — OFFICE VISIT (OUTPATIENT)
Dept: OBGYN CLINIC | Facility: CLINIC | Age: 53
End: 2018-11-21
Payer: COMMERCIAL

## 2018-11-21 VITALS
HEART RATE: 102 BPM | SYSTOLIC BLOOD PRESSURE: 130 MMHG | DIASTOLIC BLOOD PRESSURE: 83 MMHG | HEIGHT: 68 IN | WEIGHT: 237 LBS | BODY MASS INDEX: 35.92 KG/M2

## 2018-11-21 DIAGNOSIS — S82.892D CLOSED FRACTURE OF LEFT ANKLE WITH ROUTINE HEALING, SUBSEQUENT ENCOUNTER: ICD-10-CM

## 2018-11-21 DIAGNOSIS — S82.892D CLOSED FRACTURE OF LEFT ANKLE WITH ROUTINE HEALING, SUBSEQUENT ENCOUNTER: Primary | ICD-10-CM

## 2018-11-21 PROCEDURE — 99213 OFFICE O/P EST LOW 20 MIN: CPT | Performed by: ORTHOPAEDIC SURGERY

## 2018-11-21 PROCEDURE — 73610 X-RAY EXAM OF ANKLE: CPT

## 2018-11-21 NOTE — PROGRESS NOTES
Patient Name:  Lucia Rater  MRN:  [id number]    Assessment     1  Closed fracture of left ankle with routine healing, subsequent encounter  XR ankle 3+ vw left       Plan     Status post left ankle ORIF 4/27/18  1  Activities as tolerated, no restrictions  2  Follow-up as needed  Subjective     78-year-old female returns to the office today for follow-up regarding her left ankle  She is status post ORIF left ankle fracture 4/27/18  Today she notes mild discomfort in the left ankle  She states the discomfort increases with prolonged activity  She denies any significant swelling or stiffness  She does note occasional clicking  No weakness or instability  No numbness or tingling  No fevers or chills  General ROS:  Negative for fever, lethargy/malaise, or night sweats  Neurological ROS:  Negative for confusion or seizures  Objective     /83   Pulse 102   Ht 5' 8" (1 727 m)   Wt 108 kg (237 lb)   BMI 36 04 kg/m²     Left ankle:  No gross deformity  Skin intact  Fully healed incisions  No significant tenderness to palpation along the distal fibula  Minimal discomfort to palpation medial malleolus  Ankle range of motion is intact and full with minimal clicking noted  No pain with ankle range of motion  Ankle joint is stable  Negative squeeze test   Toes warm and mobile  Sensation intact left lower extremity  Palpable DP pulse  Data Review     I have personally reviewed pertinent films in PACS, and my interpretation follows  X-rays performed today of the left ankle reveals stable intact orthopedic hardware in satisfactory alignment  The fractures are fully healed  Degenerative changes noted about the ankle joint  Mortise is intact without widening        Scribe Attestation    I,:   Sharonda Nicholas PA-C am acting as a scribe while in the presence of the attending physician :        I,:   Stephanie Locke MD personally performed the services described in this documentation    as scribed in my presence :

## 2018-11-27 ENCOUNTER — OFFICE VISIT (OUTPATIENT)
Dept: BEHAVIORAL/MENTAL HEALTH CLINIC | Facility: CLINIC | Age: 53
End: 2018-11-27
Payer: COMMERCIAL

## 2018-11-27 DIAGNOSIS — F41.0 PANIC DISORDER WITHOUT AGORAPHOBIA: Chronic | ICD-10-CM

## 2018-11-27 DIAGNOSIS — F41.1 GAD (GENERALIZED ANXIETY DISORDER): Chronic | ICD-10-CM

## 2018-11-27 DIAGNOSIS — F31.81 MODERATE BIPOLAR II DISORDER, MOST RECENT EPISODE MAJOR DEPRESSIVE (HCC): Primary | ICD-10-CM

## 2018-11-27 PROCEDURE — 90834 PSYTX W PT 45 MINUTES: CPT | Performed by: SOCIAL WORKER

## 2018-11-27 NOTE — PSYCH
Psychotherapy Provided: Individual Psychotherapy 50 minutes     Length of time in session: 50 minutes, follow up in 2 week    Goals addressed in session: Goal 1, Goal 2 and Goal 3      Pain:      none    0    Current suicide risk : Low     D: Met with Janine individually   ROS: 'I'm no different'  Session focused upon Disability hearing being scheduled 3/25/19  Further discussion regarding specific circumstances surrounding family issues  Both son's are asking for distance from [address] [place] [address] remains involved with Son Roxy Skiff and family  'Sneek eating' discussed  Emotions, loneliness, finances and family issues have triggered eating pattern  Self esteem has decreased due to weight gain and depressive symptoms      A: Lis Pean continues to present with depressed, tearful and anxious mood  Family dynamics and finances are primary factors towards current circumstances       P: Continue individual therapy, monitor symptoms, support for current psychosocial stressors  Behavioral Health Treatment Plan [facility]: Diagnosis and Treatment Plan explained to Rhonda Montero relates understanding diagnosis and is agreeable to Treatment Plan   Yes

## 2018-12-04 ENCOUNTER — OFFICE VISIT (OUTPATIENT)
Dept: BEHAVIORAL/MENTAL HEALTH CLINIC | Facility: CLINIC | Age: 53
End: 2018-12-04
Payer: COMMERCIAL

## 2018-12-04 DIAGNOSIS — F41.1 GAD (GENERALIZED ANXIETY DISORDER): Primary | Chronic | ICD-10-CM

## 2018-12-04 DIAGNOSIS — F41.0 PANIC DISORDER WITHOUT AGORAPHOBIA: Chronic | ICD-10-CM

## 2018-12-04 DIAGNOSIS — F31.81 MODERATE BIPOLAR II DISORDER, MOST RECENT EPISODE MAJOR DEPRESSIVE (HCC): ICD-10-CM

## 2018-12-04 PROCEDURE — 90834 PSYTX W PT 45 MINUTES: CPT | Performed by: SOCIAL WORKER

## 2018-12-04 NOTE — PSYCH
Psychotherapy Provided: Individual Psychotherapy 50 minutes     Length of time in session: 50 minutes, follow up in 2 week    Goals addressed in session: Goal 1, Goal 2 and Goal 3      Pain:      none    0    Current suicide risk : Low     D: Met with Janine individually   ROS: 'I'm no different'  Session focused upon ongoing psychosocial stressors; grandchildren, CYS opening back up, setting more boundaries with son  Got an opportunity for a hair appointment but motivation to 'do something for me' is minimal  Acknowledged fleeting SI but 'My grandchildren are my life'      A: Matthew Royal continues to present with depressed, tearful and anxious mood  Family dynamics and finances are primary factors towards current circumstances     Minimal progress regarding setting more boundaries but consistency remains a concern  P: Continue individual therapy, monitor symptoms, support for current psychosocial stressors  Behavioral Health Treatment Plan [facility]: Diagnosis and Treatment Plan explained to Pascale Asher relates understanding diagnosis and is agreeable to Treatment Plan   Yes
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
## 2018-12-18 ENCOUNTER — OFFICE VISIT (OUTPATIENT)
Dept: BEHAVIORAL/MENTAL HEALTH CLINIC | Facility: CLINIC | Age: 53
End: 2018-12-18
Payer: COMMERCIAL

## 2018-12-18 DIAGNOSIS — F41.1 GAD (GENERALIZED ANXIETY DISORDER): Primary | Chronic | ICD-10-CM

## 2018-12-18 DIAGNOSIS — F41.0 PANIC DISORDER WITHOUT AGORAPHOBIA: Chronic | ICD-10-CM

## 2018-12-18 DIAGNOSIS — F31.81 MODERATE BIPOLAR II DISORDER, MOST RECENT EPISODE MAJOR DEPRESSIVE (HCC): ICD-10-CM

## 2018-12-18 PROCEDURE — 90834 PSYTX W PT 45 MINUTES: CPT | Performed by: SOCIAL WORKER

## 2018-12-18 NOTE — PSYCH
Psychotherapy Provided: Individual Psychotherapy 50 minutes     Length of time in session: 50 minutes, follow up in 2 week    Goals addressed in session: Goal 1, Goal 2 and Goal 3      Pain:      none    0    Current suicide risk : Low     D: Met with Janine individually   ROS: 'I'm going to an early grave - No I don't want to die'  'My anxiety is constant'  States is experiencing chest pain with panic  During session disclosed she took Ativan q2hrs rather than 4hr  Three days last week (or week before)  States fleeting SI but 'would not leave my grandchildren'  Session focused upon ongoing psychosocial stressors; grandchildren, CYS opening back up, setting more boundaries - Janine's  co signed a loan for son's car which they can't afford  When son is home with kids he must be supervised  Discussed upcoming holiday's which is also contributing towards depression and anxiety  A: Janey Amezcua continues to present with depressed, tearful and anxious mood  She remains mentally overwhelmed and there is a fear she is making herself physically ill  Family dynamics and finances are primary factors towards current circumstances       P: Continue individual therapy, monitor symptoms, support for current psychosocial stressors  Behavioral Health Treatment Plan [facility]: Diagnosis and Treatment Plan explained to Olesya Montgomery relates understanding diagnosis and is agreeable to Treatment Plan   Yes

## 2018-12-19 ENCOUNTER — OFFICE VISIT (OUTPATIENT)
Dept: PSYCHIATRY | Facility: CLINIC | Age: 53
End: 2018-12-19
Payer: COMMERCIAL

## 2018-12-19 VITALS
HEIGHT: 68 IN | SYSTOLIC BLOOD PRESSURE: 124 MMHG | DIASTOLIC BLOOD PRESSURE: 77 MMHG | BODY MASS INDEX: 35.77 KG/M2 | HEART RATE: 85 BPM | WEIGHT: 236 LBS

## 2018-12-19 DIAGNOSIS — F41.1 GAD (GENERALIZED ANXIETY DISORDER): Chronic | ICD-10-CM

## 2018-12-19 DIAGNOSIS — F31.81 BIPOLAR II DISORDER, MOST RECENT EPISODE MAJOR DEPRESSIVE (HCC): Primary | Chronic | ICD-10-CM

## 2018-12-19 DIAGNOSIS — F41.0 PANIC DISORDER WITHOUT AGORAPHOBIA: Chronic | ICD-10-CM

## 2018-12-19 PROCEDURE — 90792 PSYCH DIAG EVAL W/MED SRVCS: CPT | Performed by: PSYCHIATRY & NEUROLOGY

## 2018-12-19 RX ORDER — LORAZEPAM 0.5 MG/1
0.5 TABLET ORAL 4 TIMES DAILY PRN
Qty: 120 TABLET | Refills: 3 | Status: SHIPPED | OUTPATIENT
Start: 2018-12-19 | End: 2019-05-30 | Stop reason: SDUPTHER

## 2018-12-19 RX ORDER — LAMOTRIGINE 100 MG/1
100 TABLET ORAL DAILY
Qty: 90 TABLET | Refills: 1 | Status: SHIPPED | OUTPATIENT
Start: 2018-12-19 | End: 2019-05-30 | Stop reason: SDUPTHER

## 2018-12-19 RX ORDER — DULOXETIN HYDROCHLORIDE 60 MG/1
60 CAPSULE, DELAYED RELEASE ORAL 2 TIMES DAILY
Qty: 180 CAPSULE | Refills: 1 | Status: SHIPPED | OUTPATIENT
Start: 2018-12-19 | End: 2019-05-30 | Stop reason: SDUPTHER

## 2018-12-19 RX ORDER — LAMOTRIGINE 150 MG/1
150 TABLET ORAL
Qty: 90 TABLET | Refills: 1 | Status: SHIPPED | OUTPATIENT
Start: 2018-12-19 | End: 2019-05-30 | Stop reason: SDUPTHER
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
## 2018-12-27 ENCOUNTER — OFFICE VISIT (OUTPATIENT)
Dept: BEHAVIORAL/MENTAL HEALTH CLINIC | Facility: CLINIC | Age: 53
End: 2018-12-27
Payer: COMMERCIAL

## 2018-12-27 DIAGNOSIS — F41.1 GAD (GENERALIZED ANXIETY DISORDER): Primary | Chronic | ICD-10-CM

## 2018-12-27 DIAGNOSIS — F31.81 BIPOLAR II DISORDER, MOST RECENT EPISODE MAJOR DEPRESSIVE (HCC): Chronic | ICD-10-CM

## 2018-12-27 DIAGNOSIS — F41.0 PANIC DISORDER WITHOUT AGORAPHOBIA: Chronic | ICD-10-CM

## 2018-12-27 PROCEDURE — 90834 PSYTX W PT 45 MINUTES: CPT | Performed by: SOCIAL WORKER

## 2018-12-27 NOTE — PSYCH
Psychotherapy Provided: Individual Psychotherapy 50 minutes     Length of time in session: 50 minutes, follow up in 2 week    Goals addressed in session: Goal 1, Goal 2 and Goal 3      Pain:      none    0    Current suicide risk : Low     D: Met with Janine individually   ROS: 'I've been numb'  'I feel if I let stuff go I'm going to crack ' Session focused on Janine's relationship with Vaishali Chilel; medical concerns with him have increased  Looking into the [place]'s home  Stress of grandson remains- started [facility] doing very well  CYS remains  Encouraged to visit with aunt but traveling is too expensive  Denied SI     A: June French continues to present with depressed, tearful and anxious mood  Looked fatigued and 'worn out'  She remains mentally overwhelmed and there is a fear she is making herself physically ill  Family dynamics and finances are primary factors towards current circumstances  No progress     P: Continue individual therapy, monitor symptoms, support for current psychosocial stressors  Behavioral Health Treatment Plan [facility]: Diagnosis and Treatment Plan explained to Pattie Motley relates understanding diagnosis and is agreeable to Treatment Plan   Yes

## 2019-01-14 ENCOUNTER — OFFICE VISIT (OUTPATIENT)
Dept: BEHAVIORAL/MENTAL HEALTH CLINIC | Facility: CLINIC | Age: 54
End: 2019-01-14
Payer: COMMERCIAL

## 2019-01-14 DIAGNOSIS — F41.0 PANIC DISORDER WITHOUT AGORAPHOBIA: Chronic | ICD-10-CM

## 2019-01-14 DIAGNOSIS — F41.1 GAD (GENERALIZED ANXIETY DISORDER): Chronic | ICD-10-CM

## 2019-01-14 DIAGNOSIS — F31.81 BIPOLAR II DISORDER, MOST RECENT EPISODE MAJOR DEPRESSIVE (HCC): Primary | Chronic | ICD-10-CM

## 2019-01-14 PROCEDURE — 90834 PSYTX W PT 45 MINUTES: CPT | Performed by: SOCIAL WORKER
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
## 2019-01-25 ENCOUNTER — OFFICE VISIT (OUTPATIENT)
Dept: BEHAVIORAL/MENTAL HEALTH CLINIC | Facility: CLINIC | Age: 54
End: 2019-01-25
Payer: COMMERCIAL

## 2019-01-25 DIAGNOSIS — F41.0 PANIC DISORDER WITHOUT AGORAPHOBIA: Chronic | ICD-10-CM

## 2019-01-25 DIAGNOSIS — F31.81 BIPOLAR II DISORDER, MOST RECENT EPISODE MAJOR DEPRESSIVE (HCC): Primary | Chronic | ICD-10-CM

## 2019-01-25 DIAGNOSIS — F41.1 GAD (GENERALIZED ANXIETY DISORDER): Chronic | ICD-10-CM

## 2019-01-25 PROCEDURE — 90834 PSYTX W PT 45 MINUTES: CPT | Performed by: SOCIAL WORKER
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
## 2019-02-01 ENCOUNTER — OFFICE VISIT (OUTPATIENT)
Dept: BEHAVIORAL/MENTAL HEALTH CLINIC | Facility: CLINIC | Age: 54
End: 2019-02-01
Payer: COMMERCIAL

## 2019-02-01 DIAGNOSIS — F31.81 BIPOLAR II DISORDER, MOST RECENT EPISODE MAJOR DEPRESSIVE (HCC): Chronic | ICD-10-CM

## 2019-02-01 DIAGNOSIS — F41.1 GAD (GENERALIZED ANXIETY DISORDER): Primary | Chronic | ICD-10-CM

## 2019-02-01 DIAGNOSIS — F41.0 PANIC DISORDER WITHOUT AGORAPHOBIA: Chronic | ICD-10-CM

## 2019-02-01 PROCEDURE — 90834 PSYTX W PT 45 MINUTES: CPT | Performed by: SOCIAL WORKER

## 2019-02-07 ENCOUNTER — TELEPHONE (OUTPATIENT)
Dept: BEHAVIORAL/MENTAL HEALTH CLINIC | Facility: CLINIC | Age: 54
End: 2019-02-07

## 2019-02-07 NOTE — TELEPHONE ENCOUNTER
Michelle Oconnor called because she had to cancel her appointment for 02/12  She states she really needs to speak with you and says she lost your number? I told you I would ask you to call her when you could

## 2019-02-07 NOTE — TELEPHONE ENCOUNTER
Spoke with Janine regarding yesterday's CYS court hearing  Edilroopa Fara must move in with [place] and kids for 30 days or Nadja Pierson would of been removed

## 2019-02-11 ENCOUNTER — OFFICE VISIT (OUTPATIENT)
Dept: BEHAVIORAL/MENTAL HEALTH CLINIC | Facility: CLINIC | Age: 54
End: 2019-02-11
Payer: COMMERCIAL

## 2019-02-11 DIAGNOSIS — F41.1 GAD (GENERALIZED ANXIETY DISORDER): Primary | Chronic | ICD-10-CM

## 2019-02-11 DIAGNOSIS — F41.0 PANIC DISORDER WITHOUT AGORAPHOBIA: Chronic | ICD-10-CM

## 2019-02-11 DIAGNOSIS — F31.81 BIPOLAR II DISORDER, MOST RECENT EPISODE MAJOR DEPRESSIVE (HCC): Chronic | ICD-10-CM

## 2019-02-11 PROCEDURE — 90834 PSYTX W PT 45 MINUTES: CPT | Performed by: SOCIAL WORKER

## 2019-02-11 NOTE — PSYCH
Psychotherapy Provided: Individual Psychotherapy 50 minutes     Length of time in session: 50 minutes, follow up in 2 week    Goals addressed in session: Goal 1, Goal 2 and Goal 3      Pain:      none    0    Current suicide risk : Low      D: Met with Janine individually   ROS: 'I want to go home '  Session focused on recent court order for Janine to live with Annemarie Hansen and children for safety  PFA was not lifted and Antonietta Pickett remains at [facility]  Concerns that Jignesh Mars is not feeling well - looks ill and Dane Winters is not living there  Crisis plan in place for Janine due to emotions she is experiencing - taking care of Tiburcio Ross and self care time for Dane Hansen in day program       A: Dane Winters continues to present with depressed, tearful and anxious mood  Periods of SI but no plan  Suezanne Meals has a lot to take on due to current CYS case, many circumstances out of her control      P: Continue individual therapy, monitor symptoms, support for current psychosocial stressors  Behavioral Health Treatment Plan [facility]: Diagnosis and Treatment Plan explained to Mariama Butcher relates understanding diagnosis and is agreeable to Treatment Plan   Yes
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
## 2019-02-14 ENCOUNTER — OFFICE VISIT (OUTPATIENT)
Dept: PSYCHIATRY | Facility: CLINIC | Age: 54
End: 2019-02-14
Payer: COMMERCIAL

## 2019-02-14 VITALS
DIASTOLIC BLOOD PRESSURE: 77 MMHG | BODY MASS INDEX: 35.16 KG/M2 | HEIGHT: 68 IN | WEIGHT: 232 LBS | SYSTOLIC BLOOD PRESSURE: 130 MMHG | HEART RATE: 84 BPM

## 2019-02-14 DIAGNOSIS — F41.0 PANIC DISORDER WITHOUT AGORAPHOBIA: Chronic | ICD-10-CM

## 2019-02-14 DIAGNOSIS — F31.81 BIPOLAR II DISORDER, MOST RECENT EPISODE MAJOR DEPRESSIVE (HCC): Primary | Chronic | ICD-10-CM

## 2019-02-14 DIAGNOSIS — F41.1 GAD (GENERALIZED ANXIETY DISORDER): Chronic | ICD-10-CM

## 2019-02-14 PROCEDURE — 90833 PSYTX W PT W E/M 30 MIN: CPT | Performed by: PSYCHIATRY & NEUROLOGY

## 2019-02-14 PROCEDURE — 99213 OFFICE O/P EST LOW 20 MIN: CPT | Performed by: PSYCHIATRY & NEUROLOGY

## 2019-02-14 RX ORDER — IBUPROFEN 600 MG/1
600 TABLET ORAL 3 TIMES DAILY PRN
Refills: 0 | COMMUNITY
Start: 2019-01-29 | End: 2020-07-09 | Stop reason: ALTCHOICE

## 2019-02-20 ENCOUNTER — OFFICE VISIT (OUTPATIENT)
Dept: BEHAVIORAL/MENTAL HEALTH CLINIC | Facility: CLINIC | Age: 54
End: 2019-02-20
Payer: COMMERCIAL

## 2019-02-20 DIAGNOSIS — F31.81 BIPOLAR II DISORDER, MOST RECENT EPISODE MAJOR DEPRESSIVE (HCC): Chronic | ICD-10-CM

## 2019-02-20 DIAGNOSIS — F41.1 GAD (GENERALIZED ANXIETY DISORDER): Chronic | ICD-10-CM

## 2019-02-20 DIAGNOSIS — F41.0 PANIC DISORDER WITHOUT AGORAPHOBIA: Primary | Chronic | ICD-10-CM

## 2019-02-20 PROCEDURE — 90834 PSYTX W PT 45 MINUTES: CPT | Performed by: SOCIAL WORKER

## 2019-02-20 NOTE — PSYCH
Psychotherapy Provided: Individual Psychotherapy 50 minutes     Length of time in session: 50 minutes, follow up in 2 week    Goals addressed in session: Goal 1, Goal 2 and Goal 3      Pain:      none    0    Current suicide risk : Low     D: Met with Janine individually   ROS: 'I want to go home '  Session focused upon Janine focusing on circumstances in and out of her control  'Pick her battles not to affect my emotional state '  Health concerns for Mundo Oropeza remain  Crisis plan in place for Janine due to emotions she is experiencing - taking care of Jenyesika Reason and self care time for Radha Miles Elders stopped day program- was not appropriate for her  CYS approved this and modified her plan  Dependency Court 3/5/19       A: Radha continues to present with depressed, tearful and anxious mood  Periods of SI but no plan  Eunice Watkins has a lot to take on due to current CYS case, many circumstances out of her control       P: Continue individual therapy, monitor symptoms, support for current psychosocial stressors  Behavioral Health Treatment Plan [facility]: Diagnosis and Treatment Plan explained to Olesya Montgomery relates understanding diagnosis and is agreeable to Treatment Plan   Yes
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
## 2019-03-01 ENCOUNTER — OFFICE VISIT (OUTPATIENT)
Dept: BEHAVIORAL/MENTAL HEALTH CLINIC | Facility: CLINIC | Age: 54
End: 2019-03-01
Payer: COMMERCIAL

## 2019-03-01 DIAGNOSIS — F31.81 BIPOLAR II DISORDER, MOST RECENT EPISODE MAJOR DEPRESSIVE (HCC): Chronic | ICD-10-CM

## 2019-03-01 DIAGNOSIS — F41.1 GAD (GENERALIZED ANXIETY DISORDER): Primary | Chronic | ICD-10-CM

## 2019-03-01 DIAGNOSIS — F41.0 PANIC DISORDER WITHOUT AGORAPHOBIA: Chronic | ICD-10-CM

## 2019-03-01 PROCEDURE — 90834 PSYTX W PT 45 MINUTES: CPT | Performed by: SOCIAL WORKER

## 2019-03-01 NOTE — PSYCH
Psychotherapy Provided: Individual Psychotherapy 50 minutes     Length of time in session: 50 minutes, follow up in 2 week    Goals addressed in session: Goal 1, Goal 2 and Goal 3      Pain:      none    0    Current suicide risk : Low     D: Met with Janine individually   ROS: 'I'm waiting to crack  '  Session focused upon 2021 Chrystal Oleary focusing on circumstances in and out of her control  Health concerns for Saige Garcia- scheduled for a battery of tests  Crisis plan in place for Janine due to emotions she is experiencing - taking care of Marinda Cogan and self care time for Elbert Sy 3/5/19  Discussed possible outcomes  Reminded to set boundaries  Denied SI      A: 2021 Chrystal Oleary continues to present with depressed, tearful and anxious mood  Periods of SI but no plan  Axel Quintero has a lot to take on due to current CYS case, many circumstances out of her control  No changes      P: Continue individual therapy, monitor symptoms, support for current psychosocial stressors            Behavioral Health Treatment Plan [facility]: Diagnosis and Treatment Plan explained to Erica Zamorano relates understanding diagnosis and is agreeable to Treatment Plan   Yes

## 2019-03-18 ENCOUNTER — OFFICE VISIT (OUTPATIENT)
Dept: BEHAVIORAL/MENTAL HEALTH CLINIC | Facility: CLINIC | Age: 54
End: 2019-03-18
Payer: COMMERCIAL

## 2019-03-18 DIAGNOSIS — F41.0 PANIC DISORDER WITHOUT AGORAPHOBIA: Chronic | ICD-10-CM

## 2019-03-18 DIAGNOSIS — F41.1 GAD (GENERALIZED ANXIETY DISORDER): Chronic | ICD-10-CM

## 2019-03-18 DIAGNOSIS — F31.81 BIPOLAR II DISORDER, MOST RECENT EPISODE MAJOR DEPRESSIVE (HCC): Primary | Chronic | ICD-10-CM

## 2019-03-18 PROCEDURE — 90834 PSYTX W PT 45 MINUTES: CPT | Performed by: SOCIAL WORKER

## 2019-03-21 ENCOUNTER — DOCUMENTATION (OUTPATIENT)
Dept: PSYCHIATRY | Facility: CLINIC | Age: 54
End: 2019-03-21

## 2019-03-22 ENCOUNTER — TELEPHONE (OUTPATIENT)
Dept: INTERNAL MEDICINE CLINIC | Facility: CLINIC | Age: 54
End: 2019-03-22

## 2019-03-22 NOTE — TELEPHONE ENCOUNTER
I spoke with the patient she is having the same twinges that she normally has around the breast area  The patient feels it is from extreme stress and decreasing her smoking  She has no jaw pain, back pain, nausea or dizziness  I rvwd with the patient that if she does she needs to go to the ER

## 2019-03-26 ENCOUNTER — OFFICE VISIT (OUTPATIENT)
Dept: INTERNAL MEDICINE CLINIC | Facility: CLINIC | Age: 54
End: 2019-03-26
Payer: COMMERCIAL

## 2019-03-26 ENCOUNTER — OFFICE VISIT (OUTPATIENT)
Dept: BEHAVIORAL/MENTAL HEALTH CLINIC | Facility: CLINIC | Age: 54
End: 2019-03-26
Payer: COMMERCIAL

## 2019-03-26 VITALS
OXYGEN SATURATION: 96 % | TEMPERATURE: 98.5 F | DIASTOLIC BLOOD PRESSURE: 68 MMHG | WEIGHT: 233.2 LBS | RESPIRATION RATE: 16 BRPM | BODY MASS INDEX: 35.34 KG/M2 | SYSTOLIC BLOOD PRESSURE: 122 MMHG | HEART RATE: 66 BPM | HEIGHT: 68 IN

## 2019-03-26 DIAGNOSIS — R07.9 CHEST PAIN, UNSPECIFIED TYPE: Primary | ICD-10-CM

## 2019-03-26 DIAGNOSIS — Z12.11 SCREENING FOR COLON CANCER: ICD-10-CM

## 2019-03-26 DIAGNOSIS — F31.81 BIPOLAR II DISORDER, MOST RECENT EPISODE MAJOR DEPRESSIVE (HCC): Primary | Chronic | ICD-10-CM

## 2019-03-26 DIAGNOSIS — F41.1 GAD (GENERALIZED ANXIETY DISORDER): Chronic | ICD-10-CM

## 2019-03-26 DIAGNOSIS — F41.0 PANIC DISORDER WITHOUT AGORAPHOBIA: Chronic | ICD-10-CM

## 2019-03-26 PROCEDURE — 99213 OFFICE O/P EST LOW 20 MIN: CPT | Performed by: NURSE PRACTITIONER

## 2019-03-26 PROCEDURE — 90834 PSYTX W PT 45 MINUTES: CPT | Performed by: SOCIAL WORKER

## 2019-03-26 PROCEDURE — 4004F PT TOBACCO SCREEN RCVD TLK: CPT | Performed by: NURSE PRACTITIONER

## 2019-03-26 PROCEDURE — 3008F BODY MASS INDEX DOCD: CPT | Performed by: NURSE PRACTITIONER

## 2019-03-26 PROCEDURE — 93000 ELECTROCARDIOGRAM COMPLETE: CPT | Performed by: NURSE PRACTITIONER

## 2019-03-26 NOTE — PROGRESS NOTES
Assessment/Plan:    EKG was normal  She declined any stress test or further imaging  Her exam is normal and her symptoms are consistent only with increased anxiety  I don't feel this is cardiac related but she was advised to go to the ER for any worsening or persistent chest pain  Diagnoses and all orders for this visit:    Chest pain, unspecified type  -     POCT ECG    Screening for colon cancer    DREAD (generalized anxiety disorder)    Other orders  -     Cancel: Ambulatory referral to Gastroenterology; Future  -     Cancel: PNEUMOCOCCAL POLYSACCHARIDE VACCINE 23-VALENT =>1YO SQ IM  -     Cancel: Ambulatory referral to Gastroenterology; Future          Subjective:      Patient ID: Crystal Philip is a 46 y o  female  Here for increased stress    Trying to get disability but the  retired  Her son is living with her  She has financial issues and takes care of her autistic grandson   She went to her therapist today and has set up future appointments  She follows with psychiatry , next appointment is in may  Denies any suicidal thoughts     She's concerned about twinges she gets in her left chest wall when she is feeling anxious   Not associated with shortness of breath, dizziness, weakness  She's been getting these for 1-2 years  Stress test in 2016 was normal     +smoking         The following portions of the patient's history were reviewed and updated as appropriate: allergies, current medications, past family history, past medical history, past social history, past surgical history and problem list     Review of Systems   Constitutional: Negative  Respiratory: Negative  Cardiovascular: Positive for chest pain  Negative for palpitations and leg swelling  Gastrointestinal: Negative  Neurological: Negative  Psychiatric/Behavioral: Positive for agitation and sleep disturbance  Negative for suicidal ideas  The patient is nervous/anxious            Objective:      /68   Pulse 66   Temp 98 5 °F (36 9 °C) (Oral)   Resp 16   Ht 5' 8" (1 727 m)   Wt 106 kg (233 lb 3 2 oz)   SpO2 96%   BMI 35 46 kg/m²          Physical Exam   Constitutional: She is oriented to person, place, and time  She appears well-developed and well-nourished  Eyes: Pupils are equal, round, and reactive to light  Cardiovascular: Normal rate, regular rhythm and normal heart sounds  Pulmonary/Chest: Effort normal and breath sounds normal    Neurological: She is alert and oriented to person, place, and time  Vitals reviewed

## 2019-03-26 NOTE — PSYCH
Psychotherapy Provided: Individual Psychotherapy 50 minutes     Length of time in session: 50 minutes, follow up in 2 week    Goals addressed in session: Goal 1, Goal 2 and Goal 3      Pain:      4 (back pain)    0    Current suicide risk : Low     D: Met with Janine individually   ROS: 'I'm going to loose it '  Session focused upon Janine's current circumstances at home  Dad remains on hospice  Lis Almanza stated she received a call from disability office - Rissa Lung has retired and case will be postponed till another Felclaudette Lung is assigned  Denied current SI - stated SI  Without plan remains throughout the weeks       A: Lis Alamnza continues to present with depressed, tearful and anxious mood  She repositioned herself several times while trying to crack her knuckles  No progress      P: Continue individual therapy, monitor symptoms, support for current psychosocial stressors      Behavioral Health Treatment Plan [facility]: Diagnosis and Treatment Plan explained to Rhonda Montero relates understanding diagnosis and is agreeable to Treatment Plan   Yes

## 2019-04-05 LAB
ALBUMIN SERPL-MCNC: 4.3 G/DL (ref 3.6–5.1)
ALBUMIN/GLOB SERPL: 2 (CALC) (ref 1–2.5)
ALP SERPL-CCNC: 75 U/L (ref 33–130)
ALT SERPL-CCNC: 13 U/L (ref 6–29)
AST SERPL-CCNC: 14 U/L (ref 10–35)
BILIRUB SERPL-MCNC: 0.4 MG/DL (ref 0.2–1.2)
BUN SERPL-MCNC: 14 MG/DL (ref 7–25)
BUN/CREAT SERPL: ABNORMAL (CALC) (ref 6–22)
CALCIUM SERPL-MCNC: 9.5 MG/DL (ref 8.6–10.4)
CHLORIDE SERPL-SCNC: 108 MMOL/L (ref 98–110)
CHOLEST SERPL-MCNC: 246 MG/DL
CHOLEST/HDLC SERPL: 3.3 (CALC)
CO2 SERPL-SCNC: 29 MMOL/L (ref 20–32)
CREAT SERPL-MCNC: 0.76 MG/DL (ref 0.5–1.05)
GLOBULIN SER CALC-MCNC: 2.2 G/DL (CALC) (ref 1.9–3.7)
GLUCOSE SERPL-MCNC: 104 MG/DL (ref 65–99)
HDLC SERPL-MCNC: 75 MG/DL
LDLC SERPL CALC-MCNC: 153 MG/DL (CALC)
NONHDLC SERPL-MCNC: 171 MG/DL (CALC)
POTASSIUM SERPL-SCNC: 4.1 MMOL/L (ref 3.5–5.3)
PROT SERPL-MCNC: 6.5 G/DL (ref 6.1–8.1)
SL AMB EGFR AFRICAN AMERICAN: 102 ML/MIN/1.73M2
SL AMB EGFR NON AFRICAN AMERICAN: 88 ML/MIN/1.73M2
SODIUM SERPL-SCNC: 141 MMOL/L (ref 135–146)
TRIGL SERPL-MCNC: 77 MG/DL

## 2019-04-10 ENCOUNTER — OFFICE VISIT (OUTPATIENT)
Dept: BEHAVIORAL/MENTAL HEALTH CLINIC | Facility: CLINIC | Age: 54
End: 2019-04-10
Payer: COMMERCIAL

## 2019-04-10 DIAGNOSIS — F41.1 GAD (GENERALIZED ANXIETY DISORDER): Primary | Chronic | ICD-10-CM

## 2019-04-10 DIAGNOSIS — F41.0 PANIC DISORDER WITHOUT AGORAPHOBIA: Chronic | ICD-10-CM

## 2019-04-10 DIAGNOSIS — F31.81 BIPOLAR II DISORDER, MOST RECENT EPISODE MAJOR DEPRESSIVE (HCC): Chronic | ICD-10-CM

## 2019-04-10 PROCEDURE — 90834 PSYTX W PT 45 MINUTES: CPT | Performed by: SOCIAL WORKER

## 2019-05-14 ENCOUNTER — OFFICE VISIT (OUTPATIENT)
Dept: BEHAVIORAL/MENTAL HEALTH CLINIC | Facility: CLINIC | Age: 54
End: 2019-05-14
Payer: COMMERCIAL

## 2019-05-14 ENCOUNTER — DOCUMENTATION (OUTPATIENT)
Dept: INTERNAL MEDICINE CLINIC | Facility: CLINIC | Age: 54
End: 2019-05-14

## 2019-05-14 DIAGNOSIS — F41.1 GAD (GENERALIZED ANXIETY DISORDER): Chronic | ICD-10-CM

## 2019-05-14 DIAGNOSIS — Z12.31 ENCOUNTER FOR SCREENING MAMMOGRAM FOR BREAST CANCER: Primary | ICD-10-CM

## 2019-05-14 DIAGNOSIS — F31.81 BIPOLAR II DISORDER, MOST RECENT EPISODE MAJOR DEPRESSIVE (HCC): Primary | Chronic | ICD-10-CM

## 2019-05-14 DIAGNOSIS — F41.0 PANIC DISORDER WITHOUT AGORAPHOBIA: Chronic | ICD-10-CM

## 2019-05-14 PROCEDURE — 90834 PSYTX W PT 45 MINUTES: CPT | Performed by: SOCIAL WORKER

## 2019-05-23 ENCOUNTER — TELEPHONE (OUTPATIENT)
Dept: INTERNAL MEDICINE CLINIC | Facility: CLINIC | Age: 54
End: 2019-05-23

## 2019-05-23 DIAGNOSIS — R32 URINARY INCONTINENCE, UNSPECIFIED TYPE: Primary | ICD-10-CM

## 2019-05-30 ENCOUNTER — OFFICE VISIT (OUTPATIENT)
Dept: PSYCHIATRY | Facility: CLINIC | Age: 54
End: 2019-05-30
Payer: COMMERCIAL

## 2019-05-30 VITALS
HEIGHT: 68 IN | SYSTOLIC BLOOD PRESSURE: 121 MMHG | HEART RATE: 85 BPM | BODY MASS INDEX: 35.92 KG/M2 | WEIGHT: 237 LBS | DIASTOLIC BLOOD PRESSURE: 79 MMHG

## 2019-05-30 DIAGNOSIS — F41.0 PANIC DISORDER WITHOUT AGORAPHOBIA: Chronic | ICD-10-CM

## 2019-05-30 DIAGNOSIS — F41.1 GAD (GENERALIZED ANXIETY DISORDER): Chronic | ICD-10-CM

## 2019-05-30 DIAGNOSIS — F31.81 BIPOLAR II DISORDER, MOST RECENT EPISODE MAJOR DEPRESSIVE (HCC): Primary | Chronic | ICD-10-CM

## 2019-05-30 PROCEDURE — 90833 PSYTX W PT W E/M 30 MIN: CPT | Performed by: PSYCHIATRY & NEUROLOGY

## 2019-05-30 PROCEDURE — 99213 OFFICE O/P EST LOW 20 MIN: CPT | Performed by: PSYCHIATRY & NEUROLOGY

## 2019-05-30 RX ORDER — LAMOTRIGINE 100 MG/1
100 TABLET ORAL DAILY
Qty: 90 TABLET | Refills: 2 | Status: SHIPPED | OUTPATIENT
Start: 2019-05-30 | End: 2020-02-26 | Stop reason: SDUPTHER

## 2019-05-30 RX ORDER — DULOXETIN HYDROCHLORIDE 60 MG/1
60 CAPSULE, DELAYED RELEASE ORAL 2 TIMES DAILY
Qty: 180 CAPSULE | Refills: 2 | Status: SHIPPED | OUTPATIENT
Start: 2019-05-30 | End: 2020-02-26 | Stop reason: SDUPTHER

## 2019-05-30 RX ORDER — LAMOTRIGINE 150 MG/1
150 TABLET ORAL
Qty: 90 TABLET | Refills: 2 | Status: SHIPPED | OUTPATIENT
Start: 2019-05-30 | End: 2020-02-26 | Stop reason: SDUPTHER

## 2019-05-30 RX ORDER — LORAZEPAM 0.5 MG/1
0.5 TABLET ORAL 4 TIMES DAILY PRN
Qty: 120 TABLET | Refills: 3 | Status: SHIPPED | OUTPATIENT
Start: 2019-05-30 | End: 2019-11-26 | Stop reason: SDUPTHER

## 2019-05-31 ENCOUNTER — OFFICE VISIT (OUTPATIENT)
Dept: BEHAVIORAL/MENTAL HEALTH CLINIC | Facility: CLINIC | Age: 54
End: 2019-05-31
Payer: COMMERCIAL

## 2019-05-31 DIAGNOSIS — F41.0 PANIC DISORDER WITHOUT AGORAPHOBIA: Chronic | ICD-10-CM

## 2019-05-31 DIAGNOSIS — F41.1 GAD (GENERALIZED ANXIETY DISORDER): Chronic | ICD-10-CM

## 2019-05-31 DIAGNOSIS — F31.81 BIPOLAR II DISORDER, MOST RECENT EPISODE MAJOR DEPRESSIVE (HCC): Primary | Chronic | ICD-10-CM

## 2019-05-31 PROCEDURE — 90834 PSYTX W PT 45 MINUTES: CPT | Performed by: SOCIAL WORKER

## 2019-06-07 ENCOUNTER — OFFICE VISIT (OUTPATIENT)
Dept: BEHAVIORAL/MENTAL HEALTH CLINIC | Facility: CLINIC | Age: 54
End: 2019-06-07
Payer: COMMERCIAL

## 2019-06-07 DIAGNOSIS — F41.0 PANIC DISORDER WITHOUT AGORAPHOBIA: Chronic | ICD-10-CM

## 2019-06-07 DIAGNOSIS — F31.81 BIPOLAR II DISORDER, MOST RECENT EPISODE MAJOR DEPRESSIVE (HCC): Primary | Chronic | ICD-10-CM

## 2019-06-07 DIAGNOSIS — F41.1 GAD (GENERALIZED ANXIETY DISORDER): Chronic | ICD-10-CM

## 2019-06-07 PROCEDURE — 90834 PSYTX W PT 45 MINUTES: CPT | Performed by: SOCIAL WORKER

## 2019-06-12 ENCOUNTER — OFFICE VISIT (OUTPATIENT)
Dept: BEHAVIORAL/MENTAL HEALTH CLINIC | Facility: CLINIC | Age: 54
End: 2019-06-12
Payer: COMMERCIAL

## 2019-06-12 DIAGNOSIS — F41.0 PANIC DISORDER WITHOUT AGORAPHOBIA: Chronic | ICD-10-CM

## 2019-06-12 DIAGNOSIS — F41.1 GAD (GENERALIZED ANXIETY DISORDER): Chronic | ICD-10-CM

## 2019-06-12 DIAGNOSIS — F31.81 BIPOLAR II DISORDER, MOST RECENT EPISODE MAJOR DEPRESSIVE (HCC): Primary | Chronic | ICD-10-CM

## 2019-06-12 PROCEDURE — 90834 PSYTX W PT 45 MINUTES: CPT | Performed by: SOCIAL WORKER

## 2019-07-05 ENCOUNTER — SOCIAL WORK (OUTPATIENT)
Dept: BEHAVIORAL/MENTAL HEALTH CLINIC | Facility: CLINIC | Age: 54
End: 2019-07-05
Payer: COMMERCIAL

## 2019-07-05 DIAGNOSIS — F41.0 PANIC DISORDER WITHOUT AGORAPHOBIA: Chronic | ICD-10-CM

## 2019-07-05 DIAGNOSIS — F41.1 GAD (GENERALIZED ANXIETY DISORDER): Primary | Chronic | ICD-10-CM

## 2019-07-05 DIAGNOSIS — F31.81 BIPOLAR II DISORDER, MOST RECENT EPISODE MAJOR DEPRESSIVE (HCC): Chronic | ICD-10-CM

## 2019-07-05 PROCEDURE — 90834 PSYTX W PT 45 MINUTES: CPT | Performed by: SOCIAL WORKER
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
## 2019-07-05 NOTE — PSYCH
Psychotherapy Provided: Individual Psychotherapy 50 minutes     Length of time in session: 50 minutes, follow up in 2 week    Goals addressed in session: Goal 1, Goal 2 and Goal 3      Pain:      none    0    Current suicide risk : Low     D: Met with Janine individually   ROS: 'I'm stressed as always  ' Session focused upon seeing grandchildren on Monday - went to next visit and was declined  Jake Fagan began to 'get loud' and security was called  Janine spoke with CYS worker claiming 'grandparents rights' - They stated law reads monthly visits for grandparents  Experiencing chest pains (anxiety) when discussing above topics (already r/o by cardiology)    Denied SI     A: Jake Fagan continues to present with depressed/anxiety  Tearful throughout session - appropriate for grief she is experiencing  P: Continue individual therapy, monitor symptoms, support for current psychosocial [address] [facility]: Diagnosis and Treatment Plan explained to Cassie Morgan relates understanding diagnosis and is agreeable to Treatment Plan   Yes

## 2019-07-05 NOTE — PSYCH
Treatment Plan Tracking    # 7Treatment Plan not completed within required time limits due to: Client cancelled/no-showed scheduled appointment  Claude Sahni

## 2019-07-09 ENCOUNTER — OFFICE VISIT (OUTPATIENT)
Dept: INTERNAL MEDICINE CLINIC | Facility: CLINIC | Age: 54
End: 2019-07-09
Payer: COMMERCIAL

## 2019-07-09 VITALS
RESPIRATION RATE: 18 BRPM | DIASTOLIC BLOOD PRESSURE: 74 MMHG | OXYGEN SATURATION: 98 % | TEMPERATURE: 97.8 F | BODY MASS INDEX: 36.8 KG/M2 | WEIGHT: 242.8 LBS | HEIGHT: 68 IN | HEART RATE: 77 BPM | SYSTOLIC BLOOD PRESSURE: 124 MMHG

## 2019-07-09 DIAGNOSIS — E78.5 HYPERLIPIDEMIA, UNSPECIFIED HYPERLIPIDEMIA TYPE: ICD-10-CM

## 2019-07-09 DIAGNOSIS — R21 RASH, SKIN: Primary | ICD-10-CM

## 2019-07-09 DIAGNOSIS — F41.1 GAD (GENERALIZED ANXIETY DISORDER): Chronic | ICD-10-CM

## 2019-07-09 PROCEDURE — 4004F PT TOBACCO SCREEN RCVD TLK: CPT | Performed by: NURSE PRACTITIONER

## 2019-07-09 PROCEDURE — 99214 OFFICE O/P EST MOD 30 MIN: CPT | Performed by: NURSE PRACTITIONER

## 2019-07-09 PROCEDURE — 3008F BODY MASS INDEX DOCD: CPT | Performed by: NURSE PRACTITIONER

## 2019-07-09 RX ORDER — MOMETASONE FUROATE 1 MG/G
CREAM TOPICAL DAILY
Qty: 45 G | Refills: 0 | Status: SHIPPED | OUTPATIENT
Start: 2019-07-09 | End: 2019-10-30 | Stop reason: ALTCHOICE

## 2019-07-09 NOTE — PROGRESS NOTES
Assessment/Plan:    Rash  Referral to dermatology   Try steroid cream daily  Moisturize daily with cera ve     Hyperlipidemia  Lipids have increased due to weight gain and 'stress eating'   Advised to avoid fried/processed/fatty foods, make small changes  Recheck prior to next appointment     DREAD (generalized anxiety disorder)  Continue medications and counseling per psychiatry        Diagnoses and all orders for this visit:    Rash, skin  -     mometasone (ELOCON) 0 1 % cream; Apply topically daily  -     Ambulatory referral to Dermatology; Future    Hyperlipidemia, unspecified hyperlipidemia type    DREAD (generalized anxiety disorder)          Subjective:      Patient ID: Zohreh Ahmadi is a 53 y o  female  Here today for rash on bilateral shins   Started about a week ago   Gets something similar every year   Denies any new lotions or soaps    Did use a new sunscreen   +itching and occasional burning   Psychiatry worried about medication reaction (lamictal)      Increased stress at home   They are moving out of current residence   Grandchildren in foster care  Sees psychiatry and counselor           The following portions of the patient's history were reviewed and updated as appropriate: allergies, current medications, past family history, past medical history, past social history, past surgical history and problem list     Review of Systems   Constitutional: Negative for activity change, appetite change and fatigue  Respiratory: Negative for cough, chest tightness and shortness of breath  Cardiovascular: Negative for chest pain  Musculoskeletal: Negative for arthralgias and myalgias  Skin: Positive for rash  Psychiatric/Behavioral: Positive for decreased concentration and dysphoric mood  Negative for self-injury, sleep disturbance and suicidal ideas  The patient is nervous/anxious            Objective:      /74   Pulse 77   Temp 97 8 °F (36 6 °C) (Oral)   Resp 18   Ht 5' 8" (1 727 m)   Wt 110 kg (242 lb 12 8 oz)   SpO2 98%   BMI 36 92 kg/m²          Physical Exam   Constitutional: She appears well-developed and well-nourished  Cardiovascular: Normal rate, regular rhythm and normal heart sounds  Pulmonary/Chest: Effort normal and breath sounds normal    Musculoskeletal: She exhibits no edema  Neurological: She is alert  Skin: Skin is dry  Rash noted  Rash is papular  There is erythema  Psychiatric: Her behavior is normal  Her mood appears anxious  Vitals reviewed

## 2019-07-09 NOTE — ASSESSMENT & PLAN NOTE
Lipids have increased due to weight gain and 'stress eating'   Advised to avoid fried/processed/fatty foods, make small changes  Recheck prior to next appointment

## 2019-07-16 ENCOUNTER — SOCIAL WORK (OUTPATIENT)
Dept: BEHAVIORAL/MENTAL HEALTH CLINIC | Facility: CLINIC | Age: 54
End: 2019-07-16
Payer: COMMERCIAL

## 2019-07-16 DIAGNOSIS — F41.1 GAD (GENERALIZED ANXIETY DISORDER): Chronic | ICD-10-CM

## 2019-07-16 DIAGNOSIS — F41.0 PANIC DISORDER WITHOUT AGORAPHOBIA: Chronic | ICD-10-CM

## 2019-07-16 DIAGNOSIS — F31.81 BIPOLAR II DISORDER, MOST RECENT EPISODE MAJOR DEPRESSIVE (HCC): Primary | Chronic | ICD-10-CM

## 2019-07-16 PROCEDURE — 90834 PSYTX W PT 45 MINUTES: CPT | Performed by: SOCIAL WORKER
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
## 2019-07-26 ENCOUNTER — SOCIAL WORK (OUTPATIENT)
Dept: BEHAVIORAL/MENTAL HEALTH CLINIC | Facility: CLINIC | Age: 54
End: 2019-07-26
Payer: COMMERCIAL

## 2019-07-26 DIAGNOSIS — F41.0 PANIC DISORDER WITHOUT AGORAPHOBIA: Chronic | ICD-10-CM

## 2019-07-26 DIAGNOSIS — F41.1 GAD (GENERALIZED ANXIETY DISORDER): Chronic | ICD-10-CM

## 2019-07-26 DIAGNOSIS — F31.81 BIPOLAR II DISORDER, MOST RECENT EPISODE MAJOR DEPRESSIVE (HCC): Primary | Chronic | ICD-10-CM

## 2019-07-26 PROCEDURE — 90834 PSYTX W PT 45 MINUTES: CPT | Performed by: SOCIAL WORKER
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
## 2019-07-30 ENCOUNTER — TELEPHONE (OUTPATIENT)
Dept: PSYCHIATRY | Facility: CLINIC | Age: 54
End: 2019-07-30

## 2019-07-31 ENCOUNTER — SOCIAL WORK (OUTPATIENT)
Dept: BEHAVIORAL/MENTAL HEALTH CLINIC | Facility: CLINIC | Age: 54
End: 2019-07-31
Payer: COMMERCIAL

## 2019-07-31 ENCOUNTER — TELEPHONE (OUTPATIENT)
Dept: PSYCHIATRY | Facility: CLINIC | Age: 54
End: 2019-07-31

## 2019-07-31 DIAGNOSIS — F43.21 UNRESOLVED GRIEF: ICD-10-CM

## 2019-07-31 DIAGNOSIS — F41.1 GAD (GENERALIZED ANXIETY DISORDER): Primary | Chronic | ICD-10-CM

## 2019-07-31 DIAGNOSIS — F31.81 BIPOLAR II DISORDER, MOST RECENT EPISODE MAJOR DEPRESSIVE (HCC): Chronic | ICD-10-CM

## 2019-07-31 DIAGNOSIS — F41.0 PANIC DISORDER WITHOUT AGORAPHOBIA: Chronic | ICD-10-CM

## 2019-07-31 PROCEDURE — 90834 PSYTX W PT 45 MINUTES: CPT | Performed by: SOCIAL WORKER

## 2019-07-31 NOTE — TELEPHONE ENCOUNTER
Both prescriptions were escripted to [email or web address] on 5/30/19 for 90 days with 2 RF  Neil Linker should have medications until 2/2020  She needs to call [email or web address] and request to fill those preecriptions

## 2019-07-31 NOTE — PSYCH
Psychotherapy Provided: Individual Psychotherapy 50 minutes     Length of time in session: 50 minutes, follow up in 02 week    Goals addressed in session: Goal 1, Goal 2 and Goal 3      Pain:      none    0    Current suicide risk : Low     D: Met with Janine DIAZ; 'I'm more relaxed'  Session focused upon being approved for Disability  Awaiting official monthly payment  Move is completed - living with Tariq Washington and family in their separate area  Simran Ruggiero is experiencing triggers of memories from Liparuna Pettyotovic in various environments  Communication with Maverick Guzman has ceased  Nae Locke is limited  Support provided  Denied SI    A: Simran Ruggiero presented with depressed mood and flattened affect  Feels more 'relaxed' though I feels depression remains quite present which is appropriate for current circumstances  Recent move and ceasing communication with Maverick Guzman are both positive changes  P: Continue individual therapy, support for current circumstances, encourage ongoing use of boundaries  Behavioral Health Treatment Plan [facility]: Diagnosis and Treatment Plan explained to Manuel Prado relates understanding diagnosis and is agreeable to Treatment Plan   Yes

## 2019-08-14 DIAGNOSIS — F31.81 BIPOLAR II DISORDER (HCC): ICD-10-CM

## 2019-08-15 RX ORDER — LAMOTRIGINE 100 MG/1
TABLET ORAL
Qty: 90 TABLET | Refills: 0 | OUTPATIENT
Start: 2019-08-15

## 2019-08-19 ENCOUNTER — SOCIAL WORK (OUTPATIENT)
Dept: BEHAVIORAL/MENTAL HEALTH CLINIC | Facility: CLINIC | Age: 54
End: 2019-08-19
Payer: COMMERCIAL

## 2019-08-19 DIAGNOSIS — F41.0 PANIC DISORDER WITHOUT AGORAPHOBIA: Chronic | ICD-10-CM

## 2019-08-19 DIAGNOSIS — F41.1 GAD (GENERALIZED ANXIETY DISORDER): Chronic | ICD-10-CM

## 2019-08-19 DIAGNOSIS — F31.81 BIPOLAR II DISORDER, MOST RECENT EPISODE MAJOR DEPRESSIVE (HCC): Primary | Chronic | ICD-10-CM

## 2019-08-19 PROCEDURE — 90834 PSYTX W PT 45 MINUTES: CPT | Performed by: SOCIAL WORKER
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
## 2019-08-27 ENCOUNTER — SOCIAL WORK (OUTPATIENT)
Dept: BEHAVIORAL/MENTAL HEALTH CLINIC | Facility: CLINIC | Age: 54
End: 2019-08-27
Payer: COMMERCIAL

## 2019-08-27 DIAGNOSIS — F41.1 GAD (GENERALIZED ANXIETY DISORDER): Chronic | ICD-10-CM

## 2019-08-27 DIAGNOSIS — F41.0 PANIC DISORDER WITHOUT AGORAPHOBIA: Chronic | ICD-10-CM

## 2019-08-27 DIAGNOSIS — F31.81 BIPOLAR II DISORDER, MOST RECENT EPISODE MAJOR DEPRESSIVE (HCC): Primary | Chronic | ICD-10-CM

## 2019-08-27 PROCEDURE — 90834 PSYTX W PT 45 MINUTES: CPT | Performed by: SOCIAL WORKER

## 2019-08-27 NOTE — PSYCH
Psychotherapy Provided: Individual Psychotherapy 50 minutes     Length of time in session: 50 minutes, follow up in 2 week    Goals addressed in session: Goal 1, Goal 2 and Goal 3      Pain:      none    0    Current suicide risk : Low     D: Met with Janine individually  LOS; 'Things have gone on  Suprisingly I'm pretty positive right now '  Session focused upon infant being removed by CPS after birth 2 weeks ago  Florentino Mom met  at a Dr 's appointment - very encouraged by foster mother's care and concern  Florentino Mom discussed having a talk with Kacy Tuttle - setting boundaries and verbalizing historic circumstances of disagreements, obstacles etc   Going to court on 9/17 - there is a chance Ivon's  will put Janine on the stand  Denied SI     A: Florentino Mom presented with appropriate mood and affect  She is setting more consistent boundaries with Kacy Tuttle - we will watch to see if implementation remains  P: Continue individual therapy, support for current stressors     Behavioral Health Treatment Plan [facility]: Diagnosis and Treatment Plan explained to Becca Holt relates understanding diagnosis and is agreeable to Treatment Plan   Yes

## 2019-09-12 ENCOUNTER — SOCIAL WORK (OUTPATIENT)
Dept: BEHAVIORAL/MENTAL HEALTH CLINIC | Facility: CLINIC | Age: 54
End: 2019-09-12
Payer: COMMERCIAL

## 2019-09-12 DIAGNOSIS — F41.1 GAD (GENERALIZED ANXIETY DISORDER): Chronic | ICD-10-CM

## 2019-09-12 DIAGNOSIS — F31.81 BIPOLAR II DISORDER, MOST RECENT EPISODE MAJOR DEPRESSIVE (HCC): Primary | Chronic | ICD-10-CM

## 2019-09-12 DIAGNOSIS — F41.0 PANIC DISORDER WITHOUT AGORAPHOBIA: Chronic | ICD-10-CM

## 2019-09-12 PROCEDURE — 90834 PSYTX W PT 45 MINUTES: CPT | Performed by: SOCIAL WORKER

## 2019-09-12 NOTE — PSYCH
Psychotherapy Provided: Individual Psychotherapy 50 minutes     Length of time in session: 50 minutes, follow up in 2 week    Goals addressed in session: Goal 1, Goal 2 and Goal 3      Pain:      none    0    Current suicide risk : Low     D: Met with Janine individually  Session focused upon Arnold feeling overwhelmed emotionally  She inquired about inpatient programs or 'retreats' (not for suicidal thoughts) focusing on emotional wellness, self esteem and mood disorders  Denied SI  Looked up a program in [place] - has to inquire about insurance coverage  Wants to continue therapy but feels she needs 'more right now'  [facility] also explored  Sees grandchildren 1x/week  Still not talking with son - 'very upset about this '  Jorge Alberto Mcneil also discussed feeling she has no privacy at [facility]' home   not doing well with mobility  A: Jorge Alberto Mcneil presented with depressed mood and tearful affect  She has several psychosocial stressors that have her overwhelmed  Higher level program would be beneficial right now  P: Continue individual therapy, support and further exploration of programs    [address]: Diagnosis and Treatment Plan explained to Pippa Triplett relates understanding diagnosis and is agreeable to Treatment Plan   Yes

## 2019-09-23 ENCOUNTER — SOCIAL WORK (OUTPATIENT)
Dept: BEHAVIORAL/MENTAL HEALTH CLINIC | Facility: CLINIC | Age: 54
End: 2019-09-23
Payer: COMMERCIAL

## 2019-09-23 DIAGNOSIS — F31.81 BIPOLAR II DISORDER, MOST RECENT EPISODE MAJOR DEPRESSIVE (HCC): Primary | Chronic | ICD-10-CM

## 2019-09-23 DIAGNOSIS — F41.1 GAD (GENERALIZED ANXIETY DISORDER): Chronic | ICD-10-CM

## 2019-09-23 DIAGNOSIS — F41.0 PANIC DISORDER WITHOUT AGORAPHOBIA: Chronic | ICD-10-CM

## 2019-09-23 PROCEDURE — 90834 PSYTX W PT 45 MINUTES: CPT | Performed by: SOCIAL WORKER
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
## 2019-09-23 NOTE — PSYCH
Psychotherapy Provided: Individual Psychotherapy 50 minutes     Length of time in session: 50 minutes, follow up in 2 week    Goals addressed in session: Goal 1, Goal 2 and Goal 3      Pain:      none    0    Current suicide risk : Low     D: Met with Janine individually  Session focused upon Arnold feeling overwhelmed emotionally  Discussed possibly moving out and seeking an apartment on her own (finances remain an obstacle)  Pros and cons processed  Will see grandchildren 1x/month now due to parenting classes starting  Janine discussed conversation with Oscar Izquierdo 'I told him he is to blames, his need to be a parents and not focus on himself  He also needs to get help '  Family dynamics in the home discussed 'lots of drama because people don't know how to mind their own business '  Denied SI     A: Loretta Cannon presented with more appropriate mood and affect  She remains overwhelmed, but continues to demonstrate ongoing progress with boundary setting      P: Continue individual therapy, support and further exploration of programs    [address]: Diagnosis and Treatment Plan explained to Vamsi Kapoor relates understanding diagnosis and is agreeable to Treatment Plan   Yes

## 2019-09-27 ENCOUNTER — OFFICE VISIT (OUTPATIENT)
Dept: PSYCHIATRY | Facility: CLINIC | Age: 54
End: 2019-09-27
Payer: COMMERCIAL

## 2019-09-27 VITALS
DIASTOLIC BLOOD PRESSURE: 78 MMHG | HEIGHT: 68 IN | BODY MASS INDEX: 36.92 KG/M2 | HEART RATE: 96 BPM | SYSTOLIC BLOOD PRESSURE: 135 MMHG

## 2019-09-27 DIAGNOSIS — F31.81 BIPOLAR II DISORDER, MOST RECENT EPISODE MAJOR DEPRESSIVE (HCC): Primary | Chronic | ICD-10-CM

## 2019-09-27 DIAGNOSIS — F41.1 GAD (GENERALIZED ANXIETY DISORDER): Chronic | ICD-10-CM

## 2019-09-27 DIAGNOSIS — F41.0 PANIC DISORDER WITHOUT AGORAPHOBIA: Chronic | ICD-10-CM

## 2019-09-27 PROCEDURE — 99213 OFFICE O/P EST LOW 20 MIN: CPT | Performed by: PSYCHIATRY & NEUROLOGY

## 2019-09-27 PROCEDURE — 90833 PSYTX W PT W E/M 30 MIN: CPT | Performed by: PSYCHIATRY & NEUROLOGY

## 2019-09-27 RX ORDER — HYDROXYZINE HYDROCHLORIDE 25 MG/1
25 TABLET, FILM COATED ORAL 3 TIMES DAILY PRN
Qty: 270 TABLET | Refills: 1 | Status: SHIPPED | OUTPATIENT
Start: 2019-09-27 | End: 2019-10-09

## 2019-10-01 ENCOUNTER — TELEPHONE (OUTPATIENT)
Dept: INTERNAL MEDICINE CLINIC | Facility: CLINIC | Age: 54
End: 2019-10-01

## 2019-10-01 NOTE — TELEPHONE ENCOUNTER
Please call and get more info  Cough? Headache/lightheaded  If she has any of these symptoms or chest pain, pain down her arm, or shortness of breath, I recommend ER asap  She can try ibuprofen/aleve and warm compresses to that area if she has none of the above?  She can also schedule to be evaluated

## 2019-10-01 NOTE — TELEPHONE ENCOUNTER
Patient notified and states she does not have a cough, no headache, no lightheadedness, no chest pain, no shortness of breath, no pain down her arm  States she thinks it is a muscle pull, its coming and going       She would like a script for Screening on lungs that she said she routinely has     Would like any scripts for BW she needs done faxed to [phone] [facility] on [address]

## 2019-10-01 NOTE — TELEPHONE ENCOUNTER
Patient wants to discuss with you she is having a " weird " pain between her shoulder blades  She states it started about a week ago  No recent Falls or Injuries      Wants to speak with PCP     [phone] is the best number to contact her

## 2019-10-02 DIAGNOSIS — Z12.2 ENCOUNTER FOR SCREENING FOR LUNG CANCER: ICD-10-CM

## 2019-10-02 DIAGNOSIS — E78.5 HYPERLIPIDEMIA, UNSPECIFIED HYPERLIPIDEMIA TYPE: Primary | ICD-10-CM

## 2019-10-02 DIAGNOSIS — Z13.0 SCREENING FOR DEFICIENCY ANEMIA: ICD-10-CM

## 2019-10-02 DIAGNOSIS — F41.1 GAD (GENERALIZED ANXIETY DISORDER): ICD-10-CM

## 2019-10-04 LAB
ALBUMIN SERPL-MCNC: 4.3 G/DL (ref 3.6–5.1)
ALBUMIN/GLOB SERPL: 1.9 (CALC) (ref 1–2.5)
ALP SERPL-CCNC: 73 U/L (ref 33–130)
ALT SERPL-CCNC: 13 U/L (ref 6–29)
AST SERPL-CCNC: 15 U/L (ref 10–35)
BASOPHILS # BLD AUTO: 49 CELLS/UL (ref 0–200)
BASOPHILS NFR BLD AUTO: 0.9 %
BILIRUB SERPL-MCNC: 0.5 MG/DL (ref 0.2–1.2)
BUN SERPL-MCNC: 16 MG/DL (ref 7–25)
BUN/CREAT SERPL: NORMAL (CALC) (ref 6–22)
CALCIUM SERPL-MCNC: 9.4 MG/DL (ref 8.6–10.4)
CHLORIDE SERPL-SCNC: 105 MMOL/L (ref 98–110)
CHOLEST SERPL-MCNC: 239 MG/DL
CHOLEST/HDLC SERPL: 3.6 (CALC)
CO2 SERPL-SCNC: 28 MMOL/L (ref 20–32)
CREAT SERPL-MCNC: 0.81 MG/DL (ref 0.5–1.05)
EOSINOPHIL # BLD AUTO: 119 CELLS/UL (ref 15–500)
EOSINOPHIL NFR BLD AUTO: 2.2 %
ERYTHROCYTE [DISTWIDTH] IN BLOOD BY AUTOMATED COUNT: 12.9 % (ref 11–15)
GLOBULIN SER CALC-MCNC: 2.3 G/DL (CALC) (ref 1.9–3.7)
GLUCOSE SERPL-MCNC: 97 MG/DL (ref 65–99)
HCT VFR BLD AUTO: 39.1 % (ref 35–45)
HDLC SERPL-MCNC: 67 MG/DL
HGB BLD-MCNC: 13.2 G/DL (ref 11.7–15.5)
LDLC SERPL CALC-MCNC: 153 MG/DL (CALC)
LYMPHOCYTES # BLD AUTO: 1361 CELLS/UL (ref 850–3900)
LYMPHOCYTES NFR BLD AUTO: 25.2 %
MCH RBC QN AUTO: 30 PG (ref 27–33)
MCHC RBC AUTO-ENTMCNC: 33.8 G/DL (ref 32–36)
MCV RBC AUTO: 88.9 FL (ref 80–100)
MONOCYTES # BLD AUTO: 540 CELLS/UL (ref 200–950)
MONOCYTES NFR BLD AUTO: 10 %
NEUTROPHILS # BLD AUTO: 3332 CELLS/UL (ref 1500–7800)
NEUTROPHILS NFR BLD AUTO: 61.7 %
NONHDLC SERPL-MCNC: 172 MG/DL (CALC)
PLATELET # BLD AUTO: 96 THOUSAND/UL (ref 140–400)
PMV BLD REES-ECKER: 14 FL (ref 7.5–12.5)
POTASSIUM SERPL-SCNC: 4.2 MMOL/L (ref 3.5–5.3)
PROT SERPL-MCNC: 6.6 G/DL (ref 6.1–8.1)
RBC # BLD AUTO: 4.4 MILLION/UL (ref 3.8–5.1)
SL AMB EGFR AFRICAN AMERICAN: 95 ML/MIN/1.73M2
SL AMB EGFR NON AFRICAN AMERICAN: 82 ML/MIN/1.73M2
SODIUM SERPL-SCNC: 139 MMOL/L (ref 135–146)
TRIGL SERPL-MCNC: 87 MG/DL
TSH SERPL-ACNC: 0.84 MIU/L
WBC # BLD AUTO: 5.4 THOUSAND/UL (ref 3.8–10.8)

## 2019-10-07 ENCOUNTER — SOCIAL WORK (OUTPATIENT)
Dept: BEHAVIORAL/MENTAL HEALTH CLINIC | Facility: CLINIC | Age: 54
End: 2019-10-07
Payer: COMMERCIAL

## 2019-10-07 DIAGNOSIS — F31.81 BIPOLAR II DISORDER, MOST RECENT EPISODE MAJOR DEPRESSIVE (HCC): Primary | Chronic | ICD-10-CM

## 2019-10-07 DIAGNOSIS — F41.0 PANIC DISORDER WITHOUT AGORAPHOBIA: Chronic | ICD-10-CM

## 2019-10-07 DIAGNOSIS — F41.1 GAD (GENERALIZED ANXIETY DISORDER): Chronic | ICD-10-CM

## 2019-10-07 PROCEDURE — 90834 PSYTX W PT 45 MINUTES: CPT | Performed by: SOCIAL WORKER

## 2019-10-07 NOTE — PSYCH
Psychotherapy Provided: Individual Psychotherapy 50 minutes     Length of time in session: 50 minutes, follow up in 2 week    Goals addressed in session: Goal 1, Goal 2 and Goal 3      Pain:      none    0    Current suicide risk : Low     D: Met with Janine DIAZ; 'I wish I never moved in with my son '  Session focused upon ongoing struggles within the family dynamic; specific circumstances discussed  Recent obstacle while at speech therapy with kids - this happens to be the same place Roslyncodey Castro goes  Roslyn Castro saw Radha and refused to do his therapy  Spent time with aunt a few weeks ago; 'very therapeutic'  Radha began working with some financial programs to help with bills  Denied SI     A: Radha presented with appropriate mood and affect  Several stressors remain but Radha is demonstrating progress with considering circumstances in and out of her control  P: Continue individual therapy, support for stressors      Behavioral Health Treatment Plan [facility]: Diagnosis and Treatment Plan explained to Shea Duran relates understanding diagnosis and is agreeable to Treatment Plan   Yes

## 2019-10-09 ENCOUNTER — OFFICE VISIT (OUTPATIENT)
Dept: INTERNAL MEDICINE CLINIC | Facility: CLINIC | Age: 54
End: 2019-10-09
Payer: COMMERCIAL

## 2019-10-09 VITALS
DIASTOLIC BLOOD PRESSURE: 76 MMHG | HEART RATE: 82 BPM | TEMPERATURE: 98.7 F | SYSTOLIC BLOOD PRESSURE: 124 MMHG | OXYGEN SATURATION: 97 % | RESPIRATION RATE: 18 BRPM

## 2019-10-09 DIAGNOSIS — E78.5 HYPERLIPIDEMIA, UNSPECIFIED HYPERLIPIDEMIA TYPE: Primary | ICD-10-CM

## 2019-10-09 DIAGNOSIS — F41.1 GAD (GENERALIZED ANXIETY DISORDER): Chronic | ICD-10-CM

## 2019-10-09 DIAGNOSIS — Z11.59 NEED FOR HEPATITIS C SCREENING TEST: ICD-10-CM

## 2019-10-09 DIAGNOSIS — D69.6 THROMBOCYTOPENIA (HCC): ICD-10-CM

## 2019-10-09 DIAGNOSIS — K21.9 GASTROESOPHAGEAL REFLUX DISEASE, ESOPHAGITIS PRESENCE NOT SPECIFIED: ICD-10-CM

## 2019-10-09 PROCEDURE — 99214 OFFICE O/P EST MOD 30 MIN: CPT | Performed by: NURSE PRACTITIONER

## 2019-10-09 NOTE — PROGRESS NOTES
Assessment/Plan:    Gastroesophageal reflux disease  Most likely the cause of her sore throat  She wants to hold off on restarting any medications   Requesting to see GI for possible EGD     DREAD (generalized anxiety disorder)  Currently stable   Continue to follow up with psychiatry     Hyperlipidemia  ascvd risk score 4 5%   Encouraged smoking cessation, she has the patches at home to start   Reviewed low cholesterol diet   Recheck lipid panel in 6 months     Thrombocytopenia (HCC)  Stable  Last platelet count 78,262  Recheck in 6 months        Diagnoses and all orders for this visit:    Hyperlipidemia, unspecified hyperlipidemia type  -     Lipid Panel with Direct LDL reflex; Future    Thrombocytopenia (HCC)  -     CBC and differential; Future    DREAD (generalized anxiety disorder)    Gastroesophageal reflux disease, esophagitis presence not specified  -     Ambulatory referral to Gastroenterology; Future  -     Comprehensive metabolic panel; Future    Need for hepatitis C screening test  -     Hepatitis C antibody; Future          Subjective:      Patient ID: Francoise Lea is a 53 y o  female      Here today for follow up  Lalomariano Nohemy is doing ok   She continues to see psychiatry for DREAD and bipolar   She has stress at home due to grandchildren not present     Hyperlipidemia, ascvd risk score 4 5% , not on statin  Still smoking about 1/2 ppd , due for CT     She has a long history of thrombocytopenia  She has seen hematology in the past who just advised them to be watched  Currently stable  She does get occasional skin bruising     She's been having a sore throat for about a month, worse after meals   She feels it may be related to reflux and requesting to see GI/EGD     She also still gets occasional chest pains under her left breast  These first occurred 3 years ago and are still intermittent, they occur more frequently when feeling anxious  Stress/echo done 2016 were normal, repeat EKG 3/2019 normal             The following portions of the patient's history were reviewed and updated as appropriate: allergies, current medications, past family history, past medical history, past social history, past surgical history and problem list     Review of Systems   Constitutional: Negative for activity change, appetite change, fatigue and unexpected weight change  HENT: Positive for sore throat  Negative for trouble swallowing  Eyes: Negative for visual disturbance  Respiratory: Negative for cough, chest tightness, shortness of breath and wheezing  Cardiovascular: Positive for chest pain  Negative for palpitations and leg swelling  Gastrointestinal: Negative for abdominal pain, blood in stool, constipation and diarrhea  Genitourinary: Negative for difficulty urinating  Musculoskeletal: Negative for arthralgias  Skin: Negative for rash  Neurological: Negative for dizziness, weakness, light-headedness and headaches  Hematological: Bruises/bleeds easily  Psychiatric/Behavioral: Negative for dysphoric mood, self-injury, sleep disturbance and suicidal ideas  The patient is nervous/anxious  Objective:      /76   Pulse 82   Temp 98 7 °F (37 1 °C) (Oral)   Resp 18   SpO2 97%          Physical Exam   Constitutional: She is oriented to person, place, and time  She appears well-developed and well-nourished  HENT:   Head: Normocephalic and atraumatic  Mouth/Throat: Oropharynx is clear and moist    Eyes: Pupils are equal, round, and reactive to light  Conjunctivae are normal    Neck: No thyromegaly present  Cardiovascular: Normal rate, regular rhythm, normal heart sounds and intact distal pulses  Pulmonary/Chest: Effort normal and breath sounds normal    Abdominal: Soft  Bowel sounds are normal    Musculoskeletal: Normal range of motion  She exhibits no edema  Lymphadenopathy:     She has no cervical adenopathy  Neurological: She is alert and oriented to person, place, and time     Skin: Skin is warm and dry  Psychiatric: She has a normal mood and affect  Her behavior is normal    Vitals reviewed  BMI Counseling: There is no height or weight on file to calculate BMI   The BMI was not able to be calculated due to patient refusing height and/or weight

## 2019-10-09 NOTE — ASSESSMENT & PLAN NOTE
Most likely the cause of her sore throat  She wants to hold off on restarting any medications   Requesting to see GI for possible EGD

## 2019-10-09 NOTE — ASSESSMENT & PLAN NOTE
ascvd risk score 4 5%   Encouraged smoking cessation, she has the patches at home to start   Reviewed low cholesterol diet   Recheck lipid panel in 6 months

## 2019-10-18 ENCOUNTER — CONSULT (OUTPATIENT)
Dept: GASTROENTEROLOGY | Facility: AMBULARY SURGERY CENTER | Age: 54
End: 2019-10-18
Payer: COMMERCIAL

## 2019-10-18 ENCOUNTER — TELEPHONE (OUTPATIENT)
Dept: GASTROENTEROLOGY | Facility: AMBULARY SURGERY CENTER | Age: 54
End: 2019-10-18

## 2019-10-18 VITALS
HEART RATE: 76 BPM | SYSTOLIC BLOOD PRESSURE: 116 MMHG | WEIGHT: 242.6 LBS | RESPIRATION RATE: 18 BRPM | DIASTOLIC BLOOD PRESSURE: 60 MMHG | HEIGHT: 68 IN | BODY MASS INDEX: 36.77 KG/M2 | TEMPERATURE: 97.7 F

## 2019-10-18 DIAGNOSIS — K21.9 GASTROESOPHAGEAL REFLUX DISEASE WITHOUT ESOPHAGITIS: Primary | ICD-10-CM

## 2019-10-18 PROCEDURE — 99244 OFF/OP CNSLTJ NEW/EST MOD 40: CPT | Performed by: INTERNAL MEDICINE

## 2019-10-18 RX ORDER — DEXLANSOPRAZOLE 60 MG/1
60 CAPSULE, DELAYED RELEASE ORAL DAILY
Qty: 30 CAPSULE | Refills: 4 | Status: SHIPPED | OUTPATIENT
Start: 2019-10-18 | End: 2019-12-28 | Stop reason: SDUPTHER

## 2019-10-18 NOTE — PROGRESS NOTES
Consultation - [facility]  Padma Frazier 1965 female         Chief Complaint:  GERD    HPI:  79-year-old female with history of bipolar disorder reports having worsening of acid reflux  She is waking up at night with sore throat and chest burning  Denies any difficulty swallowing  She takes Tums as needed  Denies any abdominal pain, nausea or vomiting  Good appetite, no recent weight loss  Regular bowel movements and denies any blood or mucus in the stool  She had a colonoscopy couple of years ago    REVIEW OF SYSTEMS: Review of Systems   Constitutional: Negative for activity change, appetite change, chills, diaphoresis, fatigue, fever and unexpected weight change  HENT: Negative for ear discharge, ear pain, facial swelling, hearing loss, nosebleeds, sore throat, tinnitus and voice change  Eyes: Negative for pain, discharge, redness, itching and visual disturbance  Respiratory: Negative for apnea, cough, chest tightness, shortness of breath and wheezing  Cardiovascular: Negative for chest pain and palpitations  Gastrointestinal:        As noted in HPI   Endocrine: Negative for cold intolerance, heat intolerance and polyuria  Genitourinary: Negative for difficulty urinating, dysuria, flank pain, hematuria and urgency  Musculoskeletal: Positive for back pain  Negative for arthralgias, gait problem, joint swelling and myalgias  Skin: Negative for rash and wound  Neurological: Negative for dizziness, tremors, seizures, speech difficulty, light-headedness, numbness and headaches  Hematological: Negative for adenopathy  Does not bruise/bleed easily  Psychiatric/Behavioral: Negative for agitation, behavioral problems and confusion  The patient is not nervous/anxious           Past Medical History:   Diagnosis Date    Abnormal liver scan     last assessed 8/14/2014    Atypical chest pain     last assessed 2/3/2016    Benign colonic polyp     Bladder disorder last assessed 1/22/2013    Cervical radiculopathy     Chronic ITP (idiopathic thrombocytopenia) (HCC)     Chronic lumbar radiculopathy     Chronic neck pain     Chronic pain     neck and back    Dermatitis     Herpes simplex     Hyperlipidemia     Luteinized follicular cyst     Menorrhagia     Obesity     Urge and stress incontinence     Uterine fibroid       Past Surgical History:   Procedure Laterality Date    CHOLECYSTECTOMY      HYSTERECTOMY      MELO    ORIF TIBIA & FIBULA FRACTURES Left 4/27/2018    Procedure: OPEN REDUCTION W/ INTERNAL FIXATION (ORIF) ANKLE;  Surgeon: Gary Soriano MD;  Location: [facility];  Service: Orthopedics    TOOTH EXTRACTION      last assessed 3/20/2017, oral surgery     Social History     Socioeconomic History    Marital status: /Civil Union     Spouse name: Not on file    Number of children: 2    Years of education: 15    Highest education level: High school graduate   Occupational History    Occupation: on disability   Social Needs    Financial resource strain: Not hard at all   [place] insecurity:     Worry: Never true     Inability: Never true    Transportation needs:     Medical: No     Non-medical: No   Tobacco Use    Smoking status: Current Every Day Smoker     Packs/day: 0 50     Types: Cigarettes    Smokeless tobacco: Never Used    Tobacco comment: per allscripts' hx of tobacco abuse'   Substance and Sexual Activity    Alcohol use: No     Frequency: Never     Binge frequency: Never    Drug use: No     Comment: History of marijuana use as a teenager   Denies current recreational drug use    Sexual activity: Not Currently   Lifestyle    Physical activity:     Days per week: 2 days     Minutes per session: 30 min    Stress: Rather much   Relationships    Social connections:     Talks on phone: Once a week     Gets together: Never     Attends Anabaptist service: Never     Active member of club or organization: No     Attends meetings of clubs or organizations: Never     Relationship status:     Intimate partner violence:     Fear of current or ex partner: No     Emotionally abused: No     Physically abused: No     Forced sexual activity: No   Other Topics Concern    Not on file   Social History Narrative    Daily caffeine consumption        Education: high school graduate    Learning Disabilities: none    Marital History:     Children: 2 adult sons    Living Arrangement: lives in home with , son and son's family    Occupational History: worked in  in the past, on disability    Functioning Relationships: good support system    Legal History: none     History: None     Family History   Problem Relation Age of Onset    Stroke Father     Psychosis Father     Bipolar disorder Mother     Lung cancer Mother     Schizoaffective Disorder  Son     Alcohol abuse Neg Hx     Substance Abuse Neg Hx     Mental illness Neg Hx     Depression Neg Hx      Buspar [buspirone]  Current Outpatient Medications   Medication Sig Dispense Refill    DULoxetine (CYMBALTA) 60 mg delayed release capsule Take 1 capsule (60 mg total) by mouth 2 (two) times a day for 270 days 180 capsule 2    ibuprofen (MOTRIN) 600 mg tablet Take 600 mg by mouth 3 (three) times a day as needed  0    lamoTRIgine (LaMICtal) 100 mg tablet Take 1 tablet (100 mg total) by mouth daily for 270 days 90 tablet 2    lamoTRIgine (LaMICtal) 150 MG tablet Take 1 tablet (150 mg total) by mouth daily at bedtime for 270 days 90 tablet 2    LORazepam (ATIVAN) 0 5 mg tablet Take 1 tablet (0 5 mg total) by mouth 4 (four) times a day as needed for anxiety for up to 120 days 120 tablet 3    mometasone (ELOCON) 0 1 % cream Apply topically daily 45 g 0    dexlansoprazole (DEXILANT) 60 MG capsule Take 1 capsule (60 mg total) by mouth daily 30 capsule 4     No current facility-administered medications for this visit          Blood pressure 116/60, pulse 76, temperature 97 7 °F (36 5 °C), temperature source Tympanic, resp  rate 18, height 5' 8" (1 727 m), weight 110 kg (242 lb 9 6 oz)  PHYSICAL EXAM: Physical Exam   Constitutional: She is oriented to person, place, and time  She appears well-developed and well-nourished  HENT:   Head: Normocephalic and atraumatic  Nose: Nose normal    Mouth/Throat: Oropharynx is clear and moist    Eyes: Conjunctivae are normal  Right eye exhibits no discharge  Left eye exhibits no discharge  No scleral icterus  Neck: Neck supple  No JVD present  No tracheal deviation present  No thyromegaly present  Cardiovascular: Normal rate, regular rhythm and normal heart sounds  Exam reveals no gallop and no friction rub  No murmur heard  Pulmonary/Chest: Effort normal and breath sounds normal  No respiratory distress  She has no wheezes  She has no rales  She exhibits no tenderness  Abdominal: Soft  Bowel sounds are normal  She exhibits no distension and no mass  There is no tenderness  There is no rebound and no guarding  No hernia  Musculoskeletal: She exhibits no edema, tenderness or deformity  Lymphadenopathy:     She has no cervical adenopathy  Neurological: She is alert and oriented to person, place, and time  Skin: Skin is warm and dry  No rash noted  No erythema  Psychiatric: She has a normal mood and affect   Her behavior is normal  Thought content normal         Lab Results   Component Value Date    WBC 5 4 10/03/2019    HGB 13 2 10/03/2019    HCT 39 1 10/03/2019    MCV 88 9 10/03/2019    PLT 96 (L) 10/03/2019     Lab Results   Component Value Date    CALCIUM 9 4 10/03/2019     12/05/2017    K 4 2 10/03/2019    CO2 28 10/03/2019     10/03/2019    BUN 16 10/03/2019    CREATININE 0 81 10/03/2019     Lab Results   Component Value Date    ALT 13 10/03/2019    AST 15 10/03/2019    ALKPHOS 73 10/03/2019    BILITOT 0 4 12/05/2017     Lab Results   Component Value Date    INR 0 96 07/22/2018    INR 1 03 04/27/2018 PROTIME 12 5 07/22/2018    PROTIME 13 5 04/27/2018       Xr Ankle 3+ Vw Left    Result Date: 8/12/2018  Impression: Stable alignment and appearance of medial malleolar and distal fibular fracture status post ORIF  No evidence of hardware complication  Workstation performed: [id number]       ASSESSMENT & PLAN:    Gastroesophageal reflux disease  Gastroesophageal reflux disease - Patient has the symptoms of chronic acid reflux for a long time  Possible hiatal hernia or LES weakness  Should rule out Sinha's esophagus because of chronic symptoms         -Patient was explained about the lifestyle and dietary modifications  Advised to avoid fatty foods, chocolates, caffeine, alcohol and any other triggering foods  Avoid eating for at least 3 hours before going to bed         -put her on Dexilant regularly  I gave her some samples to try    -EGD    -Patient was explained about  the risks and benefits of the procedure  Risks including but not limited to bleeding, infection, perforation were explained in detail  Also explained about less than 100% sensitivity with the exam and other alternatives

## 2019-10-18 NOTE — H&P (VIEW-ONLY)
Consultation - [address] [facility]  Deagerard Canas 1965 female         Chief Complaint:  GERD    HPI:  69-year-old female with history of bipolar disorder reports having worsening of acid reflux  She is waking up at night with sore throat and chest burning  Denies any difficulty swallowing  She takes Tums as needed  Denies any abdominal pain, nausea or vomiting  Good appetite, no recent weight loss  Regular bowel movements and denies any blood or mucus in the stool  She had a colonoscopy couple of years ago    REVIEW OF SYSTEMS: Review of Systems   Constitutional: Negative for activity change, appetite change, chills, diaphoresis, fatigue, fever and unexpected weight change  HENT: Negative for ear discharge, ear pain, facial swelling, hearing loss, nosebleeds, sore throat, tinnitus and voice change  Eyes: Negative for pain, discharge, redness, itching and visual disturbance  Respiratory: Negative for apnea, cough, chest tightness, shortness of breath and wheezing  Cardiovascular: Negative for chest pain and palpitations  Gastrointestinal:        As noted in HPI   Endocrine: Negative for cold intolerance, heat intolerance and polyuria  Genitourinary: Negative for difficulty urinating, dysuria, flank pain, hematuria and urgency  Musculoskeletal: Positive for back pain  Negative for arthralgias, gait problem, joint swelling and myalgias  Skin: Negative for rash and wound  Neurological: Negative for dizziness, tremors, seizures, speech difficulty, light-headedness, numbness and headaches  Hematological: Negative for adenopathy  Does not bruise/bleed easily  Psychiatric/Behavioral: Negative for agitation, behavioral problems and confusion  The patient is not nervous/anxious           Past Medical History:   Diagnosis Date    Abnormal liver scan     last assessed 8/14/2014    Atypical chest pain     last assessed 2/3/2016    Benign colonic polyp     Bladder disorder last assessed 1/22/2013    Cervical radiculopathy     Chronic ITP (idiopathic thrombocytopenia) (HCC)     Chronic lumbar radiculopathy     Chronic neck pain     Chronic pain     neck and back    Dermatitis     Herpes simplex     Hyperlipidemia     Luteinized follicular cyst     Menorrhagia     Obesity     Urge and stress incontinence     Uterine fibroid       Past Surgical History:   Procedure Laterality Date    CHOLECYSTECTOMY      HYSTERECTOMY      MELO    ORIF TIBIA & FIBULA FRACTURES Left 4/27/2018    Procedure: OPEN REDUCTION W/ INTERNAL FIXATION (ORIF) ANKLE;  Surgeon: Elke Ceballos MD;  [facility];  Service: Orthopedics    TOOTH EXTRACTION      last assessed 3/20/2017, oral surgery     Social History     Socioeconomic History    Marital status: /Civil Union     Spouse name: Not on file    Number of children: 2    Years of education: 15    Highest education level: High school graduate   Occupational History    Occupation: on disability   Social Needs    Financial resource strain: Not hard at all   [place] insecurity:     Worry: Never true     Inability: Never true    Transportation needs:     Medical: No     Non-medical: No   Tobacco Use    Smoking status: Current Every Day Smoker     Packs/day: 0 50     Types: Cigarettes    Smokeless tobacco: Never Used    Tobacco comment: per allscripts' hx of tobacco abuse'   Substance and Sexual Activity    Alcohol use: No     Frequency: Never     Binge frequency: Never    Drug use: No     Comment: History of marijuana use as a teenager   Denies current recreational drug use    Sexual activity: Not Currently   Lifestyle    Physical activity:     Days per week: 2 days     Minutes per session: 30 min    Stress: Rather much   Relationships    Social connections:     Talks on phone: Once a week     Gets together: Never     Attends Muslim service: Never     Active member of club or organization: No     Attends meetings of clubs or organizations: Never     Relationship status:     Intimate partner violence:     Fear of current or ex partner: No     Emotionally abused: No     Physically abused: No     Forced sexual activity: No   Other Topics Concern    Not on file   Social History Narrative    Daily caffeine consumption        Education: high school graduate    Learning Disabilities: none    Marital History:     Children: 2 adult sons    Living Arrangement: lives in home with , son and son's family    Occupational History: worked in  in the past, on disability    Functioning Relationships: good support system    Legal History: none     History: None     Family History   Problem Relation Age of Onset    Stroke Father     Psychosis Father     Bipolar disorder Mother     Lung cancer Mother     Schizoaffective Disorder  Son     Alcohol abuse Neg Hx     Substance Abuse Neg Hx     Mental illness Neg Hx     Depression Neg Hx      Buspar [buspirone]  Current Outpatient Medications   Medication Sig Dispense Refill    DULoxetine (CYMBALTA) 60 mg delayed release capsule Take 1 capsule (60 mg total) by mouth 2 (two) times a day for 270 days 180 capsule 2    ibuprofen (MOTRIN) 600 mg tablet Take 600 mg by mouth 3 (three) times a day as needed  0    lamoTRIgine (LaMICtal) 100 mg tablet Take 1 tablet (100 mg total) by mouth daily for 270 days 90 tablet 2    lamoTRIgine (LaMICtal) 150 MG tablet Take 1 tablet (150 mg total) by mouth daily at bedtime for 270 days 90 tablet 2    LORazepam (ATIVAN) 0 5 mg tablet Take 1 tablet (0 5 mg total) by mouth 4 (four) times a day as needed for anxiety for up to 120 days 120 tablet 3    mometasone (ELOCON) 0 1 % cream Apply topically daily 45 g 0    dexlansoprazole (DEXILANT) 60 MG capsule Take 1 capsule (60 mg total) by mouth daily 30 capsule 4     No current facility-administered medications for this visit          Blood pressure 116/60, pulse 76, temperature 97 7 °F (36 5 °C), temperature source Tympanic, resp  rate 18, height 5' 8" (1 727 m), weight 110 kg (242 lb 9 6 oz)  PHYSICAL EXAM: Physical Exam   Constitutional: She is oriented to person, place, and time  She appears well-developed and well-nourished  HENT:   Head: Normocephalic and atraumatic  Nose: Nose normal    Mouth/Throat: Oropharynx is clear and moist    Eyes: Conjunctivae are normal  Right eye exhibits no discharge  Left eye exhibits no discharge  No scleral icterus  Neck: Neck supple  No JVD present  No tracheal deviation present  No thyromegaly present  Cardiovascular: Normal rate, regular rhythm and normal heart sounds  Exam reveals no gallop and no friction rub  No murmur heard  Pulmonary/Chest: Effort normal and breath sounds normal  No respiratory distress  She has no wheezes  She has no rales  She exhibits no tenderness  Abdominal: Soft  Bowel sounds are normal  She exhibits no distension and no mass  There is no tenderness  There is no rebound and no guarding  No hernia  Musculoskeletal: She exhibits no edema, tenderness or deformity  Lymphadenopathy:     She has no cervical adenopathy  Neurological: She is alert and oriented to person, place, and time  Skin: Skin is warm and dry  No rash noted  No erythema  Psychiatric: She has a normal mood and affect   Her behavior is normal  Thought content normal         Lab Results   Component Value Date    WBC 5 4 10/03/2019    HGB 13 2 10/03/2019    HCT 39 1 10/03/2019    MCV 88 9 10/03/2019    PLT 96 (L) 10/03/2019     Lab Results   Component Value Date    CALCIUM 9 4 10/03/2019     12/05/2017    K 4 2 10/03/2019    CO2 28 10/03/2019     10/03/2019    BUN 16 10/03/2019    CREATININE 0 81 10/03/2019     Lab Results   Component Value Date    ALT 13 10/03/2019    AST 15 10/03/2019    ALKPHOS 73 10/03/2019    BILITOT 0 4 12/05/2017     Lab Results   Component Value Date    INR 0 96 07/22/2018    INR 1 03 04/27/2018 PROTIME 12 5 07/22/2018    PROTIME 13 5 04/27/2018       Xr Ankle 3+ Vw Left    Result Date: 8/12/2018  Impression: Stable alignment and appearance of medial malleolar and distal fibular fracture status post ORIF  No evidence of hardware complication  Workstation performed: [id number]       ASSESSMENT & PLAN:    Gastroesophageal reflux disease  Gastroesophageal reflux disease - Patient has the symptoms of chronic acid reflux for a long time  Possible hiatal hernia or LES weakness  Should rule out Sinha's esophagus because of chronic symptoms         -Patient was explained about the lifestyle and dietary modifications  Advised to avoid fatty foods, chocolates, caffeine, alcohol and any other triggering foods  Avoid eating for at least 3 hours before going to bed         -put her on Dexilant regularly  I gave her some samples to try    -EGD    -Patient was explained about  the risks and benefits of the procedure  Risks including but not limited to bleeding, infection, perforation were explained in detail  Also explained about less than 100% sensitivity with the exam and other alternatives

## 2019-10-18 NOTE — ASSESSMENT & PLAN NOTE
Gastroesophageal reflux disease - Patient has the symptoms of chronic acid reflux for a long time  Possible hiatal hernia or LES weakness  Should rule out Sinha's esophagus because of chronic symptoms         -Patient was explained about the lifestyle and dietary modifications  Advised to avoid fatty foods, chocolates, caffeine, alcohol and any other triggering foods  Avoid eating for at least 3 hours before going to bed         -put her on Dexilant regularly  I gave her some samples to try    -EGD    -Patient was explained about  the risks and benefits of the procedure  Risks including but not limited to bleeding, infection, perforation were explained in detail  Also explained about less than 100% sensitivity with the exam and other alternatives

## 2019-10-21 ENCOUNTER — SOCIAL WORK (OUTPATIENT)
Dept: BEHAVIORAL/MENTAL HEALTH CLINIC | Facility: CLINIC | Age: 54
End: 2019-10-21
Payer: COMMERCIAL

## 2019-10-21 DIAGNOSIS — F41.0 PANIC DISORDER WITHOUT AGORAPHOBIA: Chronic | ICD-10-CM

## 2019-10-21 DIAGNOSIS — F41.1 GAD (GENERALIZED ANXIETY DISORDER): Chronic | ICD-10-CM

## 2019-10-21 DIAGNOSIS — F31.81 BIPOLAR II DISORDER, MOST RECENT EPISODE MAJOR DEPRESSIVE (HCC): Primary | Chronic | ICD-10-CM

## 2019-10-21 PROCEDURE — 90834 PSYTX W PT 45 MINUTES: CPT | Performed by: SOCIAL WORKER

## 2019-10-21 NOTE — BH TREATMENT PLAN
Axelryle Smoke  1965       Date of Initial Treatment Plan: 5/1/17   Date of Current Treatment Plan: 10/21/19      Treatment Plan Number 8     Strengths/Personal Resources for Self Care: Caring, grandmother    Diagnosis:   1  Bipolar II disorder, most recent episode major depressive ([address] )     2  DREAD (generalized anxiety disorder)     3  Panic disorder without agoraphobia         Area of Needs: Anxiety, depression, overwhelmed with family situations, setting boundaries      Long Term Goal 1:       I am able to function daily without overwhelming myself  Target Date:  2/15/20  Completion Date:  N/A         Short Term Objectives for Goal 1:   1  Read  2  Remember what is in/out of my control  3 Address mindless eating      Long Term Goal 2:        My anxiety/depression has greatly improved  Target Date:  2/15/20  Completion Date: N/A     Short Term Objectives for Goal 2: 1  Kids have been returned home  2  I can tolerate living with my son and family  3   I get my health under control          Long Term Goal # 3:        Jerzy and Deepali get the care they need  Target Date:  2/15/20  Completion Date: N/A     Short Term Objectives for Goal 3:   1  Kalen's   2  Continue visits with grandchildren  3  Maintaining boundaries with Kristen Rodrigez and Caron Hager     GOAL 1: Modality: Individual therapy 2x per month   Completion Date N/A                                   Medication management every 3 months   Completion Date:                                   VPXICAV responsible for goals: Shaun Aguilar and Dr Tyler Patterson     GOAL 2: Modality: Individual therapy 2x per month   Completion Date N/A                                   Medication management every 3 months   Completion Date:                                   HGZGUKS responsible for goals: Shaun Aguilar and Dr Tyler Patterson     GOAL 3: Modality: Individual therapy 2x per month   Completion Date N/A                                   Medication management every 3 months   Completion Date:   Joi Lundberg responsible for goals: Vero Guerrero and Dr Ezio Irizarry: Diagnosis and Treatment Plan explained to Brock Reyes relates understanding diagnosis and is agreeable to Treatment Plan         Client Comments : Please share your thoughts, feelings, need and/or experiences regarding your treatment plan:
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
## 2019-10-22 ENCOUNTER — ANESTHESIA EVENT (OUTPATIENT)
Dept: GASTROENTEROLOGY | Facility: AMBULARY SURGERY CENTER | Age: 54
End: 2019-10-22

## 2019-10-25 ENCOUNTER — HOSPITAL ENCOUNTER (OUTPATIENT)
Dept: RADIOLOGY | Age: 54
Discharge: HOME | End: 2019-10-25
Payer: COMMERCIAL

## 2019-10-25 DIAGNOSIS — Z12.2 ENCOUNTER FOR SCREENING FOR LUNG CANCER: ICD-10-CM

## 2019-10-25 PROCEDURE — G0297 LDCT FOR LUNG CA SCREEN: HCPCS

## 2019-10-29 NOTE — ANESTHESIA PREPROCEDURE EVALUATION
Review of Systems/Medical History  Patient summary reviewed  Chart reviewed  No history of anesthetic complications     Cardiovascular  Hyperlipidemia, Valvular heart disease , mitral regurgitation,    Pulmonary  Smoker , Tobacco cessation counseling given Cumulative Pack Years: 10,        GI/Hepatic    GERD well controlled,        Negative  ROS        Endo/Other    Obesity    GYN  Negative gynecology ROS          Hematology  Negative hematology ROS     Comment: Thrombocytopenia  Musculoskeletal  Back pain (Left cervical radiculopathuy ) , cervical pain, Achondroplasia       Neurology      Comment: Cervical radiculopathy  Psychology   Anxiety,            Stress  test 2/25/16  SUMMARY:  -  Stress results: Duration of exercise was 9 min  Target heart rate was  achieved  There was no chest pain during stress  -  ECG conclusions: The stress  ECG was negative for ischemia  Based on [facility] Treadmill Scoring, this patient  was at low risk for cardiac events  -  Perfusion imaging: There was a  moderate-sized, mildly severe, fixed myocardial perfusion defect of the entire  anterior wall likely due to attenuation from breast tissue  -  Gated SPECT: The  calculated left ventricular ejection fraction was 67 %  Left ventricular  ejection fraction was within normal limits by visual estimate  There was no  left ventricular regional abnormality  Physical Exam    Airway    Mallampati score: II  TM Distance: >3 FB  Neck ROM: full     Dental   upper dentures and lower dentures,     Cardiovascular  Cardiovascular exam normal    Pulmonary  Pulmonary exam normal     Other Findings        Anesthesia Plan  ASA Score- 3     Anesthesia Type- IV sedation with anesthesia with ASA Monitors  Additional Monitors:   Airway Plan:         Plan Factors-  Patient did not smoke on day of surgery  Induction- intravenous  Postoperative Plan-     Informed Consent- Anesthetic plan and risks discussed with patient    I personally reviewed this patient with the CRNA  Discussed and agreed on the Anesthesia Plan with the CRNA  Kristian Khanna

## 2019-10-30 ENCOUNTER — HOSPITAL ENCOUNTER (OUTPATIENT)
Dept: GASTROENTEROLOGY | Facility: AMBULARY SURGERY CENTER | Age: 54
Setting detail: OUTPATIENT SURGERY
Discharge: HOME | End: 2019-10-30
Attending: INTERNAL MEDICINE | Admitting: INTERNAL MEDICINE
Payer: COMMERCIAL

## 2019-10-30 ENCOUNTER — ANESTHESIA (OUTPATIENT)
Dept: GASTROENTEROLOGY | Facility: AMBULARY SURGERY CENTER | Age: 54
End: 2019-10-30

## 2019-10-30 VITALS
DIASTOLIC BLOOD PRESSURE: 62 MMHG | TEMPERATURE: 96.6 F | HEART RATE: 85 BPM | BODY MASS INDEX: 36.22 KG/M2 | WEIGHT: 239 LBS | OXYGEN SATURATION: 96 % | HEIGHT: 68 IN | RESPIRATION RATE: 16 BRPM | SYSTOLIC BLOOD PRESSURE: 127 MMHG

## 2019-10-30 DIAGNOSIS — K21.9 GASTROESOPHAGEAL REFLUX DISEASE WITHOUT ESOPHAGITIS: ICD-10-CM

## 2019-10-30 PROCEDURE — 43235 EGD DIAGNOSTIC BRUSH WASH: CPT | Performed by: INTERNAL MEDICINE

## 2019-10-30 RX ORDER — PROPOFOL 10 MG/ML
INJECTION, EMULSION INTRAVENOUS AS NEEDED
Status: DISCONTINUED | OUTPATIENT
Start: 2019-10-30 | End: 2019-10-30 | Stop reason: SURG

## 2019-10-30 RX ORDER — FENTANYL CITRATE/PF 50 MCG/ML
12.5 SYRINGE (ML) INJECTION
Status: CANCELLED | OUTPATIENT
Start: 2019-10-30

## 2019-10-30 RX ORDER — ONDANSETRON 2 MG/ML
4 INJECTION INTRAMUSCULAR; INTRAVENOUS ONCE AS NEEDED
Status: CANCELLED | OUTPATIENT
Start: 2019-10-30

## 2019-10-30 RX ORDER — LIDOCAINE HYDROCHLORIDE 10 MG/ML
INJECTION, SOLUTION EPIDURAL; INFILTRATION; INTRACAUDAL; PERINEURAL AS NEEDED
Status: DISCONTINUED | OUTPATIENT
Start: 2019-10-30 | End: 2019-10-30 | Stop reason: SURG

## 2019-10-30 RX ORDER — SODIUM CHLORIDE, SODIUM LACTATE, POTASSIUM CHLORIDE, CALCIUM CHLORIDE 600; 310; 30; 20 MG/100ML; MG/100ML; MG/100ML; MG/100ML
INJECTION, SOLUTION INTRAVENOUS CONTINUOUS PRN
Status: DISCONTINUED | OUTPATIENT
Start: 2019-10-30 | End: 2019-10-30 | Stop reason: SURG

## 2019-10-30 RX ADMIN — PROPOFOL 80 MG: 10 INJECTION, EMULSION INTRAVENOUS at 08:35

## 2019-10-30 RX ADMIN — PROPOFOL 50 MG: 10 INJECTION, EMULSION INTRAVENOUS at 08:38

## 2019-10-30 RX ADMIN — LIDOCAINE HYDROCHLORIDE 30 MG: 10 INJECTION, SOLUTION EPIDURAL; INFILTRATION; INTRACAUDAL; PERINEURAL at 08:35

## 2019-10-30 RX ADMIN — PROPOFOL 50 MG: 10 INJECTION, EMULSION INTRAVENOUS at 08:36

## 2019-10-30 RX ADMIN — PROPOFOL 20 MG: 10 INJECTION, EMULSION INTRAVENOUS at 08:41

## 2019-10-30 RX ADMIN — SODIUM CHLORIDE, SODIUM LACTATE, POTASSIUM CHLORIDE, AND CALCIUM CHLORIDE: .6; .31; .03; .02 INJECTION, SOLUTION INTRAVENOUS at 08:32

## 2019-10-30 NOTE — INTERVAL H&P NOTE
H&P reviewed  After examining the patient I find no changes in the patients condition since the H&P had been written      Vitals:    10/30/19 0751   BP: 138/65   Pulse: 81   Resp: 18   Temp: 97 5 °F (36 4 °C)   SpO2: 97%

## 2019-10-30 NOTE — ANESTHESIA POSTPROCEDURE EVALUATION
Post-Op Assessment Note    CV Status:  Stable  Pain Score: 0    Pain management: adequate     Mental Status:  Alert and awake   Hydration Status:  Euvolemic   PONV Controlled:  Controlled   Airway Patency:  Patent   Post Op Vitals Reviewed: Yes      Staff: CRNA           /67 (10/30/19 0845)    Temp (!) 96 6 °F (35 9 °C) (10/30/19 0845)    Pulse 82 (10/30/19 0845)   Resp 16 (10/30/19 0845)    SpO2 96 % (10/30/19 0845)

## 2019-11-05 ENCOUNTER — SOCIAL WORK (OUTPATIENT)
Dept: BEHAVIORAL/MENTAL HEALTH CLINIC | Facility: CLINIC | Age: 54
End: 2019-11-05
Payer: COMMERCIAL

## 2019-11-05 DIAGNOSIS — F41.1 GAD (GENERALIZED ANXIETY DISORDER): Chronic | ICD-10-CM

## 2019-11-05 DIAGNOSIS — F31.81 BIPOLAR II DISORDER, MOST RECENT EPISODE MAJOR DEPRESSIVE (HCC): Primary | Chronic | ICD-10-CM

## 2019-11-05 DIAGNOSIS — F41.0 PANIC DISORDER WITHOUT AGORAPHOBIA: Chronic | ICD-10-CM

## 2019-11-05 PROCEDURE — 90834 PSYTX W PT 45 MINUTES: CPT | Performed by: SOCIAL WORKER

## 2019-11-05 NOTE — PSYCH
Psychotherapy Provided: Individual Psychotherapy 50 minutes     Length of time in session: 50 minutes, follow up in 2 week    Goals addressed in session: Goal 1, Goal 2 and Goal 3      Pain:      none    0    Current suicide risk : Low     D: Met with Janine individually  'I'm just falling apart'  Session focused upon 2021 Chrystal Oleary struggling 'with who I am'  2021 Chrystal Oleary feels 'lost'  2021 Chrystal Oleary explained she started to write down all she spends as money is 'going way to fast'  2021 Chrystal Oleary thought most of spending was [place] but it ends up being her 'therapy shopping'  2021 Chrystal Oleary discussed going to see Sebastian's cousin who she helped bring up from [place] - made amends with him as 'things went to shit' when issues with Chaim Thapa and Isabelle Austins  Bronson relief and closure  Discussed shopping and 'feeling judged' when she buys something nice for herself  Visiting the kids has been reduced to monthly as the grandmother  Grandchildren at home continue to struggle with various behavioral and special needs  Denied SI    A: 2021 Chrystal Oleary presented with depressed mood with periods of irritability regarding family stressors and Sebastian's mobility  P: Continue individual therapy, support for psychosocial stressors  Behavioral Health Treatment Plan [facility]: Diagnosis and Treatment Plan explained to Tiffani Miller relates understanding diagnosis and is agreeable to Treatment Plan   Yes

## 2019-11-25 ENCOUNTER — SOCIAL WORK (OUTPATIENT)
Dept: BEHAVIORAL/MENTAL HEALTH CLINIC | Facility: CLINIC | Age: 54
End: 2019-11-25
Payer: COMMERCIAL

## 2019-11-25 DIAGNOSIS — F41.0 PANIC DISORDER WITHOUT AGORAPHOBIA: Chronic | ICD-10-CM

## 2019-11-25 DIAGNOSIS — F31.81 BIPOLAR II DISORDER, MOST RECENT EPISODE MAJOR DEPRESSIVE (HCC): Primary | Chronic | ICD-10-CM

## 2019-11-25 DIAGNOSIS — F41.1 GAD (GENERALIZED ANXIETY DISORDER): Chronic | ICD-10-CM

## 2019-11-25 PROCEDURE — 90834 PSYTX W PT 45 MINUTES: CPT | Performed by: SOCIAL WORKER

## 2019-11-25 NOTE — PSYCH
Psychotherapy Provided: Individual Psychotherapy 50 minutes     Length of time in session: 50 minutes, follow up in 2 week    Goals addressed in session: Goal 1, Goal 2 and Goal 3      Pain:      none    0    Current suicide risk : Low     D: Met with Janine individually  'Things are really really bad'  Session focused upon 'pain and hurt' over seeing the grandchildren at a visit 'I can see loss in the eyes'  Upcoming holidays will be very difficult  Living arrangements are not going well - no boundaries or privacy  There is talk from [place] that they can't afford to pay rent as they are paying child support for kids in the system  Denied SI     A: Hong Mendez presented with increased depression  [place]obbing majority of session  Appropriate for topics of discussion  P: Continue individual therapy to support current stressors  Behavioral Health Treatment Plan [facility]: Diagnosis and Treatment Plan explained to Lata Maldonado relates understanding diagnosis and is agreeable to Treatment Plan   Yes

## 2019-11-26 DIAGNOSIS — F41.1 GAD (GENERALIZED ANXIETY DISORDER): Chronic | ICD-10-CM

## 2019-11-26 RX ORDER — LORAZEPAM 0.5 MG/1
TABLET ORAL
Qty: 28 TABLET | Refills: 0 | Status: SHIPPED | OUTPATIENT
Start: 2019-11-26 | End: 2019-12-03 | Stop reason: SDUPTHER

## 2019-11-26 NOTE — TELEPHONE ENCOUNTER
Qi Gr,  Please tell patient that I provided a 7-day supply of her Ativan  I will defer a full prescription renewal upon Dr Seo Learn return to office  Thank you

## 2019-11-26 NOTE — TELEPHONE ENCOUNTER
Refill request received for Janine's Lorazepam 0 5 mg  Next apptmnt with Dr Tanya Damico 1/3/2020  For Rachel's review in Dr Ankit Forbes absence

## 2019-11-26 NOTE — TELEPHONE ENCOUNTER
Will provide a 7-day supply of patient's Ativan 0 5 mg tablets to cover patient while awaiting Dr Melony Saavedra return to office  PDMP checked and patient has been refilling prescriptions appropriately

## 2019-11-27 NOTE — TELEPHONE ENCOUNTER
Nursing called Janine to inform that refill request was received for her Lorazepam 0 5 mg tabs  She stated that she only has a few left  Covering provider, Rachel ordered a seven day supply of Lorazepam to cover until Dr Lani Rede returns to office  Janine explained that she receives 30 day supply at a time only from Dr Lani Reed so she may wait until Dr Yan Quintero return to  full month's supply at pharmacy (unless she runs out )       For Dr Yan Quintero review on RTO

## 2019-12-02 ENCOUNTER — TELEPHONE (OUTPATIENT)
Dept: BEHAVIORAL/MENTAL HEALTH CLINIC | Facility: CLINIC | Age: 54
End: 2019-12-02

## 2019-12-02 DIAGNOSIS — F41.1 GAD (GENERALIZED ANXIETY DISORDER): Primary | Chronic | ICD-10-CM

## 2019-12-02 NOTE — TELEPHONE ENCOUNTER
Patient called stating wrong amount of ATIVAN was called in  She states it should have been called in for 120 for 30 days    Please advise

## 2019-12-03 ENCOUNTER — TELEPHONE (OUTPATIENT)
Dept: PSYCHIATRY | Facility: CLINIC | Age: 54
End: 2019-12-03

## 2019-12-03 RX ORDER — LORAZEPAM 0.5 MG/1
0.5 TABLET ORAL 4 TIMES DAILY PRN
Qty: 120 TABLET | Refills: 1 | Status: SHIPPED | OUTPATIENT
Start: 2019-12-03 | End: 2020-02-13 | Stop reason: SDUPTHER

## 2019-12-03 NOTE — TELEPHONE ENCOUNTER
Nursing followed up with Mary Ann Slater related to her request for Ativan refill in Dr Zana Pate absence  She stated that she did not  RX ordered by covering provider as it was not for 30 day supply and Dr Дмитрий Mello usually orders 30 day supply  Mary Ann Slater stated that she had a "bad reaction" to Atarax and no longer takes Atarax  Mary Ann Slater stated that she has spoken with Dr Дмитрий Mello, who followed up with her today upon her return to office  She said thank you to Dr Дмитрий Mello for following up  For Dr Zana Pate review

## 2019-12-03 NOTE — TELEPHONE ENCOUNTER
Spoke with Mary Ann Slater - she no longer takes Atarax and is back to taking Ativan  New Ativan 0 5 mg QID PRN Rx escripted for 30 days with 1 RF  Previous 7 day Rx cancelled with [facility]

## 2019-12-10 ENCOUNTER — SOCIAL WORK (OUTPATIENT)
Dept: BEHAVIORAL/MENTAL HEALTH CLINIC | Facility: CLINIC | Age: 54
End: 2019-12-10
Payer: COMMERCIAL

## 2019-12-10 DIAGNOSIS — F41.1 GAD (GENERALIZED ANXIETY DISORDER): Chronic | ICD-10-CM

## 2019-12-10 DIAGNOSIS — F41.0 PANIC DISORDER WITHOUT AGORAPHOBIA: Primary | Chronic | ICD-10-CM

## 2019-12-10 DIAGNOSIS — F31.81 BIPOLAR II DISORDER, MOST RECENT EPISODE MAJOR DEPRESSIVE (HCC): Chronic | ICD-10-CM

## 2019-12-10 PROCEDURE — 90834 PSYTX W PT 45 MINUTES: CPT | Performed by: SOCIAL WORKER

## 2019-12-10 NOTE — PSYCH
Psychotherapy Provided: Individual Psychotherapy 50 minutes     Length of time in session: 50 minutes, follow up in 2 week    Goals addressed in session: Goal 1, Goal 2 and Goal 3      Pain:      none    0    Current suicide risk : Low     D: Met with Janine individually  Session focused upon Janine's ongoing struggle with living arrangements, 's upcoming surgery (was recently postponed) and dealing with the upcoming holiday without the children home  Specific circumstances in each area discussed  Upcoming court hearing for grandchildren discussed  Denied SI     A: Denisse Im presented with baseline mood and affect  Multiple stressors occurring in all environments acerbate symptoms  P: Continue individual therapy, support for ongoing stressors  Behavioral Health Treatment Plan [facility]: Diagnosis and Treatment Plan explained to Marissa Farias relates understanding diagnosis and is agreeable to Treatment Plan   Yes

## 2019-12-28 DIAGNOSIS — K21.9 GASTROESOPHAGEAL REFLUX DISEASE WITHOUT ESOPHAGITIS: ICD-10-CM

## 2019-12-30 RX ORDER — DEXLANSOPRAZOLE 60 MG/1
60 CAPSULE, DELAYED RELEASE ORAL DAILY
Qty: 30 CAPSULE | Refills: 11 | Status: SHIPPED | OUTPATIENT
Start: 2019-12-30 | End: 2020-05-21 | Stop reason: SDUPTHER

## 2019-12-31 ENCOUNTER — SOCIAL WORK (OUTPATIENT)
Dept: BEHAVIORAL/MENTAL HEALTH CLINIC | Facility: CLINIC | Age: 54
End: 2019-12-31
Payer: COMMERCIAL

## 2019-12-31 DIAGNOSIS — F41.0 PANIC DISORDER WITHOUT AGORAPHOBIA: Chronic | ICD-10-CM

## 2019-12-31 DIAGNOSIS — F31.81 BIPOLAR II DISORDER, MOST RECENT EPISODE MAJOR DEPRESSIVE (HCC): Primary | Chronic | ICD-10-CM

## 2019-12-31 DIAGNOSIS — F43.21 UNRESOLVED GRIEF: ICD-10-CM

## 2019-12-31 DIAGNOSIS — F41.1 GAD (GENERALIZED ANXIETY DISORDER): Chronic | ICD-10-CM

## 2019-12-31 PROCEDURE — 90834 PSYTX W PT 45 MINUTES: CPT | Performed by: SOCIAL WORKER

## 2019-12-31 NOTE — PSYCH
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

## 2020-01-03 ENCOUNTER — DOCUMENTATION (OUTPATIENT)
Dept: PSYCHIATRY | Facility: CLINIC | Age: 55
End: 2020-01-03

## 2020-01-03 NOTE — PROGRESS NOTES
Snehal Treviño Cx appointment 1/3/2020 ( canceled for today because her  had surgery   She is scheduled for 1/15/20 now )

## 2020-01-14 ENCOUNTER — SOCIAL WORK (OUTPATIENT)
Dept: BEHAVIORAL/MENTAL HEALTH CLINIC | Facility: CLINIC | Age: 55
End: 2020-01-14
Payer: COMMERCIAL

## 2020-01-14 DIAGNOSIS — F41.0 PANIC DISORDER WITHOUT AGORAPHOBIA: Chronic | ICD-10-CM

## 2020-01-14 DIAGNOSIS — F31.81 BIPOLAR II DISORDER, MOST RECENT EPISODE MAJOR DEPRESSIVE (HCC): Primary | Chronic | ICD-10-CM

## 2020-01-14 DIAGNOSIS — F41.1 GAD (GENERALIZED ANXIETY DISORDER): Chronic | ICD-10-CM

## 2020-01-14 DIAGNOSIS — F43.21 UNRESOLVED GRIEF: ICD-10-CM

## 2020-01-14 PROCEDURE — 90834 PSYTX W PT 45 MINUTES: CPT | Performed by: SOCIAL WORKER

## 2020-01-15 NOTE — PSYCH
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  Surgery didn't work  Additional procedure scheduled and Erma Herron is 'worried sick'  Erma Herron asked for info on a program she can stay for a period of time to work on herself and receive ongoing support  It's not she is unhappy in therapy but needs 'an escape'  Denied SI       A: Erma Herron presented with depressed tearful mood throughout session  Very overwhelmed by multiple stressors         P: Continue individual therapy, support for ongoing stressors      Behavioral Health Treatment Plan [facility]: Diagnosis and Treatment Plan explained to Yonas Lou relates understanding diagnosis and is agreeable to Treatment Plan   Yes

## 2020-01-28 ENCOUNTER — SOCIAL WORK (OUTPATIENT)
Dept: BEHAVIORAL/MENTAL HEALTH CLINIC | Facility: CLINIC | Age: 55
End: 2020-01-28

## 2020-01-28 NOTE — PSYCH
Psychotherapy Provided: Individual Psychotherapy 50 minutes     Length of time in session: 50 minutes, follow up in 2 week    Goals addressed in session: Goal 1, Goal 2 and Goal 3      Pain:      none    0    Current suicide risk : Low     D: Met with Janine individually  Session focused upon increased stress levels due to Meka Bustamante being admitted to PICU for RSV and breathing difficulties  He was transferred out this afternoon  Jamie Serrano is determined to tell [facility] the kids cannot come home- Specific examples of unhealthy environment in [facility] discussed  Options of communication to [facility] discussed  Ongoing communication obstacles between [place] and with [facility] discussed  'I am so close to a nervous breakdown  '   Process possible IP hospitalization  Janine expressed hopelessness but states 'I would never do it'  Fleeting SI but no plan  A: Jamie Serrano continues to be overwhelmed with current stressors  Home environment stressful as well - the point of moving was to alleviate such stress  P: Continue individual therapy, ongoing support of stressors    [address]: Diagnosis and Treatment Plan explained to Anatoly Ladd relates understanding diagnosis and is agreeable to Treatment Plan   Yes

## 2020-02-13 DIAGNOSIS — F41.1 GAD (GENERALIZED ANXIETY DISORDER): Chronic | ICD-10-CM

## 2020-02-13 RX ORDER — LORAZEPAM 0.5 MG/1
0.5 TABLET ORAL 4 TIMES DAILY PRN
Qty: 120 TABLET | Refills: 0 | Status: SHIPPED | OUTPATIENT
Start: 2020-02-13 | End: 2020-02-26 | Stop reason: SDUPTHER

## 2020-02-13 NOTE — TELEPHONE ENCOUNTER
Covering for Dr Gale Taylor  Prescription sent for lorazepam 0 5 mg 4 times daily as needed for 30 days,with no refill

## 2020-02-24 ENCOUNTER — HOSPITAL ENCOUNTER (OUTPATIENT)
Dept: RADIOLOGY | Facility: HOSPITAL | Age: 55
Discharge: HOME | End: 2020-02-24
Payer: COMMERCIAL

## 2020-02-24 DIAGNOSIS — R22.31 MASS OF FINGER OF RIGHT HAND: Primary | ICD-10-CM

## 2020-02-24 DIAGNOSIS — R22.31 MASS OF FINGER OF RIGHT HAND: ICD-10-CM

## 2020-02-24 PROCEDURE — 73130 X-RAY EXAM OF HAND: CPT

## 2020-02-26 ENCOUNTER — OFFICE VISIT (OUTPATIENT)
Dept: PSYCHIATRY | Facility: CLINIC | Age: 55
End: 2020-02-26
Payer: COMMERCIAL

## 2020-02-26 VITALS
DIASTOLIC BLOOD PRESSURE: 81 MMHG | HEART RATE: 101 BPM | BODY MASS INDEX: 36.37 KG/M2 | HEIGHT: 68 IN | SYSTOLIC BLOOD PRESSURE: 131 MMHG | WEIGHT: 240 LBS

## 2020-02-26 DIAGNOSIS — F31.81 BIPOLAR II DISORDER, MOST RECENT EPISODE MAJOR DEPRESSIVE (HCC): Primary | Chronic | ICD-10-CM

## 2020-02-26 DIAGNOSIS — F41.0 PANIC DISORDER WITHOUT AGORAPHOBIA: Chronic | ICD-10-CM

## 2020-02-26 DIAGNOSIS — F41.1 GAD (GENERALIZED ANXIETY DISORDER): Chronic | ICD-10-CM

## 2020-02-26 PROCEDURE — 90833 PSYTX W PT W E/M 30 MIN: CPT | Performed by: PSYCHIATRY & NEUROLOGY

## 2020-02-26 PROCEDURE — 1036F TOBACCO NON-USER: CPT | Performed by: PSYCHIATRY & NEUROLOGY

## 2020-02-26 PROCEDURE — 3008F BODY MASS INDEX DOCD: CPT | Performed by: PSYCHIATRY & NEUROLOGY

## 2020-02-26 PROCEDURE — 99213 OFFICE O/P EST LOW 20 MIN: CPT | Performed by: PSYCHIATRY & NEUROLOGY

## 2020-02-26 RX ORDER — LAMOTRIGINE 100 MG/1
100 TABLET ORAL DAILY
Qty: 90 TABLET | Refills: 2 | Status: SHIPPED | OUTPATIENT
Start: 2020-02-26 | End: 2020-07-09 | Stop reason: DRUGHIGH

## 2020-02-26 RX ORDER — LORAZEPAM 0.5 MG/1
0.5 TABLET ORAL 4 TIMES DAILY PRN
Qty: 120 TABLET | Refills: 3 | Status: SHIPPED | OUTPATIENT
Start: 2020-03-14 | End: 2020-07-09 | Stop reason: SDUPTHER

## 2020-02-26 RX ORDER — DULOXETIN HYDROCHLORIDE 60 MG/1
60 CAPSULE, DELAYED RELEASE ORAL 2 TIMES DAILY
Qty: 180 CAPSULE | Refills: 2 | Status: SHIPPED | OUTPATIENT
Start: 2020-02-26 | End: 2020-10-28 | Stop reason: SDUPTHER

## 2020-02-26 RX ORDER — LAMOTRIGINE 150 MG/1
150 TABLET ORAL
Qty: 90 TABLET | Refills: 2 | Status: SHIPPED | OUTPATIENT
Start: 2020-02-26 | End: 2020-07-09 | Stop reason: SDUPTHER

## 2020-02-27 ENCOUNTER — OFFICE VISIT (OUTPATIENT)
Dept: INTERNAL MEDICINE CLINIC | Facility: CLINIC | Age: 55
End: 2020-02-27
Payer: COMMERCIAL

## 2020-02-27 VITALS
HEART RATE: 75 BPM | OXYGEN SATURATION: 96 % | SYSTOLIC BLOOD PRESSURE: 126 MMHG | TEMPERATURE: 98 F | HEIGHT: 68 IN | BODY MASS INDEX: 36.49 KG/M2 | DIASTOLIC BLOOD PRESSURE: 74 MMHG

## 2020-02-27 DIAGNOSIS — R42 VERTIGO: Primary | ICD-10-CM

## 2020-02-27 PROCEDURE — 1036F TOBACCO NON-USER: CPT | Performed by: NURSE PRACTITIONER

## 2020-02-27 PROCEDURE — 3008F BODY MASS INDEX DOCD: CPT | Performed by: NURSE PRACTITIONER

## 2020-02-27 PROCEDURE — 99214 OFFICE O/P EST MOD 30 MIN: CPT | Performed by: NURSE PRACTITIONER

## 2020-02-27 RX ORDER — MECLIZINE HYDROCHLORIDE 25 MG/1
25 TABLET ORAL EVERY 8 HOURS PRN
Qty: 30 TABLET | Refills: 0 | Status: SHIPPED | OUTPATIENT
Start: 2020-02-27 | End: 2020-05-21

## 2020-02-27 NOTE — PROGRESS NOTES
Assessment/Plan:    Normal neuro exam    Increase oral fluids  Meclizine as needed  Referral for vestibular therapy if persists  Diagnoses and all orders for this visit:    Vertigo  -     meclizine (ANTIVERT) 25 mg tablet; Take 1 tablet (25 mg total) by mouth every 8 (eight) hours as needed for dizziness  -     Ambulatory referral to Physical Therapy; Future          Subjective:      Patient ID: Annel Caputo is a 53 y o  female  Here today with vertigo  Janine reports starting with vertigo while in [place] two weeks ago  It started to subside and then yesterday she flipped over in bed and the room started spinning   She has associated mild headache and nausea  Vertigo is worse when she moves her head to the left  She denies any vision changes, speech/coordination issues   She's had vertigo in the past and usually gets relief from meclizine                The following portions of the patient's history were reviewed and updated as appropriate: allergies, current medications, past family history, past medical history, past social history, past surgical history and problem list     Review of Systems   Constitutional: Positive for fatigue  Negative for activity change, appetite change and fever  HENT: Negative for congestion, ear pain, facial swelling, hearing loss, rhinorrhea and sore throat  Eyes: Negative for visual disturbance  Respiratory: Negative for cough  Cardiovascular: Negative for chest pain  Gastrointestinal: Positive for nausea  Negative for diarrhea and vomiting  Genitourinary: Negative for difficulty urinating  Musculoskeletal: Negative for myalgias  Skin: Negative for rash  Neurological: Positive for dizziness and headaches  Negative for syncope, facial asymmetry, speech difficulty, weakness and numbness           Objective:      /74   Pulse 75   Temp 98 °F (36 7 °C)   Ht 5' 8" (1 727 m)   SpO2 96%   BMI 36 49 kg/m²          Physical Exam   Constitutional: She is oriented to person, place, and time  She appears well-developed and well-nourished  HENT:   Head: Normocephalic and atraumatic  Right Ear: External ear normal    Left Ear: External ear normal    Mouth/Throat: Oropharynx is clear and moist    Eyes: Pupils are equal, round, and reactive to light  Conjunctivae are normal    Cardiovascular: Normal rate, regular rhythm and normal heart sounds  Pulmonary/Chest: Effort normal and breath sounds normal    Lymphadenopathy:     She has no cervical adenopathy  Neurological: She is alert and oriented to person, place, and time  No cranial nerve deficit  Coordination normal    Skin: Skin is warm and dry  Psychiatric: She has a normal mood and affect  Her behavior is normal    Vitals reviewed

## 2020-03-02 ENCOUNTER — SOCIAL WORK (OUTPATIENT)
Dept: BEHAVIORAL/MENTAL HEALTH CLINIC | Facility: CLINIC | Age: 55
End: 2020-03-02
Payer: COMMERCIAL

## 2020-03-02 DIAGNOSIS — F41.0 PANIC DISORDER WITHOUT AGORAPHOBIA: Chronic | ICD-10-CM

## 2020-03-02 DIAGNOSIS — F43.21 UNRESOLVED GRIEF: ICD-10-CM

## 2020-03-02 DIAGNOSIS — F41.1 GAD (GENERALIZED ANXIETY DISORDER): Chronic | ICD-10-CM

## 2020-03-02 DIAGNOSIS — F31.81 BIPOLAR II DISORDER, MOST RECENT EPISODE MAJOR DEPRESSIVE (HCC): Primary | Chronic | ICD-10-CM

## 2020-03-02 PROCEDURE — 90834 PSYTX W PT 45 MINUTES: CPT | Performed by: SOCIAL WORKER

## 2020-03-02 NOTE — BH TREATMENT PLAN
Linda Goodrich  1965       Date of Initial Treatment Plan: 5/1/17   Date of Current Treatment Plan: 03/02/20      Treatment Plan Number 9    Strengths/Personal Resources for Self Care: Caring, grandmother    Diagnosis:   1  Bipolar II disorder, most recent episode major depressive ([facility] [address] )     2  DREAD (generalized anxiety disorder)     3  Panic disorder without agoraphobia     4  Unresolved grief         Area of Needs: Anxiety, depression, overwhelmed with family situations, setting boundaries      Long Term Goal 1:        I am able to function daily without overwhelming myself  Target Date:  8/1/20  Completion Date: TBD         Short Term Objectives for Goal 1:   1  Read  2  Remember what is in/out of my control  3 Address mindless eating      Long Term Goal 2:        My anxiety/depression has greatly improved  Target Date:   8/1/20  Completion Date: TBD     Short Term Objectives for Goal 2: 1  Kids have been returned home  2  I can tolerate living with my son and family  3   I get my health under control          Long Term Goal # 3:        Jerzy and Deepali get the care they need  Target Date:  8/1/20  Completion Date: TBD     Short Term Objectives for Goal 3:   1  Kalen's   2  Continue visits with grandchildren  3  Maintaining boundaries with Kristina Minor and Corine Francois     GOAL 1: Modality: Individual therapy 2x per month   Completion Date TBD                                  Medication management every 3 months   Completion Date: TBD                                   Persons responsible for goals: Rodriguez Nuñez and Dr Oriana Sanchez     GOAL 2: Modality: Individual therapy 2x per month   Completion Date TBD                                  Medication management every 3 months   Completion Date:  TBD                                  Persons responsible for goals: Rodriguez Nuñez and Dr Oriana Sanchez     GOAL 3: Modality: Individual therapy 2x per month   Completion Date TBD                                  Medication management every 3 months   Completion Date: TBD                                  Persons responsible for goals: Mae Polo and Dr [facility]: Diagnosis and Treatment Plan explained to Corwinarabella Edenilson relates understanding diagnosis and is agreeable to Treatment Plan         Client Comments : Please share your thoughts, feelings, need and/or experiences regarding your treatment plan:

## 2020-03-02 NOTE — PSYCH
Psychotherapy Provided: Individual Psychotherapy 50 minutes     Length of time in session: 50 minutes, follow up in 2 week    Goals addressed in session: Goal 1, Goal 2 and Goal 3      Pain:      none    0    Current suicide risk : Low     D: Met with Janine individually  Session focused upon Jake being adopted - Danae Ellison will be going later this week to say goodbye  Discussed progress of other grandchildren  Processed Danae Ellison is going through a death with not seeing Nallely Fought anymore  Further discussion regarding Jaxson Townsend wants to separate and find her own apartment in a senior living community  Discussed volunteering  Wants to look into [employer] as a volunteer would children  May be hard seeing other children and Janine can't see her grandchildren often  Called in session to make an appointment to see grounds and fill out volunteer information  Denied SI    A: Danae Ellison demonstrated appropriate mood and affect for topics of discussion  She called and made an appointment while in session to meet with   Progress  P: Continue individual therapy, encourage volunteering as much as possible    [address]: Diagnosis and Treatment Plan explained to Cheryl Barrios relates understanding diagnosis and is agreeable to Treatment Plan   Yes

## 2020-03-04 ENCOUNTER — TRANSCRIBE ORDERS (OUTPATIENT)
Dept: NEUROSURGERY | Facility: CLINIC | Age: 55
End: 2020-03-04

## 2020-03-04 DIAGNOSIS — M54.2 NECK PAIN: Primary | ICD-10-CM

## 2020-03-05 ENCOUNTER — OFFICE VISIT (OUTPATIENT)
Dept: NEUROSURGERY | Facility: CLINIC | Age: 55
End: 2020-03-05
Payer: COMMERCIAL

## 2020-03-05 VITALS
HEART RATE: 66 BPM | WEIGHT: 235 LBS | DIASTOLIC BLOOD PRESSURE: 66 MMHG | RESPIRATION RATE: 16 BRPM | BODY MASS INDEX: 35.61 KG/M2 | TEMPERATURE: 98.3 F | HEIGHT: 68 IN | SYSTOLIC BLOOD PRESSURE: 126 MMHG

## 2020-03-05 DIAGNOSIS — G95.9 MYELOPATHY OF CERVICAL SPINAL CORD WITH CERVICAL RADICULOPATHY (HCC): Primary | ICD-10-CM

## 2020-03-05 DIAGNOSIS — M54.12 MYELOPATHY OF CERVICAL SPINAL CORD WITH CERVICAL RADICULOPATHY (HCC): Primary | ICD-10-CM

## 2020-03-05 DIAGNOSIS — M54.2 NECK PAIN: ICD-10-CM

## 2020-03-05 PROCEDURE — 99204 OFFICE O/P NEW MOD 45 MIN: CPT | Performed by: PHYSICIAN ASSISTANT

## 2020-03-11 ENCOUNTER — EVALUATION (OUTPATIENT)
Dept: PHYSICAL THERAPY | Facility: CLINIC | Age: 55
End: 2020-03-11
Payer: COMMERCIAL

## 2020-03-11 DIAGNOSIS — R42 VERTIGO: Primary | ICD-10-CM

## 2020-03-11 PROCEDURE — 97162 PT EVAL MOD COMPLEX 30 MIN: CPT | Performed by: PHYSICAL THERAPIST

## 2020-03-11 NOTE — PROGRESS NOTES
PT Evaluation     Today's date: 3/11/2020  Patient name: Romy Galaviz  : 1965  MRN: [id number]  Referring provider: CALEB Leary  Dx:   Encounter Diagnosis     ICD-10-CM    1  Vertigo R42        Start Time: 815  Stop Time: 915  Total time in clinic (min): 60 minutes    Assessment  Assessment details: Romy Galaviz is a 53 y o  female who presents with signs and symptoms consistent of vertigo symptoms  Patient notes that she has had vertigo symptoms on and off for the past couple years  Patient notes that this episode of vertigo symptoms in late February  Patient notes that she has had these symptoms on and off with positional changes ever since then  Patient notes that the last time she had a bad episode was on   Patient has chronic neck pain with radicular symptoms and is getting an MRI on Tuesday for further assessment  Aggravating factors include, rolling bed, bending over and standing over for to long, sitting up in bed, turning head to the left  Easing factors include, Meclizine and avoiding aggravating position changes  Anderson-Hallpike was performed 2x to each side with negative results  Patient had slight "fuzziness" but no aggravation of spinning dizziness or nystagmus  Roll test was also perform and demonstrated no provocation of symptoms or nystagmus  Other vestibular testing were also negative with no gaze deviations or nystagmus  Patient had some increase in dizziness symptoms and slight nausea with bilateral head thrust but quick resolved after 30 seconds  Based on these findings, dizziness does not seem to be caused by the inner ear and vestibular systems despite the subjective comments  Patient does have chronic neck pain which may be causing the dizziness with head movements   Patient is receiving MRI next week and discussed with patient to hold on therapy until results and is welcome to come back  for treatment of the cervical spine and to see if the dizziness is resolved with this treatment or if severe vertigo symptoms return  Impairments: abnormal gait, abnormal or restricted ROM, impaired balance, impaired physical strength, lacks appropriate home exercise program and pain with function    Symptom irritability: moderateUnderstanding of Dx/Px/POC: good   Prognosis: fair    Goals  Short Term Goals: to be achieved by 4 weeks  1) Decrease dizziness at worst to 5/10 in order to improve quality of life  3) Decrease frequency of symptoms to 1-2 week in order to improve quality of life  Long Term Goals: to be achieved by discharge  1) FOTO equal to or greater than 81 indicating improvements with overall function  2) Patient to be independent with comprehensive HEP  3) Able to turn in bed without dizziness in order to improve quality of life  Plan  Plan details: Hold therapy until after MRI next secondary to examination findings  Patient would benefit from: PT eval and skilled physical therapy  Planned therapy interventions: manual therapy, neuromuscular re-education, patient education, therapeutic activities, therapeutic exercise and home exercise program  Treatment plan discussed with: patient        Subjective Evaluation    History of Present Illness  Mechanism of injury: History of Current Injury: Patient notes that she has had vertigo symptoms on and off for the past couple years  Patient notes that this episode of vertigo symptoms in late February  Patient notes that she has had these symptoms on and off with positional changes ever since then  Patient notes that the last time she had a bad episode was on 2/27  Patient has chronic neck pain with radicular symptoms and is getting an MRI on Tuesday for further     Pain location/Descriptors: dizziness   Aggravating factors: rolling bed, bending over and standing over for to long, sitting up in bed, turning head to the left  Easing factors: Meclizine, avoiding position changes   24 HR pattern: no changes   Imaging: MRI of the cervical spine on Tuesday secondary to chronic neck pain and neurological symptoms  Special Questions: Dizziness; nausea; patient notes trouble swallowing on going  Patient goals:  Patient notes she would like to stop the dizziness  Hobbies/Interest: n/a   Occupation: not working     Pain  Current pain ratin  At best pain ratin  At worst pain rating: 10  Location: Bilateral neck pain     Patient Goals  Patient goals for therapy: improved balance  Patient goal: Patient notes she would like to stop the dizziness  Objective     Concurrent Complaints  Positive for headaches, nausea/motion sickness, tinnitus and aural fullness  Negative for hearing loss    Neurological Testing     Sensation   Cervical/Thoracic   Left   Intact: light touch    Right   Intact: light touch    Reflexes   Left   Garcia's reflex: negative    Right   Garcia's reflex: negative    Additional Neurological Details  Wrist clonus: negative     Active Range of Motion   Cervical/Thoracic Spine       Cervical    Flexion: Neck active flexion: WFL  Extension: Neck active extension: WFL  Left lateral flexion: Neck active lateral bend left: WFL  Right lateral flexion: Neck active lateral bend right: WFL  Left rotation: Neck active rotation left: WFL  Right rotation: Neck active rotation right: WFL  Neuro Exam:     Dizziness  Positive for vertigo and rocking or swaying  Exacerbating factors  Positive for bending over, rolling in bed and turning head  Symptoms   Duration: up to 5 minutes   Frequency: depends on the positional changes; 1-2x week; last epsiode was   Intensity at best: 0/10  Intensity at worst: 5/10  Average intensity: 3/10    Headaches   Patient reports headaches: Yes  Frequency: Chronic for many years with associated neck pain  Cervical exam   Ligament Laxity Testing   Alar ligament: WNL  Sharp Essie:  WNL  Modified VBI   Left: asymptomatic  Right: asymptomatic  Cervical palpation: Hypertonicity   Seated posture: forward head posture    Oculomotor exam   Resting nystagmus: not present   Gaze holding nystagmus: not present left   Smooth pursuits: within normal limits and "feels funny"   Vertical saccades: normal and "feels funny"   Horizontal saccades: normal and "feels funny"   Convergence: normal  Head thrust: left normal ("feels funny" and a little nausea) and right normal ("feels funny" and a little nausea)  Dynamic head: "feels funny"     Positional testing   Austin-Hallpike   Left posterior canal: WNL  Right posterior canal: WNL  Roll test   Left horizontal canal: WNL  Right horizontal canal: WNL             Precautions: Anxiety, chronic pain, stress/urge incontinence      Manual              Cervical mobs              Suboccipital release             IASTM UT             UE nerve glides                               Exercise Diary              Scap retraction HEP            Diaphragmatic breathing  HEP             HEP            Chin Tucks              DNF curls              Resisted Rows              Resisted Extensions             Lengby sweeps              Prone extension with scap retraction             Gripping              Wall slides             Laser maze             Median nerve glides             Lateral walks on foam             Hurdles              Standing balance on foam             Step taps             Vestibular exercises                           Arm bike or pulllies                 Modalities

## 2020-03-17 ENCOUNTER — HOSPITAL ENCOUNTER (OUTPATIENT)
Dept: RADIOLOGY | Age: 55
Discharge: HOME | End: 2020-03-17
Payer: COMMERCIAL

## 2020-03-17 DIAGNOSIS — M54.2 NECK PAIN: ICD-10-CM

## 2020-03-17 DIAGNOSIS — G95.9 MYELOPATHY OF CERVICAL SPINAL CORD WITH CERVICAL RADICULOPATHY (HCC): ICD-10-CM

## 2020-03-17 DIAGNOSIS — M54.12 MYELOPATHY OF CERVICAL SPINAL CORD WITH CERVICAL RADICULOPATHY (HCC): ICD-10-CM

## 2020-03-17 PROCEDURE — 72141 MRI NECK SPINE W/O DYE: CPT

## 2020-03-18 ENCOUNTER — SOCIAL WORK (OUTPATIENT)
Dept: BEHAVIORAL/MENTAL HEALTH CLINIC | Facility: CLINIC | Age: 55
End: 2020-03-18
Payer: COMMERCIAL

## 2020-03-18 DIAGNOSIS — F41.1 GAD (GENERALIZED ANXIETY DISORDER): Chronic | ICD-10-CM

## 2020-03-18 DIAGNOSIS — F31.81 BIPOLAR II DISORDER, MOST RECENT EPISODE MAJOR DEPRESSIVE (HCC): Primary | Chronic | ICD-10-CM

## 2020-03-18 DIAGNOSIS — F41.0 PANIC DISORDER WITHOUT AGORAPHOBIA: Chronic | ICD-10-CM

## 2020-03-18 PROBLEM — F43.21 UNRESOLVED GRIEF: Status: RESOLVED | Noted: 2019-07-31 | Resolved: 2020-03-18

## 2020-03-18 PROCEDURE — 90834 PSYTX W PT 45 MINUTES: CPT | Performed by: SOCIAL WORKER

## 2020-03-18 NOTE — PSYCH
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  Emotionally, depression remains - misses kids and feels they are missing out on so many childhood experiences  Janine set boundaries with Gilberto Guzman and Zachary Saenz stating she unavailable unless there is a family emergency - proud of herself  A: Nat Roland presented with appropriate mood and affect for topics of discussion  Setting the boundaries was progress  Also motivated to continue volunteer options once all is lifted with virus  P: [place] individual therapy, continue boundary setting and perusing volunteer options in future    [address]: Diagnosis and Treatment Plan explained to Moriah Encarnacion relates understanding diagnosis and is agreeable to Treatment Plan   Yes

## 2020-03-25 ENCOUNTER — TELEPHONE (OUTPATIENT)
Dept: NEUROSURGERY | Facility: CLINIC | Age: 55
End: 2020-03-25

## 2020-03-25 NOTE — TELEPHONE ENCOUNTER
Spoke with patient on the telephone and asked her if she has had PT/LANCE  Patient states that she has started some PT and has had LANCE's in the past and neither have been helpful  She feels like she is getting worse and would like to keep her in-office appt that is scheduled for tomorrow rather than having a virtual visit  I asked patient screening questions and they were all negative

## 2020-03-26 ENCOUNTER — TELEMEDICINE (OUTPATIENT)
Dept: INTERNAL MEDICINE CLINIC | Facility: CLINIC | Age: 55
End: 2020-03-26
Payer: COMMERCIAL

## 2020-03-26 ENCOUNTER — OFFICE VISIT (OUTPATIENT)
Dept: NEUROSURGERY | Facility: CLINIC | Age: 55
End: 2020-03-26
Payer: COMMERCIAL

## 2020-03-26 ENCOUNTER — TELEPHONE (OUTPATIENT)
Dept: INTERNAL MEDICINE CLINIC | Facility: CLINIC | Age: 55
End: 2020-03-26

## 2020-03-26 VITALS
SYSTOLIC BLOOD PRESSURE: 124 MMHG | BODY MASS INDEX: 35.61 KG/M2 | RESPIRATION RATE: 16 BRPM | WEIGHT: 235 LBS | TEMPERATURE: 98.2 F | DIASTOLIC BLOOD PRESSURE: 70 MMHG | HEIGHT: 68 IN | HEART RATE: 85 BPM

## 2020-03-26 DIAGNOSIS — M54.2 CERVICALGIA: Primary | ICD-10-CM

## 2020-03-26 DIAGNOSIS — R09.82 PND (POST-NASAL DRIP): ICD-10-CM

## 2020-03-26 DIAGNOSIS — R05.9 COUGH: Primary | ICD-10-CM

## 2020-03-26 PROCEDURE — 3008F BODY MASS INDEX DOCD: CPT | Performed by: PHYSICIAN ASSISTANT

## 2020-03-26 PROCEDURE — 1036F TOBACCO NON-USER: CPT | Performed by: PHYSICIAN ASSISTANT

## 2020-03-26 PROCEDURE — 99214 OFFICE O/P EST MOD 30 MIN: CPT | Performed by: PHYSICIAN ASSISTANT

## 2020-03-26 PROCEDURE — 99213 OFFICE O/P EST LOW 20 MIN: CPT | Performed by: NURSE PRACTITIONER

## 2020-03-26 NOTE — PROGRESS NOTES
Office Note - Neurosurgery   Tayler Adam 53 y o  female MRN: [id number]      Assessment:  Patient is a 53years old pleasant lady here to go over her cervical spine MRI results  She has history of chronic progressive posterior left side neck pain associated with left shoulder radiculopathy  Has also left arm and dorsal hand and fingers paresthesias most prominently on the dorsal thumb  He also reports mild fine motor dysfunction and gait instability  Denies bowel/bladder dysfunction  She tried multiple LANCE injections, also physical therapy and traction about 2 years without lasting effect  She states her symptoms slightly improved with Advil  Neuro exam-moves all extremities, strength in the left shoulder and upper arm 4+/5, subjective tingling sensation on the left dorsal thumb, positive Garcia's tests bilaterally, right side more than left side  DTR 3+  MRI of cervical spine on 3/17/2020 some degenerative disease with progressive left L5-6 neural foraminal stenosis  Hx,PE, images, management plan and prognosis discussed with the patient  Discussed with the patient that her cervical issues is not surgical  Patient is advised do physical therapy and also visit Pain Management with Dr Ladonna Burger  She can do exercises, yoga and range of motion of cervical spine  Questions and concerns were answered  Call office if they symptoms getting worse  Patient expressed her understanding is and agreed with the plan  Plan:  1  Ambulatory referral to pain management  2  Ambulatory referral to physical therapy  3  Continue OTC pain medication  4  Follow-up on p r n  Basis  5   Call office if symptoms get worse      Subjective/Objective     Chief Complaint: " discuss MRI results, my shoulder pain same"      HPI  Patient is a 53years old woman here today to go over her cervical MRI results, she brought in CD of cervical MRI which was done in 2019 reported no small to moderate broad-based disc protrusion eccentric toward the left paracentral region and moderate to severe left neural foraminal narrowing   MRI of cervical spine on 03/17/2020 demonstrates multilevel degenerative changes most prominent at C5-6 level, reportedly with potentially progressive left C5-6 neural foraminal stenosis  Patient reports her left neck shoulder pain is still the same and bothering her, associated with paresthesia in the left upper extremity down to her dorsal thumb, has mild fine motor dysfunction and gait instability, denies bowel/bladder dysfunction  He tried physical therapy and multiple epidural steroid injection about a year or 2 ago without long-lasting effect  She also tried traction  Patient denies history of fever, chills, rigors, cough or chest pain  Currently, patient taking Advil  ROS  Personally reviewed and updated  Review of Systems   Constitutional: Negative  HENT: Negative  Eyes: Negative  Respiratory: Positive for cough  Cardiovascular: Negative  Gastrointestinal: Negative  Endocrine: Negative  Genitourinary: Negative  Musculoskeletal: Positive for arthralgias, gait problem, myalgias, neck pain and neck stiffness  Skin: Negative  Allergic/Immunologic: Negative  Neurological: Positive for dizziness (worsening dizziness), weakness, numbness (Left UE and LE) and headaches  Hematological: Does not bruise/bleed easily  Psychiatric/Behavioral: Positive for sleep disturbance (due to pain)         Family History    Family History   Problem Relation Age of Onset    Stroke Father     Psychosis Father     Bipolar disorder Mother     Lung cancer Mother     Schizoaffective Disorder  Son     Alcohol abuse Neg Hx     Substance Abuse Neg Hx     Suicidality Neg Hx        Social History    Social History     Socioeconomic History    Marital status: /Civil Union     Spouse name: Not on file    Number of children: 2    Years of education: 12    Highest education level: High school graduate   Occupational History    Occupation: on disability   Social Needs    Financial resource strain: Not hard at all   Carlos insecurity:     Worry: Never true     Inability: Never true   "[employer] needs:     Medical: No     Non-medical: No   Tobacco Use    Smoking status: Former Smoker     Packs/day: 0 50     Types: Cigarettes    Smokeless tobacco: Never Used   Substance and Sexual Activity    Alcohol use: No     Frequency: Never     Binge frequency: Never    Drug use: No    Sexual activity: Not Currently   Lifestyle    Physical activity:     Days per week: 2 days     Minutes per session: 30 min    Stress: Rather much   Relationships    Social connections:     Talks on phone: Once a week     Gets together: Never     Attends [employer] service: Never     Active member of club or organization: No     Attends meetings of clubs or organizations: Never     Relationship status:     Intimate partner violence:     Fear of current or ex partner: No     Emotionally abused: No     Physically abused: No     Forced sexual activity: No   Other Topics Concern    Not on file   Social History Narrative    Daily caffeine consumption        Education: high school graduate    Learning Disabilities: none    Marital History:     Children: 2 adult sons    Living Arrangement: lives in home with , son and son's family    Occupational History: worked in  in the past, on disability    Functioning Relationships: good support system    Legal History: none     History: None       Past Medical History    Past Medical History:   Diagnosis Date    Abnormal liver scan     last assessed 8/14/2014    Anxiety     Atypical chest pain     last assessed 2/3/2016    Benign colonic polyp     Bladder disorder     last assessed 1/22/2013    Cervical radiculopathy     Chronic ITP (idiopathic thrombocytopenia) (HCC)     Chronic lumbar radiculopathy     Chronic neck pain     Chronic pain     neck and back    Dermatitis     Ganglion cyst     Herpes simplex     Hyperlipidemia     Luteinized follicular cyst     Menorrhagia     Obesity     Urge and stress incontinence     Uterine fibroid        Surgical History    Past Surgical History:   Procedure Laterality Date    CHOLECYSTECTOMY      HYSTERECTOMY      MELO    ORIF TIBIA & FIBULA FRACTURES Left 4/27/2018    Procedure: OPEN REDUCTION W/ INTERNAL FIXATION (ORIF) ANKLE;  Surgeon: Sade Jay MD;  [facility];  Service: Orthopedics    TOOTH EXTRACTION      last assessed 3/20/2017, oral surgery       Medications      Current Outpatient Medications:     dexlansoprazole (DEXILANT) 60 MG capsule, Take 1 capsule (60 mg total) by mouth daily, Disp: 30 capsule, Rfl: 11    DULoxetine (CYMBALTA) 60 mg delayed release capsule, Take 1 capsule (60 mg total) by mouth 2 (two) times a day, Disp: 180 capsule, Rfl: 2    ibuprofen (MOTRIN) 600 mg tablet, Take 600 mg by mouth 3 (three) times a day as needed, Disp: , Rfl: 0    lamoTRIgine (LaMICtal) 100 mg tablet, Take 1 tablet (100 mg total) by mouth daily, Disp: 90 tablet, Rfl: 2    lamoTRIgine (LaMICtal) 150 MG tablet, Take 1 tablet (150 mg total) by mouth daily at bedtime, Disp: 90 tablet, Rfl: 2    LORazepam (ATIVAN) 0 5 mg tablet, Take 1 tablet (0 5 mg total) by mouth 4 (four) times a day as needed for anxiety To be filled on or after 3/14/20, Disp: 120 tablet, Rfl: 3    meclizine (ANTIVERT) 25 mg tablet, Take 1 tablet (25 mg total) by mouth every 8 (eight) hours as needed for dizziness (Patient not taking: Reported on 3/26/2020), Disp: 30 tablet, Rfl: 0    Allergies    Allergies   Allergen Reactions    Buspar [Buspirone] Hives and Rash       The following portions of the patient's history were reviewed and updated as appropriate: allergies, current medications, past family history, past medical history, past social history, past surgical history and problem list     Investigations    I personally reviewed the MRI results with the patient:    MRI of cervical spine findings as described in the assessment section above    Physical Exam    Vitals:    03/26/20 1013   BP: 124/70   Pulse: 85   Resp: 16   Temp: 98 2 °F (36 8 °C)       Physical Exam   Constitutional: She is oriented to person, place, and time  She appears well-developed and well-nourished  HENT:   Head: Normocephalic and atraumatic  Eyes: EOM are normal    Neck: Normal range of motion  Cardiovascular: Normal rate  Pulmonary/Chest: Effort normal    Musculoskeletal: She exhibits tenderness  Neurological: She is alert and oriented to person, place, and time  She has normal strength  A sensory deficit is present  No cranial nerve deficit  GCS eye subscore is 4  GCS verbal subscore is 5  GCS motor subscore is 6  Reflex Scores:       Tricep reflexes are 3+ on the right side and 3+ on the left side  Bicep reflexes are 3+ on the right side and 3+ on the left side  Brachioradialis reflexes are 3+ on the right side and 3+ on the left side  Patellar reflexes are 3+ on the right side and 3+ on the left side  Achilles reflexes are 3+ on the right side and 3+ on the left side  Slight left hand  strength weakness 4+/5   Skin: Skin is warm  Psychiatric: Her speech is normal      Neurologic Exam     Mental Status   Oriented to person, place, and time     Speech: speech is normal   Level of consciousness: alert    Cranial Nerves     CN III, IV, VI   Extraocular motions are normal    Nystagmus: none     CN V   Right facial sensation deficit: none  Left facial sensation deficit: none    CN VII   Right facial weakness: none  Left facial weakness: none    CN XI   CN XI normal      CN XII   CN XII normal      Motor Exam   Muscle bulk: normal  Overall muscle tone: normal  Right arm tone: normal  Left arm tone: normal  Right arm pronator drift: absent  Left arm pronator drift: absent  Right leg tone: normal  Left leg tone: normal    Strength   Strength 5/5 throughout       Sensory Exam   Light touch normal      Gait, Coordination, and Reflexes     Reflexes   Right brachioradialis: 3+  Left brachioradialis: 3+  Right biceps: 3+  Left biceps: 3+  Right triceps: 3+  Left triceps: 3+  Right patellar: 3+  Left patellar: 3+  Right achilles: 3+  Left achilles: 3+  Right : 2+  Left : 1+  Right Garcia: present  Right ankle clonus: absent

## 2020-03-26 NOTE — TELEPHONE ENCOUNTER
Patient states she would like to speak to you  She has been coughing a lot due to her allergies, makes her choke sometimes  Sore throat from coughing  Would like to speak to you about her cough        NO fever  NO sob  NO runny nose  NO post nasal  NO congestion

## 2020-03-26 NOTE — PROGRESS NOTES
Virtual Regular Visit    Problem List Items Addressed This Visit     None      Visit Diagnoses     Cough    -  Primary    take OTC allergy medication like claritin daily   start flonase 1 spray in each nostril daily   continue reflux medications, avoid tomato/garlic/citrus     PND (post-nasal drip)          Call if not improving over the next week  Reason for visit is cough     Encounter provider CALEB Crook    Provider located at [address] [place] [id number]      Recent Visits  No visits were found meeting these conditions  Showing recent visits within past 7 days and meeting all other requirements     Today's Visits  Date Type Provider Dept   03/26/20 [facility] [address] Internal Med   03/26/20 Telephone [email or web address] Internal Med   Showing today's visits and meeting all other requirements     Future Appointments  Date Type Provider Dept   03/26/20 Telephone [facility] Internal Med   03/26/20 [facility] 123, [address],[facility] 620 Internal Med   Showing future appointments within next 150 days and meeting all other requirements        After connecting through [employer], the patient was identified by name and date of birth  Matildebernard Montiel was informed that this is a telemedicine visit and that the visit is being conducted through [employer] and patient was informed that this is not a secure, HIPAA-complaint platform  she agrees to proceed  which may not be secure and therefore, might not be HIPAA-compliant  My office door was closed  No one else was in the room  She acknowledged consent and understanding of privacy and security of the video platform  The patient has agreed to participate and understands they can discontinue the visit at any time  Ali Ramiro is a 53 y o  female that complaints of cough   This started about 5 days ago  She feels like something is caught in her throat which causes her to cough and gag  She feels like its a tickle in her throat  She denies any difficulty breathing or swallowing  She denies any rhinorrhea, sore throat, or fever  It sometimes gets worse after meals but food does not get stuck  She is on dexilant for GERD           Past Medical History:   Diagnosis Date    Abnormal liver scan     last assessed 8/14/2014    Anxiety     Atypical chest pain     last assessed 2/3/2016    Benign colonic polyp     Bladder disorder     last assessed 1/22/2013    Cervical radiculopathy     Chronic ITP (idiopathic thrombocytopenia) (HCC)     Chronic lumbar radiculopathy     Chronic neck pain     Chronic pain     neck and back    Dermatitis     Ganglion cyst     Herpes simplex     Hyperlipidemia     Luteinized follicular cyst     Menorrhagia     Obesity     Urge and stress incontinence     Uterine fibroid        Past Surgical History:   Procedure Laterality Date    CHOLECYSTECTOMY      HYSTERECTOMY      MELO    ORIF TIBIA & FIBULA FRACTURES Left 4/27/2018    Procedure: OPEN REDUCTION W/ INTERNAL FIXATION (ORIF) ANKLE;  Surgeon: Gisel Chilel MD;  [facility];  Service: Orthopedics    TOOTH EXTRACTION      last assessed 3/20/2017, oral surgery       Current Outpatient Medications   Medication Sig Dispense Refill    dexlansoprazole (DEXILANT) 60 MG capsule Take 1 capsule (60 mg total) by mouth daily 30 capsule 11    DULoxetine (CYMBALTA) 60 mg delayed release capsule Take 1 capsule (60 mg total) by mouth 2 (two) times a day 180 capsule 2    ibuprofen (MOTRIN) 600 mg tablet Take 600 mg by mouth 3 (three) times a day as needed  0    lamoTRIgine (LaMICtal) 100 mg tablet Take 1 tablet (100 mg total) by mouth daily 90 tablet 2    lamoTRIgine (LaMICtal) 150 MG tablet Take 1 tablet (150 mg total) by mouth daily at bedtime 90 tablet 2    LORazepam (ATIVAN) 0 5 mg tablet Take 1 tablet (0 5 mg total) by mouth 4 (four) times a day as needed for anxiety To be filled on or after 3/14/20 120 tablet 3    meclizine (ANTIVERT) 25 mg tablet Take 1 tablet (25 mg total) by mouth every 8 (eight) hours as needed for dizziness (Patient not taking: Reported on 3/26/2020) 30 tablet 0     No current facility-administered medications for this visit  Allergies   Allergen Reactions    Buspar [Buspirone] Hives and Rash       Review of Systems   Constitutional: Negative for activity change, appetite change, chills, fatigue and fever  HENT: Positive for postnasal drip  Negative for congestion, ear pain, rhinorrhea, sinus pressure, sore throat and trouble swallowing  Respiratory: Positive for cough  Negative for chest tightness, shortness of breath and wheezing  Cardiovascular: Negative for chest pain  Genitourinary: Negative for difficulty urinating  Skin: Negative for rash  Neurological: Negative for dizziness, light-headedness and headaches  Physical Exam   Constitutional: She appears well-developed and well-nourished  HENT:   Head: Normocephalic  Mouth/Throat: Oropharynx is clear and moist    Eyes: Pupils are equal, round, and reactive to light  Pulmonary/Chest: Effort normal    Neurological: She is alert  Psychiatric: She has a normal mood and affect  Her behavior is normal         I spent 20 minutes with the patient during this visit

## 2020-04-01 ENCOUNTER — SOCIAL WORK (OUTPATIENT)
Dept: BEHAVIORAL/MENTAL HEALTH CLINIC | Facility: CLINIC | Age: 55
End: 2020-04-01
Payer: COMMERCIAL

## 2020-04-01 DIAGNOSIS — F41.1 GAD (GENERALIZED ANXIETY DISORDER): Chronic | ICD-10-CM

## 2020-04-01 DIAGNOSIS — F41.0 PANIC DISORDER WITHOUT AGORAPHOBIA: Chronic | ICD-10-CM

## 2020-04-01 DIAGNOSIS — F31.81 BIPOLAR II DISORDER, MOST RECENT EPISODE MAJOR DEPRESSIVE (HCC): Primary | Chronic | ICD-10-CM

## 2020-04-01 PROCEDURE — 90834 PSYTX W PT 45 MINUTES: CPT | Performed by: SOCIAL WORKER

## 2020-04-06 ENCOUNTER — APPOINTMENT (OUTPATIENT)
Dept: PHYSICAL THERAPY | Facility: CLINIC | Age: 55
End: 2020-04-06
Payer: COMMERCIAL

## 2020-04-09 ENCOUNTER — SOCIAL WORK (OUTPATIENT)
Dept: BEHAVIORAL/MENTAL HEALTH CLINIC | Facility: CLINIC | Age: 55
End: 2020-04-09
Payer: COMMERCIAL

## 2020-04-09 DIAGNOSIS — F41.0 PANIC DISORDER WITHOUT AGORAPHOBIA: Chronic | ICD-10-CM

## 2020-04-09 DIAGNOSIS — F41.1 GAD (GENERALIZED ANXIETY DISORDER): Chronic | ICD-10-CM

## 2020-04-09 DIAGNOSIS — F31.81 BIPOLAR II DISORDER, MOST RECENT EPISODE MAJOR DEPRESSIVE (HCC): Primary | Chronic | ICD-10-CM

## 2020-04-09 PROCEDURE — 90834 PSYTX W PT 45 MINUTES: CPT | Performed by: SOCIAL WORKER

## 2020-04-13 ENCOUNTER — EVALUATION (OUTPATIENT)
Dept: PHYSICAL THERAPY | Facility: CLINIC | Age: 55
End: 2020-04-13
Payer: COMMERCIAL

## 2020-04-13 DIAGNOSIS — M54.12 CERVICAL RADICULOPATHY: Primary | ICD-10-CM

## 2020-04-13 DIAGNOSIS — M54.2 CERVICALGIA: ICD-10-CM

## 2020-04-13 PROCEDURE — 97163 PT EVAL HIGH COMPLEX 45 MIN: CPT | Performed by: PHYSICAL THERAPIST

## 2020-04-13 PROCEDURE — 97112 NEUROMUSCULAR REEDUCATION: CPT | Performed by: PHYSICAL THERAPIST

## 2020-04-17 ENCOUNTER — OFFICE VISIT (OUTPATIENT)
Dept: PHYSICAL THERAPY | Facility: CLINIC | Age: 55
End: 2020-04-17
Payer: COMMERCIAL

## 2020-04-17 DIAGNOSIS — M54.12 CERVICAL RADICULOPATHY: ICD-10-CM

## 2020-04-17 DIAGNOSIS — M54.2 CERVICALGIA: Primary | ICD-10-CM

## 2020-04-17 PROCEDURE — 97140 MANUAL THERAPY 1/> REGIONS: CPT | Performed by: PHYSICAL THERAPIST

## 2020-04-17 PROCEDURE — 97112 NEUROMUSCULAR REEDUCATION: CPT | Performed by: PHYSICAL THERAPIST

## 2020-04-17 PROCEDURE — 97110 THERAPEUTIC EXERCISES: CPT | Performed by: PHYSICAL THERAPIST

## 2020-04-22 ENCOUNTER — SOCIAL WORK (OUTPATIENT)
Dept: BEHAVIORAL/MENTAL HEALTH CLINIC | Facility: CLINIC | Age: 55
End: 2020-04-22
Payer: COMMERCIAL

## 2020-04-22 ENCOUNTER — OFFICE VISIT (OUTPATIENT)
Dept: PHYSICAL THERAPY | Facility: CLINIC | Age: 55
End: 2020-04-22
Payer: COMMERCIAL

## 2020-04-22 DIAGNOSIS — M54.2 CERVICALGIA: Primary | ICD-10-CM

## 2020-04-22 DIAGNOSIS — F41.0 PANIC DISORDER WITHOUT AGORAPHOBIA: Chronic | ICD-10-CM

## 2020-04-22 DIAGNOSIS — F31.81 BIPOLAR II DISORDER, MOST RECENT EPISODE MAJOR DEPRESSIVE (HCC): Primary | Chronic | ICD-10-CM

## 2020-04-22 DIAGNOSIS — F41.1 GAD (GENERALIZED ANXIETY DISORDER): Chronic | ICD-10-CM

## 2020-04-22 DIAGNOSIS — M54.12 CERVICAL RADICULOPATHY: ICD-10-CM

## 2020-04-22 PROCEDURE — 97110 THERAPEUTIC EXERCISES: CPT | Performed by: PHYSICAL THERAPIST

## 2020-04-22 PROCEDURE — 97112 NEUROMUSCULAR REEDUCATION: CPT | Performed by: PHYSICAL THERAPIST

## 2020-04-22 PROCEDURE — 97140 MANUAL THERAPY 1/> REGIONS: CPT | Performed by: PHYSICAL THERAPIST

## 2020-04-22 PROCEDURE — 90834 PSYTX W PT 45 MINUTES: CPT | Performed by: SOCIAL WORKER

## 2020-04-24 ENCOUNTER — OFFICE VISIT (OUTPATIENT)
Dept: PHYSICAL THERAPY | Facility: CLINIC | Age: 55
End: 2020-04-24
Payer: COMMERCIAL

## 2020-04-24 DIAGNOSIS — M54.12 CERVICAL RADICULOPATHY: ICD-10-CM

## 2020-04-24 DIAGNOSIS — M54.2 CERVICALGIA: Primary | ICD-10-CM

## 2020-04-24 PROCEDURE — 97112 NEUROMUSCULAR REEDUCATION: CPT | Performed by: PHYSICAL THERAPIST

## 2020-04-24 PROCEDURE — 97110 THERAPEUTIC EXERCISES: CPT | Performed by: PHYSICAL THERAPIST

## 2020-04-24 PROCEDURE — 97140 MANUAL THERAPY 1/> REGIONS: CPT | Performed by: PHYSICAL THERAPIST

## 2020-04-29 ENCOUNTER — OFFICE VISIT (OUTPATIENT)
Dept: PHYSICAL THERAPY | Facility: CLINIC | Age: 55
End: 2020-04-29
Payer: COMMERCIAL

## 2020-04-29 DIAGNOSIS — M54.12 CERVICAL RADICULOPATHY: ICD-10-CM

## 2020-04-29 DIAGNOSIS — M54.2 CERVICALGIA: Primary | ICD-10-CM

## 2020-04-29 PROCEDURE — 97140 MANUAL THERAPY 1/> REGIONS: CPT | Performed by: PHYSICAL THERAPIST

## 2020-04-29 PROCEDURE — 97110 THERAPEUTIC EXERCISES: CPT | Performed by: PHYSICAL THERAPIST

## 2020-04-29 PROCEDURE — 97112 NEUROMUSCULAR REEDUCATION: CPT | Performed by: PHYSICAL THERAPIST

## 2020-05-01 ENCOUNTER — OFFICE VISIT (OUTPATIENT)
Dept: PHYSICAL THERAPY | Facility: CLINIC | Age: 55
End: 2020-05-01
Payer: COMMERCIAL

## 2020-05-01 DIAGNOSIS — M54.2 CERVICALGIA: Primary | ICD-10-CM

## 2020-05-01 DIAGNOSIS — M54.12 CERVICAL RADICULOPATHY: ICD-10-CM

## 2020-05-01 PROCEDURE — 97112 NEUROMUSCULAR REEDUCATION: CPT | Performed by: PHYSICAL THERAPIST

## 2020-05-01 PROCEDURE — 97110 THERAPEUTIC EXERCISES: CPT | Performed by: PHYSICAL THERAPIST

## 2020-05-01 PROCEDURE — 97140 MANUAL THERAPY 1/> REGIONS: CPT | Performed by: PHYSICAL THERAPIST

## 2020-05-04 ENCOUNTER — OFFICE VISIT (OUTPATIENT)
Dept: PHYSICAL THERAPY | Facility: CLINIC | Age: 55
End: 2020-05-04
Payer: COMMERCIAL

## 2020-05-04 DIAGNOSIS — M54.12 CERVICAL RADICULOPATHY: ICD-10-CM

## 2020-05-04 DIAGNOSIS — M54.2 CERVICALGIA: Primary | ICD-10-CM

## 2020-05-04 PROCEDURE — 97140 MANUAL THERAPY 1/> REGIONS: CPT | Performed by: PHYSICAL THERAPIST

## 2020-05-04 PROCEDURE — 97110 THERAPEUTIC EXERCISES: CPT | Performed by: PHYSICAL THERAPIST

## 2020-05-04 PROCEDURE — 97112 NEUROMUSCULAR REEDUCATION: CPT | Performed by: PHYSICAL THERAPIST

## 2020-05-07 ENCOUNTER — OFFICE VISIT (OUTPATIENT)
Dept: PHYSICAL THERAPY | Facility: CLINIC | Age: 55
End: 2020-05-07
Payer: COMMERCIAL

## 2020-05-07 DIAGNOSIS — M54.12 CERVICAL RADICULOPATHY: ICD-10-CM

## 2020-05-07 DIAGNOSIS — M54.2 CERVICALGIA: Primary | ICD-10-CM

## 2020-05-07 PROCEDURE — 97112 NEUROMUSCULAR REEDUCATION: CPT | Performed by: PHYSICAL THERAPIST

## 2020-05-07 PROCEDURE — 97140 MANUAL THERAPY 1/> REGIONS: CPT | Performed by: PHYSICAL THERAPIST

## 2020-05-07 PROCEDURE — 97110 THERAPEUTIC EXERCISES: CPT | Performed by: PHYSICAL THERAPIST

## 2020-05-08 ENCOUNTER — TELEMEDICINE (OUTPATIENT)
Dept: BEHAVIORAL/MENTAL HEALTH CLINIC | Facility: CLINIC | Age: 55
End: 2020-05-08
Payer: COMMERCIAL

## 2020-05-08 DIAGNOSIS — F41.0 PANIC DISORDER WITHOUT AGORAPHOBIA: Chronic | ICD-10-CM

## 2020-05-08 DIAGNOSIS — F31.81 BIPOLAR II DISORDER, MOST RECENT EPISODE MAJOR DEPRESSIVE (HCC): Primary | Chronic | ICD-10-CM

## 2020-05-08 DIAGNOSIS — F41.1 GAD (GENERALIZED ANXIETY DISORDER): Chronic | ICD-10-CM

## 2020-05-08 PROCEDURE — 90834 PSYTX W PT 45 MINUTES: CPT | Performed by: SOCIAL WORKER

## 2020-05-12 ENCOUNTER — OFFICE VISIT (OUTPATIENT)
Dept: PHYSICAL THERAPY | Facility: CLINIC | Age: 55
End: 2020-05-12
Payer: COMMERCIAL

## 2020-05-12 DIAGNOSIS — M54.2 CERVICALGIA: Primary | ICD-10-CM

## 2020-05-12 DIAGNOSIS — M54.12 CERVICAL RADICULOPATHY: ICD-10-CM

## 2020-05-12 PROCEDURE — 97112 NEUROMUSCULAR REEDUCATION: CPT | Performed by: PHYSICAL THERAPIST

## 2020-05-12 PROCEDURE — 97110 THERAPEUTIC EXERCISES: CPT | Performed by: PHYSICAL THERAPIST

## 2020-05-12 PROCEDURE — 97140 MANUAL THERAPY 1/> REGIONS: CPT | Performed by: PHYSICAL THERAPIST

## 2020-05-14 ENCOUNTER — OFFICE VISIT (OUTPATIENT)
Dept: PHYSICAL THERAPY | Facility: CLINIC | Age: 55
End: 2020-05-14
Payer: COMMERCIAL

## 2020-05-14 DIAGNOSIS — M54.12 CERVICAL RADICULOPATHY: ICD-10-CM

## 2020-05-14 DIAGNOSIS — M54.2 CERVICALGIA: Primary | ICD-10-CM

## 2020-05-14 PROCEDURE — 97112 NEUROMUSCULAR REEDUCATION: CPT | Performed by: PHYSICAL THERAPIST

## 2020-05-14 PROCEDURE — 97140 MANUAL THERAPY 1/> REGIONS: CPT | Performed by: PHYSICAL THERAPIST

## 2020-05-14 PROCEDURE — 97110 THERAPEUTIC EXERCISES: CPT | Performed by: PHYSICAL THERAPIST

## 2020-05-18 ENCOUNTER — OFFICE VISIT (OUTPATIENT)
Dept: PHYSICAL THERAPY | Facility: CLINIC | Age: 55
End: 2020-05-18
Payer: COMMERCIAL

## 2020-05-18 DIAGNOSIS — M54.2 CERVICALGIA: Primary | ICD-10-CM

## 2020-05-18 DIAGNOSIS — M54.12 CERVICAL RADICULOPATHY: ICD-10-CM

## 2020-05-18 PROCEDURE — 97162 PT EVAL MOD COMPLEX 30 MIN: CPT | Performed by: PHYSICAL THERAPIST

## 2020-05-18 PROCEDURE — 97110 THERAPEUTIC EXERCISES: CPT | Performed by: PHYSICAL THERAPIST

## 2020-05-21 ENCOUNTER — EVALUATION (OUTPATIENT)
Dept: PHYSICAL THERAPY | Facility: CLINIC | Age: 55
End: 2020-05-21
Payer: COMMERCIAL

## 2020-05-21 ENCOUNTER — OFFICE VISIT (OUTPATIENT)
Dept: INTERNAL MEDICINE CLINIC | Facility: CLINIC | Age: 55
End: 2020-05-21
Payer: COMMERCIAL

## 2020-05-21 VITALS
BODY MASS INDEX: 35.73 KG/M2 | SYSTOLIC BLOOD PRESSURE: 112 MMHG | TEMPERATURE: 98 F | HEIGHT: 68 IN | DIASTOLIC BLOOD PRESSURE: 82 MMHG | HEART RATE: 77 BPM | OXYGEN SATURATION: 98 %

## 2020-05-21 DIAGNOSIS — M54.12 CERVICAL RADICULOPATHY: ICD-10-CM

## 2020-05-21 DIAGNOSIS — K21.9 GASTROESOPHAGEAL REFLUX DISEASE WITHOUT ESOPHAGITIS: Primary | ICD-10-CM

## 2020-05-21 DIAGNOSIS — D69.6 THROMBOCYTOPENIA (HCC): ICD-10-CM

## 2020-05-21 DIAGNOSIS — G89.29 CHRONIC BILATERAL LOW BACK PAIN WITHOUT SCIATICA: ICD-10-CM

## 2020-05-21 DIAGNOSIS — E78.5 HYPERLIPIDEMIA, UNSPECIFIED HYPERLIPIDEMIA TYPE: ICD-10-CM

## 2020-05-21 DIAGNOSIS — M54.2 CERVICALGIA: ICD-10-CM

## 2020-05-21 DIAGNOSIS — M54.50 CHRONIC BILATERAL LOW BACK PAIN WITHOUT SCIATICA: ICD-10-CM

## 2020-05-21 DIAGNOSIS — F31.81 BIPOLAR II DISORDER, MOST RECENT EPISODE MAJOR DEPRESSIVE (HCC): Chronic | ICD-10-CM

## 2020-05-21 DIAGNOSIS — F41.1 GAD (GENERALIZED ANXIETY DISORDER): Chronic | ICD-10-CM

## 2020-05-21 DIAGNOSIS — M54.2 CERVICALGIA: Primary | ICD-10-CM

## 2020-05-21 PROCEDURE — 97140 MANUAL THERAPY 1/> REGIONS: CPT | Performed by: PHYSICAL THERAPIST

## 2020-05-21 PROCEDURE — 1036F TOBACCO NON-USER: CPT | Performed by: NURSE PRACTITIONER

## 2020-05-21 PROCEDURE — 99214 OFFICE O/P EST MOD 30 MIN: CPT | Performed by: NURSE PRACTITIONER

## 2020-05-21 PROCEDURE — 97110 THERAPEUTIC EXERCISES: CPT | Performed by: PHYSICAL THERAPIST

## 2020-05-21 PROCEDURE — 97112 NEUROMUSCULAR REEDUCATION: CPT | Performed by: PHYSICAL THERAPIST

## 2020-05-21 RX ORDER — DEXLANSOPRAZOLE 60 MG/1
60 CAPSULE, DELAYED RELEASE ORAL DAILY
Qty: 90 CAPSULE | Refills: 3 | Status: SHIPPED | OUTPATIENT
Start: 2020-05-21 | End: 2020-10-28 | Stop reason: ALTCHOICE

## 2020-05-26 ENCOUNTER — SOCIAL WORK (OUTPATIENT)
Dept: BEHAVIORAL/MENTAL HEALTH CLINIC | Facility: CLINIC | Age: 55
End: 2020-05-26
Payer: COMMERCIAL

## 2020-05-26 ENCOUNTER — TELEPHONE (OUTPATIENT)
Dept: INTERNAL MEDICINE CLINIC | Facility: CLINIC | Age: 55
End: 2020-05-26

## 2020-05-26 DIAGNOSIS — F41.0 PANIC DISORDER WITHOUT AGORAPHOBIA: Chronic | ICD-10-CM

## 2020-05-26 DIAGNOSIS — F31.81 BIPOLAR II DISORDER, MOST RECENT EPISODE MAJOR DEPRESSIVE (HCC): Primary | Chronic | ICD-10-CM

## 2020-05-26 DIAGNOSIS — F41.1 GAD (GENERALIZED ANXIETY DISORDER): Chronic | ICD-10-CM

## 2020-05-26 PROCEDURE — 90834 PSYTX W PT 45 MINUTES: CPT | Performed by: SOCIAL WORKER

## 2020-05-27 ENCOUNTER — OFFICE VISIT (OUTPATIENT)
Dept: PHYSICAL THERAPY | Facility: CLINIC | Age: 55
End: 2020-05-27
Payer: COMMERCIAL

## 2020-05-27 DIAGNOSIS — G89.29 CHRONIC BILATERAL LOW BACK PAIN WITHOUT SCIATICA: Primary | ICD-10-CM

## 2020-05-27 DIAGNOSIS — M54.50 CHRONIC BILATERAL LOW BACK PAIN WITHOUT SCIATICA: Primary | ICD-10-CM

## 2020-05-27 PROCEDURE — 97110 THERAPEUTIC EXERCISES: CPT | Performed by: PHYSICAL THERAPIST

## 2020-05-27 PROCEDURE — 97140 MANUAL THERAPY 1/> REGIONS: CPT | Performed by: PHYSICAL THERAPIST

## 2020-05-27 PROCEDURE — 97112 NEUROMUSCULAR REEDUCATION: CPT | Performed by: PHYSICAL THERAPIST

## 2020-06-02 ENCOUNTER — OFFICE VISIT (OUTPATIENT)
Dept: PHYSICAL THERAPY | Facility: CLINIC | Age: 55
End: 2020-06-02
Payer: COMMERCIAL

## 2020-06-02 DIAGNOSIS — M54.50 CHRONIC BILATERAL LOW BACK PAIN WITHOUT SCIATICA: Primary | ICD-10-CM

## 2020-06-02 DIAGNOSIS — G89.29 CHRONIC BILATERAL LOW BACK PAIN WITHOUT SCIATICA: Primary | ICD-10-CM

## 2020-06-02 PROCEDURE — 97112 NEUROMUSCULAR REEDUCATION: CPT | Performed by: PHYSICAL THERAPIST

## 2020-06-02 PROCEDURE — 97110 THERAPEUTIC EXERCISES: CPT | Performed by: PHYSICAL THERAPIST

## 2020-06-02 PROCEDURE — 97140 MANUAL THERAPY 1/> REGIONS: CPT | Performed by: PHYSICAL THERAPIST

## 2020-06-04 ENCOUNTER — APPOINTMENT (OUTPATIENT)
Dept: LAB | Facility: CLINIC | Age: 55
End: 2020-06-04
Payer: COMMERCIAL

## 2020-06-04 DIAGNOSIS — Z13.0 SCREENING FOR DEFICIENCY ANEMIA: ICD-10-CM

## 2020-06-04 DIAGNOSIS — E78.5 HYPERLIPIDEMIA, UNSPECIFIED HYPERLIPIDEMIA TYPE: ICD-10-CM

## 2020-06-04 DIAGNOSIS — Z11.59 NEED FOR HEPATITIS C SCREENING TEST: ICD-10-CM

## 2020-06-04 DIAGNOSIS — K21.9 GASTROESOPHAGEAL REFLUX DISEASE, ESOPHAGITIS PRESENCE NOT SPECIFIED: ICD-10-CM

## 2020-06-04 DIAGNOSIS — F41.1 GAD (GENERALIZED ANXIETY DISORDER): ICD-10-CM

## 2020-06-04 DIAGNOSIS — D69.6 THROMBOCYTOPENIA (HCC): ICD-10-CM

## 2020-06-04 LAB
ALBUMIN SERPL BCP-MCNC: 3.8 G/DL (ref 3.5–5)
ALP SERPL-CCNC: 81 U/L (ref 46–116)
ALT SERPL W P-5'-P-CCNC: 21 U/L (ref 12–78)
ANION GAP SERPL CALCULATED.3IONS-SCNC: 3 MMOL/L (ref 4–13)
AST SERPL W P-5'-P-CCNC: 15 U/L (ref 5–45)
BASOPHILS # BLD AUTO: 0.03 THOUSANDS/ΜL (ref 0–0.1)
BASOPHILS NFR BLD AUTO: 1 % (ref 0–1)
BILIRUB SERPL-MCNC: 0.33 MG/DL (ref 0.2–1)
BUN SERPL-MCNC: 13 MG/DL (ref 5–25)
CALCIUM SERPL-MCNC: 9.4 MG/DL (ref 8.3–10.1)
CHLORIDE SERPL-SCNC: 109 MMOL/L (ref 100–108)
CHOLEST SERPL-MCNC: 231 MG/DL (ref 50–200)
CO2 SERPL-SCNC: 27 MMOL/L (ref 21–32)
CREAT SERPL-MCNC: 0.7 MG/DL (ref 0.6–1.3)
EOSINOPHIL # BLD AUTO: 0.19 THOUSAND/ΜL (ref 0–0.61)
EOSINOPHIL NFR BLD AUTO: 3 % (ref 0–6)
ERYTHROCYTE [DISTWIDTH] IN BLOOD BY AUTOMATED COUNT: 13.7 % (ref 11.6–15.1)
GFR SERPL CREATININE-BSD FRML MDRD: 97 ML/MIN/1.73SQ M
GLUCOSE P FAST SERPL-MCNC: 93 MG/DL (ref 65–99)
HCT VFR BLD AUTO: 41.3 % (ref 34.8–46.1)
HCV AB SER QL: NORMAL
HDLC SERPL-MCNC: 72 MG/DL
HGB BLD-MCNC: 12.8 G/DL (ref 11.5–15.4)
IMM GRANULOCYTES # BLD AUTO: 0.02 THOUSAND/UL (ref 0–0.2)
IMM GRANULOCYTES NFR BLD AUTO: 0 % (ref 0–2)
LDLC SERPL CALC-MCNC: 143 MG/DL (ref 0–100)
LYMPHOCYTES # BLD AUTO: 1.87 THOUSANDS/ΜL (ref 0.6–4.47)
LYMPHOCYTES NFR BLD AUTO: 31 % (ref 14–44)
MCH RBC QN AUTO: 29 PG (ref 26.8–34.3)
MCHC RBC AUTO-ENTMCNC: 31 G/DL (ref 31.4–37.4)
MCV RBC AUTO: 93 FL (ref 82–98)
MONOCYTES # BLD AUTO: 0.56 THOUSAND/ΜL (ref 0.17–1.22)
MONOCYTES NFR BLD AUTO: 9 % (ref 4–12)
NEUTROPHILS # BLD AUTO: 3.38 THOUSANDS/ΜL (ref 1.85–7.62)
NEUTS SEG NFR BLD AUTO: 56 % (ref 43–75)
NRBC BLD AUTO-RTO: 0 /100 WBCS
PLATELET # BLD AUTO: 104 THOUSANDS/UL (ref 149–390)
PMV BLD AUTO: 14.2 FL (ref 8.9–12.7)
POTASSIUM SERPL-SCNC: 4 MMOL/L (ref 3.5–5.3)
PROT SERPL-MCNC: 7.6 G/DL (ref 6.4–8.2)
RBC # BLD AUTO: 4.42 MILLION/UL (ref 3.81–5.12)
SODIUM SERPL-SCNC: 139 MMOL/L (ref 136–145)
TRIGL SERPL-MCNC: 81 MG/DL
TSH SERPL DL<=0.05 MIU/L-ACNC: 1.28 UIU/ML (ref 0.36–3.74)
WBC # BLD AUTO: 6.05 THOUSAND/UL (ref 4.31–10.16)

## 2020-06-04 PROCEDURE — 86803 HEPATITIS C AB TEST: CPT

## 2020-06-04 PROCEDURE — 36415 COLL VENOUS BLD VENIPUNCTURE: CPT

## 2020-06-04 PROCEDURE — 80053 COMPREHEN METABOLIC PANEL: CPT

## 2020-06-04 PROCEDURE — 85025 COMPLETE CBC W/AUTO DIFF WBC: CPT

## 2020-06-04 PROCEDURE — 80061 LIPID PANEL: CPT

## 2020-06-04 PROCEDURE — 84443 ASSAY THYROID STIM HORMONE: CPT

## 2020-06-05 ENCOUNTER — TELEPHONE (OUTPATIENT)
Dept: INTERNAL MEDICINE CLINIC | Facility: CLINIC | Age: 55
End: 2020-06-05

## 2020-06-05 DIAGNOSIS — D69.6 THROMBOCYTOPENIA (HCC): Primary | ICD-10-CM

## 2020-06-05 DIAGNOSIS — E78.5 HYPERLIPIDEMIA, UNSPECIFIED HYPERLIPIDEMIA TYPE: ICD-10-CM

## 2020-06-05 RX ORDER — ATORVASTATIN CALCIUM 20 MG/1
20 TABLET, FILM COATED ORAL DAILY
Qty: 30 TABLET | Refills: 1 | Status: SHIPPED | OUTPATIENT
Start: 2020-06-05 | End: 2020-07-27 | Stop reason: SDUPTHER

## 2020-06-08 ENCOUNTER — SOCIAL WORK (OUTPATIENT)
Dept: BEHAVIORAL/MENTAL HEALTH CLINIC | Facility: CLINIC | Age: 55
End: 2020-06-08
Payer: COMMERCIAL

## 2020-06-08 DIAGNOSIS — F31.81 BIPOLAR II DISORDER, MOST RECENT EPISODE MAJOR DEPRESSIVE (HCC): Primary | Chronic | ICD-10-CM

## 2020-06-08 DIAGNOSIS — F41.0 PANIC DISORDER WITHOUT AGORAPHOBIA: Chronic | ICD-10-CM

## 2020-06-08 DIAGNOSIS — F41.1 GAD (GENERALIZED ANXIETY DISORDER): Chronic | ICD-10-CM

## 2020-06-08 PROCEDURE — 90834 PSYTX W PT 45 MINUTES: CPT | Performed by: SOCIAL WORKER

## 2020-06-12 ENCOUNTER — OFFICE VISIT (OUTPATIENT)
Dept: PHYSICAL THERAPY | Facility: CLINIC | Age: 55
End: 2020-06-12
Payer: COMMERCIAL

## 2020-06-12 DIAGNOSIS — M54.50 CHRONIC BILATERAL LOW BACK PAIN WITHOUT SCIATICA: Primary | ICD-10-CM

## 2020-06-12 DIAGNOSIS — G89.29 CHRONIC BILATERAL LOW BACK PAIN WITHOUT SCIATICA: Primary | ICD-10-CM

## 2020-06-12 PROCEDURE — 97140 MANUAL THERAPY 1/> REGIONS: CPT | Performed by: PHYSICAL THERAPIST

## 2020-06-12 PROCEDURE — 97112 NEUROMUSCULAR REEDUCATION: CPT | Performed by: PHYSICAL THERAPIST

## 2020-06-12 PROCEDURE — 97110 THERAPEUTIC EXERCISES: CPT | Performed by: PHYSICAL THERAPIST

## 2020-06-15 ENCOUNTER — EVALUATION (OUTPATIENT)
Dept: PHYSICAL THERAPY | Facility: CLINIC | Age: 55
End: 2020-06-15
Payer: COMMERCIAL

## 2020-06-15 DIAGNOSIS — M54.50 CHRONIC BILATERAL LOW BACK PAIN WITHOUT SCIATICA: Primary | ICD-10-CM

## 2020-06-15 DIAGNOSIS — G89.29 CHRONIC BILATERAL LOW BACK PAIN WITHOUT SCIATICA: Primary | ICD-10-CM

## 2020-06-15 PROCEDURE — 97110 THERAPEUTIC EXERCISES: CPT | Performed by: PHYSICAL THERAPIST

## 2020-06-15 PROCEDURE — 97140 MANUAL THERAPY 1/> REGIONS: CPT | Performed by: PHYSICAL THERAPIST

## 2020-06-15 PROCEDURE — 97112 NEUROMUSCULAR REEDUCATION: CPT | Performed by: PHYSICAL THERAPIST

## 2020-06-16 ENCOUNTER — TELEPHONE (OUTPATIENT)
Dept: PAIN MEDICINE | Facility: CLINIC | Age: 55
End: 2020-06-16

## 2020-06-18 ENCOUNTER — TELEPHONE (OUTPATIENT)
Dept: PAIN MEDICINE | Facility: CLINIC | Age: 55
End: 2020-06-18

## 2020-07-01 ENCOUNTER — SOCIAL WORK (OUTPATIENT)
Dept: BEHAVIORAL/MENTAL HEALTH CLINIC | Facility: CLINIC | Age: 55
End: 2020-07-01

## 2020-07-01 DIAGNOSIS — F31.81 BIPOLAR II DISORDER, MOST RECENT EPISODE MAJOR DEPRESSIVE (HCC): Chronic | ICD-10-CM

## 2020-07-01 DIAGNOSIS — F41.1 GAD (GENERALIZED ANXIETY DISORDER): Primary | Chronic | ICD-10-CM

## 2020-07-01 PROBLEM — F41.0 PANIC DISORDER WITHOUT AGORAPHOBIA: Status: RESOLVED | Noted: 2018-01-30 | Resolved: 2020-07-01

## 2020-07-01 PROBLEM — F41.0 PANIC DISORDER WITHOUT AGORAPHOBIA: Chronic | Status: RESOLVED | Noted: 2018-01-30 | Resolved: 2020-07-01

## 2020-07-01 NOTE — PSYCH
Psychotherapy Provided: Individual Psychotherapy 50 minutes     Length of time in session: 50 minutes, follow up in 2 week    Goals addressed in session: Goal 1, Goal 2 and Goal 3      Pain:      none    0    Current suicide risk : Low     D: Met with Janine individually  Session focused upon family dynamic obstacles; stopped communication with Katalina Jimenez 'she's a liar'  [facility] in [facility], visits, court hearing etc  Discussed  Lenita Boas is more involved with Kalen's progress since discharged from [id number] [place] 'needs to get away'  Experiencing ongoing grief and depression regarding grandchildren  Brainstormed options for alone time on the beach and a reasonable hotel  Unable to go to aun house yet as family in [place] care [facility] at work  Janine discussed her self esteem- tearful while discussing weight gain due to closet eating; shame to eat in front of others  Reviewed yoga, walking with music and looking into volunteer options to get Lenita Boas out of the house- with the goal of distracting from food and improving depression  Denied SI     A: Lenita Boas presented with appropriate mood and affect for topics of discussion  Grandchildren in the system remain the primary emotion struggle for Janine  Living in her son's home has been helpful financially but triggers depression and low self worth - also seeing her other grandchildren be happy       P: Continue individual therapy for ongoing support for psychosocial stressors described above      [address]: Diagnosis and Treatment Plan explained to Erica Zamorano relates understanding diagnosis and is agreeable to Treatment Plan   Yes

## 2020-07-07 ENCOUNTER — TELEPHONE (OUTPATIENT)
Dept: BEHAVIORAL/MENTAL HEALTH CLINIC | Facility: CLINIC | Age: 55
End: 2020-07-07
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
## 2020-07-09 ENCOUNTER — OFFICE VISIT (OUTPATIENT)
Dept: PSYCHIATRY | Facility: CLINIC | Age: 55
End: 2020-07-09
Payer: COMMERCIAL

## 2020-07-09 VITALS — HEIGHT: 68 IN | WEIGHT: 255 LBS | BODY MASS INDEX: 38.65 KG/M2

## 2020-07-09 DIAGNOSIS — F31.81 BIPOLAR II DISORDER, MOST RECENT EPISODE MAJOR DEPRESSIVE (HCC): Primary | Chronic | ICD-10-CM

## 2020-07-09 DIAGNOSIS — F41.1 GAD (GENERALIZED ANXIETY DISORDER): Chronic | ICD-10-CM

## 2020-07-09 PROCEDURE — 3008F BODY MASS INDEX DOCD: CPT | Performed by: PSYCHIATRY & NEUROLOGY

## 2020-07-09 PROCEDURE — 99243 OFF/OP CNSLTJ NEW/EST LOW 30: CPT | Performed by: PSYCHIATRY & NEUROLOGY

## 2020-07-09 PROCEDURE — 90833 PSYTX W PT W E/M 30 MIN: CPT | Performed by: PSYCHIATRY & NEUROLOGY

## 2020-07-09 RX ORDER — LAMOTRIGINE 150 MG/1
150 TABLET ORAL 2 TIMES DAILY
Qty: 180 TABLET | Refills: 2 | Status: SHIPPED | OUTPATIENT
Start: 2020-07-09 | End: 2021-01-28 | Stop reason: SDUPTHER

## 2020-07-09 RX ORDER — LORAZEPAM 0.5 MG/1
0.5 TABLET ORAL 4 TIMES DAILY PRN
Qty: 120 TABLET | Refills: 4 | Status: SHIPPED | OUTPATIENT
Start: 2020-07-12 | End: 2021-01-14

## 2020-07-21 ENCOUNTER — TELEPHONE (OUTPATIENT)
Dept: PSYCHIATRY | Facility: CLINIC | Age: 55
End: 2020-07-21

## 2020-07-21 NOTE — TELEPHONE ENCOUNTER
Patient called stating that since the Lamictal dose increase she was waking up dizzy  She is questioning if the medication is the cause? ?   Please call or advise [phone]

## 2020-07-22 NOTE — TELEPHONE ENCOUNTER
Spoke with Janine  She said her Lamictal was increased on 7/7/20  She is dizzy but she "thinks it is vertigo related" which she has had in the past  She said if it gets any worse she will call her PCP  Encouraged increase in fluid intake  She verbalized understanding  She is aware of Dr Tony Haynes absence and RTO on Monday

## 2020-07-23 ENCOUNTER — SOCIAL WORK (OUTPATIENT)
Dept: BEHAVIORAL/MENTAL HEALTH CLINIC | Facility: CLINIC | Age: 55
End: 2020-07-23
Payer: COMMERCIAL

## 2020-07-23 DIAGNOSIS — F41.1 GAD (GENERALIZED ANXIETY DISORDER): Chronic | ICD-10-CM

## 2020-07-23 DIAGNOSIS — F31.81 BIPOLAR II DISORDER, MOST RECENT EPISODE MAJOR DEPRESSIVE (HCC): Primary | Chronic | ICD-10-CM

## 2020-07-23 PROCEDURE — 1036F TOBACCO NON-USER: CPT | Performed by: SOCIAL WORKER

## 2020-07-23 PROCEDURE — 90834 PSYTX W PT 45 MINUTES: CPT | Performed by: SOCIAL WORKER
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
## 2020-07-27 DIAGNOSIS — E78.5 HYPERLIPIDEMIA, UNSPECIFIED HYPERLIPIDEMIA TYPE: ICD-10-CM

## 2020-07-27 RX ORDER — ATORVASTATIN CALCIUM 20 MG/1
20 TABLET, FILM COATED ORAL DAILY
Qty: 30 TABLET | Refills: 1 | Status: SHIPPED | OUTPATIENT
Start: 2020-07-27 | End: 2020-09-28

## 2020-07-28 NOTE — TELEPHONE ENCOUNTER
Spoke with Zeus Parra - she is doing better now, dizziness has resolved  She feels that dizziness was related to vertigo, and not to increase in Lamictal  She will continue current Lamictal dose

## 2020-08-27 ENCOUNTER — SOCIAL WORK (OUTPATIENT)
Dept: BEHAVIORAL/MENTAL HEALTH CLINIC | Facility: CLINIC | Age: 55
End: 2020-08-27
Payer: COMMERCIAL

## 2020-08-27 DIAGNOSIS — F41.1 GAD (GENERALIZED ANXIETY DISORDER): Chronic | ICD-10-CM

## 2020-08-27 DIAGNOSIS — F31.81 BIPOLAR II DISORDER, MOST RECENT EPISODE MAJOR DEPRESSIVE (HCC): Primary | Chronic | ICD-10-CM

## 2020-08-27 PROCEDURE — 90834 PSYTX W PT 45 MINUTES: CPT | Performed by: SOCIAL WORKER
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
## 2020-08-27 NOTE — BH TREATMENT PLAN
Regina Santos  1965       Date of Initial Treatment Plan: 5/1/17   Date of Current Treatment Plan: 03/02/20      Treatment Plan Number 9     Strengths/Personal Resources for Self Care: Caring, grandmother     Diagnosis:   1  Bipolar II disorder, most recent episode major depressive ([facility] [address] )      2  DREAD (generalized anxiety disorder)      3  Panic disorder without agoraphobia      4  Unresolved grief           Area of Needs: Anxiety, depression, overwhelmed with family situations, setting boundaries      Long Term Goal 1:        I am able to function daily without overwhelming myself  Target Date:  2/20/21  Completion Date: TBD         Short Term Objectives for Goal 1:   1  Read  2  Remember what is in/out of my control  3 Address mindless eating      Long Term Goal 2:        My anxiety/depression has greatly improved  Target Date:   2/20/21  Completion Date: TBD     Short Term Objectives for Goal 2:   1  Volunteer  2  Address self esteem  3  I get my health under control  4   Grief due to grandchildren's adoption          Long Term Goal # 3:        Jerzy and Deepali get the care they need  Target Date:  2/20/21  Completion Date: TBD     Short Term Objectives for Goal 3:   1  Kalen's   2  Continue visits with grandchildren  3  Maintaining boundaries with Sher Bruno and Nadege Doran     GOAL 1: Modality: Individual therapy 2x per month   Completion Date TBD                                  Medication management every 3 months   Completion Date: TBD                                   Persons responsible for goals: Gerhardt Matt and Dr Kristyn Hanna     GOAL 2: Modality: Individual therapy 2x per month   Completion Date TBD                                  Medication management every 3 months   Completion Date:  TBD                                  Persons responsible for goals: Gerhardt Matt and Dr Kristyn Hanna     GOAL 3: Modality: Individual therapy 2x per month   Completion Date TBD                                  Medication management every 3 months   Completion Date: TBD                                  Persons responsible for goals: Bonnie Sharif and Dr Almaraz Ahr: Diagnosis and Treatment Plan explained to Olesya Montgomery relates understanding diagnosis and is agreeable to Treatment Plan         Client Comments : Please share your thoughts, feelings, need and/or experiences regarding your treatment plan:

## 2020-09-04 NOTE — PROGRESS NOTES
PT DISCHARGE  Patient has not returned since last session on 3/11/2020  Patient returned to therapy for separate issue and was treated by another therapist  Patient did not call for follow-up for vertigo sx  Progress unable to be formally assessed  Patient appropriate for DC this date

## 2020-09-09 ENCOUNTER — SOCIAL WORK (OUTPATIENT)
Dept: BEHAVIORAL/MENTAL HEALTH CLINIC | Facility: CLINIC | Age: 55
End: 2020-09-09
Payer: COMMERCIAL

## 2020-09-09 DIAGNOSIS — F41.1 GAD (GENERALIZED ANXIETY DISORDER): Chronic | ICD-10-CM

## 2020-09-09 DIAGNOSIS — F31.81 BIPOLAR II DISORDER, MOST RECENT EPISODE MAJOR DEPRESSIVE (HCC): Primary | Chronic | ICD-10-CM

## 2020-09-09 PROCEDURE — 90834 PSYTX W PT 45 MINUTES: CPT | Performed by: SOCIAL WORKER
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
## 2020-09-27 DIAGNOSIS — E78.5 HYPERLIPIDEMIA, UNSPECIFIED HYPERLIPIDEMIA TYPE: ICD-10-CM

## 2020-09-28 RX ORDER — ATORVASTATIN CALCIUM 20 MG/1
TABLET, FILM COATED ORAL
Qty: 30 TABLET | Refills: 1 | Status: SHIPPED | OUTPATIENT
Start: 2020-09-28 | End: 2020-11-30

## 2020-10-01 ENCOUNTER — SOCIAL WORK (OUTPATIENT)
Dept: BEHAVIORAL/MENTAL HEALTH CLINIC | Facility: CLINIC | Age: 55
End: 2020-10-01
Payer: COMMERCIAL

## 2020-10-01 DIAGNOSIS — F41.1 GAD (GENERALIZED ANXIETY DISORDER): Chronic | ICD-10-CM

## 2020-10-01 DIAGNOSIS — F31.81 BIPOLAR II DISORDER, MOST RECENT EPISODE MAJOR DEPRESSIVE (HCC): Primary | Chronic | ICD-10-CM

## 2020-10-01 PROCEDURE — 90834 PSYTX W PT 45 MINUTES: CPT | Performed by: SOCIAL WORKER

## 2020-10-16 ENCOUNTER — SOCIAL WORK (OUTPATIENT)
Dept: BEHAVIORAL/MENTAL HEALTH CLINIC | Facility: CLINIC | Age: 55
End: 2020-10-16
Payer: COMMERCIAL

## 2020-10-16 DIAGNOSIS — F31.81 BIPOLAR II DISORDER, MOST RECENT EPISODE MAJOR DEPRESSIVE (HCC): Primary | Chronic | ICD-10-CM

## 2020-10-16 DIAGNOSIS — F41.1 GAD (GENERALIZED ANXIETY DISORDER): Chronic | ICD-10-CM

## 2020-10-16 PROCEDURE — 90834 PSYTX W PT 45 MINUTES: CPT | Performed by: SOCIAL WORKER

## 2020-10-25 ENCOUNTER — TELEPHONE (OUTPATIENT)
Dept: GASTROENTEROLOGY | Facility: AMBULARY SURGERY CENTER | Age: 55
End: 2020-10-25

## 2020-10-28 ENCOUNTER — OFFICE VISIT (OUTPATIENT)
Dept: PSYCHIATRY | Facility: CLINIC | Age: 55
End: 2020-10-28
Payer: COMMERCIAL

## 2020-10-28 VITALS — BODY MASS INDEX: 38.04 KG/M2 | WEIGHT: 251 LBS | HEIGHT: 68 IN

## 2020-10-28 DIAGNOSIS — F31.81 BIPOLAR II DISORDER, MOST RECENT EPISODE MAJOR DEPRESSIVE (HCC): Primary | Chronic | ICD-10-CM

## 2020-10-28 DIAGNOSIS — F41.0 PANIC DISORDER WITHOUT AGORAPHOBIA: Chronic | ICD-10-CM

## 2020-10-28 DIAGNOSIS — F41.1 GAD (GENERALIZED ANXIETY DISORDER): Chronic | ICD-10-CM

## 2020-10-28 PROCEDURE — 99214 OFFICE O/P EST MOD 30 MIN: CPT | Performed by: PSYCHIATRY & NEUROLOGY

## 2020-10-28 PROCEDURE — 90833 PSYTX W PT W E/M 30 MIN: CPT | Performed by: PSYCHIATRY & NEUROLOGY

## 2020-10-28 RX ORDER — DULOXETIN HYDROCHLORIDE 60 MG/1
60 CAPSULE, DELAYED RELEASE ORAL 2 TIMES DAILY
Qty: 180 CAPSULE | Refills: 2 | Status: SHIPPED | OUTPATIENT
Start: 2020-10-28 | End: 2021-05-20 | Stop reason: SDUPTHER

## 2020-11-04 ENCOUNTER — SOCIAL WORK (OUTPATIENT)
Dept: BEHAVIORAL/MENTAL HEALTH CLINIC | Facility: CLINIC | Age: 55
End: 2020-11-04
Payer: COMMERCIAL

## 2020-11-04 DIAGNOSIS — F41.0 PANIC DISORDER WITHOUT AGORAPHOBIA: Chronic | ICD-10-CM

## 2020-11-04 DIAGNOSIS — F31.81 BIPOLAR II DISORDER, MOST RECENT EPISODE MAJOR DEPRESSIVE (HCC): Primary | Chronic | ICD-10-CM

## 2020-11-04 DIAGNOSIS — F41.1 GAD (GENERALIZED ANXIETY DISORDER): Chronic | ICD-10-CM

## 2020-11-04 PROCEDURE — 90834 PSYTX W PT 45 MINUTES: CPT | Performed by: SOCIAL WORKER

## 2020-11-11 ENCOUNTER — TELEMEDICINE (OUTPATIENT)
Dept: INTERNAL MEDICINE CLINIC | Facility: CLINIC | Age: 55
End: 2020-11-11
Payer: COMMERCIAL

## 2020-11-11 DIAGNOSIS — M54.16 CHRONIC LUMBAR RADICULOPATHY: Primary | ICD-10-CM

## 2020-11-11 PROCEDURE — 99213 OFFICE O/P EST LOW 20 MIN: CPT | Performed by: NURSE PRACTITIONER

## 2020-11-11 RX ORDER — METHYLPREDNISOLONE 4 MG/1
TABLET ORAL
Qty: 21 EACH | Refills: 0 | Status: SHIPPED | OUTPATIENT
Start: 2020-11-11 | End: 2020-11-23

## 2020-11-23 ENCOUNTER — OFFICE VISIT (OUTPATIENT)
Dept: INTERNAL MEDICINE CLINIC | Facility: CLINIC | Age: 55
End: 2020-11-23
Payer: COMMERCIAL

## 2020-11-23 VITALS
BODY MASS INDEX: 37.74 KG/M2 | HEART RATE: 82 BPM | HEIGHT: 68 IN | WEIGHT: 249 LBS | SYSTOLIC BLOOD PRESSURE: 126 MMHG | DIASTOLIC BLOOD PRESSURE: 82 MMHG | OXYGEN SATURATION: 98 % | TEMPERATURE: 95.6 F

## 2020-11-23 DIAGNOSIS — M54.16 CHRONIC LUMBAR RADICULOPATHY: ICD-10-CM

## 2020-11-23 DIAGNOSIS — K21.9 GASTROESOPHAGEAL REFLUX DISEASE WITHOUT ESOPHAGITIS: ICD-10-CM

## 2020-11-23 DIAGNOSIS — D69.6 THROMBOCYTOPENIA (HCC): ICD-10-CM

## 2020-11-23 DIAGNOSIS — F41.1 GAD (GENERALIZED ANXIETY DISORDER): Chronic | ICD-10-CM

## 2020-11-23 DIAGNOSIS — Z12.31 ENCOUNTER FOR SCREENING MAMMOGRAM FOR BREAST CANCER: ICD-10-CM

## 2020-11-23 DIAGNOSIS — F31.81 BIPOLAR II DISORDER, MOST RECENT EPISODE MAJOR DEPRESSIVE (HCC): Chronic | ICD-10-CM

## 2020-11-23 DIAGNOSIS — E78.5 HYPERLIPIDEMIA, UNSPECIFIED HYPERLIPIDEMIA TYPE: Primary | ICD-10-CM

## 2020-11-23 PROCEDURE — 1036F TOBACCO NON-USER: CPT | Performed by: NURSE PRACTITIONER

## 2020-11-23 PROCEDURE — 99214 OFFICE O/P EST MOD 30 MIN: CPT | Performed by: NURSE PRACTITIONER

## 2020-11-23 PROCEDURE — 3008F BODY MASS INDEX DOCD: CPT | Performed by: NURSE PRACTITIONER

## 2020-11-30 DIAGNOSIS — E78.5 HYPERLIPIDEMIA, UNSPECIFIED HYPERLIPIDEMIA TYPE: ICD-10-CM

## 2020-11-30 RX ORDER — ATORVASTATIN CALCIUM 20 MG/1
TABLET, FILM COATED ORAL
Qty: 30 TABLET | Refills: 5 | Status: SHIPPED | OUTPATIENT
Start: 2020-11-30 | End: 2021-05-19

## 2020-12-01 ENCOUNTER — SOCIAL WORK (OUTPATIENT)
Dept: BEHAVIORAL/MENTAL HEALTH CLINIC | Facility: CLINIC | Age: 55
End: 2020-12-01
Payer: COMMERCIAL

## 2020-12-01 DIAGNOSIS — F41.0 PANIC DISORDER WITHOUT AGORAPHOBIA: Chronic | ICD-10-CM

## 2020-12-01 DIAGNOSIS — F41.1 GAD (GENERALIZED ANXIETY DISORDER): Chronic | ICD-10-CM

## 2020-12-01 DIAGNOSIS — F31.81 BIPOLAR II DISORDER, MOST RECENT EPISODE MAJOR DEPRESSIVE (HCC): Primary | Chronic | ICD-10-CM

## 2020-12-01 PROCEDURE — 90834 PSYTX W PT 45 MINUTES: CPT | Performed by: SOCIAL WORKER

## 2020-12-08 ENCOUNTER — LAB (OUTPATIENT)
Dept: LAB | Age: 55
End: 2020-12-08
Payer: COMMERCIAL

## 2020-12-08 DIAGNOSIS — E78.5 HYPERLIPIDEMIA, UNSPECIFIED HYPERLIPIDEMIA TYPE: ICD-10-CM

## 2020-12-08 LAB
ALBUMIN SERPL BCP-MCNC: 3.9 G/DL (ref 3.5–5)
ALP SERPL-CCNC: 92 U/L (ref 46–116)
ALT SERPL W P-5'-P-CCNC: 25 U/L (ref 12–78)
ANION GAP SERPL CALCULATED.3IONS-SCNC: 6 MMOL/L (ref 4–13)
AST SERPL W P-5'-P-CCNC: 13 U/L (ref 5–45)
BILIRUB SERPL-MCNC: 0.45 MG/DL (ref 0.2–1)
BUN SERPL-MCNC: 12 MG/DL (ref 5–25)
CALCIUM SERPL-MCNC: 9.7 MG/DL (ref 8.3–10.1)
CHLORIDE SERPL-SCNC: 109 MMOL/L (ref 100–108)
CHOLEST SERPL-MCNC: 177 MG/DL (ref 50–200)
CO2 SERPL-SCNC: 27 MMOL/L (ref 21–32)
CREAT SERPL-MCNC: 0.7 MG/DL (ref 0.6–1.3)
GFR SERPL CREATININE-BSD FRML MDRD: 97 ML/MIN/1.73SQ M
GLUCOSE P FAST SERPL-MCNC: 101 MG/DL (ref 65–99)
HDLC SERPL-MCNC: 74 MG/DL
LDLC SERPL CALC-MCNC: 87 MG/DL (ref 0–100)
POTASSIUM SERPL-SCNC: 4.1 MMOL/L (ref 3.5–5.3)
PROT SERPL-MCNC: 7.7 G/DL (ref 6.4–8.2)
SODIUM SERPL-SCNC: 142 MMOL/L (ref 136–145)
TRIGL SERPL-MCNC: 81 MG/DL

## 2020-12-08 PROCEDURE — 80061 LIPID PANEL: CPT

## 2020-12-08 PROCEDURE — 36415 COLL VENOUS BLD VENIPUNCTURE: CPT

## 2020-12-08 PROCEDURE — 80053 COMPREHEN METABOLIC PANEL: CPT

## 2020-12-29 ENCOUNTER — SOCIAL WORK (OUTPATIENT)
Dept: BEHAVIORAL/MENTAL HEALTH CLINIC | Facility: CLINIC | Age: 55
End: 2020-12-29
Payer: COMMERCIAL

## 2020-12-29 DIAGNOSIS — F31.81 BIPOLAR II DISORDER, MOST RECENT EPISODE MAJOR DEPRESSIVE (HCC): Primary | Chronic | ICD-10-CM

## 2020-12-29 DIAGNOSIS — F41.1 GAD (GENERALIZED ANXIETY DISORDER): Chronic | ICD-10-CM

## 2020-12-29 PROCEDURE — 90834 PSYTX W PT 45 MINUTES: CPT | Performed by: SOCIAL WORKER

## 2021-01-12 ENCOUNTER — SOCIAL WORK (OUTPATIENT)
Dept: BEHAVIORAL/MENTAL HEALTH CLINIC | Facility: CLINIC | Age: 56
End: 2021-01-12
Payer: COMMERCIAL

## 2021-01-12 DIAGNOSIS — F31.81 BIPOLAR II DISORDER, MOST RECENT EPISODE MAJOR DEPRESSIVE (HCC): Primary | Chronic | ICD-10-CM

## 2021-01-12 DIAGNOSIS — F41.0 PANIC DISORDER WITHOUT AGORAPHOBIA: Chronic | ICD-10-CM

## 2021-01-12 DIAGNOSIS — F41.1 GAD (GENERALIZED ANXIETY DISORDER): Chronic | ICD-10-CM

## 2021-01-12 PROCEDURE — 90834 PSYTX W PT 45 MINUTES: CPT | Performed by: SOCIAL WORKER

## 2021-01-12 NOTE — PSYCH
This note was not shared with the patient due to this is a psychotherapy note    Psychotherapy Provided: Individual Psychotherapy 50 minutes     Length of time in session: 50 minutes, follow up in 2 week    Goals addressed in session: Goal 1 and Goal 2     Pain:      none    0    Current suicide risk : Low     D: Met with Janine individually  NP student present  Session focused upon ongoing isolation and depression - still living with male roommate  April Nahid remains incarcerated- looking to get out in June  Jake's adoption almost final   Reviewed self sabotage with thought stopping and cognitive reframing  Identified positive change- progress  A: Yobany Chen presented with appropriate mood and affect  All above topics are situational and will require ongoing processing and support  P: Continue Individual therapy    Behavioral Health Treatment Plan [facility]: Diagnosis and Treatment Plan explained to Cassandra Givens relates understanding diagnosis and is agreeable to Treatment Plan   Yes

## 2021-01-12 NOTE — BH TREATMENT PLAN
Francoise Lea  1965       Date of Initial Treatment Plan: 5/1/17   Date of Current Treatment Plan: 1/12/21      Treatment Plan Number 10     Strengths/Personal Resources for Self Care: Caring, grandmother     Diagnosis:   1  Bipolar II disorder, most recent episode major depressive ([facility] [address] )      2  DREAD (generalized anxiety disorder)      3  Panic disorder without agoraphobia      4  Unresolved grief            Area of Needs: Anxiety, depression, overwhelmed with family situations, setting boundaries      Long Term Goal 1:        I am able to function daily without overwhelming myself  Target Date:  2/20/21  Completion Date: TBD         Short Term Objectives for Goal 1:   1  Read  2  Remember what is in/out of my control  3 Address mindless eating      Long Term Goal 2:        My anxiety/depression has greatly improved  Target Date:   7/3/21  Completion Date: TBD     Short Term Objectives for Goal 2:   1  Volunteer  2  Address self esteem  3  I get my health under control  4  Grief due to grandchildren's adoption          Long Term Goal # 3:        I am addressing my self sabatouge  Target Date:  7/3/21  Completion Date: TBD     Short Term Objectives for Goal 3:   1  I am capable of having friends  2  I must challenge feeling comfortable in my misery  3   Pessimism - thought stopping - cognitive reframing       GOAL 1: Modality: Individual therapy 2x per month   Completion Date TBD                                  Medication management every 3 months   Completion Date: TBD                                   Persons responsible for goals: Romelia Crandall and Dr Дмитрий Mello     GOAL 2: Modality: Individual therapy 2x per month   Completion Date TBD                                  Medication management every 3 months   Completion Date:  TBD                                  Persons responsible for goals: Romelia Crandall and Dr Дмитрий Mello     GOAL 3: Modality: Individual therapy 2x per month   Completion Date TBD                                  Medication management every 3 months   Completion Date: TBD                                  Persons responsible for goals: Mae Polo and Dr Kong Delacruz: Diagnosis and Treatment Plan explained to Blanca Pyle relates understanding diagnosis and is agreeable to Treatment Plan         Client Comments : Please share your thoughts, feelings, need and/or experiences regarding your treatment plan:

## 2021-01-14 DIAGNOSIS — F41.1 GAD (GENERALIZED ANXIETY DISORDER): Primary | Chronic | ICD-10-CM

## 2021-01-14 RX ORDER — LORAZEPAM 0.5 MG/1
0.5 TABLET ORAL 4 TIMES DAILY PRN
Qty: 120 TABLET | Refills: 0 | Status: SHIPPED | OUTPATIENT
Start: 2021-01-14 | End: 2021-01-28 | Stop reason: SDUPTHER
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
## 2021-01-28 ENCOUNTER — OFFICE VISIT (OUTPATIENT)
Dept: PSYCHIATRY | Facility: CLINIC | Age: 56
End: 2021-01-28
Payer: COMMERCIAL

## 2021-01-28 VITALS — HEIGHT: 68 IN | BODY MASS INDEX: 37.28 KG/M2 | WEIGHT: 246 LBS

## 2021-01-28 DIAGNOSIS — F41.1 GAD (GENERALIZED ANXIETY DISORDER): Chronic | ICD-10-CM

## 2021-01-28 DIAGNOSIS — F41.0 PANIC DISORDER WITHOUT AGORAPHOBIA: Chronic | ICD-10-CM

## 2021-01-28 DIAGNOSIS — F31.81 BIPOLAR II DISORDER, MOST RECENT EPISODE MAJOR DEPRESSIVE (HCC): Primary | Chronic | ICD-10-CM

## 2021-01-28 PROCEDURE — 99214 OFFICE O/P EST MOD 30 MIN: CPT | Performed by: PSYCHIATRY & NEUROLOGY

## 2021-01-28 PROCEDURE — 90833 PSYTX W PT W E/M 30 MIN: CPT | Performed by: PSYCHIATRY & NEUROLOGY

## 2021-01-28 PROCEDURE — 3008F BODY MASS INDEX DOCD: CPT | Performed by: PSYCHIATRY & NEUROLOGY

## 2021-01-28 PROCEDURE — 1036F TOBACCO NON-USER: CPT | Performed by: PSYCHIATRY & NEUROLOGY

## 2021-01-28 RX ORDER — LAMOTRIGINE 150 MG/1
150 TABLET ORAL 2 TIMES DAILY
Qty: 180 TABLET | Refills: 2 | Status: SHIPPED | OUTPATIENT
Start: 2021-01-28 | End: 2021-07-08 | Stop reason: SDUPTHER

## 2021-01-28 RX ORDER — LORAZEPAM 0.5 MG/1
0.5 TABLET ORAL 4 TIMES DAILY PRN
Qty: 120 TABLET | Refills: 3 | Status: SHIPPED | OUTPATIENT
Start: 2021-02-13 | End: 2021-05-20 | Stop reason: SDUPTHER

## 2021-02-05 ENCOUNTER — TELEPHONE (OUTPATIENT)
Dept: PSYCHIATRY | Facility: CLINIC | Age: 56
End: 2021-02-05

## 2021-02-05 NOTE — TELEPHONE ENCOUNTER
PT BRIGIDAM she may need to cancel her appt today @ 12   She is feeling under the weather   Please call @ [phone]

## 2021-03-03 ENCOUNTER — TELEPHONE (OUTPATIENT)
Dept: PSYCHIATRY | Facility: CLINIC | Age: 56
End: 2021-03-03

## 2021-03-03 NOTE — TELEPHONE ENCOUNTER
Ben Osorio called requesting a call back, "a lot going on"    Also looking to come in sooner than 3/11   [phone]

## 2021-03-05 ENCOUNTER — SOCIAL WORK (OUTPATIENT)
Dept: BEHAVIORAL/MENTAL HEALTH CLINIC | Facility: CLINIC | Age: 56
End: 2021-03-05
Payer: COMMERCIAL

## 2021-03-05 DIAGNOSIS — F31.81 BIPOLAR II DISORDER, MOST RECENT EPISODE MAJOR DEPRESSIVE (HCC): Primary | Chronic | ICD-10-CM

## 2021-03-05 DIAGNOSIS — F41.0 PANIC DISORDER WITHOUT AGORAPHOBIA: Chronic | ICD-10-CM

## 2021-03-05 DIAGNOSIS — F41.1 GAD (GENERALIZED ANXIETY DISORDER): Chronic | ICD-10-CM

## 2021-03-05 PROCEDURE — 90834 PSYTX W PT 45 MINUTES: CPT | Performed by: SOCIAL WORKER

## 2021-03-05 NOTE — PSYCH
This note was not shared with the patient due to this is a psychotherapy note    Psychotherapy Provided: Individual Psychotherapy 50 minutes     Length of time in session: 50 minutes, follow up in 2 week    Goals addressed in session: Goal 1 and Goal 2     Pain:      none    0    Current suicide risk : Low     D: Met with Janine individually for an urgent session  Session focused upon ongoing family dynamics involving both families  Received news that [address] are going home to mom in June  Zhane Dorsey feels strongly this is an unsafe decisions- plans to discuss with CYS  Al is moving in a few months and this may leave Zhane Dorsey homeless  'Puts on mask' majority of the time  Misses grandchildren  Fleeting SI without plan  A: Zhane Dorsey presented with baseline mood and affect  Multiple stressors continue - evidence of self sabotage at times - living situation is an added issue now  P: Continue Individual therapy    Behavioral Health Treatment Plan [facility]: Diagnosis and Treatment Plan explained to Lucijimy Juan Jose relates understanding diagnosis and is agreeable to Treatment Plan   Yes

## 2021-04-06 ENCOUNTER — TELEPHONE (OUTPATIENT)
Dept: PSYCHIATRY | Facility: CLINIC | Age: 56
End: 2021-04-06

## 2021-04-07 ENCOUNTER — SOCIAL WORK (OUTPATIENT)
Dept: BEHAVIORAL/MENTAL HEALTH CLINIC | Facility: CLINIC | Age: 56
End: 2021-04-07
Payer: COMMERCIAL

## 2021-04-07 DIAGNOSIS — F31.81 BIPOLAR II DISORDER, MOST RECENT EPISODE MAJOR DEPRESSIVE (HCC): Chronic | ICD-10-CM

## 2021-04-07 DIAGNOSIS — F41.1 GAD (GENERALIZED ANXIETY DISORDER): Chronic | ICD-10-CM

## 2021-04-07 DIAGNOSIS — F41.0 PANIC DISORDER WITHOUT AGORAPHOBIA: Primary | Chronic | ICD-10-CM

## 2021-04-07 PROCEDURE — 90834 PSYTX W PT 45 MINUTES: CPT | Performed by: SOCIAL WORKER

## 2021-04-07 NOTE — PSYCH
This note was not shared with the patient due to this is a psychotherapy note    Psychotherapy Provided: Individual Psychotherapy 50 minutes     Length of time in session: 50 minutes, follow up in 2 week    Goals addressed in session: Goal 1 and Goal 2     Pain:      none    0    Current suicide risk : Low     D: Met with Janine individually  'I can't wait to go to [place] this weekend '  States depression/anxiety remain 'I can't really go anywhere yet  Session focused upon setting boundaries involving kids mom, grandchildren, roommate  Talking to Margi Pérez regularly and continues to support him  Decided a animal shelter to volunteer at  Going to see aunt in [place] over the weekend  Encouraged by warm weather - walking dog daily- lost 5lbs  Denied SI    A: Tamraie Seats presented with baseline mood and affect  Exercising and looking forward to using pool and volunteering  Follow through will be the key  P: Continue individual therapy  Behavioral Health Treatment Plan [facility]: Diagnosis and Treatment Plan explained to Jon Najera relates understanding diagnosis and is agreeable to Treatment Plan   Yes

## 2021-04-23 ENCOUNTER — SOCIAL WORK (OUTPATIENT)
Dept: BEHAVIORAL/MENTAL HEALTH CLINIC | Facility: CLINIC | Age: 56
End: 2021-04-23
Payer: COMMERCIAL

## 2021-04-23 DIAGNOSIS — F31.81 BIPOLAR II DISORDER, MOST RECENT EPISODE MAJOR DEPRESSIVE (HCC): Chronic | ICD-10-CM

## 2021-04-23 DIAGNOSIS — F41.1 GAD (GENERALIZED ANXIETY DISORDER): Chronic | ICD-10-CM

## 2021-04-23 DIAGNOSIS — F41.0 PANIC DISORDER WITHOUT AGORAPHOBIA: Primary | Chronic | ICD-10-CM

## 2021-04-23 PROCEDURE — 90834 PSYTX W PT 45 MINUTES: CPT | Performed by: SOCIAL WORKER
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
## 2021-05-07 ENCOUNTER — SOCIAL WORK (OUTPATIENT)
Dept: BEHAVIORAL/MENTAL HEALTH CLINIC | Facility: CLINIC | Age: 56
End: 2021-05-07
Payer: COMMERCIAL

## 2021-05-07 DIAGNOSIS — F41.0 PANIC DISORDER WITHOUT AGORAPHOBIA: Primary | Chronic | ICD-10-CM

## 2021-05-07 DIAGNOSIS — F41.1 GAD (GENERALIZED ANXIETY DISORDER): Chronic | ICD-10-CM

## 2021-05-07 DIAGNOSIS — F31.81 BIPOLAR II DISORDER, MOST RECENT EPISODE MAJOR DEPRESSIVE (HCC): Chronic | ICD-10-CM

## 2021-05-07 PROCEDURE — 90834 PSYTX W PT 45 MINUTES: CPT | Performed by: SOCIAL WORKER

## 2021-05-07 NOTE — PSYCH
This note was not shared with the patient due to this is a psychotherapy note    Virtual Regular Visit      Assessment/Plan:    Problem List Items Addressed This Visit        Other    Bipolar II disorder, most recent episode major depressive ([address] ) (Chronic)    DREAD (generalized anxiety disorder) (Chronic)    Panic disorder without agoraphobia - Primary (Chronic)          Goals addressed in session: Goal 1 and Goal 2          Reason for visit is No chief complaint on file  Encounter provider Alicia Israel    Provider located at [address] [place] [id number] [phone]      Recent Visits  No visits were found meeting these conditions  Showing recent visits within past 7 days and meeting all other requirements     Future Appointments  No visits were found meeting these conditions  Showing future appointments within next 150 days and meeting all other requirements        The patient was identified by name and date of birth  Bearl Quant was informed that this is a telemedicine visit and that the visit is being conducted through [employer] and patient was informed that this is a secure, HIPAA-compliant platform  She agrees to proceed     My office door was closed  No one else was in the room  She acknowledged consent and understanding of privacy and security of the video platform  The patient has agreed to participate and understands they can discontinue the visit at any time  Patient is aware this is a billable service  Sean Eller is a 61 y o  female    D: Met with Janine marroquin  'I have so much on my mind'  Family dynamics remains primary obstacle  Son feel Jenniferkelsey Chaudhari must take  to Dr  Visits as son doesn't feel he's on the right meds  Azul Chaudhari found there may have been a med error influencing behavior  A: Azul Chaudhari presented with baseline mood and affect    Family dynamics will not change anytime soon  P: Continue individual therapy      HPI     Past Medical History:   Diagnosis Date    Abnormal liver scan     last assessed 8/14/2014    Anxiety     Atypical chest pain     last assessed 2/3/2016    Benign colonic polyp     Bladder disorder     last assessed 1/22/2013    Cervical radiculopathy     Chronic ITP (idiopathic thrombocytopenia) (HCC)     Chronic lumbar radiculopathy     Chronic neck pain     Chronic pain     neck and back    Dermatitis     Ganglion cyst     Herpes simplex     Hyperlipidemia     Luteinized follicular cyst     Menorrhagia     Obesity     Panic disorder without agoraphobia 1/30/2018    Urge and stress incontinence     Uterine fibroid        Past Surgical History:   Procedure Laterality Date    CHOLECYSTECTOMY      HYSTERECTOMY      MELO    ORIF TIBIA & FIBULA FRACTURES Left 4/27/2018    Procedure: OPEN REDUCTION W/ INTERNAL FIXATION (ORIF) ANKLE;  Surgeon: MD Richardson;  [facility];  Service: Orthopedics    TOOTH EXTRACTION      last assessed 3/20/2017, oral surgery       Current Outpatient Medications   Medication Sig Dispense Refill    atorvastatin (LIPITOR) 20 mg tablet TAKE 1 TABLET BY MOUTH EVERY DAY 30 tablet 5    DULoxetine (CYMBALTA) 60 mg delayed release capsule Take 1 capsule (60 mg total) by mouth 2 (two) times a day 180 capsule 2    lamoTRIgine (LaMICtal) 150 MG tablet Take 1 tablet (150 mg total) by mouth 2 (two) times a day 180 tablet 2    LORazepam (ATIVAN) 0 5 mg tablet Take 1 tablet (0 5 mg total) by mouth 4 (four) times a day as needed for anxiety To be filled on or after 2/13/21 120 tablet 3     No current facility-administered medications for this visit           Allergies   Allergen Reactions    Buspar [Buspirone] Hives and Rash           I spent 50 minutes directly with the patient during this visit      [facility] acknowledges that she has consented to an online visit or consultation  She understands that the online visit is based solely on information provided by her, and that, in the absence of a face-to-face physical evaluation by the physician, the diagnosis she receives is both limited and provisional in terms of accuracy and completeness  This is not intended to replace a full medical face-to-face evaluation by the physician  Ted Askew understands and accepts these terms
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
## 2021-05-19 DIAGNOSIS — E78.5 HYPERLIPIDEMIA, UNSPECIFIED HYPERLIPIDEMIA TYPE: ICD-10-CM

## 2021-05-19 RX ORDER — ATORVASTATIN CALCIUM 20 MG/1
TABLET, FILM COATED ORAL
Qty: 30 TABLET | Refills: 5 | Status: SHIPPED | OUTPATIENT
Start: 2021-05-19 | End: 2021-05-24 | Stop reason: SDUPTHER

## 2021-05-20 ENCOUNTER — OFFICE VISIT (OUTPATIENT)
Dept: PSYCHIATRY | Facility: CLINIC | Age: 56
End: 2021-05-20
Payer: COMMERCIAL

## 2021-05-20 VITALS — WEIGHT: 243 LBS | HEIGHT: 68 IN | BODY MASS INDEX: 36.83 KG/M2

## 2021-05-20 DIAGNOSIS — F31.81 BIPOLAR II DISORDER, MOST RECENT EPISODE MAJOR DEPRESSIVE (HCC): Primary | Chronic | ICD-10-CM

## 2021-05-20 DIAGNOSIS — F41.1 GAD (GENERALIZED ANXIETY DISORDER): Chronic | ICD-10-CM

## 2021-05-20 DIAGNOSIS — F41.0 PANIC DISORDER WITHOUT AGORAPHOBIA: Chronic | ICD-10-CM

## 2021-05-20 PROCEDURE — 99214 OFFICE O/P EST MOD 30 MIN: CPT | Performed by: PSYCHIATRY & NEUROLOGY

## 2021-05-20 RX ORDER — DULOXETIN HYDROCHLORIDE 60 MG/1
60 CAPSULE, DELAYED RELEASE ORAL 2 TIMES DAILY
Qty: 180 CAPSULE | Refills: 2 | Status: SHIPPED | OUTPATIENT
Start: 2021-05-20 | End: 2022-01-14

## 2021-05-20 RX ORDER — LORAZEPAM 0.5 MG/1
0.5 TABLET ORAL 4 TIMES DAILY PRN
Qty: 120 TABLET | Refills: 3 | Status: SHIPPED | OUTPATIENT
Start: 2021-06-13 | End: 2021-10-11 | Stop reason: SDUPTHER

## 2021-05-24 ENCOUNTER — OFFICE VISIT (OUTPATIENT)
Dept: INTERNAL MEDICINE CLINIC | Facility: CLINIC | Age: 56
End: 2021-05-24
Payer: COMMERCIAL

## 2021-05-24 VITALS
SYSTOLIC BLOOD PRESSURE: 130 MMHG | BODY MASS INDEX: 36.46 KG/M2 | OXYGEN SATURATION: 97 % | DIASTOLIC BLOOD PRESSURE: 84 MMHG | HEART RATE: 83 BPM | HEIGHT: 68 IN | TEMPERATURE: 97.1 F | WEIGHT: 240.6 LBS

## 2021-05-24 DIAGNOSIS — F31.81 BIPOLAR II DISORDER, MOST RECENT EPISODE MAJOR DEPRESSIVE (HCC): Chronic | ICD-10-CM

## 2021-05-24 DIAGNOSIS — D69.6 THROMBOCYTOPENIA (HCC): ICD-10-CM

## 2021-05-24 DIAGNOSIS — Z72.0 TOBACCO ABUSE: ICD-10-CM

## 2021-05-24 DIAGNOSIS — Z12.11 SCREENING FOR COLON CANCER: ICD-10-CM

## 2021-05-24 DIAGNOSIS — F41.1 GAD (GENERALIZED ANXIETY DISORDER): Chronic | ICD-10-CM

## 2021-05-24 DIAGNOSIS — K21.9 GASTROESOPHAGEAL REFLUX DISEASE WITHOUT ESOPHAGITIS: ICD-10-CM

## 2021-05-24 DIAGNOSIS — E78.5 HYPERLIPIDEMIA, UNSPECIFIED HYPERLIPIDEMIA TYPE: Primary | ICD-10-CM

## 2021-05-24 PROCEDURE — 99214 OFFICE O/P EST MOD 30 MIN: CPT | Performed by: NURSE PRACTITIONER

## 2021-05-24 PROCEDURE — 1036F TOBACCO NON-USER: CPT | Performed by: NURSE PRACTITIONER

## 2021-05-24 PROCEDURE — 3008F BODY MASS INDEX DOCD: CPT | Performed by: NURSE PRACTITIONER

## 2021-05-24 PROCEDURE — 3725F SCREEN DEPRESSION PERFORMED: CPT | Performed by: NURSE PRACTITIONER

## 2021-05-24 RX ORDER — ATORVASTATIN CALCIUM 20 MG/1
20 TABLET, FILM COATED ORAL DAILY
Qty: 90 TABLET | Refills: 1 | Status: SHIPPED | OUTPATIENT
Start: 2021-05-24 | End: 2021-10-18

## 2021-05-24 NOTE — PROGRESS NOTES
Assessment/Plan:    Gastroesophageal reflux disease  Takes Pepcid PRN  Plans to schedule with GI     Bipolar II disorder, most recent episode major depressive (HCC)  Stable  On cymbalta, lamictal, and lorazepam   Sees psychiatry     DREAD (generalized anxiety disorder)  Stable  Continue current medications per psychiatry     Hyperlipidemia  Due for lipids  On statin  Tobacco abuse  CT lung screening ordered  Discussed cessation, she plans to try the nicotine  patch  Thrombocytopenia ([address] )  Check labs  BMI Counseling: Body mass index is 36 58 kg/m²  The BMI is above normal  Nutrition recommendations include reducing portion sizes, decreasing overall calorie intake and moderation in carbohydrate intake  Exercise recommendations include exercising 3-5 times per week  Diagnoses and all orders for this visit:    Hyperlipidemia, unspecified hyperlipidemia type  -     atorvastatin (LIPITOR) 20 mg tablet; Take 1 tablet (20 mg total) by mouth daily  -     Comprehensive metabolic panel; Future  -     Lipid Panel with Direct LDL reflex; Future    Gastroesophageal reflux disease without esophagitis    Tobacco abuse  -     CT lung screening program; Future    Bipolar II disorder, most recent episode major depressive (HCC)    DREAD (generalized anxiety disorder)  -     TSH, 3rd generation with Free T4 reflex; Future    Thrombocytopenia (HCC)  -     CBC and differential; Future    Screening for colon cancer  -     Cologuard; Future          Subjective:      Patient ID: Claudia Raymundo is a 61 y o  female  Here today for follow up  Valentín Mckeon is doing ok  She continues to struggle with family issues with her grandchildren  She has been walking the dog daily  She is motivated to swim this summer for exercise  Her reflux has been worse recently  She is taking pepcid  She plans on seeing GI  She has been smoking more lately, about 1/2 ppd  She is going to try the patches to help her quit     She has been coughing more over the last few months  She denies any shortness of breath  Her anxiety is well managed on current medications  She is seeing psychiatry  The following portions of the patient's history were reviewed and updated as appropriate: allergies, current medications, past family history, past medical history, past social history, past surgical history and problem list     Review of Systems   Constitutional: Negative for activity change, appetite change, fatigue and unexpected weight change  Eyes: Negative for visual disturbance  Respiratory: Positive for cough  Negative for chest tightness, shortness of breath and wheezing  Cardiovascular: Negative for chest pain, palpitations and leg swelling  Gastrointestinal: Negative for abdominal pain, blood in stool, constipation and diarrhea  Genitourinary: Negative for difficulty urinating  Musculoskeletal: Negative for arthralgias  Skin: Negative for rash  Neurological: Negative for dizziness, weakness, light-headedness and headaches  Psychiatric/Behavioral: Negative for decreased concentration, dysphoric mood and sleep disturbance  The patient is not nervous/anxious  Objective:      /84   Pulse 83   Temp (!) 97 1 °F (36 2 °C)   Ht 5' 8" (1 727 m)   Wt 109 kg (240 lb 9 6 oz)   SpO2 97%   BMI 36 58 kg/m²          Physical Exam  Vitals signs reviewed  Constitutional:       Appearance: She is well-developed  HENT:      Head: Normocephalic and atraumatic  Right Ear: Tympanic membrane, ear canal and external ear normal       Left Ear: Tympanic membrane, ear canal and external ear normal    Eyes:      Conjunctiva/sclera: Conjunctivae normal       Pupils: Pupils are equal, round, and reactive to light  Neck:      Thyroid: No thyromegaly  Cardiovascular:      Rate and Rhythm: Normal rate and regular rhythm  Heart sounds: Normal heart sounds     Pulmonary:      Effort: Pulmonary effort is normal       Breath sounds: Normal breath sounds  Abdominal:      General: Bowel sounds are normal       Palpations: Abdomen is soft  Musculoskeletal: Normal range of motion  Lymphadenopathy:      Cervical: No cervical adenopathy  Skin:     General: Skin is warm and dry  Neurological:      Mental Status: She is alert and oriented to person, place, and time     Psychiatric:         Behavior: Behavior normal

## 2021-06-09 DIAGNOSIS — R19.5 POSITIVE COLORECTAL CANCER SCREENING USING COLOGUARD TEST: Primary | ICD-10-CM

## 2021-06-10 ENCOUNTER — TRANSCRIBE ORDERS (OUTPATIENT)
Dept: ADMINISTRATIVE | Facility: HOSPITAL | Age: 56
End: 2021-06-10

## 2021-06-10 ENCOUNTER — APPOINTMENT (OUTPATIENT)
Dept: LAB | Age: 56
End: 2021-06-10
Payer: COMMERCIAL

## 2021-06-10 ENCOUNTER — HOSPITAL ENCOUNTER (OUTPATIENT)
Dept: RADIOLOGY | Age: 56
Discharge: HOME | End: 2021-06-10
Payer: COMMERCIAL

## 2021-06-10 DIAGNOSIS — D69.6 THROMBOCYTOPENIA (HCC): ICD-10-CM

## 2021-06-10 DIAGNOSIS — E78.5 HYPERLIPIDEMIA, UNSPECIFIED HYPERLIPIDEMIA TYPE: ICD-10-CM

## 2021-06-10 DIAGNOSIS — Z72.0 TOBACCO ABUSE: ICD-10-CM

## 2021-06-10 DIAGNOSIS — F41.1 GAD (GENERALIZED ANXIETY DISORDER): Chronic | ICD-10-CM

## 2021-06-10 LAB
ALBUMIN SERPL BCP-MCNC: 4 G/DL (ref 3.5–5)
ALP SERPL-CCNC: 88 U/L (ref 46–116)
ALT SERPL W P-5'-P-CCNC: 24 U/L (ref 12–78)
ANION GAP SERPL CALCULATED.3IONS-SCNC: 6 MMOL/L (ref 4–13)
AST SERPL W P-5'-P-CCNC: 15 U/L (ref 5–45)
BASOPHILS # BLD AUTO: 0.03 THOUSANDS/ΜL (ref 0–0.1)
BASOPHILS NFR BLD AUTO: 1 % (ref 0–1)
BILIRUB SERPL-MCNC: 0.49 MG/DL (ref 0.2–1)
BUN SERPL-MCNC: 15 MG/DL (ref 5–25)
CALCIUM SERPL-MCNC: 9.7 MG/DL (ref 8.3–10.1)
CHLORIDE SERPL-SCNC: 110 MMOL/L (ref 100–108)
CHOLEST SERPL-MCNC: 169 MG/DL (ref 50–200)
CO2 SERPL-SCNC: 24 MMOL/L (ref 21–32)
CREAT SERPL-MCNC: 0.68 MG/DL (ref 0.6–1.3)
EOSINOPHIL # BLD AUTO: 0.22 THOUSAND/ΜL (ref 0–0.61)
EOSINOPHIL NFR BLD AUTO: 4 % (ref 0–6)
ERYTHROCYTE [DISTWIDTH] IN BLOOD BY AUTOMATED COUNT: 13.8 % (ref 11.6–15.1)
GFR SERPL CREATININE-BSD FRML MDRD: 97 ML/MIN/1.73SQ M
GLUCOSE P FAST SERPL-MCNC: 101 MG/DL (ref 65–99)
HCT VFR BLD AUTO: 40.7 % (ref 34.8–46.1)
HDLC SERPL-MCNC: 69 MG/DL
HGB BLD-MCNC: 13.2 G/DL (ref 11.5–15.4)
IMM GRANULOCYTES # BLD AUTO: 0.02 THOUSAND/UL (ref 0–0.2)
IMM GRANULOCYTES NFR BLD AUTO: 0 % (ref 0–2)
LDLC SERPL CALC-MCNC: 86 MG/DL (ref 0–100)
LYMPHOCYTES # BLD AUTO: 1.35 THOUSANDS/ΜL (ref 0.6–4.47)
LYMPHOCYTES NFR BLD AUTO: 22 % (ref 14–44)
MCH RBC QN AUTO: 29.9 PG (ref 26.8–34.3)
MCHC RBC AUTO-ENTMCNC: 32.4 G/DL (ref 31.4–37.4)
MCV RBC AUTO: 92 FL (ref 82–98)
MONOCYTES # BLD AUTO: 0.57 THOUSAND/ΜL (ref 0.17–1.22)
MONOCYTES NFR BLD AUTO: 9 % (ref 4–12)
NEUTROPHILS # BLD AUTO: 3.87 THOUSANDS/ΜL (ref 1.85–7.62)
NEUTS SEG NFR BLD AUTO: 64 % (ref 43–75)
NRBC BLD AUTO-RTO: 0 /100 WBCS
PLATELET # BLD AUTO: 100 THOUSANDS/UL (ref 149–390)
PMV BLD AUTO: 13.9 FL (ref 8.9–12.7)
POTASSIUM SERPL-SCNC: 4 MMOL/L (ref 3.5–5.3)
PROT SERPL-MCNC: 7.6 G/DL (ref 6.4–8.2)
RBC # BLD AUTO: 4.42 MILLION/UL (ref 3.81–5.12)
SODIUM SERPL-SCNC: 140 MMOL/L (ref 136–145)
TRIGL SERPL-MCNC: 70 MG/DL
TSH SERPL DL<=0.05 MIU/L-ACNC: 1.22 UIU/ML (ref 0.36–3.74)
WBC # BLD AUTO: 6.06 THOUSAND/UL (ref 4.31–10.16)

## 2021-06-10 PROCEDURE — 36415 COLL VENOUS BLD VENIPUNCTURE: CPT

## 2021-06-10 PROCEDURE — 80061 LIPID PANEL: CPT

## 2021-06-10 PROCEDURE — 85025 COMPLETE CBC W/AUTO DIFF WBC: CPT

## 2021-06-10 PROCEDURE — 80053 COMPREHEN METABOLIC PANEL: CPT

## 2021-06-10 PROCEDURE — 84443 ASSAY THYROID STIM HORMONE: CPT

## 2021-06-10 PROCEDURE — 71271 CT THORAX LUNG CANCER SCR C-: CPT

## 2021-06-14 DIAGNOSIS — F31.81 BIPOLAR II DISORDER, MOST RECENT EPISODE MAJOR DEPRESSIVE (HCC): Chronic | ICD-10-CM

## 2021-06-28 RX ORDER — LAMOTRIGINE 150 MG/1
TABLET ORAL
Qty: 180 TABLET | Refills: 2 | OUTPATIENT
Start: 2021-06-28

## 2021-07-01 DIAGNOSIS — F31.81 BIPOLAR II DISORDER, MOST RECENT EPISODE MAJOR DEPRESSIVE (HCC): Chronic | ICD-10-CM

## 2021-07-01 RX ORDER — LAMOTRIGINE 150 MG/1
150 TABLET ORAL 2 TIMES DAILY
Qty: 180 TABLET | Refills: 2 | OUTPATIENT
Start: 2021-07-01 | End: 2022-03-28

## 2021-07-06 ENCOUNTER — SOCIAL WORK (OUTPATIENT)
Dept: BEHAVIORAL/MENTAL HEALTH CLINIC | Facility: CLINIC | Age: 56
End: 2021-07-06
Payer: COMMERCIAL

## 2021-07-06 DIAGNOSIS — F31.81 BIPOLAR II DISORDER, MOST RECENT EPISODE MAJOR DEPRESSIVE (HCC): Chronic | ICD-10-CM

## 2021-07-06 DIAGNOSIS — F41.1 GAD (GENERALIZED ANXIETY DISORDER): Chronic | ICD-10-CM

## 2021-07-06 DIAGNOSIS — F41.0 PANIC DISORDER WITHOUT AGORAPHOBIA: Primary | Chronic | ICD-10-CM

## 2021-07-06 PROCEDURE — 90834 PSYTX W PT 45 MINUTES: CPT | Performed by: SOCIAL WORKER

## 2021-07-06 NOTE — BH TREATMENT PLAN
Bassem Martinez  1965       Date of Initial Treatment Plan: 5/1/17   Date of Current Treatment Plan: 7/6/21      Treatment Plan Number 11     Strengths/Personal Resources for Self Care: Caring, grandmother     Diagnosis:   1  Bipolar II disorder, most recent episode major depressive ([facility] [address] )      2  DREAD (generalized anxiety disorder)      3  Panic disorder without agoraphobia      4  Unresolved grief            Area of Needs: Anxiety, depression, overwhelmed with family situations, setting boundaries      Long Term Goal 1:        I am able to function daily without overwhelming myself  Target Date:  1/2/21  Completion Date: TBD         Short Term Objectives for Goal 1:   1  Read  2  Remember what is in/out of my control  3 Address mindless eating      Long Term Goal 2:        My anxiety/depression has greatly improved  Target Date:   1/2/21  Completion Date: TBD     Short Term Objectives for Goal 2:   1  Volunteer  2  Address self esteem  3  I get my health under control  4  Finding another place to live          Long Term Goal # 3:        I am addressing my self sabatouge  Target Date:  1/2/21  Completion Date: TBD     Short Term Objectives for Goal 3:   1  I am capable of having friends  2  I must challenge feeling comfortable in my misery  3   Pessimism - thought stopping - cognitive reframing        GOAL 1: Modality: Individual therapy 2x per month   Completion Date TBD                                  Medication management every 3 months   Completion Date: TBD                                   Persons responsible for goals: Andrae Warren and Dr Deepthi Lynch     GOAL 2: Modality: Individual therapy 2x per month   Completion Date TBD                                  Medication management every 3 months   Completion Date:  TBD                                  Persons responsible for goals: Andrae Warren and Dr Deepthi Lynch     GOAL 3: Modality: Individual therapy 2x per month   Completion Date TBD                                  Medication management every 3 months   Completion Date: TBD                                  Persons responsible for goals: Paige Rodriguez and Dr Young Alu: Diagnosis and Treatment Plan explained to Amalia Kerr relates understanding diagnosis and is agreeable to Treatment Plan         Client Comments : Please share your thoughts, feelings, need and/or experiences regarding your treatment plan:

## 2021-07-06 NOTE — PSYCH
Psychotherapy Provided: Individual Psychotherapy 50 minutes     Length of time in session: 50 minutes, follow up in 1 month    Encounter Diagnosis     ICD-10-CM    1  Panic disorder without agoraphobia  F41 0    2  DREAD (generalized anxiety disorder)  F41 1    3  Bipolar II disorder, most recent episode major depressive ([facility] [address] )  F31 81        Goals addressed in session: Goal 1, Goal 2 and Goal 3      Pain:      none    0    Current suicide risk : Low     D: Met with Janine individually  Processed grandchildren - having weekends with mom  Trust in the system if difficult as 2021 Chrystal Oleary doesn't feel the kids should be returned home  Ladan Phelps will be going to [employer] after release - as per PO  Janine's roommate is selling the trailer  Finances prevent Janine to take it over  A: 2021 Chrystal Oleary presented overwhelmed - all situational  Anxiety related to finding a home is one of the primary stressors along with grandchildren most likely returning home  P: Continue individual therapy    Behavioral Health Treatment Plan [facility]: Diagnosis and Treatment Plan explained to Mateus Garzon relates understanding diagnosis and is agreeable to Treatment Plan   Yes

## 2021-07-08 ENCOUNTER — TELEPHONE (OUTPATIENT)
Dept: PSYCHIATRY | Facility: CLINIC | Age: 56
End: 2021-07-08

## 2021-07-08 DIAGNOSIS — F31.81 BIPOLAR II DISORDER, MOST RECENT EPISODE MAJOR DEPRESSIVE (HCC): Primary | Chronic | ICD-10-CM

## 2021-07-08 DIAGNOSIS — F31.81 BIPOLAR II DISORDER, MOST RECENT EPISODE MAJOR DEPRESSIVE (HCC): Chronic | ICD-10-CM

## 2021-07-08 RX ORDER — LAMOTRIGINE 150 MG/1
150 TABLET ORAL 2 TIMES DAILY
Qty: 180 TABLET | Refills: 2 | Status: SHIPPED | OUTPATIENT
Start: 2021-07-08 | End: 2021-07-08 | Stop reason: SDUPTHER

## 2021-07-08 RX ORDER — LAMOTRIGINE 150 MG/1
150 TABLET ORAL 2 TIMES DAILY
Qty: 180 TABLET | Refills: 2 | Status: CANCELLED | OUTPATIENT
Start: 2021-07-08 | End: 2022-04-04

## 2021-07-08 RX ORDER — LAMOTRIGINE 150 MG/1
150 TABLET ORAL 2 TIMES DAILY
Qty: 180 TABLET | Refills: 2 | Status: SHIPPED | OUTPATIENT
Start: 2021-07-08 | End: 2022-03-08 | Stop reason: SDUPTHER

## 2021-07-08 NOTE — TELEPHONE ENCOUNTER
----- Message from Chandler Hoffmann sent at 7/8/2021 10:05 AM EDT -----  I received a v/m from above patient stating [facility] has not gotten refill orders for Lamictal - She states Rogerio told her they are waiting on a return message from Dr Diogenes Ozuna since June??  Simran Ruggiero is just about of Lamictal

## 2021-07-08 NOTE — TELEPHONE ENCOUNTER
Nursing called and spoke with Diane Allen at [facility] [employer]  [phone]  Nursing inquired about the Lamotrigine prescription sent 1/28/21   She said this prescription was discontinued due to no response from the member Balbir Moody)     Please send new prescription to [facility] for Lamotrigine 150 mg

## 2021-07-08 NOTE — TELEPHONE ENCOUNTER
7/8 @ 12:40  Patient called to state pharmacy states they have no scripts on hold for LAMICTAL 150 mg   I called pharmacy and inquired because chart states there should be 2 scripts on hold  Per pharmacy they contacted patient numerous times about a payment issue  When they did not receive a return call, pharmacy cancelled out the 2 scripts  Please resend scripts for LAMICTAL 150 mg to pharmacy to be filled

## 2021-07-26 ENCOUNTER — OFFICE VISIT (OUTPATIENT)
Dept: GASTROENTEROLOGY | Facility: AMBULARY SURGERY CENTER | Age: 56
End: 2021-07-26
Payer: COMMERCIAL

## 2021-07-26 ENCOUNTER — TELEPHONE (OUTPATIENT)
Dept: DERMATOLOGY | Facility: CLINIC | Age: 56
End: 2021-07-26

## 2021-07-26 ENCOUNTER — PREP FOR PROCEDURE (OUTPATIENT)
Dept: GASTROENTEROLOGY | Facility: AMBULARY SURGERY CENTER | Age: 56
End: 2021-07-26

## 2021-07-26 VITALS
HEIGHT: 68 IN | BODY MASS INDEX: 36.04 KG/M2 | SYSTOLIC BLOOD PRESSURE: 160 MMHG | WEIGHT: 237.8 LBS | DIASTOLIC BLOOD PRESSURE: 96 MMHG

## 2021-07-26 DIAGNOSIS — K21.9 GASTROESOPHAGEAL REFLUX DISEASE WITHOUT ESOPHAGITIS: Primary | ICD-10-CM

## 2021-07-26 DIAGNOSIS — R13.10 ODYNOPHAGIA: ICD-10-CM

## 2021-07-26 DIAGNOSIS — R11.0 NAUSEA: ICD-10-CM

## 2021-07-26 DIAGNOSIS — Z86.010 HX OF COLONIC POLYPS: ICD-10-CM

## 2021-07-26 DIAGNOSIS — R19.5 POSITIVE COLORECTAL CANCER SCREENING USING COLOGUARD TEST: ICD-10-CM

## 2021-07-26 PROCEDURE — 99213 OFFICE O/P EST LOW 20 MIN: CPT | Performed by: PHYSICIAN ASSISTANT

## 2021-07-26 RX ORDER — OMEPRAZOLE 20 MG/1
40 TABLET, DELAYED RELEASE ORAL DAILY
Qty: 90 TABLET | Refills: 1 | Status: SHIPPED | OUTPATIENT
Start: 2021-07-26 | End: 2021-09-02 | Stop reason: ALTCHOICE

## 2021-07-26 NOTE — PATIENT INSTRUCTIONS
Start Prilosec 40 mg daily in the morning on an empty stomach and try to wait 30 minutes before eating/drinking

## 2021-07-26 NOTE — PROGRESS NOTES
[facility] - Outpatient Follow-up Note  Deven Guerrero 61 y o  female MRN: [id number]  Encounter: [id number]          ASSESSMENT AND PLAN:      1  GERD  2  Nausea, dry heaving  3  Odynophagia  61year old female with GERD, mitral regurg, ITP, DREAD presents for follow up  She was last seen in 2019 for reflux and had EGD showing small, healing GE junction ulcer with recommended repeat in 1 year which was not performed  Also, her insurance did not cover [place] so she has been managing her symptoms with tums  She reports epigastric burning and reflux, nausea, sensitive gag reflux and occassional discomfort when swallowing  Denies vomiting, dysphagia, odynophagia, decreased appetite, unintentional weight loss, NSAID use, melena  Reports increased stress lately     -Start Prilosec 40 mg daily   -Schedule EGD  -Discussed risks of procedure including bleeding, infection and perforation  -Continue to avoid NSAIDs  -Continue trying to decrease smoking and avoid alcohol  -Recommend continued follow up with therapist to manage increasing stresses and anxieties  -Avoid laying down 3 hours after eating, and foods such as spicy, tomato based, citrus, carbonated beverages, caffeine    -Patient advised to call/message in the meantime for worsening symptoms   -Patient should have CBC prior to procedures due to ITP  Last CBC with Plt 100          4  Positive cologuard  Patient had cologuard 6/2021 which was positive  Reports normal BM without any constipation, diarrhea or blood in the stool  No family history of colon cancer  Last colonoscopy was in 2014 with 3 polyps of unknown path with repeat recommended in 2 years  Recent lab work with normal Hgb 13 2  CMP normal        -Schedule colonscopy  -Miralax/dulcolax prep  -Discussed risks of procedure including bleeding, infection and perforation        Patient will follow up in 3 months after procedures  ______________________________________________________________________    SUBJECTIVE:      Aj Guillermo is a 61year old female with PMH of GERD, mitral regurg, chronic ITP, DREAD who presents for a follow up  She was last seen 10/2019 for reflux  She had an EGD which showed small, healing ulcer in the GE junction  She was recommended to have a repaet in 1 year however did not have that complete  She reports worsening symptoms of epigastric burning, nausea, worsening gag reflex for about 1 year  She has been trying to treat this at home with tums as Dexilant was not covered by her insurance  She reports symptoms daily  Reports some discomfort when swallowing  She reports she has had significant stress over the last few months which she believes is worsening her symptoms  She sees therapist regularly  She smokes around 9 cigarettes a day  Denies alcohol use  Denies any vomiting, dysphagia,NSAID, melena, decreased appetite, unintentional weight loss  She reports losing 20 lb intentionally with diet and exercise  Denies family history of colon cancer  Abdominal surgeries consist of hysterectomy and cholecystectomy  Reports BMs are normal without any constipation, diarrhea, blood in stool  She had cologuard testing 6/2021 which was positive  Last colonoscopy was in 2014 with repeat recommended in 2 years  REVIEW OF SYSTEMS IS OTHERWISE NEGATIVE        Historical Information   Past Medical History:   Diagnosis Date    Abnormal liver scan     last assessed 8/14/2014    Anxiety     Atypical chest pain     last assessed 2/3/2016    Benign colonic polyp     Bladder disorder     last assessed 1/22/2013    Cervical radiculopathy     Chronic ITP (idiopathic thrombocytopenia) (HCC)     Chronic lumbar radiculopathy     Chronic neck pain     Chronic pain     neck and back    Dermatitis     Ganglion cyst     Herpes simplex     Hyperlipidemia     Luteinized follicular cyst     Menorrhagia     Obesity     Panic disorder without agoraphobia 1/30/2018    Urge and stress incontinence     Uterine fibroid      Past Surgical History:   Procedure Laterality Date    CHOLECYSTECTOMY      HYSTERECTOMY      MELO    ORIF TIBIA & FIBULA FRACTURES Left 4/27/2018    Procedure: OPEN REDUCTION W/ INTERNAL FIXATION (ORIF) ANKLE;  Surgeon: Brittney Dos Santos MD;  [facility];  Service: Orthopedics    TOOTH EXTRACTION      last assessed 3/20/2017, oral surgery     Social History   Social History     Substance and Sexual Activity   Alcohol Use No     Social History     Substance and Sexual Activity   Drug Use No     Social History     Tobacco Use   Smoking Status Former Smoker    Packs/day: 0 50    Types: Cigarettes   Smokeless Tobacco Never Used     Family History   Problem Relation Age of Onset    Stroke Father     Psychosis Father     Bipolar disorder Mother     Lung cancer Mother     Schizoaffective Disorder  Son     Alcohol abuse Neg Hx     Substance Abuse Neg Hx     Suicidality Neg Hx        Meds/Allergies       Current Outpatient Medications:     atorvastatin (LIPITOR) 20 mg tablet    DULoxetine (CYMBALTA) 60 mg delayed release capsule    lamoTRIgine (LaMICtal) 150 MG tablet    LORazepam (ATIVAN) 0 5 mg tablet    omeprazole (PriLOSEC OTC) 20 MG tablet    Allergies   Allergen Reactions    Buspar [Buspirone] Hives and Rash           Objective     Blood pressure 160/96, height 5' 8" (1 727 m), weight 108 kg (237 lb 12 8 oz)  Body mass index is 36 16 kg/m²  PHYSICAL EXAM:      General Appearance:   Alert, cooperative, no distress  Patient sitting comfortably in chair  HEENT:   Normocephalic, atraumatic, anicteric      Neck:  Supple, symmetrical, trachea midline   Lungs:   Clear to auscultation bilaterally; no rales, rhonchi or wheezing; respirations unlabored    Heart[de-identified]   Regular rate and rhythm; no murmur, rub, or gallop     Abdomen:   Soft, non-tender, non-distended; normal bowel sounds; no masses, no organomegaly    Genitalia:   Deferred    Rectal:   Deferred    Extremities:  No cyanosis, clubbing or edema    Pulses:  2+ and symmetric    Skin:  No jaundice, rashes, or lesions    Lymph nodes:  No palpable cervical lymphadenopathy        Lab Results:   No visits with results within 1 Day(s) from this visit  Latest known visit with results is:   Appointment on 06/10/2021   Component Date Value    Sodium 06/10/2021 140     Potassium 06/10/2021 4 0     Chloride 06/10/2021 110*    CO2 06/10/2021 24     ANION GAP 06/10/2021 6     BUN 06/10/2021 15     Creatinine 06/10/2021 0 68     Glucose, Fasting 06/10/2021 101*    Calcium 06/10/2021 9 7     AST 06/10/2021 15     ALT 06/10/2021 24     Alkaline Phosphatase 06/10/2021 88     Total Protein 06/10/2021 7 6     Albumin 06/10/2021 4 0     Total Bilirubin 06/10/2021 0 49     eGFR 06/10/2021 97     Cholesterol 06/10/2021 169     Triglycerides 06/10/2021 70     HDL, Direct 06/10/2021 69     LDL Calculated 06/10/2021 86     TSH 3RD GENERATON 06/10/2021 1 220     WBC 06/10/2021 6 06     RBC 06/10/2021 4 42     Hemoglobin 06/10/2021 13 2     Hematocrit 06/10/2021 40 7     MCV 06/10/2021 92     MCH 06/10/2021 29 9     MCHC 06/10/2021 32 4     RDW 06/10/2021 13 8     MPV 06/10/2021 13 9*    Platelets 54/55/3163 100*    nRBC 06/10/2021 0     Neutrophils Relative 06/10/2021 64     Immat GRANS % 06/10/2021 0     Lymphocytes Relative 06/10/2021 22     Monocytes Relative 06/10/2021 9     Eosinophils Relative 06/10/2021 4     Basophils Relative 06/10/2021 1     Neutrophils Absolute 06/10/2021 3 87     Immature Grans Absolute 06/10/2021 0 02     Lymphocytes Absolute 06/10/2021 1 35     Monocytes Absolute 06/10/2021 0 57     Eosinophils Absolute 06/10/2021 0 22     Basophils Absolute 06/10/2021 0 03          Radiology Results:   No results found

## 2021-07-27 ENCOUNTER — OFFICE VISIT (OUTPATIENT)
Dept: INTERNAL MEDICINE CLINIC | Facility: CLINIC | Age: 56
End: 2021-07-27
Payer: COMMERCIAL

## 2021-07-27 VITALS
HEIGHT: 68 IN | OXYGEN SATURATION: 97 % | DIASTOLIC BLOOD PRESSURE: 80 MMHG | BODY MASS INDEX: 35.8 KG/M2 | SYSTOLIC BLOOD PRESSURE: 120 MMHG | HEART RATE: 81 BPM | WEIGHT: 236.2 LBS | TEMPERATURE: 96.8 F

## 2021-07-27 DIAGNOSIS — R21 RASH: Primary | ICD-10-CM

## 2021-07-27 PROCEDURE — 3008F BODY MASS INDEX DOCD: CPT | Performed by: NURSE PRACTITIONER

## 2021-07-27 PROCEDURE — 1036F TOBACCO NON-USER: CPT | Performed by: NURSE PRACTITIONER

## 2021-07-27 PROCEDURE — 99213 OFFICE O/P EST LOW 20 MIN: CPT | Performed by: NURSE PRACTITIONER

## 2021-07-27 RX ORDER — TRIAMCINOLONE ACETONIDE 1 MG/G
CREAM TOPICAL 2 TIMES DAILY
Qty: 30 G | Refills: 0 | Status: SHIPPED | OUTPATIENT
Start: 2021-07-27 | End: 2021-09-02

## 2021-07-27 NOTE — PROGRESS NOTES
Assessment/Plan:    Suspect photodermatitis  Apply a sunscreen that only contains zinc oxide  Limit sun exposure  Apply cera ve lotion twice daily  Steroid cream as needed  Follow up with dermatology if not improving  Diagnoses and all orders for this visit:    Rash  -     triamcinolone (KENALOG) 0 1 % cream; Apply topically 2 (two) times a day          Subjective:      Patient ID: Hussein Gr is a 61 y o  female  Here today for rash on her legs   It started in the beginning of the summer  She had this last summer too  It seems worse after being out in the sun  It is very itchy and burns  She has been using different types of sunscreens lately  No other new skin products  She has called dermatology to set up an appointment  The following portions of the patient's history were reviewed and updated as appropriate: allergies, current medications, past family history, past medical history, past social history, past surgical history and problem list     Review of Systems   Constitutional: Negative for activity change, appetite change, fatigue and fever  Respiratory: Negative for shortness of breath  Cardiovascular: Negative for chest pain  Musculoskeletal: Negative for myalgias  Skin: Positive for rash  Neurological: Negative for dizziness and headaches  Objective:      /80   Pulse 81   Temp (!) 96 8 °F (36 °C)   Ht 5' 8" (1 727 m)   Wt 107 kg (236 lb 3 2 oz)   SpO2 97%   BMI 35 91 kg/m²          Physical Exam  Vitals reviewed  Constitutional:       Appearance: Normal appearance  HENT:      Head: Normocephalic and atraumatic  Eyes:      Conjunctiva/sclera: Conjunctivae normal    Pulmonary:      Effort: Pulmonary effort is normal    Skin:     Findings: Rash present  Comments: Erythematous papular macular rash on upper thighs and shins  Neurological:      Mental Status: She is alert and oriented to person, place, and time     Psychiatric:         Mood and Affect: Mood normal          Behavior: Behavior normal

## 2021-08-02 ENCOUNTER — SOCIAL WORK (OUTPATIENT)
Dept: BEHAVIORAL/MENTAL HEALTH CLINIC | Facility: CLINIC | Age: 56
End: 2021-08-02
Payer: COMMERCIAL

## 2021-08-02 DIAGNOSIS — F31.81 BIPOLAR II DISORDER, MOST RECENT EPISODE MAJOR DEPRESSIVE (HCC): Chronic | ICD-10-CM

## 2021-08-02 DIAGNOSIS — F41.0 PANIC DISORDER WITHOUT AGORAPHOBIA: Primary | Chronic | ICD-10-CM

## 2021-08-02 DIAGNOSIS — F41.1 GAD (GENERALIZED ANXIETY DISORDER): Chronic | ICD-10-CM

## 2021-08-02 PROCEDURE — 90834 PSYTX W PT 45 MINUTES: CPT | Performed by: SOCIAL WORKER

## 2021-08-02 NOTE — PSYCH
Psychotherapy Provided: Individual Psychotherapy 50 minutes     Length of time in session: 50 minutes, follow up in 1 month    Encounter Diagnosis     ICD-10-CM    1  Panic disorder without agoraphobia  F41 0    2  DREAD (generalized anxiety disorder)  F41 1    3  Bipolar II disorder, most recent episode major depressive ([facility] [address] )  F31 81        Goals addressed in session: Goal 1, Goal 2 and Goal 3      Pain:      none    0    Current suicide risk : Low     D: Met with Janine individually  'There is so much going on '  Eating better - lost 20# - walking at night   'I'm stressed but trying to stay positive ' Specific obstacles regarding future living arrangements, health issues and concerns around grandchildren  Son remains in [employer] [de-identified] camp- Garrison Son is proud of his progress  Denied SI    A: Garrison Son presented with appropriate mood and affect for topics of discussion  Short term future goals remain 'up in the air '     P: Continue individual therapy    [address]: Diagnosis and Treatment Plan explained to Israel Guillermo relates understanding diagnosis and is agreeable to Treatment Plan   Yes

## 2021-09-02 DIAGNOSIS — R21 RASH: ICD-10-CM

## 2021-09-02 RX ORDER — TRIAMCINOLONE ACETONIDE 1 MG/G
CREAM TOPICAL
Qty: 30 G | Refills: 0 | Status: SHIPPED | OUTPATIENT
Start: 2021-09-02 | End: 2021-10-11 | Stop reason: ALTCHOICE

## 2021-09-03 ENCOUNTER — SOCIAL WORK (OUTPATIENT)
Dept: BEHAVIORAL/MENTAL HEALTH CLINIC | Facility: CLINIC | Age: 56
End: 2021-09-03
Payer: COMMERCIAL

## 2021-09-03 DIAGNOSIS — F41.0 PANIC DISORDER WITHOUT AGORAPHOBIA: Primary | Chronic | ICD-10-CM

## 2021-09-03 DIAGNOSIS — F31.81 BIPOLAR II DISORDER, MOST RECENT EPISODE MAJOR DEPRESSIVE (HCC): Chronic | ICD-10-CM

## 2021-09-03 DIAGNOSIS — F41.1 GAD (GENERALIZED ANXIETY DISORDER): Chronic | ICD-10-CM

## 2021-09-03 PROCEDURE — 90834 PSYTX W PT 45 MINUTES: CPT | Performed by: SOCIAL WORKER

## 2021-09-03 NOTE — PSYCH
Psychotherapy Provided: Individual Psychotherapy 50 minutes     Length of time in session: 50 minutes, follow up in 1 month    Encounter Diagnosis     ICD-10-CM    1  Panic disorder without agoraphobia  F41 0    2  DREAD (generalized anxiety disorder)  F41 1    3  Bipolar II disorder, most recent episode major depressive ([facility] [address] )  F31 81        Goals addressed in session: Goal 1 and Goal 2     Pain:      none    0    Current suicide risk : Low     D: Met with Janine individually  States experiencing 5/6 panic attacks a month - heart palpations, chest pressure, tremors  Session focused upon grandchildren and possible adoption since mom left kids unattended while she had her visit  Cherie Galloway was excited to show pictures during her visit  Obstacles with Al Rumps - memories being triggered of 'how they used to be'  Will be returning to [facility] while waiting for another place to live- discussed witnessing Sebastian's drinking and declining health  'My ladonna has been helping me process all this ' Denied SI    A: Cherie Galloway presented irritable though today's topics were difficult (besides grandchildren visit)    P: Continue individual therapy    [address]: Diagnosis and Treatment Plan explained to Eric Means relates understanding diagnosis and is agreeable to Treatment Plan   Yes

## 2021-10-06 ENCOUNTER — SOCIAL WORK (OUTPATIENT)
Dept: BEHAVIORAL/MENTAL HEALTH CLINIC | Facility: CLINIC | Age: 56
End: 2021-10-06
Payer: COMMERCIAL

## 2021-10-06 DIAGNOSIS — F41.0 PANIC DISORDER WITHOUT AGORAPHOBIA: Primary | Chronic | ICD-10-CM

## 2021-10-06 DIAGNOSIS — F41.1 GAD (GENERALIZED ANXIETY DISORDER): Chronic | ICD-10-CM

## 2021-10-06 DIAGNOSIS — F31.81 BIPOLAR II DISORDER, MOST RECENT EPISODE MAJOR DEPRESSIVE (HCC): Chronic | ICD-10-CM

## 2021-10-06 PROCEDURE — 90834 PSYTX W PT 45 MINUTES: CPT | Performed by: SOCIAL WORKER

## 2021-10-11 ENCOUNTER — OFFICE VISIT (OUTPATIENT)
Dept: PSYCHIATRY | Facility: CLINIC | Age: 56
End: 2021-10-11
Payer: COMMERCIAL

## 2021-10-11 VITALS
DIASTOLIC BLOOD PRESSURE: 76 MMHG | BODY MASS INDEX: 36.07 KG/M2 | SYSTOLIC BLOOD PRESSURE: 126 MMHG | HEART RATE: 85 BPM | WEIGHT: 238 LBS | HEIGHT: 68 IN

## 2021-10-11 DIAGNOSIS — F31.81 BIPOLAR II DISORDER, MOST RECENT EPISODE MAJOR DEPRESSIVE (HCC): Primary | Chronic | ICD-10-CM

## 2021-10-11 DIAGNOSIS — F41.1 GAD (GENERALIZED ANXIETY DISORDER): Chronic | ICD-10-CM

## 2021-10-11 DIAGNOSIS — F41.0 PANIC DISORDER WITHOUT AGORAPHOBIA: Chronic | ICD-10-CM

## 2021-10-11 PROCEDURE — 1036F TOBACCO NON-USER: CPT | Performed by: PSYCHIATRY & NEUROLOGY

## 2021-10-11 PROCEDURE — 90833 PSYTX W PT W E/M 30 MIN: CPT | Performed by: PSYCHIATRY & NEUROLOGY

## 2021-10-11 PROCEDURE — 99214 OFFICE O/P EST MOD 30 MIN: CPT | Performed by: PSYCHIATRY & NEUROLOGY

## 2021-10-11 PROCEDURE — 3008F BODY MASS INDEX DOCD: CPT | Performed by: PSYCHIATRY & NEUROLOGY

## 2021-10-11 RX ORDER — LORAZEPAM 0.5 MG/1
0.5 TABLET ORAL 4 TIMES DAILY PRN
Qty: 120 TABLET | Refills: 3 | Status: SHIPPED | OUTPATIENT
Start: 2021-10-11 | End: 2022-03-08 | Stop reason: SDUPTHER

## 2021-10-16 DIAGNOSIS — E78.5 HYPERLIPIDEMIA, UNSPECIFIED HYPERLIPIDEMIA TYPE: ICD-10-CM

## 2021-10-18 RX ORDER — ATORVASTATIN CALCIUM 20 MG/1
TABLET, FILM COATED ORAL
Qty: 90 TABLET | Refills: 1 | Status: SHIPPED | OUTPATIENT
Start: 2021-10-18 | End: 2022-03-14

## 2021-11-01 ENCOUNTER — TELEPHONE (OUTPATIENT)
Dept: GASTROENTEROLOGY | Facility: AMBULARY SURGERY CENTER | Age: 56
End: 2021-11-01

## 2021-11-01 ENCOUNTER — TELEPHONE (OUTPATIENT)
Dept: INTERNAL MEDICINE CLINIC | Facility: CLINIC | Age: 56
End: 2021-11-01

## 2021-11-03 PROCEDURE — U0005 INFEC AGEN DETEC AMPLI PROBE: HCPCS | Performed by: NURSE PRACTITIONER

## 2021-11-03 PROCEDURE — U0003 INFECTIOUS AGENT DETECTION BY NUCLEIC ACID (DNA OR RNA); SEVERE ACUTE RESPIRATORY SYNDROME CORONAVIRUS 2 (SARS-COV-2) (CORONAVIRUS DISEASE [COVID-19]), AMPLIFIED PROBE TECHNIQUE, MAKING USE OF HIGH THROUGHPUT TECHNOLOGIES AS DESCRIBED BY CMS-2020-01-R: HCPCS | Performed by: NURSE PRACTITIONER

## 2021-11-09 ENCOUNTER — SOCIAL WORK (OUTPATIENT)
Dept: BEHAVIORAL/MENTAL HEALTH CLINIC | Facility: CLINIC | Age: 56
End: 2021-11-09
Payer: COMMERCIAL

## 2021-11-09 DIAGNOSIS — F41.0 PANIC DISORDER WITHOUT AGORAPHOBIA: Primary | Chronic | ICD-10-CM

## 2021-11-09 DIAGNOSIS — F41.1 GAD (GENERALIZED ANXIETY DISORDER): Chronic | ICD-10-CM

## 2021-11-09 DIAGNOSIS — F31.81 BIPOLAR II DISORDER, MOST RECENT EPISODE MAJOR DEPRESSIVE (HCC): Chronic | ICD-10-CM

## 2021-11-09 PROCEDURE — 90834 PSYTX W PT 45 MINUTES: CPT | Performed by: SOCIAL WORKER

## 2021-12-01 ENCOUNTER — TELEPHONE (OUTPATIENT)
Dept: INTERNAL MEDICINE CLINIC | Facility: CLINIC | Age: 56
End: 2021-12-01

## 2021-12-02 ENCOUNTER — APPOINTMENT (OUTPATIENT)
Dept: LAB | Facility: CLINIC | Age: 56
End: 2021-12-02
Payer: COMMERCIAL

## 2021-12-02 DIAGNOSIS — E78.5 HYPERLIPIDEMIA, UNSPECIFIED HYPERLIPIDEMIA TYPE: ICD-10-CM

## 2021-12-02 LAB
ALBUMIN SERPL BCP-MCNC: 3.8 G/DL (ref 3.5–5)
ALP SERPL-CCNC: 85 U/L (ref 46–116)
ALT SERPL W P-5'-P-CCNC: 24 U/L (ref 12–78)
ANION GAP SERPL CALCULATED.3IONS-SCNC: 4 MMOL/L (ref 4–13)
AST SERPL W P-5'-P-CCNC: 13 U/L (ref 5–45)
BILIRUB SERPL-MCNC: 0.33 MG/DL (ref 0.2–1)
BUN SERPL-MCNC: 15 MG/DL (ref 5–25)
CALCIUM SERPL-MCNC: 9.6 MG/DL (ref 8.3–10.1)
CHLORIDE SERPL-SCNC: 107 MMOL/L (ref 100–108)
CO2 SERPL-SCNC: 30 MMOL/L (ref 21–32)
CREAT SERPL-MCNC: 0.73 MG/DL (ref 0.6–1.3)
GFR SERPL CREATININE-BSD FRML MDRD: 92 ML/MIN/1.73SQ M
GLUCOSE P FAST SERPL-MCNC: 106 MG/DL (ref 65–99)
POTASSIUM SERPL-SCNC: 3.8 MMOL/L (ref 3.5–5.3)
PROT SERPL-MCNC: 7.7 G/DL (ref 6.4–8.2)
SODIUM SERPL-SCNC: 141 MMOL/L (ref 136–145)

## 2021-12-02 PROCEDURE — 36415 COLL VENOUS BLD VENIPUNCTURE: CPT

## 2021-12-02 PROCEDURE — 80053 COMPREHEN METABOLIC PANEL: CPT

## 2021-12-07 ENCOUNTER — SOCIAL WORK (OUTPATIENT)
Dept: BEHAVIORAL/MENTAL HEALTH CLINIC | Facility: CLINIC | Age: 56
End: 2021-12-07
Payer: COMMERCIAL

## 2021-12-07 DIAGNOSIS — F41.0 PANIC DISORDER WITHOUT AGORAPHOBIA: Primary | Chronic | ICD-10-CM

## 2021-12-07 DIAGNOSIS — F31.81 BIPOLAR II DISORDER, MOST RECENT EPISODE MAJOR DEPRESSIVE (HCC): Chronic | ICD-10-CM

## 2021-12-07 DIAGNOSIS — F41.1 GAD (GENERALIZED ANXIETY DISORDER): Chronic | ICD-10-CM

## 2021-12-07 PROCEDURE — 90834 PSYTX W PT 45 MINUTES: CPT | Performed by: SOCIAL WORKER

## 2021-12-09 ENCOUNTER — OFFICE VISIT (OUTPATIENT)
Dept: DERMATOLOGY | Facility: CLINIC | Age: 56
End: 2021-12-09
Payer: COMMERCIAL

## 2021-12-09 VITALS — TEMPERATURE: 96.5 F | BODY MASS INDEX: 35.61 KG/M2 | WEIGHT: 235 LBS | HEIGHT: 68 IN

## 2021-12-09 DIAGNOSIS — L81.4 LENTIGO: ICD-10-CM

## 2021-12-09 DIAGNOSIS — D18.01 CHERRY ANGIOMA: ICD-10-CM

## 2021-12-09 DIAGNOSIS — L57.0 KERATOSIS, ACTINIC: ICD-10-CM

## 2021-12-09 DIAGNOSIS — L72.0 MILIUM: ICD-10-CM

## 2021-12-09 DIAGNOSIS — L85.3 XEROSIS OF SKIN: ICD-10-CM

## 2021-12-09 DIAGNOSIS — L73.8 SEBACEOUS HYPERPLASIA: ICD-10-CM

## 2021-12-09 DIAGNOSIS — D22.9 MULTIPLE MELANOCYTIC NEVI: Primary | ICD-10-CM

## 2021-12-09 PROCEDURE — 3008F BODY MASS INDEX DOCD: CPT | Performed by: DERMATOLOGY

## 2021-12-09 PROCEDURE — 99203 OFFICE O/P NEW LOW 30 MIN: CPT | Performed by: DERMATOLOGY

## 2021-12-09 PROCEDURE — 1036F TOBACCO NON-USER: CPT | Performed by: DERMATOLOGY

## 2021-12-09 PROCEDURE — 17000 DESTRUCT PREMALG LESION: CPT | Performed by: DERMATOLOGY

## 2022-01-05 ENCOUNTER — TELEPHONE (OUTPATIENT)
Dept: PSYCHIATRY | Facility: CLINIC | Age: 57
End: 2022-01-05

## 2022-01-05 NOTE — TELEPHONE ENCOUNTER
----- Message from Paul oMrales sent at 1/5/2022 11:59 AM EST -----  Regarding: appt cx  Patient called to cx her appt tomrorrow due to having a procedure done  Pt aware of her next appt

## 2022-01-06 ENCOUNTER — TELEPHONE (OUTPATIENT)
Dept: GASTROENTEROLOGY | Facility: AMBULARY SURGERY CENTER | Age: 57
End: 2022-01-06

## 2022-01-07 ENCOUNTER — ANESTHESIA EVENT (OUTPATIENT)
Dept: GASTROENTEROLOGY | Facility: AMBULARY SURGERY CENTER | Age: 57
End: 2022-01-07

## 2022-01-07 ENCOUNTER — TELEPHONE (OUTPATIENT)
Dept: GASTROENTEROLOGY | Facility: AMBULARY SURGERY CENTER | Age: 57
End: 2022-01-07

## 2022-01-07 ENCOUNTER — ANESTHESIA (OUTPATIENT)
Dept: GASTROENTEROLOGY | Facility: AMBULARY SURGERY CENTER | Age: 57
End: 2022-01-07

## 2022-01-07 ENCOUNTER — HOSPITAL ENCOUNTER (OUTPATIENT)
Dept: GASTROENTEROLOGY | Facility: AMBULARY SURGERY CENTER | Age: 57
Setting detail: OUTPATIENT SURGERY
Discharge: HOME | End: 2022-01-07
Attending: INTERNAL MEDICINE
Payer: COMMERCIAL

## 2022-01-07 VITALS
HEART RATE: 81 BPM | WEIGHT: 213 LBS | BODY MASS INDEX: 32.28 KG/M2 | TEMPERATURE: 97 F | SYSTOLIC BLOOD PRESSURE: 109 MMHG | OXYGEN SATURATION: 96 % | RESPIRATION RATE: 16 BRPM | DIASTOLIC BLOOD PRESSURE: 71 MMHG | HEIGHT: 68 IN

## 2022-01-07 DIAGNOSIS — Z86.010 HX OF COLONIC POLYPS: ICD-10-CM

## 2022-01-07 DIAGNOSIS — K21.9 GASTROESOPHAGEAL REFLUX DISEASE WITHOUT ESOPHAGITIS: ICD-10-CM

## 2022-01-07 PROCEDURE — 43235 EGD DIAGNOSTIC BRUSH WASH: CPT | Performed by: INTERNAL MEDICINE

## 2022-01-07 PROCEDURE — 45378 DIAGNOSTIC COLONOSCOPY: CPT | Performed by: INTERNAL MEDICINE

## 2022-01-07 RX ORDER — SODIUM CHLORIDE, SODIUM LACTATE, POTASSIUM CHLORIDE, CALCIUM CHLORIDE 600; 310; 30; 20 MG/100ML; MG/100ML; MG/100ML; MG/100ML
INJECTION, SOLUTION INTRAVENOUS CONTINUOUS PRN
Status: DISCONTINUED | OUTPATIENT
Start: 2022-01-07 | End: 2022-01-07

## 2022-01-07 RX ORDER — PROPOFOL 10 MG/ML
INJECTION, EMULSION INTRAVENOUS AS NEEDED
Status: DISCONTINUED | OUTPATIENT
Start: 2022-01-07 | End: 2022-01-07

## 2022-01-07 RX ADMIN — PROPOFOL 50 MG: 10 INJECTION, EMULSION INTRAVENOUS at 09:25

## 2022-01-07 RX ADMIN — SODIUM CHLORIDE, SODIUM LACTATE, POTASSIUM CHLORIDE, AND CALCIUM CHLORIDE: .6; .31; .03; .02 INJECTION, SOLUTION INTRAVENOUS at 09:19

## 2022-01-07 RX ADMIN — PROPOFOL 50 MG: 10 INJECTION, EMULSION INTRAVENOUS at 09:30

## 2022-01-07 RX ADMIN — PROPOFOL 50 MG: 10 INJECTION, EMULSION INTRAVENOUS at 09:35

## 2022-01-07 RX ADMIN — PROPOFOL 150 MG: 10 INJECTION, EMULSION INTRAVENOUS at 09:22

## 2022-01-07 RX ADMIN — PROPOFOL 50 MG: 10 INJECTION, EMULSION INTRAVENOUS at 09:27

## 2022-01-07 NOTE — H&P
History and Physical - [facility]  Gaurang Champion 61 y o  female MRN: [id number]        HPI:  79-year-old female history of anxiety, GERD was tested positive for cologuard  Had colonoscopy many years ago had colon polyps removed      Historical Information   Past Medical History:   Diagnosis Date    Abnormal liver scan     last assessed 8/14/2014    Anxiety     Atypical chest pain     last assessed 2/3/2016    Benign colonic polyp     Bladder disorder     last assessed 1/22/2013    Cervical radiculopathy     Chronic ITP (idiopathic thrombocytopenia) (HCC)     Chronic lumbar radiculopathy     Chronic neck pain     Chronic pain     neck and back    Dermatitis     Ganglion cyst     Herpes simplex     Hyperlipidemia     Luteinized follicular cyst     Menorrhagia     Obesity     Panic disorder without agoraphobia 1/30/2018    Urge and stress incontinence     Uterine fibroid      Past Surgical History:   Procedure Laterality Date    CHOLECYSTECTOMY      COLONOSCOPY      HYSTERECTOMY      MELO    ORIF TIBIA & FIBULA FRACTURES Left 4/27/2018    Procedure: OPEN REDUCTION W/ INTERNAL FIXATION (ORIF) ANKLE;  Surgeon: Frankie Hernández MD;  [facility];  Service: Orthopedics    TOOTH EXTRACTION      last assessed 3/20/2017, oral surgery     Social History   Social History     Substance and Sexual Activity   Alcohol Use No     Social History     Substance and Sexual Activity   Drug Use No     Social History     Tobacco Use   Smoking Status Current Every Day Smoker    Packs/day: 0 25    Types: Cigarettes   Smokeless Tobacco Never Used     Family History   Problem Relation Age of Onset    Stroke Father     Psychosis Father     Bipolar disorder Mother     Lung cancer Mother     Schizoaffective Disorder  Son     Alcohol abuse Neg Hx     Substance Abuse Neg Hx     Suicidality Neg Hx        Meds/Allergies     (Not in a hospital admission)      Allergies   Allergen Reactions  Buspar [Buspirone] Hives and Rash       Objective     Blood pressure 133/61, pulse 94, temperature 97 5 °F (36 4 °C), temperature source Temporal, resp  rate 18, height 5' 8" (1 727 m), weight 96 6 kg (213 lb), SpO2 96 %      PHYSICAL EXAM:    Gen: NAD  CV: S1 & S2 normal, RRR  CHEST: Clear to auscultate  ABD: soft, NT/ND, good bowel sounds  EXT: no edema    ASSESSMENT:     GERD, history of colon polyps, positive cologuard    PLAN:    EGD and colonoscopy

## 2022-01-07 NOTE — ANESTHESIA PREPROCEDURE EVALUATION
Procedure:  COLONOSCOPY  EGD    Relevant Problems   CARDIO   (+) Chest pain   (+) Hyperlipidemia   (+) Mitral regurgitation      GI/HEPATIC   (+) Gastroesophageal reflux disease      HEMATOLOGY   (+) Chronic idiopathic thrombocytopenic purpura (HCC)   (+) Thrombocytopenia (HCC)      NEURO/PSYCH   (+) DREAD (generalized anxiety disorder)   (+) Panic disorder without agoraphobia      Other   (+) Bipolar II disorder, most recent episode major depressive (HCC)   (+) Chronic idiopathic thrombocytopenic purpura (HCC)   (+) Tobacco abuse        Physical Exam    Airway    Mallampati score: II  TM Distance: >3 FB  Neck ROM: full     Dental   No notable dental hx     Cardiovascular      Pulmonary      Other Findings        Anesthesia Plan  ASA Score- 3     Anesthesia Type- IV sedation with anesthesia with ASA Monitors  Additional Monitors:   Airway Plan: NTT  Plan Factors-Exercise tolerance (METS): >4 METS  Chart reviewed  Patient summary reviewed  Obstructive sleep apnea risk education given perioperatively  Induction- intravenous  Postoperative Plan-     Informed Consent- Anesthetic plan and risks discussed with patient  I personally reviewed this patient with the CRNA  Discussed and agreed on the Anesthesia Plan with the CRNA  Lucius Ashford

## 2022-01-13 DIAGNOSIS — F41.1 GAD (GENERALIZED ANXIETY DISORDER): Chronic | ICD-10-CM

## 2022-01-13 DIAGNOSIS — F31.81 BIPOLAR II DISORDER, MOST RECENT EPISODE MAJOR DEPRESSIVE (HCC): Primary | Chronic | ICD-10-CM

## 2022-01-13 DIAGNOSIS — F41.0 PANIC DISORDER WITHOUT AGORAPHOBIA: Chronic | ICD-10-CM

## 2022-01-14 RX ORDER — DULOXETIN HYDROCHLORIDE 60 MG/1
60 CAPSULE, DELAYED RELEASE ORAL 2 TIMES DAILY
Qty: 180 CAPSULE | Refills: 0 | Status: SHIPPED | OUTPATIENT
Start: 2022-01-14 | End: 2022-03-08 | Stop reason: SDUPTHER

## 2022-02-08 ENCOUNTER — SOCIAL WORK (OUTPATIENT)
Dept: BEHAVIORAL/MENTAL HEALTH CLINIC | Facility: CLINIC | Age: 57
End: 2022-02-08
Payer: COMMERCIAL

## 2022-02-08 DIAGNOSIS — F41.1 GAD (GENERALIZED ANXIETY DISORDER): Chronic | ICD-10-CM

## 2022-02-08 DIAGNOSIS — F41.0 PANIC DISORDER WITHOUT AGORAPHOBIA: Primary | Chronic | ICD-10-CM

## 2022-02-08 DIAGNOSIS — F31.81 BIPOLAR II DISORDER, MOST RECENT EPISODE MAJOR DEPRESSIVE (HCC): Chronic | ICD-10-CM

## 2022-02-08 PROCEDURE — 90834 PSYTX W PT 45 MINUTES: CPT | Performed by: SOCIAL WORKER
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
## 2022-03-08 ENCOUNTER — OFFICE VISIT (OUTPATIENT)
Dept: PSYCHIATRY | Facility: CLINIC | Age: 57
End: 2022-03-08
Payer: COMMERCIAL

## 2022-03-08 VITALS
BODY MASS INDEX: 36.07 KG/M2 | DIASTOLIC BLOOD PRESSURE: 76 MMHG | HEART RATE: 96 BPM | SYSTOLIC BLOOD PRESSURE: 125 MMHG | HEIGHT: 68 IN | WEIGHT: 238 LBS

## 2022-03-08 DIAGNOSIS — F31.81 BIPOLAR II DISORDER, MOST RECENT EPISODE MAJOR DEPRESSIVE (HCC): Primary | Chronic | ICD-10-CM

## 2022-03-08 DIAGNOSIS — F41.0 PANIC DISORDER WITHOUT AGORAPHOBIA: Chronic | ICD-10-CM

## 2022-03-08 DIAGNOSIS — F41.1 GAD (GENERALIZED ANXIETY DISORDER): Chronic | ICD-10-CM

## 2022-03-08 PROCEDURE — 3725F SCREEN DEPRESSION PERFORMED: CPT | Performed by: PSYCHIATRY & NEUROLOGY

## 2022-03-08 PROCEDURE — 90833 PSYTX W PT W E/M 30 MIN: CPT | Performed by: PSYCHIATRY & NEUROLOGY

## 2022-03-08 PROCEDURE — 3008F BODY MASS INDEX DOCD: CPT | Performed by: NURSE PRACTITIONER

## 2022-03-08 PROCEDURE — 99214 OFFICE O/P EST MOD 30 MIN: CPT | Performed by: PSYCHIATRY & NEUROLOGY

## 2022-03-08 RX ORDER — LAMOTRIGINE 150 MG/1
150 TABLET ORAL 2 TIMES DAILY
Qty: 180 TABLET | Refills: 2 | Status: SHIPPED | OUTPATIENT
Start: 2022-03-08 | End: 2022-12-03

## 2022-03-08 RX ORDER — DULOXETIN HYDROCHLORIDE 60 MG/1
60 CAPSULE, DELAYED RELEASE ORAL 2 TIMES DAILY
Qty: 180 CAPSULE | Refills: 0 | Status: SHIPPED | OUTPATIENT
Start: 2022-03-08 | End: 2022-06-06

## 2022-03-08 RX ORDER — LORAZEPAM 0.5 MG/1
0.5 TABLET ORAL 4 TIMES DAILY PRN
Qty: 120 TABLET | Refills: 3 | Status: SHIPPED | OUTPATIENT
Start: 2022-03-08 | End: 2022-07-06

## 2022-03-11 DIAGNOSIS — E78.5 HYPERLIPIDEMIA, UNSPECIFIED HYPERLIPIDEMIA TYPE: ICD-10-CM

## 2022-03-14 ENCOUNTER — SOCIAL WORK (OUTPATIENT)
Dept: BEHAVIORAL/MENTAL HEALTH CLINIC | Facility: CLINIC | Age: 57
End: 2022-03-14
Payer: COMMERCIAL

## 2022-03-14 DIAGNOSIS — F41.1 GAD (GENERALIZED ANXIETY DISORDER): Primary | Chronic | ICD-10-CM

## 2022-03-14 DIAGNOSIS — F31.81 BIPOLAR II DISORDER, MOST RECENT EPISODE MAJOR DEPRESSIVE (HCC): Chronic | ICD-10-CM

## 2022-03-14 DIAGNOSIS — F41.0 PANIC DISORDER WITHOUT AGORAPHOBIA: Chronic | ICD-10-CM

## 2022-03-14 PROCEDURE — 90834 PSYTX W PT 45 MINUTES: CPT | Performed by: SOCIAL WORKER

## 2022-03-14 RX ORDER — ATORVASTATIN CALCIUM 20 MG/1
TABLET, FILM COATED ORAL
Qty: 90 TABLET | Refills: 1 | Status: SHIPPED | OUTPATIENT
Start: 2022-03-14
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
## 2022-03-21 ENCOUNTER — OFFICE VISIT (OUTPATIENT)
Dept: INTERNAL MEDICINE CLINIC | Facility: CLINIC | Age: 57
End: 2022-03-21
Payer: COMMERCIAL

## 2022-03-21 VITALS
BODY MASS INDEX: 36.19 KG/M2 | TEMPERATURE: 97.5 F | SYSTOLIC BLOOD PRESSURE: 130 MMHG | DIASTOLIC BLOOD PRESSURE: 84 MMHG | HEIGHT: 68 IN | OXYGEN SATURATION: 97 % | HEART RATE: 91 BPM

## 2022-03-21 DIAGNOSIS — M62.838 CERVICAL PARASPINOUS MUSCLE SPASM: Primary | ICD-10-CM

## 2022-03-21 DIAGNOSIS — M54.12 CERVICAL RADICULOPATHY: ICD-10-CM

## 2022-03-21 PROCEDURE — 4004F PT TOBACCO SCREEN RCVD TLK: CPT | Performed by: NURSE PRACTITIONER

## 2022-03-21 PROCEDURE — 99213 OFFICE O/P EST LOW 20 MIN: CPT | Performed by: NURSE PRACTITIONER

## 2022-03-21 RX ORDER — CYCLOBENZAPRINE HCL 5 MG
5 TABLET ORAL 2 TIMES DAILY PRN
Qty: 60 TABLET | Refills: 0 | Status: SHIPPED | OUTPATIENT
Start: 2022-03-21

## 2022-03-21 NOTE — PROGRESS NOTES
Assessment/Plan:    Advised to take aleve twice daily with food for 3-5 days  Start muscle relaxer twice a day as needed- may cause sedation, avoid driving  Apply warm moist heat to neck 20 minutes 4 times daily  If pain persists, ok to start physical therapy  Discussed medrol dose pack if not getting relief over the next few days  Diagnoses and all orders for this visit:    Cervical paraspinous muscle spasm  -     cyclobenzaprine (FLEXERIL) 5 mg tablet; Take 1 tablet (5 mg total) by mouth 2 (two) times a day as needed for muscle spasms  -     Ambulatory Referral to Physical Therapy; Future    Cervical radiculopathy  -     cyclobenzaprine (FLEXERIL) 5 mg tablet; Take 1 tablet (5 mg total) by mouth 2 (two) times a day as needed for muscle spasms  -     Ambulatory Referral to Physical Therapy; Future          Subjective:       Patient ID: Marlys Persaud is a 61 y o  female  Here today with complaints of pain in between her shoulder blades   Symptoms started 5 days ago   She denies any trauma or injury   She woke up with her typical left sided neck pain that radiates down to her fingers  However the pain has now started radiating to her right neck and down her right arm  She has occasional numbness and tingling in he fingers  The pain is worse when she takes deep breaths or moves both arms above her head  It also hurts when moving her head from side to side  She has been having difficultly sleeping due to pain  She tried changing her pillow which did not help  She has taken aleve occasionally without relief  The following portions of the patient's history were reviewed and updated as appropriate: allergies, current medications, past family history, past medical history, past social history, past surgical history and problem list     Review of Systems   Constitutional: Negative for activity change and fatigue  Respiratory: Negative for shortness of breath      Cardiovascular: Negative for chest pain    Gastrointestinal: Negative for abdominal pain  Musculoskeletal: Positive for arthralgias, back pain and neck pain  Skin: Negative for rash  Neurological: Positive for numbness  Negative for dizziness, weakness, light-headedness and headaches  Objective:      /84   Pulse 91   Temp 97 5 °F (36 4 °C)   Ht 5' 8" (1 727 m)   SpO2 97%   BMI 36 19 kg/m²          Physical Exam  Vitals reviewed  Constitutional:       Appearance: Normal appearance  HENT:      Head: Normocephalic and atraumatic  Eyes:      Conjunctiva/sclera: Conjunctivae normal    Cardiovascular:      Rate and Rhythm: Normal rate and regular rhythm  Heart sounds: Normal heart sounds  Pulmonary:      Effort: Pulmonary effort is normal       Breath sounds: Normal breath sounds  Musculoskeletal:      Cervical back: Spasms and tenderness present  Pain with movement present  Normal range of motion  Skin:     Findings: No rash  Neurological:      Mental Status: She is alert and oriented to person, place, and time     Psychiatric:         Mood and Affect: Mood normal          Behavior: Behavior normal

## 2022-03-23 ENCOUNTER — EVALUATION (OUTPATIENT)
Dept: PHYSICAL THERAPY | Facility: CLINIC | Age: 57
End: 2022-03-23
Payer: COMMERCIAL

## 2022-03-23 DIAGNOSIS — M54.12 CERVICAL RADICULOPATHY: ICD-10-CM

## 2022-03-23 DIAGNOSIS — M62.838 CERVICAL PARASPINOUS MUSCLE SPASM: ICD-10-CM

## 2022-03-23 PROCEDURE — 97161 PT EVAL LOW COMPLEX 20 MIN: CPT

## 2022-03-23 PROCEDURE — 97112 NEUROMUSCULAR REEDUCATION: CPT

## 2022-03-23 NOTE — PROGRESS NOTES
PT Evaluation     Today's date: 3/23/2022  Patient name: Nadeem Trotter  : 1965  MRN: [id number]  Referring provider: CALEB Gould  Dx:   Encounter Diagnosis     ICD-10-CM    1  Cervical paraspinous muscle spasm  M62 838 Ambulatory Referral to Physical Therapy   2  Cervical radiculopathy  M54 12 Ambulatory Referral to Physical Therapy                  Assessment  Assessment details: Nadeem Trotter is a 61 y o  female who presents with chronic L cervical radic as well as acute right-sided radiculopathy  Pt with no red flags present at this time  Patient presents with intact sensation and no clear myotomal deficits, but demonstrates shoulder weakness  Patient also with limited cervical AROM and decreased tension of median nerve bilaterally  Due to these impairments, the patient has difficulty performing most daily activities as well as neck movements and sleeping  Patient would benefit from skilled physical therapy to address the impairments, improve their level of function, and to improve their overall quality of life  Impairments: abnormal or restricted ROM, abnormal movement, activity intolerance, lacks appropriate home exercise program, pain with function and poor posture   Prognosis details: Positive prognostic indicators include: absence of observed red flags, positive attitude towards recovery, good understanding of diagnosis and treatment plan   Negative prognostic indicators include: chronic sx previously     Goals  Short Term Goals: to be achieved by 4 weeks  1) Patient to be independent with basic HEP  2) Decrease pain to 610 at its worst   3) Increase cervical spine ROM by 5-10 degrees in all deficient planes  4) Increase UE strength by 1/2 MMT grade in all deficient planes  5) Nerve tension 90 deg elbow flexion or better     Long Term Goals: to be achieved by discharge  1) FOTO equal to or greater than expected  2) Patient to be independent with comprehensive HEP    3) Cervical spine ROM WNL all planes to improve a/iadls  4) Increase UE strength to 1 MMT grade in all planes to improve a/iadls  5) Lifting is improved to maximal level of function       Plan  Patient would benefit from: PT eval and skilled physical therapy  Planned modality interventions: low level laser therapy  Planned therapy interventions: manual therapy, neuromuscular re-education, patient education, self care, therapeutic activities, therapeutic exercise, activity modification and home exercise program  Frequency: 2x/week for 4-6 weeks  Treatment plan discussed with: patient        Subjective Evaluation    History of Present Illness  Mechanism of injury: History: Pt reports chronic L arm numbness and tingling from cervical radic, but she woke up last week with right-sided pain as well and down the right arm  She previously did PT for the left side which improved her symptoms but did not fully resolve  Currently, she has right-sided shoulder blade and upper trap discomfort  She also has occasional tingling down into the right hand  Aggravating: sitting, sleeping  Alleviating: heat, Aleve   24 hours: no change; no change in last 3 days    Functional limitations: bothers her during ADLs but able to complete what she needs to   Social support: on disability; lives with son   Hobbies/occupation: enjoys getting out and walking   Imaging: 3/2020:Potentially significant left C5-C6 foraminal stenosis, correlate for left C6 radiculitis  Red flags: Corrine Allen denies a new onset of Bladder incontinence and Bowel dysfunction  She feels headaches that coincide with radicular symptoms  Denies drop attacks  Occasionally feels left-sided face numbness when her left arm flares up but notes that she's gotten this checked out before    Pain  Current pain ratin  At best pain ratin  At worst pain ratin  Location: right neck and shoulder blade   Quality: sharp    Patient Goals  Patient goals for therapy: decreased pain          Objective     Concurrent Complaints  Positive for night pain, disturbed sleep and headaches  Negative for nausea/motion sickness, tinnitus, trouble swallowing, difficulty breathing and shortness of breath    Palpation     Right   Tenderness of the suboccipitals and upper trapezius  Neurological Testing     Sensation   Cervical/Thoracic   Left   Intact: light touch    Right   Intact: light touch    Reflexes   Left   Biceps (C5/C6): normal (2+)  Triceps (C7): normal (2+)  Garcia's reflex: negative    Right   Biceps (C5/C6): normal (2+)  Triceps (C7): trace (1+)  Garcia's reflex: negative    Active Range of Motion   Cervical/Thoracic Spine       Cervical    Flexion: 30 degrees  with pain  Extension: 30 (provokes radicular sx) degrees     with pain  Left lateral flexion: 15 (shoulder ache) degrees     with pain  Right lateral flexion: 20 (shoulder ache) degrees     with pain  Left rotation: 60 degrees with pain  Right rotation: 55 degrees    with pain    Joint Play     Hypomobile: C2 and C3     Strength/Myotome Testing     Left Shoulder     Planes of Motion   Flexion: 5   Abduction: 4+   External rotation at 0°: 5   Internal rotation at 0°: 5     Right Shoulder     Planes of Motion   Flexion: 4+   Abduction: 4-   External rotation at 0°: 5   Internal rotation at 0°: 5     Left Elbow   Flexion: 5  Extension: 5    Right Elbow   Flexion: 4+  Extension: 4+    Left Wrist/Hand   Wrist extension: 5  Wrist flexion: 5  Thumb extension: 5    Right Wrist/Hand   Wrist extension: 5  Wrist flexion: 5  Thumb extension: 5    Tests       Thoracic   Chest expansion test positive: (immediate sx)      Left Shoulder   Positive ULTT1  Right Shoulder   Positive ULTT1  Additional Tests Details  Distraction in supine increases symptoms   ULTT 10 deg ea side   Neuro Exam:     Headaches   Patient reports headaches: Yes                Precautions: Bipolar II, Depression      Manuals 3/23            Cervical traction- intermittent             OA/AA mobs             Nerve glides                           Neuro Re-Ed             Cervical retractions 10x            Scap retractions 10x            Nerve glides             DNF                                                    Ther Ex                          Thoracic open book                                                                                           Ther Activity                                       Education             Sleeping positioning RISHI                         Modalities

## 2022-03-30 ENCOUNTER — APPOINTMENT (OUTPATIENT)
Dept: PHYSICAL THERAPY | Facility: CLINIC | Age: 57
End: 2022-03-30
Payer: COMMERCIAL

## 2022-04-04 ENCOUNTER — SOCIAL WORK (OUTPATIENT)
Dept: BEHAVIORAL/MENTAL HEALTH CLINIC | Facility: CLINIC | Age: 57
End: 2022-04-04
Payer: COMMERCIAL

## 2022-04-04 DIAGNOSIS — F41.0 PANIC DISORDER WITHOUT AGORAPHOBIA: Primary | Chronic | ICD-10-CM

## 2022-04-04 DIAGNOSIS — F41.1 GAD (GENERALIZED ANXIETY DISORDER): Chronic | ICD-10-CM

## 2022-04-04 DIAGNOSIS — F31.81 BIPOLAR II DISORDER, MOST RECENT EPISODE MAJOR DEPRESSIVE (HCC): Chronic | ICD-10-CM

## 2022-04-04 PROCEDURE — 90834 PSYTX W PT 45 MINUTES: CPT | Performed by: SOCIAL WORKER

## 2022-04-04 NOTE — PSYCH
Psychotherapy Provided: Individual Psychotherapy 50 minutes     Length of time in session: 50 minutes, follow up in 2 week    Encounter Diagnosis     ICD-10-CM    1  Panic disorder without agoraphobia  F41 0    2  DREAD (generalized anxiety disorder)  F41 1    3  Bipolar II disorder, most recent episode major depressive ([facility] [address] )  F31 81        Goals addressed in session: Goal 1, Goal 2 and Goal 3      Pain:      moderate to severe    6    Current suicide risk : Low     D: Met with Janine individually  'I am so stressed it's horrible'  Explored specific circumstances regarding toxic home environment; [employer] and [employer] want Zhane Hawking out of the house - feel she's not trying to find another place to live  Discussed volunteer options, part time job to help pass the time and distraction  Has an appointment for training at [facility] doing well - working at old job  A:  Depressive symptoms have greatly increased - situational   Denied SI      P: Continue individual therapy    Behavioral Health Treatment Plan  Luke: Diagnosis and Treatment Plan explained to Cathi Ingram relates understanding diagnosis and is agreeable to Treatment Plan   Yes

## 2022-05-02 NOTE — PROGRESS NOTES
Pt discharged from therapy at this time  Therapist reached out to patient several times who reported feeling better; pt able to return to therapy if needed

## 2022-05-11 ENCOUNTER — SOCIAL WORK (OUTPATIENT)
Dept: BEHAVIORAL/MENTAL HEALTH CLINIC | Facility: CLINIC | Age: 57
End: 2022-05-11
Payer: COMMERCIAL

## 2022-05-11 DIAGNOSIS — F41.0 PANIC DISORDER WITHOUT AGORAPHOBIA: Primary | Chronic | ICD-10-CM

## 2022-05-11 DIAGNOSIS — F31.81 BIPOLAR II DISORDER, MOST RECENT EPISODE MAJOR DEPRESSIVE (HCC): Chronic | ICD-10-CM

## 2022-05-11 DIAGNOSIS — F41.1 GAD (GENERALIZED ANXIETY DISORDER): Chronic | ICD-10-CM

## 2022-05-11 PROCEDURE — 90834 PSYTX W PT 45 MINUTES: CPT | Performed by: SOCIAL WORKER

## 2022-05-11 NOTE — PSYCH
Psychotherapy Provided: Individual Psychotherapy 50 minutes     Length of time in session: 50 minutes, follow up in 1 month    Encounter Diagnosis     ICD-10-CM    1  Panic disorder without agoraphobia  F41 0    2  DREAD (generalized anxiety disorder)  F41 1    3  Bipolar II disorder, most recent episode major depressive ([facility] [address] )  F31 81        Goals addressed in session: Goal 1, Goal 2 and Goal 3      Pain:      moderate to severe    5    Current suicide risk : Low     D: Met with Janine individually  'Nothing much going on here'  Completed all clearances to volunteer at the [facility]  Proud of self for completing  Remains in son's home  Finances preventing this  Still seeing grandchildren when able  Happy with their progress  Lorenza Sicard stated she quit smoking X10 days  Request to attend every 2 months as 'there is much to talk about'  When asked, Lorenza Sicard feels she isn't ready to discuss 'past issues'  A: Lorenza Sicard presented with appropriate mood and affect  Progress remains slow - follow through is biggest obstacle with Lorenza Sicard - a lot of excuses  She is realizing a lot is interpersonal relationships  P: Continue individual therapy    Behavioral Health Treatment Plan  Luke: Diagnosis and Treatment Plan explained to Shea Duran relates understanding diagnosis and is agreeable to Treatment Plan   Yes

## 2022-05-11 NOTE — BH TREATMENT PLAN
Hien Diaz  1965       Date of Initial Treatment Plan: 5/1/17   Date of Current Treatment Plan: 5/11/22      Treatment Plan Number 13     Strengths/Personal Resources for Self Care: Caring, grandmother     Diagnosis:   1  Bipolar II disorder, most recent episode major depressive ([facility] [address] )      2  DREAD (generalized anxiety disorder)      3  Panic disorder without agoraphobia      4  Unresolved grief            Area of Needs: Anxiety, depression, overwhelmed with family situations, setting boundaries      Long Term Goal 1:        I am able to function daily without overwhelming myself  Target Date:  11/5/22  Completion Date: TBD         Short Term Objectives for Goal 1:   1  Read  2  Remember what is in/out of my control  3 Address mindless eating      Long Term Goal 2:        My anxiety/depression has greatly improved  Target Date:   11/5/22  Completion Date: TBD     Short Term Objectives for Goal 2:   1  Volunteer  2  Address self esteem  3  I get my health under control  4  Finding another place to live          Long Term Goal # 3:        I am addressing my self sabatouge  Target Date:  11/5/22  Completion Date: TBD     Short Term Objectives for Goal 3:   1  I am capable of having friends  2  I must challenge feeling comfortable in my misery  3   Pessimism - thought stopping - cognitive reframing        GOAL 1: Modality: Individual therapy Every 2 months  Completion Date TBD                                  Medication management every 3 months   Completion Date: TBD                                   Persons responsible for goals: Carlos Agosto     GOAL 2: Modality: Individual therapy Every 2 months   Completion Date TBD                                  Medication management every 3 months   Completion Date:  TBD                                  Persons responsible for goals: Carlos Sheffield and Dr Agosto     GOAL 3: Modality: Individual therapy Every 2 months   Completion Date TBD                                  Medication management every 3 months   Completion Date: TBD                                  Persons responsible for goals: Kyra Kelley and Dr Codi Whitaker and Treatment Plan explained to Janine, Janine relates understanding diagnosis and is agreeable to Treatment Plan          Client Comments : Please share your thoughts, feelings, need and/or experiences regarding your treatment plan:

## 2022-06-04 DIAGNOSIS — F41.1 GAD (GENERALIZED ANXIETY DISORDER): Chronic | ICD-10-CM

## 2022-06-04 DIAGNOSIS — F31.81 BIPOLAR II DISORDER, MOST RECENT EPISODE MAJOR DEPRESSIVE (HCC): Primary | Chronic | ICD-10-CM

## 2022-06-04 DIAGNOSIS — F41.0 PANIC DISORDER WITHOUT AGORAPHOBIA: Chronic | ICD-10-CM

## 2022-06-06 RX ORDER — DULOXETIN HYDROCHLORIDE 60 MG/1
CAPSULE, DELAYED RELEASE ORAL
Qty: 180 CAPSULE | Refills: 0 | Status: SHIPPED | OUTPATIENT
Start: 2022-06-06

## 2022-06-07 ENCOUNTER — TELEPHONE (OUTPATIENT)
Dept: INTERNAL MEDICINE CLINIC | Facility: CLINIC | Age: 57
End: 2022-06-07

## 2022-06-07 NOTE — TELEPHONE ENCOUNTER
Pt declined virtual visit, states she is treating her symptoms at home, aware to call us if symptoms do not improve or to follow up on symptoms in a few days

## 2022-06-07 NOTE — TELEPHONE ENCOUNTER
Pt  Di two home covid tests yesterday and both were pos  Started with muscle aches, sweats over the weekend

## 2022-07-14 ENCOUNTER — SOCIAL WORK (OUTPATIENT)
Dept: BEHAVIORAL/MENTAL HEALTH CLINIC | Facility: CLINIC | Age: 57
End: 2022-07-14
Payer: COMMERCIAL

## 2022-07-14 DIAGNOSIS — F31.81 BIPOLAR II DISORDER, MOST RECENT EPISODE MAJOR DEPRESSIVE (HCC): Chronic | ICD-10-CM

## 2022-07-14 DIAGNOSIS — F41.0 PANIC DISORDER WITHOUT AGORAPHOBIA: Primary | Chronic | ICD-10-CM

## 2022-07-14 DIAGNOSIS — F41.1 GAD (GENERALIZED ANXIETY DISORDER): Chronic | ICD-10-CM

## 2022-07-14 PROCEDURE — 90834 PSYTX W PT 45 MINUTES: CPT | Performed by: SOCIAL WORKER

## 2022-07-25 ENCOUNTER — APPOINTMENT (OUTPATIENT)
Dept: RADIOLOGY | Age: 57
End: 2022-07-25
Payer: COMMERCIAL

## 2022-07-25 ENCOUNTER — TELEPHONE (OUTPATIENT)
Dept: INTERNAL MEDICINE CLINIC | Facility: CLINIC | Age: 57
End: 2022-07-25

## 2022-07-25 DIAGNOSIS — M79.675 GREAT TOE PAIN, LEFT: Primary | ICD-10-CM

## 2022-07-25 DIAGNOSIS — M79.675 GREAT TOE PAIN, LEFT: ICD-10-CM

## 2022-07-25 PROCEDURE — 73630 X-RAY EXAM OF FOOT: CPT

## 2022-07-25 NOTE — TELEPHONE ENCOUNTER
Pt bumped her left long toe about three weeks ago  She's concerned because it is still swollen and black and blue  She'd like to see a podiatrist and would like your recommendation

## 2022-07-25 NOTE — TELEPHONE ENCOUNTER
I put in an order for an xray to get done of her left foot and also referral to a podiatrist  Keep the foot elevated as much as possible  Wear proper fitted shoes
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
## 2022-08-24 ENCOUNTER — OFFICE VISIT (OUTPATIENT)
Dept: PSYCHIATRY | Facility: CLINIC | Age: 57
End: 2022-08-24
Payer: COMMERCIAL

## 2022-08-24 VITALS
HEART RATE: 89 BPM | BODY MASS INDEX: 36.68 KG/M2 | SYSTOLIC BLOOD PRESSURE: 133 MMHG | WEIGHT: 242 LBS | HEIGHT: 68 IN | DIASTOLIC BLOOD PRESSURE: 81 MMHG

## 2022-08-24 DIAGNOSIS — F41.1 GAD (GENERALIZED ANXIETY DISORDER): Chronic | ICD-10-CM

## 2022-08-24 DIAGNOSIS — F31.81 BIPOLAR II DISORDER, MOST RECENT EPISODE MAJOR DEPRESSIVE (HCC): Primary | Chronic | ICD-10-CM

## 2022-08-24 DIAGNOSIS — F41.0 PANIC DISORDER WITHOUT AGORAPHOBIA: Chronic | ICD-10-CM

## 2022-08-24 PROCEDURE — 3725F SCREEN DEPRESSION PERFORMED: CPT | Performed by: PSYCHIATRY & NEUROLOGY

## 2022-08-24 PROCEDURE — 90833 PSYTX W PT W E/M 30 MIN: CPT | Performed by: PSYCHIATRY & NEUROLOGY

## 2022-08-24 PROCEDURE — 99214 OFFICE O/P EST MOD 30 MIN: CPT | Performed by: PSYCHIATRY & NEUROLOGY

## 2022-08-24 RX ORDER — LAMOTRIGINE 150 MG/1
150 TABLET ORAL 2 TIMES DAILY
Qty: 180 TABLET | Refills: 2 | Status: SHIPPED | OUTPATIENT
Start: 2022-08-24 | End: 2023-05-21

## 2022-08-24 RX ORDER — DULOXETIN HYDROCHLORIDE 60 MG/1
60 CAPSULE, DELAYED RELEASE ORAL 2 TIMES DAILY
Qty: 180 CAPSULE | Refills: 2 | Status: SHIPPED | OUTPATIENT
Start: 2022-08-24 | End: 2023-05-21

## 2022-08-24 RX ORDER — LORAZEPAM 0.5 MG/1
0.5 TABLET ORAL 4 TIMES DAILY PRN
Qty: 120 TABLET | Refills: 3 | Status: SHIPPED | OUTPATIENT
Start: 2022-08-24 | End: 2022-12-22

## 2022-09-06 ENCOUNTER — TELEPHONE (OUTPATIENT)
Dept: PSYCHIATRY | Facility: CLINIC | Age: 57
End: 2022-09-06

## 2022-09-06 DIAGNOSIS — E78.5 HYPERLIPIDEMIA, UNSPECIFIED HYPERLIPIDEMIA TYPE: Primary | ICD-10-CM

## 2022-09-06 DIAGNOSIS — F41.1 GAD (GENERALIZED ANXIETY DISORDER): ICD-10-CM

## 2022-09-06 NOTE — TELEPHONE ENCOUNTER
Pt cx appt with Ree Him on 9/8/22 during confirmation calls due to work schedule, Pt would like a call back to reschedule  Thank you follow-up 11/17/22

## 2022-09-09 ENCOUNTER — TELEPHONE (OUTPATIENT)
Dept: INTERNAL MEDICINE CLINIC | Facility: CLINIC | Age: 57
End: 2022-09-09

## 2022-09-09 NOTE — TELEPHONE ENCOUNTER
She should be seen in the office  If she's unable to come today please ask what color the drainage is? Is she having clear drainage or yellow rope like, yellow thick? Any eye pain or vision changes?

## 2022-09-09 NOTE — TELEPHONE ENCOUNTER
It could be more of an allergic conjunctivitis since she is having no yellow drainage  The eye drops for this type of conjunctivitis are now OTC   She should start pataday 1 drop twice daily until resolved  Call if symptoms persist or worsen

## 2022-09-09 NOTE — TELEPHONE ENCOUNTER
Both of pt 's eyes hurt, burn and are red inside with a little crusting  Said she has gotten pink eye a lot before and know this is what it is  Would like med  Send to [facility] in [place]

## 2022-09-09 NOTE — TELEPHONE ENCOUNTER
Pt states she has no drainage   They are very red under the lid   The little crusting she has is clear

## 2022-09-11 ENCOUNTER — OFFICE VISIT (OUTPATIENT)
Dept: URGENT CARE | Age: 57
End: 2022-09-11
Payer: COMMERCIAL

## 2022-09-11 ENCOUNTER — APPOINTMENT (OUTPATIENT)
Dept: LAB | Age: 57
End: 2022-09-11
Payer: COMMERCIAL

## 2022-09-11 VITALS
HEART RATE: 82 BPM | TEMPERATURE: 97 F | OXYGEN SATURATION: 99 % | RESPIRATION RATE: 18 BRPM | SYSTOLIC BLOOD PRESSURE: 140 MMHG | DIASTOLIC BLOOD PRESSURE: 92 MMHG

## 2022-09-11 DIAGNOSIS — F41.1 GAD (GENERALIZED ANXIETY DISORDER): ICD-10-CM

## 2022-09-11 DIAGNOSIS — H10.9 CONJUNCTIVITIS OF BOTH EYES, UNSPECIFIED CONJUNCTIVITIS TYPE: Primary | ICD-10-CM

## 2022-09-11 DIAGNOSIS — E78.5 HYPERLIPIDEMIA, UNSPECIFIED HYPERLIPIDEMIA TYPE: ICD-10-CM

## 2022-09-11 LAB
ALBUMIN SERPL BCP-MCNC: 3.6 G/DL (ref 3.5–5)
ALP SERPL-CCNC: 81 U/L (ref 46–116)
ALT SERPL W P-5'-P-CCNC: 29 U/L (ref 12–78)
ANION GAP SERPL CALCULATED.3IONS-SCNC: 2 MMOL/L (ref 4–13)
AST SERPL W P-5'-P-CCNC: 15 U/L (ref 5–45)
BASOPHILS # BLD AUTO: 0.04 THOUSANDS/ΜL (ref 0–0.1)
BASOPHILS NFR BLD AUTO: 1 % (ref 0–1)
BILIRUB SERPL-MCNC: 0.46 MG/DL (ref 0.2–1)
BUN SERPL-MCNC: 12 MG/DL (ref 5–25)
CALCIUM SERPL-MCNC: 9.1 MG/DL (ref 8.3–10.1)
CHLORIDE SERPL-SCNC: 109 MMOL/L (ref 96–108)
CHOLEST SERPL-MCNC: 181 MG/DL
CO2 SERPL-SCNC: 28 MMOL/L (ref 21–32)
CREAT SERPL-MCNC: 0.75 MG/DL (ref 0.6–1.3)
EOSINOPHIL # BLD AUTO: 0.3 THOUSAND/ΜL (ref 0–0.61)
EOSINOPHIL NFR BLD AUTO: 5 % (ref 0–6)
ERYTHROCYTE [DISTWIDTH] IN BLOOD BY AUTOMATED COUNT: 13.7 % (ref 11.6–15.1)
GFR SERPL CREATININE-BSD FRML MDRD: 88 ML/MIN/1.73SQ M
GLUCOSE P FAST SERPL-MCNC: 96 MG/DL (ref 65–99)
HCT VFR BLD AUTO: 38.8 % (ref 34.8–46.1)
HDLC SERPL-MCNC: 78 MG/DL
HGB BLD-MCNC: 12.1 G/DL (ref 11.5–15.4)
IMM GRANULOCYTES # BLD AUTO: 0.01 THOUSAND/UL (ref 0–0.2)
IMM GRANULOCYTES NFR BLD AUTO: 0 % (ref 0–2)
LDLC SERPL CALC-MCNC: 91 MG/DL (ref 0–100)
LDLC SERPL DIRECT ASSAY-MCNC: 87 MG/DL (ref 0–100)
LYMPHOCYTES # BLD AUTO: 1.43 THOUSANDS/ΜL (ref 0.6–4.47)
LYMPHOCYTES NFR BLD AUTO: 26 % (ref 14–44)
MCH RBC QN AUTO: 28.7 PG (ref 26.8–34.3)
MCHC RBC AUTO-ENTMCNC: 31.2 G/DL (ref 31.4–37.4)
MCV RBC AUTO: 92 FL (ref 82–98)
MONOCYTES # BLD AUTO: 0.48 THOUSAND/ΜL (ref 0.17–1.22)
MONOCYTES NFR BLD AUTO: 9 % (ref 4–12)
NEUTROPHILS # BLD AUTO: 3.32 THOUSANDS/ΜL (ref 1.85–7.62)
NEUTS SEG NFR BLD AUTO: 59 % (ref 43–75)
NONHDLC SERPL-MCNC: 103 MG/DL
NRBC BLD AUTO-RTO: 0 /100 WBCS
PLATELET # BLD AUTO: 109 THOUSANDS/UL (ref 149–390)
PMV BLD AUTO: 13.4 FL (ref 8.9–12.7)
POTASSIUM SERPL-SCNC: 4.1 MMOL/L (ref 3.5–5.3)
PROT SERPL-MCNC: 7.5 G/DL (ref 6.4–8.4)
RBC # BLD AUTO: 4.21 MILLION/UL (ref 3.81–5.12)
SODIUM SERPL-SCNC: 139 MMOL/L (ref 135–147)
TRIGL SERPL-MCNC: 62 MG/DL
TSH SERPL DL<=0.05 MIU/L-ACNC: 1.19 UIU/ML (ref 0.45–4.5)
WBC # BLD AUTO: 5.58 THOUSAND/UL (ref 4.31–10.16)

## 2022-09-11 PROCEDURE — 80061 LIPID PANEL: CPT

## 2022-09-11 PROCEDURE — 99213 OFFICE O/P EST LOW 20 MIN: CPT | Performed by: NURSE PRACTITIONER

## 2022-09-11 PROCEDURE — 83721 ASSAY OF BLOOD LIPOPROTEIN: CPT

## 2022-09-11 PROCEDURE — 36415 COLL VENOUS BLD VENIPUNCTURE: CPT

## 2022-09-11 PROCEDURE — 85025 COMPLETE CBC W/AUTO DIFF WBC: CPT

## 2022-09-11 PROCEDURE — 80053 COMPREHEN METABOLIC PANEL: CPT

## 2022-09-11 PROCEDURE — 84443 ASSAY THYROID STIM HORMONE: CPT

## 2022-09-11 RX ORDER — GENTAMICIN SULFATE 3 MG/ML
1 SOLUTION/ DROPS OPHTHALMIC EVERY 4 HOURS
Qty: 5 ML | Refills: 0 | Status: SHIPPED | OUTPATIENT
Start: 2022-09-11

## 2022-09-11 NOTE — PROGRESS NOTES
[facility]        NAME: Ronald Walters is a 61 y o  female  : 1965    MRN: [id number]  DATE: 2022  TIME: 10:02 AM    Assessment and Plan   Conjunctivitis of both eyes, unspecified conjunctivitis type [H10 9]  1  Conjunctivitis of both eyes, unspecified conjunctivitis type  gentamicin (GARAMYCIN) 0 3 % ophthalmic solution         Patient Instructions     Use med as directed  Follow up with PCP in 3-5 days  Proceed to  ER if symptoms worsen  Chief Complaint     Chief Complaint   Patient presents with    Eye Problem     Patient reports that for a week she has some crusting discharge in AM and her lower eye lid is red- been using over counter allergy medication which has nt helped- vision with glasses 20/30 both eyes         History of Present Illness       HPI   Reports eye pain and eye discharge for several days  Getting worse  Review of Systems   Review of Systems   Constitutional: Negative for chills and fever  HENT: Negative for trouble swallowing  Eyes: Positive for pain, discharge, redness and itching  Negative for visual disturbance  Respiratory: Negative for chest tightness and shortness of breath  Cardiovascular: Negative for chest pain           Current Medications       Current Outpatient Medications:     gentamicin (GARAMYCIN) 0 3 % ophthalmic solution, Administer 1 drop to both eyes every 4 (four) hours X 5 days, Disp: 5 mL, Rfl: 0    atorvastatin (LIPITOR) 20 mg tablet, TAKE 1 TABLET DAILY, Disp: 90 tablet, Rfl: 0    DULoxetine (CYMBALTA) 60 mg delayed release capsule, Take 1 capsule (60 mg total) by mouth 2 (two) times a day, Disp: 180 capsule, Rfl: 2    lamoTRIgine (LaMICtal) 150 MG tablet, Take 1 tablet (150 mg total) by mouth 2 (two) times a day, Disp: 180 tablet, Rfl: 2    LORazepam (ATIVAN) 0 5 mg tablet, Take 1 tablet (0 5 mg total) by mouth 4 (four) times a day as needed for anxiety, Disp: 120 tablet, Rfl: 3    pantoprazole (PROTONIX) 40 mg tablet, Take 1 tablet (40 mg total) by mouth daily, Disp: 90 tablet, Rfl: 0    Current Allergies     Allergies as of 09/11/2022 - Reviewed 09/11/2022   Allergen Reaction Noted    Buspar [buspirone] Hives and Rash 06/04/2015            The following portions of the patient's history were reviewed and updated as appropriate: allergies, current medications, past family history, past medical history, past social history, past surgical history and problem list      Past Medical History:   Diagnosis Date    Abnormal liver scan     last assessed 8/14/2014    Anxiety     Atypical chest pain     last assessed 2/3/2016    Benign colonic polyp     Bladder disorder     last assessed 1/22/2013    Cervical radiculopathy     Chronic ITP (idiopathic thrombocytopenia) (HCC)     Chronic lumbar radiculopathy     Chronic neck pain     Chronic pain     neck and back    Dermatitis     Ganglion cyst     Herpes simplex     Hyperlipidemia     Luteinized follicular cyst     Menorrhagia     Obesity     Panic disorder without agoraphobia 01/30/2018    Urge and stress incontinence     Uterine fibroid        Past Surgical History:   Procedure Laterality Date    CHOLECYSTECTOMY      COLONOSCOPY      HYSTERECTOMY      MELO    ORIF TIBIA & FIBULA FRACTURES Left 4/27/2018    Procedure: OPEN REDUCTION W/ INTERNAL FIXATION (ORIF) ANKLE;  Surgeon: Gina Beebe MD;  [facility];  Service: Orthopedics    TOOTH EXTRACTION      last assessed 3/20/2017, oral surgery       Family History   Problem Relation Age of Onset    Stroke Father     Psychosis Father     Bipolar disorder Mother     Lung cancer Mother     Schizoaffective Disorder  Son     Alcohol abuse Neg Hx     Substance Abuse Neg Hx     Suicidality Neg Hx          Medications have been verified          Objective   /92 (BP Location: Right arm, Patient Position: Sitting, Cuff Size: Large)   Pulse 82   Temp (!) 97 °F (36 1 °C)   Resp 18   SpO2 99% No LMP recorded  Patient has had a hysterectomy  Physical Exam     Physical Exam  Constitutional:       Appearance: She is not ill-appearing or diaphoretic  HENT:      Right Ear: Tympanic membrane normal       Left Ear: Tympanic membrane normal       Nose: Rhinorrhea present  Eyes:      General:         Right eye: Discharge present  Left eye: Discharge present  Extraocular Movements: Extraocular movements intact  Pupils: Pupils are equal, round, and reactive to light  Cardiovascular:      Rate and Rhythm: Regular rhythm  Heart sounds: Normal heart sounds  Pulmonary:      Effort: Pulmonary effort is normal       Breath sounds: Normal breath sounds

## 2022-09-12 ENCOUNTER — SOCIAL WORK (OUTPATIENT)
Dept: BEHAVIORAL/MENTAL HEALTH CLINIC | Facility: CLINIC | Age: 57
End: 2022-09-12
Payer: COMMERCIAL

## 2022-09-12 DIAGNOSIS — F41.1 GAD (GENERALIZED ANXIETY DISORDER): Chronic | ICD-10-CM

## 2022-09-12 DIAGNOSIS — F41.0 PANIC DISORDER WITHOUT AGORAPHOBIA: Primary | Chronic | ICD-10-CM

## 2022-09-12 DIAGNOSIS — F31.81 BIPOLAR II DISORDER, MOST RECENT EPISODE MAJOR DEPRESSIVE (HCC): Chronic | ICD-10-CM

## 2022-09-12 PROCEDURE — 90834 PSYTX W PT 45 MINUTES: CPT | Performed by: SOCIAL WORKER

## 2022-09-12 NOTE — BH TREATMENT PLAN
Elona Aschoff  1965       Date of Initial Treatment Plan: 5/1/17   Date of Current Treatment Plan: 9/12/22      Treatment Plan Number 14     Strengths/Personal Resources for Self Care: Caring, grandmother     Diagnosis:   1  Bipolar II disorder, most recent episode major depressive ([facility] [address] )      2  DREDA (generalized anxiety disorder)      3  Panic disorder without agoraphobia      4  Unresolved grief            Area of Needs: Anxiety, depression, overwhelmed with family situations, setting boundaries      Long Term Goal 1:        I am able to function daily without overwhelming myself  Target Date:  3/6/23  Completion Date: TBD         Short Term Objectives for Goal 1:   1  Read  2  Remember what is in/out of my control  3 Address mindless eating      Long Term Goal 2:        My anxiety/depression has greatly improved  Target Date:   3/6/23  Completion Date: TBD     Short Term Objectives for Goal 2:   1  Volunteer  2  Address self esteem  3  I get my health under control  4  Finding another place to live          Long Term Goal # 3:        I am addressing my self sabatouge  Target Date:  3/6/23  Completion Date: TBD     Short Term Objectives for Goal 3:   1  I am capable of having friends  2  I must challenge feeling comfortable in my misery  3   Pessimism - thought stopping - cognitive reframing        GOAL 1: Modality: Individual therapy Every 2 months  Completion Date TBD                                  Medication management every 3 months   Completion Date: TBD                                   Persons responsible for goals: Bonnie Sharif and Dr Alvin Perez     GOAL 2: Modality: Individual therapy Every 2 months   Completion Date TBD                                  Medication management every 3 months   Completion Date:  TBD                                  Persons responsible for goals: Bonnie Sharif and Dr Alvin Perez     GOAL 3: Modality: Individual therapy Every 2 months   Completion Date TBD                                  Medication management every 3 months   Completion Date: TBD                                  Persons responsible for goals: Teetee Aleman and Dr Agosto         **Signature pad not working - Meme Holcomb agrees with plan     [facility]: Diagnosis and Treatment Plan explained to Janine, Janine relates understanding diagnosis and is agreeable to Treatment Plan          Client Comments : Please share your thoughts, feelings, need and/or experiences regarding your treatment plan:

## 2022-09-12 NOTE — PSYCH
Psychotherapy Provided: Individual Psychotherapy 50 minutes     Length of time in session: 50 minutes, follow up in 2 month    Encounter Diagnosis     ICD-10-CM    1  Panic disorder without agoraphobia  F41 0    2  DREAD (generalized anxiety disorder)  F41 1    3  Bipolar II disorder, most recent episode major depressive ([facility] [address] )  F31 81        Goals addressed in session: Goal 1 and Goal 2     Pain:      none    0    Current suicide risk : Low     D: Met with Janine individually  'I get lonely at times but I'm ok I guess '  Specific family dynamics regarding [place] and [employer]    Adrienne [place] doing well - June French states she not happy with how he's living his life but this is out of her control  June French works at [employer] now that 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  Janine visits grandchildren - Hailey Mckeon had a birthday party and siblings and cousins were invited all together  Lonely in apartment - using food to fill emotions - exercising at [employer] after work 3x/week  Denied SI    A: June French presented with baseline mood and affect  Encouraging additional social opportunities but June French is still a bit resistant  Dating a possibility  P: Continue individual therapy    Behavioral Health Treatment Plan [facility]: Diagnosis and Treatment Plan explained to Pattie Motley relates understanding diagnosis and is agreeable to Treatment Plan   Yes

## 2022-09-14 ENCOUNTER — OFFICE VISIT (OUTPATIENT)
Dept: URGENT CARE | Facility: MEDICAL CENTER | Age: 57
End: 2022-09-14
Payer: COMMERCIAL

## 2022-09-14 VITALS
TEMPERATURE: 97 F | WEIGHT: 243 LBS | HEART RATE: 74 BPM | RESPIRATION RATE: 18 BRPM | BODY MASS INDEX: 36.83 KG/M2 | HEIGHT: 68 IN | OXYGEN SATURATION: 99 %

## 2022-09-14 DIAGNOSIS — H10.13 ACUTE ATOPIC CONJUNCTIVITIS OF BOTH EYES: Primary | ICD-10-CM

## 2022-09-14 PROCEDURE — 99213 OFFICE O/P EST LOW 20 MIN: CPT | Performed by: PHYSICIAN ASSISTANT

## 2022-09-14 RX ORDER — LORATADINE 10 MG/1
10 TABLET ORAL DAILY
Qty: 20 TABLET | Refills: 0 | Status: SHIPPED | OUTPATIENT
Start: 2022-09-14

## 2022-09-14 NOTE — PATIENT INSTRUCTIONS
Conjunctivitis   WHAT YOU NEED TO KNOW:   Conjunctivitis, or pink eye, is inflammation of your conjunctiva  The conjunctiva is a thin tissue that covers the front of your eye and the back of your eyelids  The conjunctiva helps protect your eye and keep it moist  Conjunctivitis may be caused by bacteria, allergies, or a virus  If your conjunctivitis is caused by bacteria, it may get better on its own in about 7 days  Viral conjunctivitis can last up to 3 weeks  DISCHARGE INSTRUCTIONS:   Return to the emergency department if:   You have worsening eye pain  The swelling in your eye gets worse, even after treatment  Your vision suddenly becomes worse or you cannot see at all  Contact your healthcare provider if:   You develop a fever and ear pain  You have tiny bumps or spots of blood on your eye  You have questions or concerns about your condition or care  Manage your symptoms:   Apply a cool compress  Wet a washcloth with cold water and place it on your eye  This will help decrease itching and irritation  Do not wear contact lenses  They can irritate your eye  Throw away the pair you are using and ask when you can wear them again  Use a new pair of lenses when your healthcare provider says it is okay  Avoid irritants  Stay away from smoke filled areas  Shield your eyes from wind and sun  Flush your eye  You may need to flush your eye with saline to help decrease your symptoms  Ask for more information on how to flush your eye  Medicines:  Treatment depends on what is causing your conjunctivitis  You may be given any of the following: Allergy medicine  helps decrease itchy, red, swollen eyes caused by allergies  It may be given as a pill, eye drops, or nasal spray  Antibiotics  may be needed if your conjunctivitis is caused by bacteria  This medicine may be given as a pill, eye drops, or eye ointment  Take your medicine as directed    Contact your healthcare provider if you think your medicine is not helping or if you have side effects  Tell him or her if you are allergic to any medicine  Keep a list of the medicines, vitamins, and herbs you take  Include the amounts, and when and why you take them  Bring the list or the pill bottles to follow-up visits  Carry your medicine list with you in case of an emergency  Prevent the spread of conjunctivitis:   Wash your hands with soap and water often  Wash your hands before and after you touch your eyes  Also wash your hands before you prepare or eat food and after you use the bathroom or change a diaper  Avoid allergens  Try to avoid the things that cause your allergies, such as pets, dust, or grass  Avoid contact with others  Do not share towels or washcloths  Try to stay away from others as much as possible  Ask when you can return to work or school  Throw away eye makeup  The bacteria that caused your conjunctivitis can stay in eye makeup  Throw away mascara and other eye makeup  © Copyright [employer] 2022 Information is for End User's use only and may not be sold, redistributed or otherwise used for commercial purposes  All illustrations and images included in CareNotes® are the copyrighted property of A D A M , Inc  or [facility] Gabe Hanna   The above information is an  only  It is not intended as medical advice for individual conditions or treatments  Talk to your doctor, nurse or pharmacist before following any medical regimen to see if it is safe and effective for you

## 2022-09-14 NOTE — PROGRESS NOTES
[facility] [place] [facility]        NAME: Tomás Romano is a 61 y o  female  : 1965    MRN: [id number]  DATE: 2022  TIME: 12:17 PM    Assessment and Plan   Acute atopic conjunctivitis of both eyes [H10 13]  1  Acute atopic conjunctivitis of both eyes           Patient Instructions   Manage your symptoms:   · Apply a cool compress  Wet a washcloth with cold water and place it on your eye  This will help decrease itching and irritation  · Do not wear contact lenses  They can irritate your eye  Throw away the pair you are using and ask when you can wear them again  Use a new pair of lenses when your healthcare provider says it is okay  · Avoid irritants  Stay away from smoke filled areas  Shield your eyes from wind and sun  · Flush your eye  You may need to flush your eye with saline to help decrease your symptoms  Ask for more information on how to flush your eye  Medicines:  Treatment depends on what is causing your conjunctivitis  You may be given any of the following:  · Allergy medicine  helps decrease itchy, red, swollen eyes caused by allergies  It may be given as a pill, eye drops, or nasal spray  · Antibiotics  may be needed if your conjunctivitis is caused by bacteria  This medicine may be given as a pill, eye drops, or eye ointment  · Take your medicine as directed  Contact your healthcare provider if you think your medicine is not helping or if you have side effects  Tell him or her if you are allergic to any medicine  Keep a list of the medicines, vitamins, and herbs you take  Include the amounts, and when and why you take them  Bring the list or the pill bottles to follow-up visits  Carry your medicine list with you in case of an emergency  Follow up with PCP in 3-5 days  Proceed to  ER if symptoms worsen  Chief Complaint     Chief Complaint   Patient presents with    Eye Pain     Pt states she was seen  for pink eye  States she has not improved  History of Present Illness       Patient is a 58-year-old female with report of bilateral red eyes, pruritic eyes with morning matting and dry discharge for 2 weeks  The patient has been prescribed a topical ABX antibiotic last week ago with no resolve  She denies injury to her eyes, headaches, ocular pain, purulent discharge, visual disturbance, halos, 0 cloudy vision  Eye Pain   Pertinent negatives include no fever, nausea, photophobia or vomiting  Review of Systems   Review of Systems   Constitutional: Negative for activity change, appetite change, chills and fever  HENT: Positive for rhinorrhea  Negative for congestion, ear pain, sore throat and tinnitus  Eyes: Negative for photophobia, pain and visual disturbance  Eye discomfort with cessation of feeling gritty   Respiratory: Negative for cough, shortness of breath and wheezing  Gastrointestinal: Negative for abdominal pain, diarrhea, nausea and vomiting  Musculoskeletal: Negative for gait problem and neck stiffness  Skin: Negative for pallor and rash  Neurological: Negative for dizziness, light-headedness, numbness and headaches  Psychiatric/Behavioral: Negative            Current Medications       Current Outpatient Medications:     atorvastatin (LIPITOR) 20 mg tablet, TAKE 1 TABLET DAILY, Disp: 90 tablet, Rfl: 0    DULoxetine (CYMBALTA) 60 mg delayed release capsule, Take 1 capsule (60 mg total) by mouth 2 (two) times a day, Disp: 180 capsule, Rfl: 2    gentamicin (GARAMYCIN) 0 3 % ophthalmic solution, Administer 1 drop to both eyes every 4 (four) hours X 5 days, Disp: 5 mL, Rfl: 0    lamoTRIgine (LaMICtal) 150 MG tablet, Take 1 tablet (150 mg total) by mouth 2 (two) times a day, Disp: 180 tablet, Rfl: 2    LORazepam (ATIVAN) 0 5 mg tablet, Take 1 tablet (0 5 mg total) by mouth 4 (four) times a day as needed for anxiety, Disp: 120 tablet, Rfl: 3    pantoprazole (PROTONIX) 40 mg tablet, Take 1 tablet (40 mg total) by mouth daily, Disp: 90 tablet, Rfl: 0    Current Allergies     Allergies as of 09/14/2022 - Reviewed 09/14/2022   Allergen Reaction Noted    Buspar [buspirone] Hives and Rash 06/04/2015            The following portions of the patient's history were reviewed and updated as appropriate: allergies, current medications, past family history, past medical history, past social history, past surgical history and problem list      Past Medical History:   Diagnosis Date    Abnormal liver scan     last assessed 8/14/2014    Anxiety     Atypical chest pain     last assessed 2/3/2016    Benign colonic polyp     Bladder disorder     last assessed 1/22/2013    Cervical radiculopathy     Chronic ITP (idiopathic thrombocytopenia) (HCC)     Chronic lumbar radiculopathy     Chronic neck pain     Chronic pain     neck and back    Dermatitis     Ganglion cyst     Herpes simplex     Hyperlipidemia     Luteinized follicular cyst     Menorrhagia     Obesity     Panic disorder without agoraphobia 01/30/2018    Urge and stress incontinence     Uterine fibroid        Past Surgical History:   Procedure Laterality Date    CHOLECYSTECTOMY      COLONOSCOPY      HYSTERECTOMY      MELO    ORIF TIBIA & FIBULA FRACTURES Left 4/27/2018    Procedure: OPEN REDUCTION W/ INTERNAL FIXATION (ORIF) ANKLE;  Surgeon: Farzana Matos MD;  Location: [facility];  Service: Orthopedics    TOOTH EXTRACTION      last assessed 3/20/2017, oral surgery       Family History   Problem Relation Age of Onset    Stroke Father     Psychosis Father     Bipolar disorder Mother     Lung cancer Mother     Schizoaffective Disorder  Son     Alcohol abuse Neg Hx     Substance Abuse Neg Hx     Suicidality Neg Hx          Medications have been verified          Objective   Pulse 74   Temp (!) 97 °F (36 1 °C)   Resp 18   Ht 5' 8" (1 727 m)   Wt 110 kg (243 lb)   SpO2 99%   BMI 36 95 kg/m²        Physical Exam     Physical Exam  Vitals and nursing note reviewed  Constitutional:       General: She is not in acute distress  Appearance: Normal appearance  She is not ill-appearing  HENT:      Head: Normocephalic and atraumatic  Right Ear: Tympanic membrane, ear canal and external ear normal       Left Ear: Tympanic membrane, ear canal and external ear normal       Nose: Congestion and rhinorrhea present  Mouth/Throat:      Mouth: Mucous membranes are moist       Pharynx: Oropharynx is clear  No posterior oropharyngeal erythema  Eyes:      Extraocular Movements: Extraocular movements intact  Pupils: Pupils are equal, round, and reactive to light  Comments: Using a hand held sneelen chart OU 20/25, OD20/30, OS 20/30 with corrective lenses  Sclerae slightly injected there is no purulent discharge bilaterally  Eyelids are slightly hyperemic  Cardiovascular:      Rate and Rhythm: Normal rate and regular rhythm  Pulses: Normal pulses  Heart sounds: Normal heart sounds  Pulmonary:      Effort: Pulmonary effort is normal  No respiratory distress  Breath sounds: Normal breath sounds  Musculoskeletal:         General: Normal range of motion  Cervical back: Normal range of motion and neck supple  Lymphadenopathy:      Cervical: No cervical adenopathy  Skin:     General: Skin is warm and dry  Findings: No rash  Neurological:      General: No focal deficit present  Mental Status: She is alert and oriented to person, place, and time  Gait: Gait normal    Psychiatric:         Mood and Affect: Mood normal          Behavior: Behavior normal          Thought Content:  Thought content normal          Judgment: Judgment normal

## 2022-11-07 ENCOUNTER — OFFICE VISIT (OUTPATIENT)
Dept: INTERNAL MEDICINE CLINIC | Facility: CLINIC | Age: 57
End: 2022-11-07

## 2022-11-07 VITALS
DIASTOLIC BLOOD PRESSURE: 74 MMHG | HEART RATE: 86 BPM | SYSTOLIC BLOOD PRESSURE: 112 MMHG | TEMPERATURE: 97.4 F | OXYGEN SATURATION: 96 %

## 2022-11-07 DIAGNOSIS — Z72.0 TOBACCO ABUSE: ICD-10-CM

## 2022-11-07 DIAGNOSIS — B34.9 VIRAL INFECTION: Primary | ICD-10-CM

## 2022-11-07 DIAGNOSIS — E78.5 HYPERLIPIDEMIA, UNSPECIFIED HYPERLIPIDEMIA TYPE: ICD-10-CM

## 2022-11-07 DIAGNOSIS — Z12.31 SCREENING MAMMOGRAM FOR BREAST CANCER: ICD-10-CM

## 2022-11-07 DIAGNOSIS — F41.1 GAD (GENERALIZED ANXIETY DISORDER): Chronic | ICD-10-CM

## 2022-11-07 NOTE — PROGRESS NOTES
Name: Snehal Treviño      : 1965      MRN: [id number]  Encounter Provider: CALEB Pro  Encounter Date: 2022   Encounter department: [facility]     1  Viral infection  Comments:  viral or could be allergy related  start flonase 1 spray in each nostril daily  2  DREAD (generalized anxiety disorder)  Assessment & Plan: With increased stress the last few months  On medications per psychiatry  Sees a counselor monthly, recommend increasing to twice monthly if possible  3  Hyperlipidemia, unspecified hyperlipidemia type  Assessment & Plan: On statin  Follow a low fat diet  4  Screening mammogram for breast cancer  -     Mammo screening bilateral w 3d & cad; Future; Expected date: 2022    5  Tobacco abuse  -     CT lung screening program; Future; Expected date: 2022         Finesse Anthony is here today with complaints of a headache, sore throat, and cough  Symptoms started about a week ago   She has a dry cough  No fevers  She had a negative covid test today  She quit smoking 6 months ago on her own  Since then she gets a burning in her throat when she takes a deep breath in  Since then she's gained some weight  She has been eating more  She is lonely  She moved out of her house and is now living on her own  She has been spending time with her grand kids which bring her arsalan  She reports a few severe headaches after having a dream    It resolves on its own  She denies any vision changes  She does admit to jaw clenching  She is under increased stress  She sees her counselor next week  Review of Systems   Constitutional: Negative for activity change, appetite change, fatigue and fever  HENT: Positive for sore throat  Negative for congestion and rhinorrhea  Eyes: Negative for visual disturbance  Respiratory: Positive for cough   Negative for chest tightness, shortness of breath and wheezing  Cardiovascular: Negative for chest pain  Gastrointestinal: Negative for abdominal pain  Genitourinary: Negative for difficulty urinating  Musculoskeletal: Negative for arthralgias  Neurological: Positive for headaches  Negative for dizziness, weakness and light-headedness  Psychiatric/Behavioral: Negative for dysphoric mood and sleep disturbance  The patient is not nervous/anxious  Current Outpatient Medications on File Prior to Visit   Medication Sig   • atorvastatin (LIPITOR) 20 mg tablet TAKE 1 TABLET DAILY   • DULoxetine (CYMBALTA) 60 mg delayed release capsule Take 1 capsule (60 mg total) by mouth 2 (two) times a day   • gentamicin (GARAMYCIN) 0 3 % ophthalmic solution Administer 1 drop to both eyes every 4 (four) hours X 5 days   • lamoTRIgine (LaMICtal) 150 MG tablet Take 1 tablet (150 mg total) by mouth 2 (two) times a day   • loratadine (CLARITIN) 10 mg tablet Take 1 tablet (10 mg total) by mouth daily   • LORazepam (ATIVAN) 0 5 mg tablet Take 1 tablet (0 5 mg total) by mouth 4 (four) times a day as needed for anxiety   • pantoprazole (PROTONIX) 40 mg tablet Take 1 tablet (40 mg total) by mouth daily       Objective     /74 (BP Location: Left arm, Patient Position: Sitting, Cuff Size: Adult)   Pulse 86   Temp (!) 97 4 °F (36 3 °C)   SpO2 96%     Reviewed recent labs with patient     Physical Exam  Vitals reviewed  Constitutional:       Appearance: Normal appearance  HENT:      Head: Normocephalic and atraumatic  Mouth/Throat:      Pharynx: Posterior oropharyngeal erythema present  No oropharyngeal exudate  Eyes:      Conjunctiva/sclera: Conjunctivae normal    Cardiovascular:      Rate and Rhythm: Normal rate and regular rhythm  Heart sounds: Normal heart sounds  Pulmonary:      Effort: Pulmonary effort is normal       Breath sounds: Normal breath sounds  Musculoskeletal:         General: Normal range of motion     Skin:     General: Skin is warm and dry    Neurological:      General: No focal deficit present  Mental Status: She is alert and oriented to person, place, and time     Psychiatric:         Mood and Affect: Mood normal          Behavior: Behavior normal        Ruthie Bumpers, CRNP

## 2022-11-07 NOTE — ASSESSMENT & PLAN NOTE
With increased stress the last few months  On medications per psychiatry  Sees a counselor monthly, recommend increasing to twice monthly if possible

## 2022-11-12 DIAGNOSIS — K21.9 GASTROESOPHAGEAL REFLUX DISEASE WITHOUT ESOPHAGITIS: ICD-10-CM

## 2022-11-12 RX ORDER — PANTOPRAZOLE SODIUM 40 MG/1
TABLET, DELAYED RELEASE ORAL
Qty: 90 TABLET | Refills: 3 | Status: SHIPPED | OUTPATIENT
Start: 2022-11-12

## 2022-11-14 ENCOUNTER — TELEPHONE (OUTPATIENT)
Dept: INTERNAL MEDICINE CLINIC | Facility: CLINIC | Age: 57
End: 2022-11-14

## 2022-11-14 NOTE — TELEPHONE ENCOUNTER
I know she mentioned this during her appointment, but how long has she had the sore throat and coughing  Any fevers? Is she bringing up anything with the cough? Shortness of breath? And has the dry heaving issue been going on for the same amount of time? Or is this a separate issue?

## 2022-11-14 NOTE — TELEPHONE ENCOUNTER
Patient states the sore throat is from the coughing   No fevers   No mucus and no SOB   Has had since a few days before she saw you     She states hard to say if it is related to the dry heaving , happens so randomly   Asking if she should see GI?

## 2022-11-14 NOTE — TELEPHONE ENCOUNTER
Pt 's throat feels worse and is coughing a lot-is also dry heaving  her throat is burning from coughing so much  Did Flonase for a couple of days but it bothered her  Has been taking Ibuprofen  She states that certain smells give her dry heaves-crayons at work triggered dry heaves and a diesel  by her  Roberto  this never happened before

## 2022-11-14 NOTE — TELEPHONE ENCOUNTER
Yes recommend seeing GI since she is already taking the protonix and still having cough/dry heaving which could be related to reflux

## 2022-11-17 DIAGNOSIS — E78.5 HYPERLIPIDEMIA, UNSPECIFIED HYPERLIPIDEMIA TYPE: ICD-10-CM

## 2022-11-18 RX ORDER — ATORVASTATIN CALCIUM 20 MG/1
TABLET, FILM COATED ORAL
Qty: 90 TABLET | Refills: 0 | Status: SHIPPED | OUTPATIENT
Start: 2022-11-18

## 2022-11-21 ENCOUNTER — SOCIAL WORK (OUTPATIENT)
Dept: BEHAVIORAL/MENTAL HEALTH CLINIC | Facility: CLINIC | Age: 57
End: 2022-11-21

## 2022-11-21 DIAGNOSIS — F41.1 GAD (GENERALIZED ANXIETY DISORDER): Chronic | ICD-10-CM

## 2022-11-21 DIAGNOSIS — F41.0 PANIC DISORDER WITHOUT AGORAPHOBIA: Primary | Chronic | ICD-10-CM

## 2022-11-21 DIAGNOSIS — F31.81 BIPOLAR II DISORDER, MOST RECENT EPISODE MAJOR DEPRESSIVE (HCC): Chronic | ICD-10-CM

## 2022-11-21 PROBLEM — F43.12 CHRONIC POST-TRAUMATIC STRESS DISORDER (PTSD): Status: ACTIVE | Noted: 2022-11-21

## 2022-12-01 ENCOUNTER — TELEPHONE (OUTPATIENT)
Dept: GASTROENTEROLOGY | Facility: CLINIC | Age: 57
End: 2022-12-01

## 2022-12-01 DIAGNOSIS — K21.9 GASTROESOPHAGEAL REFLUX DISEASE WITHOUT ESOPHAGITIS: ICD-10-CM

## 2022-12-01 RX ORDER — PANTOPRAZOLE SODIUM 40 MG/1
40 TABLET, DELAYED RELEASE ORAL DAILY
Qty: 90 TABLET | Refills: 2 | Status: SHIPPED | OUTPATIENT
Start: 2022-12-01

## 2022-12-01 NOTE — TELEPHONE ENCOUNTER
Patients GI provider:  Dr Rolo Mckinley    Number to return call: [phone]     Reason for call: Pt called and stated she has been having issues with pharmacy refilling her medication pantoprazole please review and prescription [facility] thank you     Scheduled procedure/appointment date if applicable: n/a

## 2022-12-13 ENCOUNTER — TELEPHONE (OUTPATIENT)
Dept: INTERNAL MEDICINE CLINIC | Facility: CLINIC | Age: 57
End: 2022-12-13

## 2022-12-13 NOTE — TELEPHONE ENCOUNTER
Her protonix was sent in by the GI doctor on 12/1 with refills  Sent to [facility]  Is she interested in treatment for covid? If so schedule a virtual visit  If not continue symptomatic treatment  Monitor shortness of breath, recommend getting a pulse ox- should be >90%

## 2022-12-19 ENCOUNTER — TELEPHONE (OUTPATIENT)
Dept: DERMATOLOGY | Age: 57
End: 2022-12-19

## 2022-12-19 NOTE — TELEPHONE ENCOUNTER
Can try over the counter shampoos such as head and shoulder clinical strength or Dove anti-dandruff shampoo, Selsun blue, Neutrogena T sal and T gel

## 2022-12-19 NOTE — TELEPHONE ENCOUNTER
Spoke with patient and provided list of over the counter shampoos that patient can use until her appointment

## 2022-12-19 NOTE — TELEPHONE ENCOUNTER
Pt left vm to make appt  Cb and made future appt in March    Believes she has psoriasis on her scalp and would like to know if a shampoo can be recommended or what she can do until her appt    Please call back [phone]

## 2022-12-23 ENCOUNTER — HOSPITAL ENCOUNTER (OUTPATIENT)
Dept: RADIOLOGY | Facility: MEDICAL CENTER | Age: 57
Discharge: HOME | End: 2022-12-23

## 2022-12-23 DIAGNOSIS — Z72.0 TOBACCO ABUSE: ICD-10-CM

## 2023-01-05 ENCOUNTER — COSMETIC (OUTPATIENT)
Dept: DERMATOLOGY | Facility: CLINIC | Age: 58
End: 2023-01-05

## 2023-01-05 ENCOUNTER — OFFICE VISIT (OUTPATIENT)
Dept: DERMATOLOGY | Facility: CLINIC | Age: 58
End: 2023-01-05

## 2023-01-05 VITALS — WEIGHT: 230 LBS | BODY MASS INDEX: 34.86 KG/M2 | HEIGHT: 68 IN

## 2023-01-05 DIAGNOSIS — L82.1 SEBORRHEIC KERATOSES: Primary | ICD-10-CM

## 2023-01-05 NOTE — PATIENT INSTRUCTIONS
1  SEBORRHEIC KERATOSIS; NON-INFLAMED  Assessment and Plan:  Based on a thorough discussion of this condition and the management approach to it (including a comprehensive discussion of the known risks, side effects and potential benefits of treatment), the patient (family) agrees to implement the following specific plan:  Reassure benign     Seborrheic Keratosis  A seborrheic keratosis is a harmless warty spot that appears during adult life as a common sign of skin aging  Seborrheic keratoses can arise on any area of skin, covered or uncovered, with the usual exception of the palms and soles  They do not arise from mucous membranes  Seborrheic keratoses can have highly variable appearance  Seborrheic keratoses are extremely common  It has been estimated that over 90% of adults over the age of 61 years have one or more of them  They occur in males and females of all races, typically beginning to erupt in the 35s or 45s  They are uncommon under the age of 21 years  The precise cause of seborrhoeic keratoses is not known  Seborrhoeic keratoses are considered degenerative in nature  As time goes by, seborrheic keratoses tend to become more numerous  Some people inherit a tendency to develop a very large number of them; some people may have hundreds of them  The name "seborrheic keratosis" is misleading, because these lesions are not limited to a seborrhoeic distribution (scalp, mid-face, chest, upper back), nor are they formed from sebaceous glands, nor are they associated with sebum -- which is greasy    Seborrheic keratosis may also be called "SK," "Seb K," "basal cell papilloma," "senile wart," or "barnacle "      Researchers have noted:  Eruptive seborrhoeic keratoses can follow sunburn or dermatitis  Skin friction may be the reason they appear in body folds  Viral cause (e g , human papillomavirus) seems unlikely  Stable and clonal mutations or activation of FRFR3, PIK3CA, KOMAL, AKT1 and EGFR genes are found in seborrhoeic keratoses  Seborrhoeic keratosis can arise from solar lentigo  FRFR3 mutations also arise in solar lentigines  These mutations are associated with increased age and location on the head and neck, suggesting a role of ultraviolet radiation in these lesions  Seborrheic keratoses do not harbour tumour suppressor gene mutations  Epidermal growth factor receptor inhibitors, which are used to treat some cancers, often result in an increase in verrucal (warty) keratoses  There is no easy way to remove multiple lesions on a single occasion  Unless a specific lesion is "inflamed" and is causing pain or stinging/burning or is bleeding, most insurance companies do not authorize treatment

## 2023-01-05 NOTE — PROGRESS NOTES
Damon is a 36 year old child presenting for assessment of   Chief Complaint   Patient presents with   • Wart     R foot   .      Pharmacy of choice noted (under Meds & Orders): Yes  Medications and allergies verified: Yes  Denies known Latex allergy or symptoms of Latex sensitivity.  IMMUNIZATION REACTION: no  Patient would like communication of their results via:        Cell Phone:   Telephone Information:   Mobile [phone]     Okay to leave a message containing results? Yes  CONCERNS: yes    Reminders:   1) Offer PHQ9 to 12 to 18 year olds if >9 months since last WCC  2) Encourage sign up for MyChart    There are no preventive care reminders to display for this patient.    Patient is up to date, no discussion needed.           [facility] [address] [facility]     Patient Name: Yashira Morales  Encounter Date: 01/05/2023     Have you been cared for by a [place] [address] Dermatologist in the last 3 years and, if so, which description applies to you? Yes  I have been here within the last 3 years, and my medical history has NOT changed since that time  I am FEMALE/of child-bearing potential     REVIEW OF SYSTEMS:  Have you recently had or currently have any of the following? · No changes in my recent health  PAST MEDICAL HISTORY:  Have you personally ever had or currently have any of the following? If "YES," then please provide more detail  · No changes in my medical history  FAMILY HISTORY:  Any "first degree relatives" (parent, brother, sister, or child) with the following? • No changes in my family's known health  PATIENT EXPERIENCE:    • Do you want the Dermatologist to perform a COMPLETE skin exam today including a clinical examination under the "bra and underwear" areas? Yes  • If necessary, do we have your permission to call and leave a detailed message on your Preferred Phone number that includes your specific medical information?   Yes      Allergies   Allergen Reactions   • Buspar [Buspirone] Hives and Rash      Current Outpatient Medications:   •  atorvastatin (LIPITOR) 20 mg tablet, TAKE 1 TABLET DAILY, Disp: 90 tablet, Rfl: 0  •  DULoxetine (CYMBALTA) 60 mg delayed release capsule, Take 1 capsule (60 mg total) by mouth 2 (two) times a day, Disp: 180 capsule, Rfl: 2  •  lamoTRIgine (LaMICtal) 150 MG tablet, Take 1 tablet (150 mg total) by mouth 2 (two) times a day, Disp: 180 tablet, Rfl: 2  •  LORazepam (ATIVAN) 0 5 mg tablet, Take 1 tablet (0 5 mg total) by mouth 4 (four) times a day as needed for anxiety, Disp: 120 tablet, Rfl: 3  •  pantoprazole (PROTONIX) 40 mg tablet, Take 1 tablet (40 mg total) by mouth daily, Disp: 90 tablet, Rfl: 2  •  gentamicin (GARAMYCIN) 0 3 % ophthalmic solution, Administer 1 drop to both eyes every 4 (four) hours X 5 days, Disp: 5 mL, Rfl: 0  •  loratadine (CLARITIN) 10 mg tablet, Take 1 tablet (10 mg total) by mouth daily, Disp: 20 tablet, Rfl: 0          • Whom besides the patient is providing clinical information about today's encounter?   o NO ADDITIONAL HISTORIAN (patient alone provided history)    Physical Exam and Assessment/Plan by Diagnosis:    1  SEBORRHEIC KERATOSIS; INFLAMED    Physical Exam:  • Anatomic Location Affected:  scalp  • Morphological Description:  Flat and raised, waxy, smooth to warty textured, yellow to brownish-grey to dark brown to blackish, discrete, "stuck-on" appearing papules  • Pertinent Positives:  • Pertinent Negatives: Additional History of Present Condition:  Patient reports new bumps on the skin  Denies itch, burn, pain, bleeding or ulceration  Present constantly; nothing seems to make it worse or better  No prior treatment  Assessment and Plan:  Based on a thorough discussion of this condition and the management approach to it (including a comprehensive discussion of the known risks, side effects and potential benefits of treatment), the patient (family) agrees to implement the following specific plan:  • Reassure benign     Seborrheic Keratosis  A seborrheic keratosis is a harmless warty spot that appears during adult life as a common sign of skin aging  Seborrheic keratoses can arise on any area of skin, covered or uncovered, with the usual exception of the palms and soles  They do not arise from mucous membranes  Seborrheic keratoses can have highly variable appearance  Seborrheic keratoses are extremely common  It has been estimated that over 90% of adults over the age of 61 years have one or more of them  They occur in males and females of all races, typically beginning to erupt in the 35s or 45s  They are uncommon under the age of 21 years  The precise cause of seborrhoeic keratoses is not known    Seborrhoeic keratoses are considered degenerative in nature  As time goes by, seborrheic keratoses tend to become more numerous  Some people inherit a tendency to develop a very large number of them; some people may have hundreds of them  The name "seborrheic keratosis" is misleading, because these lesions are not limited to a seborrhoeic distribution (scalp, mid-face, chest, upper back), nor are they formed from sebaceous glands, nor are they associated with sebum -- which is greasy  Seborrheic keratosis may also be called "SK," "Seb K," "basal cell papilloma," "senile wart," or "barnacle "      Researchers have noted:  • Eruptive seborrhoeic keratoses can follow sunburn or dermatitis  • Skin friction may be the reason they appear in body folds  • Viral cause (e g , human papillomavirus) seems unlikely  • Stable and clonal mutations or activation of FRFR3, PIK3CA, KOMAL, AKT1 and EGFR genes are found in seborrhoeic keratoses  • Seborrhoeic keratosis can arise from solar lentigo  • FRFR3 mutations also arise in solar lentigines  These mutations are associated with increased age and location on the head and neck, suggesting a role of ultraviolet radiation in these lesions  • Seborrheic keratoses do not harbour tumour suppressor gene mutations  • Epidermal growth factor receptor inhibitors, which are used to treat some cancers, often result in an increase in verrucal (warty) keratoses  There is no easy way to remove multiple lesions on a single occasion  Unless a specific lesion is "inflamed" and is causing pain or stinging/burning or is bleeding, most insurance companies do not authorize treatment        Scribe Attestation    I,:  Melissa Steele MA am acting as a scribe while in the presence of the attending physician :       I,:  Kellie Barthel, MD personally performed the services described in this documentation    as scribed in my presence :

## 2023-01-05 NOTE — PROGRESS NOTES
PROCEDURE:  DESTRUCTION OF BENIGN LESIONS WITH CRYOTHERAPY  After a thorough discussion of treatment options and risk/benefits/alternatives (including but not limited to local pain, scarring, dyspigmentation, blistering, and possible superinfection), verbal and written consent were obtained and the aforementioned lesions were treated on with cryotherapy using liquid nitrogen x 1 cycle for 5-10 seconds  TOTAL NUMBER of 1 lesions were treated today on the ANATOMIC LOCATION: right top of scalp  The patient tolerated the procedure well, and after-care instructions were provided  THIS IS A COSMETIC PATIENT WHO PAID OUT OF POCKET AT TIME OF VISIT  DO NOT BILL     $20  THE PATIENT ALREADY PAID IN FULL FOR SERVICES      Scribe Attestation    I,:  Alexys Montanez MA am acting as a scribe while in the presence of the attending physician :       I,:  Anjum Lora MD personally performed the services described in this documentation    as scribed in my presence :

## 2023-01-06 ENCOUNTER — TELEPHONE (OUTPATIENT)
Dept: PSYCHIATRY | Facility: CLINIC | Age: 58
End: 2023-01-06

## 2023-01-06 NOTE — TELEPHONE ENCOUNTER
Barbarar contacted pt in regards to a vm we received in which pt requested a call back to reschedule appt   was able to schedule pt on 4/20/2023 at 3:30 p m  Pt was added to wait list as well

## 2023-01-13 ENCOUNTER — TELEPHONE (OUTPATIENT)
Dept: PSYCHIATRY | Facility: CLINIC | Age: 58
End: 2023-01-13

## 2023-01-25 DIAGNOSIS — F41.1 GAD (GENERALIZED ANXIETY DISORDER): Primary | Chronic | ICD-10-CM

## 2023-01-25 RX ORDER — LORAZEPAM 0.5 MG/1
0.5 TABLET ORAL 4 TIMES DAILY PRN
Qty: 120 TABLET | Refills: 2 | Status: SHIPPED | OUTPATIENT
Start: 2023-01-25 | End: 2023-04-25

## 2023-01-29 DIAGNOSIS — E78.5 HYPERLIPIDEMIA, UNSPECIFIED HYPERLIPIDEMIA TYPE: ICD-10-CM

## 2023-01-29 RX ORDER — ATORVASTATIN CALCIUM 20 MG/1
TABLET, FILM COATED ORAL
Qty: 90 TABLET | Refills: 0 | Status: SHIPPED | OUTPATIENT
Start: 2023-01-29

## 2023-02-15 ENCOUNTER — TELEPHONE (OUTPATIENT)
Dept: INTERNAL MEDICINE CLINIC | Facility: CLINIC | Age: 58
End: 2023-02-15

## 2023-02-15 DIAGNOSIS — M54.16 CHRONIC LUMBAR RADICULOPATHY: Primary | ICD-10-CM

## 2023-02-15 RX ORDER — CYCLOBENZAPRINE HCL 5 MG
5 TABLET ORAL 3 TIMES DAILY PRN
Qty: 30 TABLET | Refills: 0 | Status: SHIPPED | OUTPATIENT
Start: 2023-02-15

## 2023-02-15 NOTE — TELEPHONE ENCOUNTER
I sent in flexeril a muscle relaxer she took in the past  Karol Salgado to take 3 times a day as needed- may cause sedation     Recommend warm compresses 20 minutes 3-4 times daily and light stretching

## 2023-02-15 NOTE — TELEPHONE ENCOUNTER
When she woke up this morning her back really hurt  [place] it feels like it is going to go out  Said when this happens she usually gets a steroid or muscle relaxant which helps

## 2023-02-15 NOTE — TELEPHONE ENCOUNTER
Spoke with patient she is OK moving forward with those recommendations     Wants to clarify that it is OK with her other medications      States in the past was given Tramadol and Psychiatrist stated she could not be on that with her medications because she could get in trouble

## 2023-03-08 ENCOUNTER — DOCUMENTATION (OUTPATIENT)
Dept: BEHAVIORAL/MENTAL HEALTH CLINIC | Facility: CLINIC | Age: 58
End: 2023-03-08

## 2023-03-08 ENCOUNTER — TELEPHONE (OUTPATIENT)
Dept: PSYCHIATRY | Facility: CLINIC | Age: 58
End: 2023-03-08

## 2023-03-08 DIAGNOSIS — F43.12 CHRONIC POST-TRAUMATIC STRESS DISORDER (PTSD): ICD-10-CM

## 2023-03-08 DIAGNOSIS — F41.1 GAD (GENERALIZED ANXIETY DISORDER): Chronic | ICD-10-CM

## 2023-03-08 DIAGNOSIS — F31.81 BIPOLAR II DISORDER, MOST RECENT EPISODE MAJOR DEPRESSIVE (HCC): Primary | Chronic | ICD-10-CM

## 2023-03-08 NOTE — TELEPHONE ENCOUNTER
Spoke with patient during confirmation calls to remind of the appointment for 3/10/23 at 10:00a in office  The patient stated that she is would like to speak with her provider about this appointment  The patient stated that she may need to r/s  The patient did not wish to provide the writer with any details as to why she was requesting a call back about the appointment      Patient call back: [phone]
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
## 2023-04-20 PROBLEM — F43.12 POST-TRAUMATIC STRESS DISORDER, CHRONIC: Chronic | Status: ACTIVE | Noted: 2022-11-21

## 2023-04-20 PROBLEM — Z72.0 TOBACCO ABUSE: Status: RESOLVED | Noted: 2021-05-24 | Resolved: 2023-04-20

## 2023-04-23 DIAGNOSIS — E78.5 HYPERLIPIDEMIA, UNSPECIFIED HYPERLIPIDEMIA TYPE: ICD-10-CM

## 2023-04-23 RX ORDER — ATORVASTATIN CALCIUM 20 MG/1
TABLET, FILM COATED ORAL
Qty: 90 TABLET | Refills: 0 | Status: SHIPPED | OUTPATIENT
Start: 2023-04-23

## 2023-04-27 ENCOUNTER — OFFICE VISIT (OUTPATIENT)
Dept: GASTROENTEROLOGY | Facility: CLINIC | Age: 58
End: 2023-04-27

## 2023-04-27 VITALS
HEART RATE: 103 BPM | HEIGHT: 68 IN | SYSTOLIC BLOOD PRESSURE: 130 MMHG | WEIGHT: 250 LBS | BODY MASS INDEX: 37.89 KG/M2 | DIASTOLIC BLOOD PRESSURE: 84 MMHG | OXYGEN SATURATION: 96 %

## 2023-04-27 DIAGNOSIS — D69.6 THROMBOCYTOPENIA (HCC): ICD-10-CM

## 2023-04-27 DIAGNOSIS — K21.9 GASTROESOPHAGEAL REFLUX DISEASE WITHOUT ESOPHAGITIS: Primary | ICD-10-CM

## 2023-04-27 DIAGNOSIS — R19.8 GAGGING EPISODE: ICD-10-CM

## 2023-04-27 RX ORDER — ESOMEPRAZOLE MAGNESIUM 40 MG/1
40 CAPSULE, DELAYED RELEASE ORAL
Qty: 31 CAPSULE | Refills: 6 | Status: SHIPPED | OUTPATIENT
Start: 2023-04-27

## 2023-04-28 ENCOUNTER — TELEPHONE (OUTPATIENT)
Dept: GASTROENTEROLOGY | Facility: CLINIC | Age: 58
End: 2023-04-28

## 2023-04-28 NOTE — PROGRESS NOTES
[facility] - Outpatient Follow-up Note  Mary Canales 60 y o  female MRN: [id number]  Encounter: [id number]          ASSESSMENT AND PLAN:      1  Gastroesophageal reflux disease without esophagitis  - esomeprazole (NexIUM) 40 MG capsule; Take 1 capsule (40 mg total) by mouth daily before breakfast  Dispense: 31 capsule; Refill: 6  - No lying down within 2 hours of eating and snacking  - eat smaller meals  - elevate the back of the bed 6 inches  - D/C pantoprazole    2  Thrombocytopenia (HCC)      3  Gagging episode  - I am concerned that these episodes may be related to reflux events    ______________________________________________________________________    SUBJECTIVE:  This 60year old female has transferred care up here to [facility]  She has a long standing problem with GERD and has been taking pantoprazole continuously for the past several years and it doesn't appear to be working as well  She is also experiencing repeated episodes of coughing with gagging which most occur at night but also occur during the daytime  She states that she does eat smaller meals but that she snacks and eats late at night prior to lying down  There is no dysphagia  She having BM's daily, however she is having epigastric abdominal pains, occasional bleeding hemorrhoids  She underwent egd and colonoscopy 1/7/22 down in [place]  The EGD showed a 1 cm HH and the colonoscopy demonstrated internal hemorrhoids  She is having recurring episodes of burning in the throat area  She has gained 20 lbs of weight over the past 2 years  REVIEW OF SYSTEMS IS OTHERWISE NEGATIVE        Historical Information   Past Medical History:   Diagnosis Date   • Abnormal liver scan     last assessed 8/14/2014   • Anxiety    • Atypical chest pain     last assessed 2/3/2016   • Benign colonic polyp    • Bladder disorder     last assessed 1/22/2013   • Cervical radiculopathy    • Chronic ITP (idiopathic "thrombocytopenia) ([facility])    • Chronic lumbar radiculopathy    • Chronic neck pain    • Chronic pain     neck and back   • Dermatitis    • Ganglion cyst    • GERD (gastroesophageal reflux disease)    • Herpes simplex    • Hyperlipidemia    • Luteinized follicular cyst    • Menorrhagia    • Obesity    • Panic disorder without agoraphobia 01/30/2018   • Urge and stress incontinence    • Uterine fibroid      Past Surgical History:   Procedure Laterality Date   • CHOLECYSTECTOMY     • COLONOSCOPY     • HYSTERECTOMY      MELO   • ORIF TIBIA & FIBULA FRACTURES Left 4/27/2018    Procedure: OPEN REDUCTION W/ INTERNAL FIXATION (ORIF) ANKLE;  Surgeon: Liane Villarreal MD;  Location: [facility];  Service: Orthopedics   • TOOTH EXTRACTION      last assessed 3/20/2017, oral surgery     Social History   Social History     Substance and Sexual Activity   Alcohol Use No     Social History     Substance and Sexual Activity   Drug Use No     Social History     Tobacco Use   Smoking Status Former   • Packs/day: 0 25   • Types: Cigarettes   Smokeless Tobacco Never     Family History   Problem Relation Age of Onset   • Stroke Father    • Psychosis Father    • Bipolar disorder Mother    • Lung cancer Mother    • Schizoaffective Disorder  Son    • Alcohol abuse Neg Hx    • Substance Abuse Neg Hx    • Suicidality Neg Hx        Meds/Allergies       Current Outpatient Medications:   •  atorvastatin (LIPITOR) 20 mg tablet  •  DULoxetine (CYMBALTA) 60 mg delayed release capsule  •  esomeprazole (NexIUM) 40 MG capsule  •  lamoTRIgine (LaMICtal) 150 MG tablet  •  [START ON 5/15/2023] LORazepam (ATIVAN) 0 5 mg tablet  •  pantoprazole (PROTONIX) 40 mg tablet  •  cyclobenzaprine (FLEXERIL) 5 mg tablet    Allergies   Allergen Reactions   • Buspar [Buspirone] Hives and Rash           Objective     Blood pressure 130/84, pulse 103, height 5' 8\" (1 727 m), weight 113 kg (250 lb), SpO2 96 %  Body mass index is 38 01 kg/m²        PHYSICAL EXAM:      General " Appearance:   Alert, cooperative, no distress   HEENT:   Normocephalic, atraumatic, anicteric      Neck:  Supple, symmetrical, trachea midline   Lungs:   Clear to auscultation bilaterally; no rales, rhonchi or wheezing; respirations unlabored    Heart[de-identified]   Regular rate and rhythm; no murmur, rub, or gallop  Abdomen:   Soft, non-tender, non-distended; normal bowel sounds; no masses, no organomegaly    Genitalia:   Deferred    Rectal:   Deferred    Extremities:  No cyanosis, clubbing or edema    Pulses:  2+ and symmetric    Skin:  No jaundice, rashes, or lesions    Lymph nodes:  No palpable cervical lymphadenopathy        Lab Results:   No visits with results within 1 Day(s) from this visit     Latest known visit with results is:   Appointment on 09/11/2022   Component Date Value   • Sodium 09/11/2022 139    • Potassium 09/11/2022 4 1    • Chloride 09/11/2022 109 (H)    • CO2 09/11/2022 28    • ANION GAP 09/11/2022 2 (L)    • BUN 09/11/2022 12    • Creatinine 09/11/2022 0 75    • Glucose, Fasting 09/11/2022 96    • Calcium 09/11/2022 9 1    • AST 09/11/2022 15    • ALT 09/11/2022 29    • Alkaline Phosphatase 09/11/2022 81    • Total Protein 09/11/2022 7 5    • Albumin 09/11/2022 3 6    • Total Bilirubin 09/11/2022 0 46    • eGFR 09/11/2022 88    • WBC 09/11/2022 5 58    • RBC 09/11/2022 4 21    • Hemoglobin 09/11/2022 12 1    • Hematocrit 09/11/2022 38 8    • MCV 09/11/2022 92    • MCH 09/11/2022 28 7    • MCHC 09/11/2022 31 2 (L)    • RDW 09/11/2022 13 7    • MPV 09/11/2022 13 4 (H)    • Platelets 86/81/5341 109 (L)    • nRBC 09/11/2022 0    • Neutrophils Relative 09/11/2022 59    • Immat GRANS % 09/11/2022 0    • Lymphocytes Relative 09/11/2022 26    • Monocytes Relative 09/11/2022 9    • Eosinophils Relative 09/11/2022 5    • Basophils Relative 09/11/2022 1    • Neutrophils Absolute 09/11/2022 3 32    • Immature Grans Absolute 09/11/2022 0 01    • Lymphocytes Absolute 09/11/2022 1 43    • Monocytes Absolute 09/11/2022 0 48    • Eosinophils Absolute 09/11/2022 0 30    • Basophils Absolute 09/11/2022 0 04    • TSH 3RD GENERATON 09/11/2022 1 190    • Cholesterol 09/11/2022 181    • Triglycerides 09/11/2022 62    • HDL, Direct 09/11/2022 78    • LDL Calculated 09/11/2022 91    • Non-HDL-Chol (CHOL-HDL) 09/11/2022 103    • LDL Direct 09/11/2022 87          Radiology Results:   No results found    Answers for HPI/ROS submitted by the patient on 4/27/2023  Chronicity: new  Onset: more than 1 month ago  Onset quality: sudden

## 2023-04-28 NOTE — TELEPHONE ENCOUNTER
Patients GI provider:  Dr Huy Starks    Number to return call: [phone]    Reason for call: Pt calling stating [facility] informed her that Esomeprazole (NexIUM) 40 mg capsules are not covered by her insurance  Cost for pt over $200  Pt requesting prior auth or another like medicine that is cost effective for her      Scheduled procedure/appointment date if applicable: n/a

## 2023-05-02 NOTE — TELEPHONE ENCOUNTER
Patients GI provider:  Dr Eva Boast    Number to return call: [phone]     Reason for call: Pt calling requesting for a follow up on nexium auth   Would like to know if an alternative can be called in     Scheduled procedure/appointment date if applicable: Apt/procedure

## 2023-05-15 ENCOUNTER — OFFICE VISIT (OUTPATIENT)
Dept: INTERNAL MEDICINE CLINIC | Facility: CLINIC | Age: 58
End: 2023-05-15

## 2023-05-15 VITALS
TEMPERATURE: 97.2 F | DIASTOLIC BLOOD PRESSURE: 80 MMHG | HEART RATE: 64 BPM | BODY MASS INDEX: 38.01 KG/M2 | HEIGHT: 68 IN | SYSTOLIC BLOOD PRESSURE: 126 MMHG | OXYGEN SATURATION: 98 %

## 2023-05-15 DIAGNOSIS — F31.81 BIPOLAR II DISORDER, MOST RECENT EPISODE MAJOR DEPRESSIVE (HCC): Chronic | ICD-10-CM

## 2023-05-15 DIAGNOSIS — E78.5 HYPERLIPIDEMIA, UNSPECIFIED HYPERLIPIDEMIA TYPE: ICD-10-CM

## 2023-05-15 DIAGNOSIS — F41.1 GAD (GENERALIZED ANXIETY DISORDER): Chronic | ICD-10-CM

## 2023-05-15 DIAGNOSIS — M54.16 CHRONIC LUMBAR RADICULOPATHY: ICD-10-CM

## 2023-05-15 DIAGNOSIS — K21.9 GASTROESOPHAGEAL REFLUX DISEASE WITHOUT ESOPHAGITIS: Primary | ICD-10-CM

## 2023-05-15 DIAGNOSIS — D69.6 THROMBOCYTOPENIA (HCC): ICD-10-CM

## 2023-05-15 DIAGNOSIS — R91.1 LUNG NODULE, SOLITARY: ICD-10-CM

## 2023-05-15 NOTE — PROGRESS NOTES
Name: Renate Beyer      : 1965      MRN: [id number]  Encounter Provider: CALEB Aguilar  Encounter Date: 5/15/2023   Encounter department: [address] [facility]     1  Gastroesophageal reflux disease without esophagitis  Assessment & Plan: Will be starting on nexium  Recently saw GI        2  Thrombocytopenia ([facility] [address] )  Assessment & Plan:  Due for CBC    Orders:  -     CBC and differential; Future    3  Bipolar II disorder, most recent episode major depressive ([facility] )  Assessment & Plan:  Stable  On medications per psychiatry       4  DREAD (generalized anxiety disorder)  Assessment & Plan:  See above  Orders:  -     TSH, 3rd generation with Free T4 reflex; Future    5  Hyperlipidemia, unspecified hyperlipidemia type  Assessment & Plan: On lipitor  Due for labs  Orders:  -     Comprehensive metabolic panel; Future  -     Lipid Panel with Direct LDL reflex; Future    6  Lung nodule, solitary  Assessment & Plan:  Recent CT chest normal  Repeat yearly  7  Chronic lumbar radiculopathy  Assessment & Plan:  Continue daily walks  Ok to take tylenol or ibuprofen as needed  BMI Counseling: Body mass index is 38 01 kg/m²  The BMI is above normal  Nutrition recommendations include decreasing portion sizes  Exercise recommendations include exercising 3-5 times per week  Rationale for BMI follow-up plan is due to patient being overweight or obese  Warden Pinto is here today for follow up  She is doing well  She has been living on her own for a few months  She has been getting good support with her [employer] community  She stopped seeing her counselor due to her [employer] support  She fell going up the stairs a few months ago  She scrapped her knees  Since then she's been having outer knee and calf tightness  No swelling or redness  She takes tylenol and ibuprofen as needed with relief   She continues to have intermittent chronic neck and "back pain  She also noticed a few weeks ago her arms feel an achy pain when she lifts anything  She denies dropping things or numbness  May 1st was a year since she quit smoking  Review of Systems   Constitutional: Negative for activity change, appetite change, fatigue and unexpected weight change  Eyes: Negative for visual disturbance  Respiratory: Negative for cough and shortness of breath  Cardiovascular: Negative for chest pain, palpitations and leg swelling  Gastrointestinal: Negative for abdominal pain, blood in stool, constipation and diarrhea  Genitourinary: Negative for difficulty urinating  Musculoskeletal: Positive for arthralgias, back pain and neck pain  Skin: Negative for rash  Neurological: Negative for dizziness, weakness, light-headedness and headaches  Psychiatric/Behavioral: Negative for dysphoric mood and sleep disturbance  The patient is not nervous/anxious  Current Outpatient Medications on File Prior to Visit   Medication Sig   • atorvastatin (LIPITOR) 20 mg tablet TAKE 1 TABLET DAILY   • DULoxetine (CYMBALTA) 60 mg delayed release capsule Take 1 capsule (60 mg total) by mouth 2 (two) times a day   • esomeprazole (NexIUM) 40 MG capsule Take 1 capsule (40 mg total) by mouth daily before breakfast   • lamoTRIgine (LaMICtal) 150 MG tablet Take 1 tablet (150 mg total) by mouth 2 (two) times a day   • LORazepam (ATIVAN) 0 5 mg tablet Take 1 tablet (0 5 mg total) by mouth 4 (four) times a day as needed for anxiety Do not start before May 15, 2023  • pantoprazole (PROTONIX) 40 mg tablet Take 1 tablet (40 mg total) by mouth daily   • cyclobenzaprine (FLEXERIL) 5 mg tablet Take 1 tablet (5 mg total) by mouth 3 (three) times a day as needed for muscle spasms (Patient not taking: Reported on 4/27/2023)       Objective     /80   Pulse 64   Temp (!) 97 2 °F (36 2 °C)   Ht 5' 8\" (1 727 m)   SpO2 98%   BMI 38 01 kg/m²     Physical Exam  Vitals reviewed   " Constitutional:       Appearance: Normal appearance  HENT:      Head: Normocephalic and atraumatic  Eyes:      Conjunctiva/sclera: Conjunctivae normal    Cardiovascular:      Rate and Rhythm: Normal rate and regular rhythm  Heart sounds: Normal heart sounds  Pulmonary:      Effort: Pulmonary effort is normal       Breath sounds: Normal breath sounds  Musculoskeletal:         General: Normal range of motion  Right lower leg: No edema  Left lower leg: No edema  Skin:     General: Skin is dry  Neurological:      Mental Status: She is alert and oriented to person, place, and time     Psychiatric:         Mood and Affect: Mood normal          Behavior: Behavior normal        CALEB Beasley

## 2023-05-20 ENCOUNTER — APPOINTMENT (OUTPATIENT)
Dept: LAB | Facility: MEDICAL CENTER | Age: 58
End: 2023-05-20

## 2023-05-20 DIAGNOSIS — E78.5 HYPERLIPIDEMIA, UNSPECIFIED HYPERLIPIDEMIA TYPE: ICD-10-CM

## 2023-05-20 DIAGNOSIS — F41.1 GAD (GENERALIZED ANXIETY DISORDER): Chronic | ICD-10-CM

## 2023-05-20 DIAGNOSIS — D69.6 THROMBOCYTOPENIA (HCC): ICD-10-CM

## 2023-05-20 LAB
ALBUMIN SERPL BCP-MCNC: 4 G/DL (ref 3.5–5)
ALP SERPL-CCNC: 96 U/L (ref 46–116)
ALT SERPL W P-5'-P-CCNC: 24 U/L (ref 12–78)
ANION GAP SERPL CALCULATED.3IONS-SCNC: 2 MMOL/L (ref 4–13)
AST SERPL W P-5'-P-CCNC: 16 U/L (ref 5–45)
BASOPHILS # BLD AUTO: 0.05 THOUSANDS/ÂΜL (ref 0–0.1)
BASOPHILS NFR BLD AUTO: 1 % (ref 0–1)
BILIRUB SERPL-MCNC: 0.46 MG/DL (ref 0.2–1)
BUN SERPL-MCNC: 15 MG/DL (ref 5–25)
CALCIUM SERPL-MCNC: 9.1 MG/DL (ref 8.3–10.1)
CHLORIDE SERPL-SCNC: 108 MMOL/L (ref 96–108)
CHOLEST SERPL-MCNC: 184 MG/DL
CO2 SERPL-SCNC: 28 MMOL/L (ref 21–32)
CREAT SERPL-MCNC: 0.79 MG/DL (ref 0.6–1.3)
EOSINOPHIL # BLD AUTO: 0.16 THOUSAND/ÂΜL (ref 0–0.61)
EOSINOPHIL NFR BLD AUTO: 3 % (ref 0–6)
ERYTHROCYTE [DISTWIDTH] IN BLOOD BY AUTOMATED COUNT: 14 % (ref 11.6–15.1)
GFR SERPL CREATININE-BSD FRML MDRD: 82 ML/MIN/1.73SQ M
GLUCOSE P FAST SERPL-MCNC: 93 MG/DL (ref 65–99)
HCT VFR BLD AUTO: 39.7 % (ref 34.8–46.1)
HDLC SERPL-MCNC: 80 MG/DL
HGB BLD-MCNC: 12.5 G/DL (ref 11.5–15.4)
IMM GRANULOCYTES # BLD AUTO: 0.03 THOUSAND/UL (ref 0–0.2)
IMM GRANULOCYTES NFR BLD AUTO: 1 % (ref 0–2)
LDLC SERPL CALC-MCNC: 91 MG/DL (ref 0–100)
LYMPHOCYTES # BLD AUTO: 1.37 THOUSANDS/ÂΜL (ref 0.6–4.47)
LYMPHOCYTES NFR BLD AUTO: 24 % (ref 14–44)
MCH RBC QN AUTO: 28.4 PG (ref 26.8–34.3)
MCHC RBC AUTO-ENTMCNC: 31.5 G/DL (ref 31.4–37.4)
MCV RBC AUTO: 90 FL (ref 82–98)
MONOCYTES # BLD AUTO: 0.54 THOUSAND/ÂΜL (ref 0.17–1.22)
MONOCYTES NFR BLD AUTO: 9 % (ref 4–12)
NEUTROPHILS # BLD AUTO: 3.69 THOUSANDS/ÂΜL (ref 1.85–7.62)
NEUTS SEG NFR BLD AUTO: 62 % (ref 43–75)
NRBC BLD AUTO-RTO: 0 /100 WBCS
PLATELET # BLD AUTO: 118 THOUSANDS/UL (ref 149–390)
PMV BLD AUTO: 14.1 FL (ref 8.9–12.7)
POTASSIUM SERPL-SCNC: 4 MMOL/L (ref 3.5–5.3)
PROT SERPL-MCNC: 7.2 G/DL (ref 6.4–8.4)
RBC # BLD AUTO: 4.4 MILLION/UL (ref 3.81–5.12)
SODIUM SERPL-SCNC: 138 MMOL/L (ref 135–147)
TRIGL SERPL-MCNC: 64 MG/DL
TSH SERPL DL<=0.05 MIU/L-ACNC: 1.14 UIU/ML (ref 0.45–4.5)
WBC # BLD AUTO: 5.84 THOUSAND/UL (ref 4.31–10.16)

## 2023-06-14 DIAGNOSIS — E78.5 HYPERLIPIDEMIA, UNSPECIFIED HYPERLIPIDEMIA TYPE: ICD-10-CM

## 2023-06-14 RX ORDER — ATORVASTATIN CALCIUM 20 MG/1
TABLET, FILM COATED ORAL
Qty: 90 TABLET | Refills: 0 | Status: SHIPPED | OUTPATIENT
Start: 2023-06-14

## 2023-07-10 ENCOUNTER — TELEPHONE (OUTPATIENT)
Age: 58
End: 2023-07-10

## 2023-07-10 DIAGNOSIS — K21.9 GASTROESOPHAGEAL REFLUX DISEASE WITHOUT ESOPHAGITIS: Primary | ICD-10-CM

## 2023-07-10 NOTE — TELEPHONE ENCOUNTER
Pt would like Mohinder Caballero from dr Rachna Scott office to contact her about her esomeprazole, says Mohinder Caballero always helps her, I did advise she has refills left at her pharm but she states she is aware and would like to speak with Mohinder Caballero.

## 2023-07-11 NOTE — TELEPHONE ENCOUNTER
Called pt. Pt reports the esomeprazole 40 mg daily is not working. Pt c/o of having burring in the throat and gagging. Do you want to recommend pt to start taking twice a day?   Thanks    Pt [facility]

## 2023-07-12 RX ORDER — ESOMEPRAZOLE MAGNESIUM 40 MG/1
40 CAPSULE, DELAYED RELEASE ORAL
Qty: 60 CAPSULE | Refills: 2 | Status: CANCELLED | OUTPATIENT
Start: 2023-07-12

## 2023-07-12 NOTE — TELEPHONE ENCOUNTER
Called pt and advised her that Dr Charlene Beard  recommends she start taking esomeprazole 40 mg twice daily. Pt is running low on her medication and will call pharmacy to refill 30 day supply  and asked that I send the new prescription tomorrow so we don't run into a problem.     Send new prescription for esomeprazole 40 mg twice daily to [facility]  On 7/13/23

## 2023-07-13 RX ORDER — ESOMEPRAZOLE MAGNESIUM 40 MG/1
40 CAPSULE, DELAYED RELEASE ORAL
Qty: 60 CAPSULE | Refills: 3 | Status: SHIPPED | OUTPATIENT
Start: 2023-07-13

## 2023-07-13 NOTE — TELEPHONE ENCOUNTER
Called [facility] requesting pt to call back to confirm that she filled her 30 day supply of esomeprazole 40 mg daily.

## 2023-08-30 ENCOUNTER — TELEPHONE (OUTPATIENT)
Dept: INTERNAL MEDICINE CLINIC | Facility: CLINIC | Age: 58
End: 2023-08-30

## 2023-08-30 NOTE — TELEPHONE ENCOUNTER
If she needs a diagnostic mammogram instead of a screening mammogram, she would need an office visit documenting the lump/cyst for insurance purposes. Patient was seen and examined. Spoke with RN. Chart reviewed.  No events overnight. Feeling better- no complaints  Vital Signs Last 24 Hrs  T(F): 96.9 (29 Jun 2020 04:47), Max: 96.9 (29 Jun 2020 04:47)  HR: 70 (29 Jun 2020 04:47) (70 - 79)  BP: 116/73 (29 Jun 2020 04:47) (106/57 - 116/73)  SpO2: 98% (28 Jun 2020 20:00) (98% - 98%)  MEDICATIONS  (STANDING):  amLODIPine   Tablet 5 milliGRAM(s) Oral daily  chlorhexidine 4% Liquid 1 Application(s) Topical <User Schedule>  enoxaparin Injectable 40 milliGRAM(s) SubCutaneous daily  hydrocortisone 1% Cream 6 Application(s) Topical daily  lisinopril 10 milliGRAM(s) Oral daily  pregabalin 150 milliGRAM(s) Oral four times a day    MEDICATIONS  (PRN):    Labs:                        13.5   5.88  )-----------( 252      ( 28 Jun 2020 06:28 )             39.4     28 Jun 2020 06:28    140    |  99     |  17     ----------------------------<  154    4.4     |  27     |  1.3      Ca    9.2        28 Jun 2020 06:28  Mg     2.0       28 Jun 2020 06:28    TPro  7.0    /  Alb  4.2    /  TBili  0.6    /  DBili  x      /  AST  45     /  ALT  54     /  AlkPhos  92     28 Jun 2020 06:28    PT/INR - ( 28 Jun 2020 17:35 )   PT: 12.30 sec;   INR: 1.07 ratio         PTT - ( 28 Jun 2020 17:35 )  PTT:36.0 sec      General: comfortable, NAD  Neurology: A&Ox3, nonfocal  Head:  Normocephalic, atraumatic  ENT:  Mucosa moist, no ulcerations  Neck:  Supple, no JVD,   Resp: CTA B/L  CV: RRR, S1S2,   GI: Soft, NT, bowel sounds  MS: B/L feet bandaged      A/P:  55y old  man with b/l foot ulcers w/ septic arthritis in fifth MTP on the left foot and chronic osteomyelitis of right fifth MTP on right foot.    IV abx as per ID    wound care    OR today     glycemic control    plan as per podiatry    weigh bearing as per podiatry    DVT prophylaxis  Decubitus prevention- all measures as per RN protocol  Please call or text me with any questions or updates

## 2023-08-30 NOTE — TELEPHONE ENCOUNTER
Pt found what she thinks is a cyst on her breast and would like a script for a mammogram.  She was unclear, but said scheduling told her she would need something additional ordered for the cyst but doesn't know what that is.

## 2023-08-31 ENCOUNTER — OFFICE VISIT (OUTPATIENT)
Dept: INTERNAL MEDICINE CLINIC | Facility: CLINIC | Age: 58
End: 2023-08-31
Payer: COMMERCIAL

## 2023-08-31 ENCOUNTER — HOSPITAL ENCOUNTER (OUTPATIENT)
Dept: RADIOLOGY | Facility: HOSPITAL | Age: 58
Discharge: HOME | End: 2023-08-31
Payer: COMMERCIAL

## 2023-08-31 VITALS
SYSTOLIC BLOOD PRESSURE: 118 MMHG | HEART RATE: 74 BPM | HEIGHT: 68 IN | BODY MASS INDEX: 38.01 KG/M2 | DIASTOLIC BLOOD PRESSURE: 82 MMHG | OXYGEN SATURATION: 98 % | TEMPERATURE: 96.9 F

## 2023-08-31 DIAGNOSIS — N63.10 MASS OF RIGHT BREAST, UNSPECIFIED QUADRANT: ICD-10-CM

## 2023-08-31 DIAGNOSIS — M25.551 RIGHT HIP PAIN: ICD-10-CM

## 2023-08-31 DIAGNOSIS — M54.16 CHRONIC LUMBAR RADICULOPATHY: Primary | ICD-10-CM

## 2023-08-31 PROCEDURE — 99214 OFFICE O/P EST MOD 30 MIN: CPT | Performed by: NURSE PRACTITIONER

## 2023-08-31 PROCEDURE — 73502 X-RAY EXAM HIP UNI 2-3 VIEWS: CPT

## 2023-08-31 RX ORDER — METHYLPREDNISOLONE 4 MG/1
TABLET ORAL
Qty: 21 EACH | Refills: 0 | Status: SHIPPED | OUTPATIENT
Start: 2023-08-31

## 2023-08-31 RX ORDER — METHOCARBAMOL 500 MG/1
500 TABLET, FILM COATED ORAL 2 TIMES DAILY
Qty: 30 TABLET | Refills: 0 | Status: SHIPPED | OUTPATIENT
Start: 2023-08-31

## 2023-08-31 NOTE — PROGRESS NOTES
Name: Rhonda Thao      : 1965      MRN: [id number]  Encounter Provider: CALEB Campuzano  Encounter Date: 2023   Encounter department: [id number] [facility]     1. Chronic lumbar radiculopathy  -     methocarbamol (ROBAXIN) 500 mg tablet; Take 1 tablet (500 mg total) by mouth 2 (two) times a day  -     methylPREDNISolone 4 MG tablet therapy pack; Use as directed on package    2. Mass of right breast, unspecified quadrant  -     Mammo diagnostic right w cad; Future; Expected date: 2023  -     US breast right limited (diagnostic); Future; Expected date: 2023    3. Right hip pain  -     XR hip/pelv 2-3 vws right if performed; Future; Expected date: 2023           Finesse Feng is here today with complaints of right breast pain and mass  She first noticed the lump 4 days ago. It is tender to touch. No redness. No other breast changes. She has been having back spasms for about a month. She has chronic back pain. She also has right hip pain. The pain does not radiate. Her hip is tender to touch. She denies numbness or tingling. Review of Systems   Constitutional: Negative for activity change and appetite change. Respiratory: Negative for shortness of breath. Cardiovascular: Negative for chest pain. Musculoskeletal: Positive for arthralgias. Neurological: Negative for dizziness, weakness, light-headedness, numbness and headaches.        Current Outpatient Medications on File Prior to Visit   Medication Sig   • atorvastatin (LIPITOR) 20 mg tablet TAKE 1 TABLET DAILY   • DULoxetine (CYMBALTA) 60 mg delayed release capsule Take 1 capsule (60 mg total) by mouth 2 (two) times a day   • lamoTRIgine (LaMICtal) 150 MG tablet Take 1 tablet (150 mg total) by mouth 2 (two) times a day   • LORazepam (ATIVAN) 0.5 mg tablet Take 1 tablet (0.5 mg total) by mouth 4 (four) times a day as needed for anxiety Do not start before May 15, 2023.   • cyclobenzaprine (FLEXERIL) 5 mg tablet Take 1 tablet (5 mg total) by mouth 3 (three) times a day as needed for muscle spasms (Patient not taking: Reported on 4/27/2023)   • esomeprazole (NexIUM) 40 MG capsule Take 1 capsule (40 mg total) by mouth daily before breakfast   • esomeprazole (NexIUM) 40 MG capsule Take 1 capsule (40 mg total) by mouth 2 (two) times a day before meals (Patient not taking: Reported on 8/31/2023)   • pantoprazole (PROTONIX) 40 mg tablet Take 1 tablet (40 mg total) by mouth daily (Patient not taking: Reported on 8/31/2023)       Objective     /82   Pulse 74   Temp (!) 96.9 °F (36.1 °C)   Ht 5' 8" (1.727 m)   SpO2 98%   BMI 38.01 kg/m²     Physical Exam  Vitals reviewed. Constitutional:       Appearance: Normal appearance. HENT:      Head: Normocephalic and atraumatic. Eyes:      Conjunctiva/sclera: Conjunctivae normal.   Cardiovascular:      Rate and Rhythm: Normal rate and regular rhythm. Heart sounds: Normal heart sounds. Pulmonary:      Effort: Pulmonary effort is normal.      Breath sounds: Normal breath sounds. Chest:       Musculoskeletal:      Right hip: Bony tenderness present. Normal range of motion. Normal strength. Neurological:      Mental Status: She is alert and oriented to person, place, and time.    Psychiatric:         Mood and Affect: Mood normal.         Behavior: Behavior normal.       CALEB Koehler

## 2023-09-27 DIAGNOSIS — M54.16 CHRONIC LUMBAR RADICULOPATHY: ICD-10-CM

## 2023-09-27 RX ORDER — METHOCARBAMOL 500 MG/1
500 TABLET, FILM COATED ORAL 2 TIMES DAILY
Qty: 30 TABLET | Refills: 0 | Status: SHIPPED | OUTPATIENT
Start: 2023-09-27 | End: 2023-10-03 | Stop reason: ALTCHOICE
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
## 2023-10-03 ENCOUNTER — OFFICE VISIT (OUTPATIENT)
Dept: PSYCHIATRY | Facility: CLINIC | Age: 58
End: 2023-10-03
Payer: COMMERCIAL

## 2023-10-03 VITALS
WEIGHT: 256 LBS | HEIGHT: 68 IN | DIASTOLIC BLOOD PRESSURE: 75 MMHG | BODY MASS INDEX: 38.8 KG/M2 | HEART RATE: 102 BPM | SYSTOLIC BLOOD PRESSURE: 112 MMHG

## 2023-10-03 DIAGNOSIS — F43.12 POST-TRAUMATIC STRESS DISORDER, CHRONIC: Chronic | ICD-10-CM

## 2023-10-03 DIAGNOSIS — F31.81 BIPOLAR II DISORDER, MOST RECENT EPISODE MAJOR DEPRESSIVE (HCC): Primary | Chronic | ICD-10-CM

## 2023-10-03 DIAGNOSIS — F41.1 GAD (GENERALIZED ANXIETY DISORDER): Chronic | ICD-10-CM

## 2023-10-03 DIAGNOSIS — F41.0 PANIC DISORDER WITHOUT AGORAPHOBIA: Chronic | ICD-10-CM

## 2023-10-03 PROCEDURE — 99214 OFFICE O/P EST MOD 30 MIN: CPT | Performed by: PSYCHIATRY & NEUROLOGY

## 2023-10-03 PROCEDURE — 90833 PSYTX W PT W E/M 30 MIN: CPT | Performed by: PSYCHIATRY & NEUROLOGY

## 2023-10-03 RX ORDER — LAMOTRIGINE 150 MG/1
150 TABLET ORAL DAILY
Qty: 90 TABLET | Refills: 2 | Status: SHIPPED | OUTPATIENT
Start: 2023-10-03 | End: 2024-06-29

## 2023-10-03 RX ORDER — LAMOTRIGINE 200 MG/1
200 TABLET ORAL
Qty: 90 TABLET | Refills: 2 | Status: SHIPPED | OUTPATIENT
Start: 2023-10-03 | End: 2024-06-29

## 2023-10-03 RX ORDER — LORAZEPAM 0.5 MG/1
0.5 TABLET ORAL 4 TIMES DAILY PRN
Qty: 120 TABLET | Refills: 4 | Status: SHIPPED | OUTPATIENT
Start: 2023-10-27 | End: 2024-03-25

## 2023-10-03 NOTE — BH TREATMENT PLAN
TREATMENT PLAN (Medication Management Only)        [facility]    Name/Date of Birth/MRN:  Rhonda Thao 60 y.o. 1965 MRN: [id number]  Date of Treatment Plan: October 3, 2023  Diagnosis/Diagnoses:   1. Bipolar II disorder, most recent episode major depressive ([address])    2. DREAD (generalized anxiety disorder)    3. Panic disorder without agoraphobia    4. Post-traumatic stress disorder, chronic      Strengths/Personal Resources for Self-Care: "taking my medications, being independent, my job". Area/Areas of need (in own words): "my self-esteem". 1. Long Term Goal:   alleviate anxiety, improve control of depression, improve mood stability  Target Date: 2 months - 12/3/2023  Person/Persons responsible for completion of goal: Janine  2. Short Term Objective (s) - How will we reach this goal?:   A. Provider new recommended medication/dosage changes and/or continue medication(s): increase Lamictal, continue all other medications (Cymbalta and Ativan). B.  N/A.  C.  N/A. Target Date: 2 months - 12/3/2023  Person/Persons Responsible for Completion of Goal: Janine   Progress Towards Goals: regressed  Treatment Modality: medication management every 2 months, referral for individual psychotherapy  Review due 180 days from date of this plan: 6 months - 4/3/2024  Expected length of service: ongoing treatment unless revised  My Physician/PA/NP and I have developed this plan together and I agree to work on the goals and objectives. I understand the treatment goals that were developed for my treatment.   Electronic Signatures: on file (unless signed below)    Martínez Barillas MD 10/03/23

## 2023-10-18 ENCOUNTER — HOSPITAL ENCOUNTER (OUTPATIENT)
Dept: MAMMOGRAPHY | Facility: CLINIC | Age: 58
Discharge: HOME | End: 2023-10-18
Payer: COMMERCIAL

## 2023-10-18 ENCOUNTER — HOSPITAL ENCOUNTER (OUTPATIENT)
Dept: ULTRASOUND IMAGING | Facility: CLINIC | Age: 58
Discharge: HOME | End: 2023-10-18
Payer: COMMERCIAL

## 2023-10-18 VITALS — HEIGHT: 68 IN | BODY MASS INDEX: 38.8 KG/M2 | WEIGHT: 256 LBS

## 2023-10-18 DIAGNOSIS — N63.10 MASS OF RIGHT BREAST, UNSPECIFIED QUADRANT: ICD-10-CM

## 2023-10-18 DIAGNOSIS — N63.12 LUMP IN UPPER INNER QUADRANT OF RIGHT BREAST: ICD-10-CM

## 2023-10-18 PROCEDURE — G0279 TOMOSYNTHESIS, MAMMO: HCPCS

## 2023-10-18 PROCEDURE — 76642 ULTRASOUND BREAST LIMITED: CPT

## 2023-10-18 PROCEDURE — 77066 DX MAMMO INCL CAD BI: CPT

## 2023-10-23 NOTE — TELEPHONE ENCOUNTER
Pt called for refills for her Protonix  As the end of the call, she mentioned that she tested + for COVID on Saturday  Pt has cough, sore throat, headache, body aches, little bit of fatigue, diarrhea, and SOB  No chest pain  Symptoms started on Thursday  no
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
## 2023-11-06 ENCOUNTER — OFFICE VISIT (OUTPATIENT)
Dept: URGENT CARE | Facility: MEDICAL CENTER | Age: 58
End: 2023-11-06
Payer: COMMERCIAL

## 2023-11-06 VITALS
SYSTOLIC BLOOD PRESSURE: 141 MMHG | WEIGHT: 253 LBS | HEIGHT: 68 IN | BODY MASS INDEX: 38.34 KG/M2 | HEART RATE: 77 BPM | OXYGEN SATURATION: 96 % | TEMPERATURE: 97.7 F | RESPIRATION RATE: 18 BRPM | DIASTOLIC BLOOD PRESSURE: 84 MMHG

## 2023-11-06 DIAGNOSIS — F41.9 ANXIETY: ICD-10-CM

## 2023-11-06 DIAGNOSIS — R10.2 SUPRAPUBIC DISCOMFORT: ICD-10-CM

## 2023-11-06 DIAGNOSIS — R35.0 URINARY FREQUENCY: ICD-10-CM

## 2023-11-06 DIAGNOSIS — R30.0 DYSURIA: Primary | ICD-10-CM

## 2023-11-06 DIAGNOSIS — R07.89 CHEST PRESSURE: ICD-10-CM

## 2023-11-06 LAB
ATRIAL RATE: 79 BPM
P AXIS: 34 DEGREES
PR INTERVAL: 160 MS
QRS AXIS: 24 DEGREES
QRSD INTERVAL: 78 MS
QT INTERVAL: 394 MS
QTC INTERVAL: 451 MS
T WAVE AXIS: 27 DEGREES
VENTRICULAR RATE: 79 BPM

## 2023-11-06 PROCEDURE — 87086 URINE CULTURE/COLONY COUNT: CPT | Performed by: PHYSICIAN ASSISTANT

## 2023-11-06 PROCEDURE — 99213 OFFICE O/P EST LOW 20 MIN: CPT | Performed by: PHYSICIAN ASSISTANT

## 2023-11-06 PROCEDURE — 93010 ELECTROCARDIOGRAM REPORT: CPT | Performed by: INTERNAL MEDICINE

## 2023-11-06 PROCEDURE — 93005 ELECTROCARDIOGRAM TRACING: CPT | Performed by: PHYSICIAN ASSISTANT

## 2023-11-06 RX ORDER — CEPHALEXIN 500 MG/1
500 CAPSULE ORAL EVERY 12 HOURS SCHEDULED
Qty: 6 CAPSULE | Refills: 0 | Status: SHIPPED | OUTPATIENT
Start: 2023-11-06 | End: 2023-11-09

## 2023-11-06 NOTE — PROGRESS NOTES
[facility]        NAME: Ambrocio Barahona is a 60 y.o. female  : 1965    MRN: [id number]  DATE: 2023  TIME: 10:07 AM    Assessment and Plan   Dysuria [R30.0]  1. Dysuria  Urine culture    cephalexin (KEFLEX) 500 mg capsule      2. Urinary frequency  cephalexin (KEFLEX) 500 mg capsule      3. Suprapubic discomfort  cephalexin (KEFLEX) 500 mg capsule      4. Anxiety  ECG 12 lead      5. Chest pressure  ECG 12 lead        Based on patient's history and exam, I suspect urinary tract infection. We will not be able to dip the urine here in the clinic due to recent Azo use. We will send a culture. We will treat with antibiotics in the meantime. I do believe the patient's chest discomfort is due to her recurrent anxiety but will check an EKG here just to make sure there are no changes from the previous. Patient knows to go to the emergency department immediately for any worsening or changing symptoms. Patient Instructions     1. Increase oral fluid consumption. 2. Over-the-counter acetaminophen as needed for pain. 3. Return here or go to the ER for any worsening symptoms. 4. Follow-up with primary care for any symptoms lasting longer than 5 days. Chief Complaint     Chief Complaint   Patient presents with    Possible UTI     Pt reports urinary frequency, burning with urination, abdominal cramping, flank pain x4 days. Anxiety     Pt reports feeling very nervous about having her first UTI and health anxiety, pt reports having chest discomfort in the center of her chest rates 2/10 since last night. History of Present Illness       21-year-old female patient with 2 to 3 days history of dysuria, urinary frequency, urinary urgency. Also complains of some intermittent suprapubic pressure. Patient states she took Azo within the last 24 hours.   Patient states she has been anxious about the notion of having a UTI beings that she has never had 1 before and states that she has 2 out of 10 midsternal chest pressure which she has had many times in the past with anxiety episodes. Patient has had multiple EKGs for the same as well as a cardiac work-up which was negative. Patient states this discomfort is in no way anything different than she is ever experienced with her anxiety in the past.  She denies any radiation of the symptoms. No diaphoresis. No shortness of breath. Anxiety  Symptoms include chest pain and nervous/anxious behavior. Patient reports no palpitations or shortness of breath. Review of Systems   Review of Systems   Constitutional:  Negative for chills and fever. HENT:  Negative for ear pain and sore throat. Eyes:  Negative for pain and visual disturbance. Respiratory:  Negative for cough and shortness of breath. Cardiovascular:  Positive for chest pain. Negative for palpitations. Gastrointestinal:  Positive for abdominal pain. Negative for vomiting. Genitourinary:  Positive for dysuria, frequency and urgency. Negative for hematuria. Musculoskeletal:  Negative for arthralgias and back pain. Skin:  Negative for color change and rash. Neurological:  Negative for seizures and syncope. Psychiatric/Behavioral:  The patient is nervous/anxious. All other systems reviewed and are negative.         Current Medications       Current Outpatient Medications:     atorvastatin (LIPITOR) 20 mg tablet, TAKE 1 TABLET DAILY, Disp: 90 tablet, Rfl: 0    cephalexin (KEFLEX) 500 mg capsule, Take 1 capsule (500 mg total) by mouth every 12 (twelve) hours for 3 days, Disp: 6 capsule, Rfl: 0    DULoxetine (CYMBALTA) 60 mg delayed release capsule, Take 1 capsule (60 mg total) by mouth 2 (two) times a day, Disp: 180 capsule, Rfl: 2    esomeprazole (NexIUM) 40 MG capsule, Take 1 capsule (40 mg total) by mouth 2 (two) times a day before meals, Disp: 60 capsule, Rfl: 3    lamoTRIgine (LaMICtal) 150 MG tablet, Take 1 tablet (150 mg total) by mouth daily, Disp: 90 tablet, Rfl: 2    lamoTRIgine (LaMICtal) 200 MG tablet, Take 1 tablet (200 mg total) by mouth daily at bedtime, Disp: 90 tablet, Rfl: 2    LORazepam (ATIVAN) 0.5 mg tablet, Take 1 tablet (0.5 mg total) by mouth 4 (four) times a day as needed for anxiety Do not start before October 27, 2023., Disp: 120 tablet, Rfl: 4    Current Allergies     Allergies as of 11/06/2023 - Reviewed 11/06/2023   Allergen Reaction Noted    Buspar [buspirone] Hives and Rash 06/04/2015            The following portions of the patient's history were reviewed and updated as appropriate: allergies, current medications, past family history, past medical history, past social history, past surgical history and problem list.     Past Medical History:   Diagnosis Date    Abnormal liver scan     last assessed 8/14/2014    Allergic     Anxiety     Atypical chest pain     last assessed 2/3/2016    Benign colonic polyp     Bladder disorder     last assessed 1/22/2013    Cervical radiculopathy     Chronic ITP (idiopathic thrombocytopenia) (HCC)     Chronic lumbar radiculopathy     Chronic neck pain     Chronic pain     neck and back    Depression     Dermatitis     Ganglion cyst     GERD (gastroesophageal reflux disease)     Headache(784.0)     Herpes simplex     Hyperlipidemia     Luteinized follicular cyst     Menorrhagia     Obesity     Panic disorder without agoraphobia 01/30/2018    Urge and stress incontinence     Uterine fibroid        Past Surgical History:   Procedure Laterality Date    CHOLECYSTECTOMY      COLONOSCOPY      HYSTERECTOMY      MELO    ORIF TIBIA & FIBULA FRACTURES Left 4/27/2018    Procedure: OPEN REDUCTION W/ INTERNAL FIXATION (ORIF) ANKLE;  Surgeon: Tonya Martin MD;  Location: [facility];  Service: Orthopedics    TOOTH EXTRACTION      last assessed 3/20/2017, oral surgery       Family History   Problem Relation Age of Onset    Bipolar disorder Mother     Lung cancer Mother     Stroke Father     Psychosis Father Schizoaffective Disorder  Son     Alcohol abuse Neg Hx     Substance Abuse Neg Hx     Suicidality Neg Hx          Medications have been verified. Objective   /84   Pulse 77   Temp 97.7 °F (36.5 °C) (Temporal)   Resp 18   Ht 5' 8" (1.727 m)   Wt 115 kg (253 lb)   SpO2 96%   BMI 38.47 kg/m²        Physical Exam     Physical Exam  Constitutional:       General: She is not in acute distress. Appearance: Normal appearance. She is not ill-appearing. HENT:      Head: Normocephalic. Right Ear: Tympanic membrane normal.      Left Ear: Tympanic membrane normal.      Nose: Nose normal.      Mouth/Throat:      Mouth: Mucous membranes are moist.      Pharynx: Oropharynx is clear. Eyes:      Conjunctiva/sclera: Conjunctivae normal.      Pupils: Pupils are equal, round, and reactive to light. Cardiovascular:      Rate and Rhythm: Normal rate and regular rhythm. Pulses: Normal pulses. Heart sounds: Normal heart sounds. Pulmonary:      Effort: Pulmonary effort is normal.      Breath sounds: Normal breath sounds. Chest:      Chest wall: No tenderness. Abdominal:      Tenderness: There is no abdominal tenderness. There is no right CVA tenderness or left CVA tenderness. Musculoskeletal:         General: Normal range of motion. Cervical back: Normal range of motion. Skin:     General: Skin is warm and dry. Capillary Refill: Capillary refill takes less than 2 seconds. Neurological:      Mental Status: She is alert and oriented to person, place, and time. Psychiatric:         Mood and Affect: Mood normal.         Behavior: Behavior normal.           Preliminary interpretation of EKG:  Normal sinus rhythm rate of 79 with no acute ST or T wave changes.

## 2023-11-06 NOTE — PATIENT INSTRUCTIONS
1. Increase oral fluid consumption. 2. Over-the-counter acetaminophen as needed for pain. 3. Return here or go to the ER for any worsening symptoms. 4. Follow-up with primary care for any symptoms lasting longer than 5 days.

## 2023-11-07 LAB — BACTERIA UR CULT: NORMAL

## 2023-11-09 ENCOUNTER — TELEPHONE (OUTPATIENT)
Age: 58
End: 2023-11-09

## 2023-11-09 DIAGNOSIS — R39.9 UTI SYMPTOMS: Primary | ICD-10-CM

## 2023-11-09 NOTE — TELEPHONE ENCOUNTER
Daisy Hernandez called says she was seen on 11/6 at a [facility]. Was prescribed medication   cephalexin (KEFLEX) 500 mg capsule Take 1 capsule (500 mg total) by mouth every 12 (twelve) hours for 3 days       Pt states med helped, but still has cloudy urine and burning sensation. She was advised to call PCP if symptoms didn't improve to request another med. Pt also states the results are in for her urine culture and wants to know what the results showed.

## 2023-11-09 NOTE — TELEPHONE ENCOUNTER
Urine culture results do not show an infection. Is she drinking enough water? Any vaginal itching or discharge? Also see message for her  Alva Ann- is he seeing PCP at the facility?

## 2023-11-09 NOTE — TELEPHONE ENCOUNTER
Spoke to patient she states she is drinking enough water   N itching or discharge   Her urine is cloudy, she has burning and going more frequent     And  will not be coming back

## 2023-11-09 NOTE — TELEPHONE ENCOUNTER
The patient called back she took an over the counter medication urinary pain relief    ----she took it on Sunday     ---after she took it her urine was orange   could this have effected the urine test?  Please advise

## 2023-11-09 NOTE — TELEPHONE ENCOUNTER
Yes the OTC medication causes orange urine and could affect her urine test but not her urine culture.    Does she want to repeat the urine test?

## 2023-11-10 ENCOUNTER — APPOINTMENT (OUTPATIENT)
Dept: LAB | Facility: MEDICAL CENTER | Age: 58
End: 2023-11-10
Payer: COMMERCIAL

## 2023-11-10 DIAGNOSIS — R39.9 UTI SYMPTOMS: ICD-10-CM

## 2023-11-10 LAB
BACTERIA UR QL AUTO: ABNORMAL /HPF
BILIRUB UR QL STRIP: NEGATIVE
CLARITY UR: CLEAR
COLOR UR: ABNORMAL
GLUCOSE UR STRIP-MCNC: NEGATIVE MG/DL
HGB UR QL STRIP.AUTO: NEGATIVE
KETONES UR STRIP-MCNC: NEGATIVE MG/DL
LEUKOCYTE ESTERASE UR QL STRIP: NEGATIVE
MUCOUS THREADS UR QL AUTO: ABNORMAL
NITRITE UR QL STRIP: NEGATIVE
NON-SQ EPI CELLS URNS QL MICRO: ABNORMAL /HPF
PH UR STRIP.AUTO: 6 [PH]
PROT UR STRIP-MCNC: ABNORMAL MG/DL
RBC #/AREA URNS AUTO: ABNORMAL /HPF
SP GR UR STRIP.AUTO: 1.02 (ref 1–1.03)
UROBILINOGEN UR STRIP-ACNC: <2 MG/DL
WBC #/AREA URNS AUTO: ABNORMAL /HPF

## 2023-11-10 PROCEDURE — 81001 URINALYSIS AUTO W/SCOPE: CPT

## 2023-11-13 ENCOUNTER — TELEPHONE (OUTPATIENT)
Age: 58
End: 2023-11-13

## 2023-11-13 NOTE — TELEPHONE ENCOUNTER
Patient trying to get to bottom of this just doesn't understand. Do you think at this point she should just see GYN? And if so can you refer one?     Doesn't  know what you would do differently in the office as opposed to over the phone

## 2023-11-13 NOTE — TELEPHONE ENCOUNTER
I'm not sure at this point if this is a urology issue or GYN. That would be the reason for the visit, to gather more information. She may need an ultrasound of her kidneys, bladder, or a pelvic ultrasound. Or possible other lab work.

## 2023-11-13 NOTE — TELEPHONE ENCOUNTER
If she's not having any vaginal symptoms- dryness, itchiness, or vaginal bleeding, then its likely not a gyn issue. I can order an ultrasound of her kidneys and bladder? Or I can place a referral to see urology?

## 2023-11-13 NOTE — TELEPHONE ENCOUNTER
Pt called requesting if provider can contact her. Pt said it its regarding her UTI.    Please contact pt at [phone]

## 2023-11-13 NOTE — TELEPHONE ENCOUNTER
Erendira Caicedo is calling to see if her UA results have come been read yet, she states she has been in pain and struggling throughout the weekend. Results in chart. Erendira Caicedo would like a call back ASAP with results. Thank you!

## 2023-11-14 DIAGNOSIS — R39.9 UTI SYMPTOMS: Primary | ICD-10-CM

## 2023-11-14 NOTE — TELEPHONE ENCOUNTER
Patient called and states is having burning when urinates, lower abdominal pressure for over 2 weeks. States will see Urologist and do any orders that is recommended. States appreciates everything and will do what is asked of her.  Patient can be reached at [phone]

## 2023-11-15 DIAGNOSIS — B37.31 VAGINAL YEAST INFECTION: Primary | ICD-10-CM

## 2023-11-15 RX ORDER — FLUCONAZOLE 150 MG/1
150 TABLET ORAL ONCE
Qty: 1 TABLET | Refills: 0 | Status: SHIPPED | OUTPATIENT
Start: 2023-11-15 | End: 2023-11-15

## 2023-11-15 NOTE — TELEPHONE ENCOUNTER
Please let her know that I sent in diflucan. If she doesn't get relief, continue with plan for ultrasound, urology, and if having vaginal symptoms schedule with GYN. She can also schedule here but has declined earlier this week.

## 2023-11-15 NOTE — TELEPHONE ENCOUNTER
Patient called and very upset, is very uncomfortable, having a lot of burning pain. Would like to know if can have Diflucan. Has plans this weekend and is very uncomfortable.  Patient uses [facility] and would like a call at [phone]

## 2023-11-16 ENCOUNTER — HOSPITAL ENCOUNTER (OUTPATIENT)
Dept: RADIOLOGY | Facility: MEDICAL CENTER | Age: 58
Discharge: HOME | End: 2023-11-16
Payer: COMMERCIAL

## 2023-11-16 DIAGNOSIS — R39.9 UTI SYMPTOMS: ICD-10-CM

## 2023-11-16 PROCEDURE — 76775 US EXAM ABDO BACK WALL LIM: CPT

## 2023-11-21 ENCOUNTER — TELEPHONE (OUTPATIENT)
Age: 58
End: 2023-11-21

## 2023-11-21 NOTE — TELEPHONE ENCOUNTER
[address] [address] called wanting to let provider know there are significant findings in pts Renal Ultrasound. If this can be reviewed.   Unable to reach office for warm transfer
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
## 2023-12-04 ENCOUNTER — HOSPITAL ENCOUNTER (OUTPATIENT)
Dept: ULTRASOUND IMAGING | Facility: CLINIC | Age: 58
Discharge: HOME | End: 2023-12-04
Payer: COMMERCIAL

## 2023-12-04 DIAGNOSIS — N63.10 MASS OF RIGHT BREAST, UNSPECIFIED QUADRANT: ICD-10-CM

## 2023-12-04 PROCEDURE — 76642 ULTRASOUND BREAST LIMITED: CPT

## 2023-12-04 NOTE — PROGRESS NOTES
Met with patient and Dr. Kincaid  regarding recommendation for;    __X___ RIGHT ______LEFT      ___X__Ultrasound guided  ______Stereotactic breast biopsy.      __X___Verbalized understanding.      Blood thinners:  No: __X___ Yes: ______ What:                 Biopsy teaching sheet given:  Yes: ___X___ No: ________    Pt given contact information and adv to call with any questions/needs    Patient advised to arrive at 1330 for a 1400 appointment

## 2023-12-11 ENCOUNTER — TELEMEDICINE (OUTPATIENT)
Dept: PSYCHIATRY | Facility: CLINIC | Age: 58
End: 2023-12-11
Payer: COMMERCIAL

## 2023-12-11 VITALS — BODY MASS INDEX: 38.19 KG/M2 | WEIGHT: 252 LBS | HEIGHT: 68 IN

## 2023-12-11 DIAGNOSIS — F43.12 POST-TRAUMATIC STRESS DISORDER, CHRONIC: Chronic | ICD-10-CM

## 2023-12-11 DIAGNOSIS — F41.0 PANIC DISORDER WITHOUT AGORAPHOBIA: Chronic | ICD-10-CM

## 2023-12-11 DIAGNOSIS — F41.1 GAD (GENERALIZED ANXIETY DISORDER): Chronic | ICD-10-CM

## 2023-12-11 DIAGNOSIS — F31.81 BIPOLAR II DISORDER, MOST RECENT EPISODE MAJOR DEPRESSIVE (HCC): Primary | Chronic | ICD-10-CM

## 2023-12-11 PROCEDURE — 90833 PSYTX W PT W E/M 30 MIN: CPT | Performed by: PSYCHIATRY & NEUROLOGY

## 2023-12-11 PROCEDURE — 99214 OFFICE O/P EST MOD 30 MIN: CPT | Performed by: PSYCHIATRY & NEUROLOGY

## 2023-12-11 RX ORDER — DULOXETIN HYDROCHLORIDE 60 MG/1
60 CAPSULE, DELAYED RELEASE ORAL 2 TIMES DAILY
Qty: 180 CAPSULE | Refills: 2 | Status: SHIPPED | OUTPATIENT
Start: 2023-12-11 | End: 2024-09-06

## 2023-12-11 RX ORDER — FLUCONAZOLE 150 MG/1
TABLET ORAL
COMMUNITY
Start: 2023-12-07 | End: 2023-12-20

## 2023-12-11 RX ORDER — METRONIDAZOLE 500 MG/1
500 TABLET ORAL 2 TIMES DAILY
COMMUNITY
Start: 2023-12-07 | End: 2023-12-20

## 2023-12-11 NOTE — BH CRISIS PLAN
Client Name: Piero Darby       Client YOB: 1965  : 1965    Treatment Team (include name and contact information):     Psychiatrist: Bharath Gurrola MD    Psychotherapist: None    : None      Healthcare Provider  [facility] Delacruz, [facility] [address]  [id number]  [phone]    Type of Plan   * Child plans (children 15 yo and younger) must be completed and signed by the child's legal guardian   * Plans for all individuals 15 yo and above must be signed by the client. Plan Type: adolescent/adult (14 and over) Initial    My Personal Strengths are (in the client's own words):  "Compassion, addressing other people needs before myself. Like helping other"    The stressors and triggers that may put me at risk are:  " Overthinking, I don't want my son's drama when he call me "    Coping skills I can use to keep myself calm and safe:  "listening to music and doing puzzles, reading, sensory toys "    Coping skills/supports I can use to maintain abstinence from substance use:   Not Applicable    The people that provide me with help and support: (Include name, contact, and how they can help)    Support Persons #1 and #2:   *Extended Emergency Contact Information  Primary Emergency Contact: [address] [facility] Phone: [phone]  Relation: Son  Secondary Emergency Contact: Giacomo Ortiz  Mobile Phone: [phone]  Relation: Daughter In-Law   *How they can help me?  "Be there when I need somebody to listen, they support me with my stressors, we talk about"    In the past, the following has helped me in times of crisis:    " [employer] is now helping me, couple of neighbors "      If it is an emergency and you need immediate help, call     If there is a possibility of danger to yourself or others, call the following crisis hotline resources:     Adult Crisis Numbers  Suicide Prevention Hotline - Dial   [facility]: [address] [facility] Street: [address]: [address] [facility] [address]: [phone]  [address] [place] [address] Street: [phone]  [place]: [address] [facility] [address]: [address]: [phone] (daytime). [phone] (after hours, weekends, holidays)     Child/Adolescent Crisis Numbers   [facility]: [address]: [phone]   [place]: [phone]   [address] [place] [address] Street: [phone]    Please note: Some [facility] do not have a separate number for Child/Adolescent specific crisis. If your county is not listed under Child/Adolescent, please call the adult number for your county     National Talk to Text Line   [facility] - [phone]    In the event your feelings become unmanageable, and you cannot reach your support system, you will call 911 immediately or go to the nearest hospital emergency room.

## 2023-12-18 ENCOUNTER — HOSPITAL ENCOUNTER (OUTPATIENT)
Dept: ULTRASOUND IMAGING | Facility: CLINIC | Age: 58
Discharge: HOME | End: 2023-12-18

## 2023-12-18 DIAGNOSIS — R93.89 ABNORMAL ULTRASOUND: ICD-10-CM

## 2023-12-20 ENCOUNTER — OFFICE VISIT (OUTPATIENT)
Dept: UROLOGY | Facility: CLINIC | Age: 58
End: 2023-12-20
Payer: COMMERCIAL

## 2023-12-20 VITALS
SYSTOLIC BLOOD PRESSURE: 148 MMHG | WEIGHT: 250 LBS | HEIGHT: 68 IN | HEART RATE: 96 BPM | OXYGEN SATURATION: 97 % | BODY MASS INDEX: 37.89 KG/M2 | DIASTOLIC BLOOD PRESSURE: 86 MMHG

## 2023-12-20 DIAGNOSIS — N39.46 MIXED STRESS AND URGE URINARY INCONTINENCE: ICD-10-CM

## 2023-12-20 DIAGNOSIS — N13.30 HYDRONEPHROSIS, RIGHT: Primary | ICD-10-CM

## 2023-12-20 LAB
POST-VOID RESIDUAL VOLUME, ML POC: 31 ML
SL AMB  POCT GLUCOSE, UA: NORMAL
SL AMB LEUKOCYTE ESTERASE,UA: NORMAL
SL AMB POCT BILIRUBIN,UA: NORMAL
SL AMB POCT BLOOD,UA: 6
SL AMB POCT CLARITY,UA: CLEAR
SL AMB POCT COLOR,UA: NORMAL
SL AMB POCT KETONES,UA: NORMAL
SL AMB POCT NITRITE,UA: NORMAL
SL AMB POCT PH,UA: 15
SL AMB POCT SPECIFIC GRAVITY,UA: NORMAL
SL AMB POCT URINE PROTEIN: 0.2
SL AMB POCT UROBILINOGEN: NORMAL

## 2023-12-20 PROCEDURE — 51798 US URINE CAPACITY MEASURE: CPT | Performed by: PHYSICIAN ASSISTANT

## 2023-12-20 PROCEDURE — 99203 OFFICE O/P NEW LOW 30 MIN: CPT | Performed by: PHYSICIAN ASSISTANT

## 2023-12-20 PROCEDURE — 81002 URINALYSIS NONAUTO W/O SCOPE: CPT | Performed by: PHYSICIAN ASSISTANT

## 2023-12-20 NOTE — PROGRESS NOTES
12/20/2023      Chief Complaint   Patient presents with    Hydronephrosis     Assessment and Plan    58 y.o. female new patient    Right hydronephrosis  Right side/lower abdominal pain  - Renal bladder US from 11/16/23 showing mild right hydronephrosis. No shadowing calculi. Bilateral ureteral jets detected. Also 1 cm right renal upper pole cyst with questionable thin septation.   - Obtain CT urogram for further evaluation and BMP  - Urine dip negative  - PVR 31 mL   - Follow up to review imaging results.     3. Mixed urinary incontinence, stress and urge  - Previously failed pelvic floor PT  - Reviewed retrial of OAB medication which she declines at this time  - Recommend minimizing bladder irritants.     History of Present Illness  Janine Carrington is a 58 y.o. female here for new patient evaluation of right hydronephrosis. She had underwent a renal bladder US ordered by her PCP due to UTI symptoms. Urine testing was negative.     She does complain of some right side and lower abdominal pain. Denies flank or back pain. Denies dysuria or hematuria. She has long standing urinary frequency and urge urinary incontinence as well as IONA at times. Does report seeing urologist many years ago and had negative cystoscopy at that time per her report. Previously on Myrbetriq in the past, unsure if this was beneficial. Previously failed pelvic floor PT. Denies any prior  surgical manipulation. Denies history of kidney stones. Denies family history of  malignancy. Former smoker.     Review of Systems   Constitutional:  Negative for chills and fever.   Respiratory:  Negative for shortness of breath.    Gastrointestinal:  Positive for abdominal pain. Negative for nausea and vomiting.   Genitourinary:  Positive for flank pain, frequency and urgency. Negative for difficulty urinating, dysuria and hematuria.   Musculoskeletal:  Negative for back pain.   Neurological:  Negative for dizziness.                  Past Medical  History  Past Medical History:   Diagnosis Date    Abnormal liver scan     last assessed 8/14/2014    Allergic     Anxiety     Atypical chest pain     last assessed 2/3/2016    Benign colonic polyp     Bipolar II disorder (HCC)     Bladder disorder     last assessed 1/22/2013    Cervical radiculopathy     Chronic ITP (idiopathic thrombocytopenia) (HCC)     Chronic lumbar radiculopathy     Chronic neck pain     Chronic pain     neck and back    Dermatitis     Ganglion cyst     GERD (gastroesophageal reflux disease)     Headache(784.0)     Herpes simplex     Hyperlipidemia     Luteinized follicular cyst     Menorrhagia     Obesity     Panic disorder without agoraphobia 01/30/2018    Urge and stress incontinence     Uterine fibroid        Past Social History  Past Surgical History:   Procedure Laterality Date    CHOLECYSTECTOMY      COLONOSCOPY      HYSTERECTOMY      MELO    ORIF TIBIA & FIBULA FRACTURES Left 4/27/2018    Procedure: OPEN REDUCTION W/ INTERNAL FIXATION (ORIF) ANKLE;  Surgeon: Omid Winters MD;  Location: [facility];  Service: Orthopedics    TOOTH EXTRACTION      last assessed 3/20/2017, oral surgery     Social History     Tobacco Use   Smoking Status Former    Current packs/day: 0.25    Types: Cigarettes   Smokeless Tobacco Never       Past Family History  Family History   Problem Relation Age of Onset    Bipolar disorder Mother     Lung cancer Mother     Stroke Father     Psychosis Father     Schizoaffective Disorder  Son     Alcohol abuse Neg Hx     Substance Abuse Neg Hx     Suicidality Neg Hx        Past Social history  Social History     Socioeconomic History    Marital status: /Civil Union     Spouse name: Not on file    Number of children: 2    Years of education: 12    Highest education level: High school graduate   Occupational History    Occupation: on disability    Occupation:    Tobacco Use    Smoking status: Former     Current packs/day: 0.25     Types: Cigarettes     Smokeless tobacco: Never   Vaping Use    Vaping status: Never Used   Substance and Sexual Activity    Alcohol use: No    Drug use: No    Sexual activity: Not Currently   Other Topics Concern    Not on file   Social History Narrative    Daily caffeine consumption        Education: high school graduate    Learning Disabilities: none    Marital History:     Children: 2 adult sons    Living Arrangement: lives alone in an apartment    Occupational History: works as a  part time at the ; worked in  in the past, on disability    Functioning Relationships: good support system    Legal History: none     History: None     Social Determinants of Health     Financial Resource Strain: Low Risk  (12/11/2023)    Overall Financial Resource Strain (CARDIA)     Difficulty of Paying Living Expenses: Not hard at all   Food Insecurity: No Food Insecurity (12/11/2023)    Hunger Vital Sign     Worried About Running Out of Food in the Last Year: Never true     Ran Out of Food in the Last Year: Never true   Transportation Needs: No Transportation Needs (12/11/2023)    PRAPARE - Transportation     Lack of Transportation (Medical): No     Lack of Transportation (Non-Medical): No   Physical Activity: Insufficiently Active (12/11/2023)    Exercise Vital Sign     Days of Exercise per Week: 4 days     Minutes of Exercise per Session: 30 min   Stress: Stress Concern Present (12/11/2023)    [place] [employer] - Occupational Stress Questionnaire     Feeling of Stress : To some extent   Social Connections: Socially Isolated (12/11/2023)    Social Connection and Isolation Panel [NHANES]     Frequency of Communication with Friends and Family: Once a week     Frequency of Social Gatherings with Friends and Family: Never     Attends Amish Services: Never     Active Member of Clubs or Organizations: No     Attends Club or Organization Meetings: Never     Marital Status:    Intimate  "Partner Violence: Not At Risk (12/11/2023)    Humiliation, Afraid, Rape, and Kick questionnaire     Fear of Current or Ex-Partner: No     Emotionally Abused: No     Physically Abused: No     Sexually Abused: No   Housing Stability: Low Risk  (12/11/2023)    Housing Stability Vital Sign     Unable to Pay for Housing in the Last Year: No     Number of Places Lived in the Last Year: 1     Unstable Housing in the Last Year: No       Current Medications  Current Outpatient Medications   Medication Sig Dispense Refill    atorvastatin (LIPITOR) 20 mg tablet TAKE 1 TABLET DAILY 90 tablet 0    DULoxetine (CYMBALTA) 60 mg delayed release capsule Take 1 capsule (60 mg total) by mouth 2 (two) times a day 180 capsule 2    esomeprazole (NexIUM) 40 MG capsule Take 1 capsule (40 mg total) by mouth 2 (two) times a day before meals 60 capsule 3    lamoTRIgine (LaMICtal) 150 MG tablet Take 1 tablet (150 mg total) by mouth daily 90 tablet 2    lamoTRIgine (LaMICtal) 200 MG tablet Take 1 tablet (200 mg total) by mouth daily at bedtime 90 tablet 2    LORazepam (ATIVAN) 0.5 mg tablet Take 1 tablet (0.5 mg total) by mouth 4 (four) times a day as needed for anxiety Do not start before October 27, 2023. 120 tablet 4     No current facility-administered medications for this visit.       Allergies  Allergies   Allergen Reactions    Buspar [Buspirone] Hives and Rash         The following portions of the patient's history were reviewed and updated as appropriate: allergies, current medications, past medical history, past social history, past surgical history and problem list.      Vitals  Vitals:    12/20/23 1314   BP: 148/86   BP Location: Left arm   Patient Position: Sitting   Pulse: 96   SpO2: 97%   Weight: 113 kg (250 lb)   Height: 5' 8\" (1.727 m)           Physical Exam  Physical Exam  Constitutional:       Appearance: Normal appearance.   HENT:      Head: Normocephalic and atraumatic.      Right Ear: External ear normal.      Left Ear: " "External ear normal.      Nose: Nose normal.   Eyes:      General: No scleral icterus.     Conjunctiva/sclera: Conjunctivae normal.   Cardiovascular:      Pulses: Normal pulses.   Pulmonary:      Effort: Pulmonary effort is normal.   Musculoskeletal:         General: Normal range of motion.      Cervical back: Normal range of motion.   Neurological:      General: No focal deficit present.      Mental Status: She is alert and oriented to person, place, and time.   Psychiatric:         Mood and Affect: Mood normal.         Behavior: Behavior normal.         Thought Content: Thought content normal.         Judgment: Judgment normal.           Results  Recent Results (from the past 1 hour(s))   POCT urine dip    Collection Time: 12/20/23  1:18 PM   Result Value Ref Range    LEUKOCYTE ESTERASE,UA -     NITRITE,UA -     SL AMB POCT UROBILINOGEN -     POCT URINE PROTEIN 0.2      PH,UA 15     BLOOD,UA 6.0     SPECIFIC GRAVITY,UA -     KETONES,UA -     BILIRUBIN,UA -     GLUCOSE, UA -      COLOR,UA yeello     CLARITY,UA clear    POCT Measure PVR    Collection Time: 12/20/23  1:22 PM   Result Value Ref Range    POST-VOID RESIDUAL VOLUME, ML POC 31 mL   ]  No results found for: \"PSA\"  Lab Results   Component Value Date    CALCIUM 9.1 05/20/2023     12/05/2017    K 4.0 05/20/2023    CO2 28 05/20/2023     05/20/2023    BUN 15 05/20/2023    CREATININE 0.79 05/20/2023     Lab Results   Component Value Date    WBC 5.84 05/20/2023    HGB 12.5 05/20/2023    HCT 39.7 05/20/2023    MCV 90 05/20/2023     (L) 05/20/2023           Orders  Orders Placed This Encounter   Procedures    POCT Measure PVR    POCT urine dip       Shira Roe      "

## 2023-12-27 ENCOUNTER — HOSPITAL ENCOUNTER (OUTPATIENT)
Dept: RADIOLOGY | Facility: MEDICAL CENTER | Age: 58
Discharge: HOME | End: 2023-12-27
Payer: COMMERCIAL

## 2023-12-27 DIAGNOSIS — N13.30 HYDRONEPHROSIS, RIGHT: ICD-10-CM

## 2023-12-27 PROCEDURE — 74178 CT ABD&PLV WO CNTR FLWD CNTR: CPT

## 2023-12-27 PROCEDURE — G1004 CDSM NDSC: HCPCS

## 2023-12-27 RX ADMIN — IOHEXOL 100 ML: 350 INJECTION, SOLUTION INTRAVENOUS at 13:45

## 2024-01-12 ENCOUNTER — OFFICE VISIT (OUTPATIENT)
Dept: UROLOGY | Facility: CLINIC | Age: 59
End: 2024-01-12
Payer: COMMERCIAL

## 2024-01-12 VITALS
BODY MASS INDEX: 38.01 KG/M2 | HEART RATE: 68 BPM | SYSTOLIC BLOOD PRESSURE: 130 MMHG | DIASTOLIC BLOOD PRESSURE: 90 MMHG | HEIGHT: 68 IN | OXYGEN SATURATION: 97 %

## 2024-01-12 DIAGNOSIS — N13.30 HYDRONEPHROSIS, RIGHT: ICD-10-CM

## 2024-01-12 DIAGNOSIS — N39.46 MIXED STRESS AND URGE URINARY INCONTINENCE: Primary | ICD-10-CM

## 2024-01-12 DIAGNOSIS — N28.9 RENAL LESION: ICD-10-CM

## 2024-01-12 PROCEDURE — 99213 OFFICE O/P EST LOW 20 MIN: CPT | Performed by: PHYSICIAN ASSISTANT

## 2024-01-12 NOTE — PROGRESS NOTES
1/12/2024      Chief Complaint   Patient presents with    Follow-up         Assessment and Plan    Right hydronephrosis, resolved  Right side/lower abdominal pain, resolved  Right renal lesions  - Renal bladder US from 11/16/23 showing mild right hydronephrosis. No shadowing calculi. Bilateral ureteral jets detected. Also 1 cm right renal upper pole cyst with questionable thin septation.   - CT urogram from 12/27/23 - No solid enhancing renal mass or detectable urothelial mass is evident. Low-attenuation lesions in the right kidney which are too small to characterize by CT and cannot accurately assess for septation or abnormal enhancement. May represent cyst. If indicated a follow-up renal ultrasound may be obtained in 6 months. No hydronephrosis or hydroureter. Under distended urinary bladder without definite wall mass or calculus is evident.  - Obtain US in 6 months for reassessment of renal lesions     4. Mixed urinary incontinence, stress and urge  - Previously failed pelvic floor PT  - Recommend minimizing bladder irritants.  - Consider OAB medication which she defers at this time.     History of Present Illness  Janine Carrington is a 58 y.o. female here for follow up evaluation of right hydronephrosis and imaging review. CT urogram is negative for hydronephrosis. She reports right flank and abdominal pain has resolved.     She has long standing urinary frequency and urge urinary incontinence as well as IONA at times. Does report seeing urologist many years ago and had negative cystoscopy at that time per her report. Previously on Myrbetriq in the past, unsure if this was beneficial. Previously failed pelvic floor PT. Denies any prior  surgical manipulation.       Review of Systems   Constitutional:  Negative for chills and fever.   Respiratory:  Negative for shortness of breath.    Cardiovascular:  Negative for chest pain.   Gastrointestinal:  Negative for abdominal pain.   Genitourinary:  Positive for frequency  and urgency. Negative for difficulty urinating, dysuria, flank pain, hematuria and pelvic pain.   Neurological:  Negative for dizziness.                  Past Medical History  Past Medical History:   Diagnosis Date    Abnormal liver scan     last assessed 8/14/2014    Allergic     Anxiety     Atypical chest pain     last assessed 2/3/2016    Benign colonic polyp     Bipolar II disorder (HCC)     Bladder disorder     last assessed 1/22/2013    Cervical radiculopathy     Chronic ITP (idiopathic thrombocytopenia) (HCC)     Chronic lumbar radiculopathy     Chronic neck pain     Chronic pain     neck and back    Dermatitis     Ganglion cyst     GERD (gastroesophageal reflux disease)     Headache(784.0)     Herpes simplex     Hyperlipidemia     Luteinized follicular cyst     Menorrhagia     Obesity     Panic disorder without agoraphobia 01/30/2018    Urge and stress incontinence     Uterine fibroid        Past Social History  Past Surgical History:   Procedure Laterality Date    CHOLECYSTECTOMY      COLONOSCOPY      HYSTERECTOMY      MELO    ORIF TIBIA & FIBULA FRACTURES Left 4/27/2018    Procedure: OPEN REDUCTION W/ INTERNAL FIXATION (ORIF) ANKLE;  Surgeon: Omid Winters MD;  Location: [facility];  Service: Orthopedics    TOOTH EXTRACTION      last assessed 3/20/2017, oral surgery     Social History     Tobacco Use   Smoking Status Former    Current packs/day: 0.25    Types: Cigarettes    Passive exposure: Past   Smokeless Tobacco Never       Past Family History  Family History   Problem Relation Age of Onset    Bipolar disorder Mother     Lung cancer Mother     Stroke Father     Psychosis Father     Schizoaffective Disorder  Son     Alcohol abuse Neg Hx     Substance Abuse Neg Hx     Suicidality Neg Hx        Past Social history  Social History     Socioeconomic History    Marital status: /Civil Union     Spouse name: Not on file    Number of children: 2    Years of education: 12    Highest education level:  High school graduate   Occupational History    Occupation: on disability    Occupation:    Tobacco Use    Smoking status: Former     Current packs/day: 0.25     Types: Cigarettes     Passive exposure: Past    Smokeless tobacco: Never   Vaping Use    Vaping status: Never Used   Substance and Sexual Activity    Alcohol use: No    Drug use: No    Sexual activity: Not Currently   Other Topics Concern    Not on file   Social History Narrative    Daily caffeine consumption        Education: high school graduate    Learning Disabilities: none    Marital History:     Children: 2 adult sons    Living Arrangement: lives alone in an apartment    Occupational History: works as a [employer] part time at the [employer]; worked in  in the past, on disability    Functioning Relationships: good support system    Legal History: none     History: None     Social Determinants of Health     Financial Resource Strain: Low Risk  (12/11/2023)    Overall Financial Resource Strain (CARDIA)     Difficulty of Paying Living Expenses: Not hard at all   Food Insecurity: No Food Insecurity (12/11/2023)    Hunger Vital Sign     Worried About Running Out of Food in the Last Year: Never true     Ran Out of Food in the Last Year: Never true   Transportation Needs: No Transportation Needs (12/11/2023)    PRAPARE - Transportation     Lack of Transportation (Medical): No     Lack of Transportation (Non-Medical): No   Physical Activity: Insufficiently Active (12/11/2023)    Exercise Vital Sign     Days of Exercise per Week: 4 days     Minutes of Exercise per Session: 30 min   Stress: Stress Concern Present (12/11/2023)    [place] [employer] - Occupational Stress Questionnaire     Feeling of Stress : To some extent   Social Connections: Socially Isolated (12/11/2023)    Social Connection and Isolation Panel [NHANES]     Frequency of Communication with Friends and Family: Once a week     Frequency of Social  "Gatherings with Friends and Family: Never     Attends Catholic Services: Never     Active Member of Clubs or Organizations: No     Attends Club or Organization Meetings: Never     Marital Status:    Intimate Partner Violence: Not At Risk (12/11/2023)    Humiliation, Afraid, Rape, and Kick questionnaire     Fear of Current or Ex-Partner: No     Emotionally Abused: No     Physically Abused: No     Sexually Abused: No   Housing Stability: Low Risk  (12/11/2023)    Housing Stability Vital Sign     Unable to Pay for Housing in the Last Year: No     Number of Places Lived in the Last Year: 1     Unstable Housing in the Last Year: No       Current Medications  Current Outpatient Medications   Medication Sig Dispense Refill    atorvastatin (LIPITOR) 20 mg tablet TAKE 1 TABLET DAILY 90 tablet 0    DULoxetine (CYMBALTA) 60 mg delayed release capsule Take 1 capsule (60 mg total) by mouth 2 (two) times a day 180 capsule 2    esomeprazole (NexIUM) 40 MG capsule Take 1 capsule (40 mg total) by mouth 2 (two) times a day before meals 60 capsule 3    lamoTRIgine (LaMICtal) 150 MG tablet Take 1 tablet (150 mg total) by mouth daily 90 tablet 2    lamoTRIgine (LaMICtal) 200 MG tablet Take 1 tablet (200 mg total) by mouth daily at bedtime 90 tablet 2    LORazepam (ATIVAN) 0.5 mg tablet Take 1 tablet (0.5 mg total) by mouth 4 (four) times a day as needed for anxiety Do not start before October 27, 2023. 120 tablet 4     No current facility-administered medications for this visit.       Allergies  Allergies   Allergen Reactions    Buspar [Buspirone] Hives and Rash         The following portions of the patient's history were reviewed and updated as appropriate: allergies, current medications, past medical history, past social history, past surgical history and problem list.      Vitals  Vitals:    01/12/24 1501   BP: 130/90   Pulse: 68   SpO2: 97%   Height: 5' 8\" (1.727 m)           Physical Exam  Physical Exam  Constitutional:  " "     Appearance: Normal appearance.   HENT:      Head: Normocephalic and atraumatic.      Right Ear: External ear normal.      Left Ear: External ear normal.      Nose: Nose normal.   Eyes:      General: No scleral icterus.     Conjunctiva/sclera: Conjunctivae normal.   Cardiovascular:      Pulses: Normal pulses.   Pulmonary:      Effort: Pulmonary effort is normal.   Musculoskeletal:         General: Normal range of motion.      Cervical back: Normal range of motion.   Neurological:      General: No focal deficit present.      Mental Status: She is alert and oriented to person, place, and time.   Psychiatric:         Mood and Affect: Mood normal.         Behavior: Behavior normal.         Thought Content: Thought content normal.         Judgment: Judgment normal.           Results  No results found for this or any previous visit (from the past 1 hour(s)).]  No results found for: \"PSA\"  Lab Results   Component Value Date    CALCIUM 9.1 05/20/2023     12/05/2017    K 4.0 05/20/2023    CO2 28 05/20/2023     05/20/2023    BUN 15 05/20/2023    CREATININE 0.79 05/20/2023     Lab Results   Component Value Date    WBC 5.84 05/20/2023    HGB 12.5 05/20/2023    HCT 39.7 05/20/2023    MCV 90 05/20/2023     (L) 05/20/2023           Orders  Orders Placed This Encounter   Procedures    US kidney and bladder     Standing Status:   Future     Standing Expiration Date:   1/12/2028     Scheduling Instructions:      Prep required if being scheduled in conjunction with other studies, refer to those examination's Preps first before scheduling.             All patients for US Kidney and Bladder they must drink 24 oz of water 60 minutes before your scheduled appointment time. This test requires you to have a FULL bladder. Please do not urinate before your test.            If you have difficulty holding your urine please arrive 30 minutes early to complete your drinking prep.            You may take all of your " medications for this test.            Pediatric Preps-birth to 12 years             Infants and toddlers to 1 year of age- no drinking prep or restrictions             Toddlers (2-3 year old)- just give liquids , any clear liquid or juice, and hour prior to the exam             4 years old and older- drink liquids (approx. 16 to 24 oz and no soda) 30 minutes before, try to not let the child void until the test is completed            Please bring your insurance cards, a form of photo ID and a list of yourmedications with you. Arrive 15 minutes prior to your appointment time in order to register.            If you need to have lab work or a urinalysis, please do this AFTER your ultrasound.             To schedule this appointment, please contact Central Scheduling at [phone].           Order Specific Question:   Is a Renal Artery Doppler also being requested in addition to the Kidney/Renal ultrasound ?     Answer:   Cecilia Au

## 2024-01-25 ENCOUNTER — TELEPHONE (OUTPATIENT)
Dept: OBGYN CLINIC | Facility: CLINIC | Age: 59
End: 2024-01-25

## 2024-02-10 LAB — HBA1C MFR BLD HPLC: 5.3 %

## 2024-02-14 ENCOUNTER — TELEPHONE (OUTPATIENT)
Age: 59
End: 2024-02-14

## 2024-02-14 DIAGNOSIS — R73.01 IFG (IMPAIRED FASTING GLUCOSE): Primary | ICD-10-CM

## 2024-02-14 NOTE — TELEPHONE ENCOUNTER
Patient calling to see if Mellissa Cordova can put a doctor referral in for an Endocrinologist.  She stated her insulin levels are elevated and A1C.  She stated if she needs an apt before doing a referral to please let her know.    Thank you

## 2024-02-14 NOTE — TELEPHONE ENCOUNTER
Patient had labs through [facility] network labs, A1C was 5.7  I will reach out for labs , please place referral

## 2024-02-14 NOTE — TELEPHONE ENCOUNTER
I do not see a recent A1C in her chart. Did she have this done somewhere that we can get the results? What was her A1C?  If not I did put in for an A1C to check first.

## 2024-02-15 NOTE — TELEPHONE ENCOUNTER
A1C is just barely in the prediabetic range. No need for medication, just reducing carbohydrates and sweets. Not sure what else the endocrinologist would do? Does she still want a referral?

## 2024-03-18 DIAGNOSIS — E78.5 HYPERLIPIDEMIA, UNSPECIFIED HYPERLIPIDEMIA TYPE: ICD-10-CM

## 2024-03-18 RX ORDER — ATORVASTATIN CALCIUM 20 MG/1
20 TABLET, FILM COATED ORAL DAILY
Qty: 90 TABLET | Refills: 1 | Status: SHIPPED | OUTPATIENT
Start: 2024-03-18

## 2024-03-18 NOTE — TELEPHONE ENCOUNTER
Reason for call:   [x] Refill   [] Prior Auth  [] Other:     Office:   [x] PCP/Provider -   [] Specialty/Provider -     Medication: Atorvastatin     Dose/Frequency: 20 mg tablet taken by mouth once daily     Quantity: 90    [facility] [place] [facility] [address] [facility] [phone]     Does the patient have enough for 3 days?   [] Yes   [x] No - Send as HP to POD
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
## 2024-04-01 ENCOUNTER — OFFICE VISIT (OUTPATIENT)
Dept: PSYCHIATRY | Facility: CLINIC | Age: 59
End: 2024-04-01
Payer: COMMERCIAL

## 2024-04-01 VITALS
HEART RATE: 103 BPM | DIASTOLIC BLOOD PRESSURE: 84 MMHG | WEIGHT: 260 LBS | BODY MASS INDEX: 39.4 KG/M2 | SYSTOLIC BLOOD PRESSURE: 135 MMHG | HEIGHT: 68 IN

## 2024-04-01 DIAGNOSIS — F41.0 PANIC DISORDER WITHOUT AGORAPHOBIA: Chronic | ICD-10-CM

## 2024-04-01 DIAGNOSIS — F43.12 POST-TRAUMATIC STRESS DISORDER, CHRONIC: Chronic | ICD-10-CM

## 2024-04-01 DIAGNOSIS — F41.1 GAD (GENERALIZED ANXIETY DISORDER): Chronic | ICD-10-CM

## 2024-04-01 DIAGNOSIS — F31.81 BIPOLAR II DISORDER, MOST RECENT EPISODE MAJOR DEPRESSIVE (HCC): Primary | Chronic | ICD-10-CM

## 2024-04-01 PROCEDURE — 90833 PSYTX W PT W E/M 30 MIN: CPT | Performed by: PSYCHIATRY & NEUROLOGY

## 2024-04-01 PROCEDURE — 99214 OFFICE O/P EST MOD 30 MIN: CPT | Performed by: PSYCHIATRY & NEUROLOGY

## 2024-04-01 RX ORDER — LAMOTRIGINE 150 MG/1
150 TABLET ORAL DAILY
Qty: 90 TABLET | Refills: 2 | Status: SHIPPED | OUTPATIENT
Start: 2024-04-01 | End: 2024-12-27

## 2024-04-01 RX ORDER — LORAZEPAM 0.5 MG/1
0.5 TABLET ORAL 4 TIMES DAILY PRN
Qty: 120 TABLET | Refills: 4 | Status: SHIPPED | OUTPATIENT
Start: 2024-04-09 | End: 2024-09-06

## 2024-04-01 RX ORDER — LAMOTRIGINE 200 MG/1
200 TABLET ORAL
Qty: 90 TABLET | Refills: 2 | Status: SHIPPED | OUTPATIENT
Start: 2024-04-01 | End: 2024-12-27

## 2024-04-01 NOTE — BH CRISIS PLAN
"Client Name: Janine Carrington       Client YOB: 1965    [facility] Safety Plan      Creation Date: 12/11/23 Update Date: 4/1/24   Created By: Francesca Daniels MD Last Updated By: Francesca Daniels MD      Step 1: Warning Signs:   Warning Signs   \" Overthinking, I don't want my son's drama when he call me \"            Step 2: Internal Coping Strategies:   Internal Coping Strategies   \"listening to music and doing puzzles, reading, sensory toys \"            Step 3: People and social settings that provide distraction:   Name Contact Information   Thomas Carrington (son) [phone]   Lyudmila Carrington (daughter-in-law) [phone]            Step 4: People whom I can ask for help during a crisis:      Name Contact Information    Thomas Carrington (son) [phone]    Lyudmila Carrington (daughter-in-law) [phone]      Step 5: Professionals or agencies I can contact during a crisis:      Clinican/Agency Name Phone Emergency Contact    [facility] [phone]       [facility] Emergency Department Emergency Department Phone Emergency Department Address    [facility] [address]         Crisis Phone Numbers:   Suicide Prevention Lifeline: Call or Text  810 Crisis Text Line: Text HOME to 836-684   Please note: Some [facility] do not have a separate number for Child/Adolescent specific crisis. If your county is not listed under Child/Adolescent, please call the adult number for your county      Adult Crisis Numbers: Child/Adolescent Crisis Numbers   [facility]: [phone] [facility]: [phone]   [facility]: [phone] [facility]: [phone]   [facility]: [phone] [place]: [phone]   [facility]: [phone] [place] [facility]: [phone]   [place] [facility]: [phone]   [facility]: [phone]   [facility]: [phone]   [facility]: [phone] (daytime) [phone] (after hours, weekends, holidays) "      Step 6: Making the environment safer (plan for lethal means safety):   Patient did not identify any lethal methods: Yes     Optional: What is most important to me and worth living for?   My grand kids     [place] Safety Plan. Alexia Hannon and Tony August. Used with permission of the authors.

## 2024-04-01 NOTE — BH TREATMENT PLAN
"TREATMENT PLAN (Medication Management Only)        [facility] - PSYCHIATRIC ASSOCIATES    Name/Date of Birth/MRN:  Janine Carrington 59 y.o. 1965 MRN: [id number]  Date of Treatment Plan: April 1, 2024  Diagnosis/Diagnoses:   1. Bipolar II disorder, most recent episode major depressive (HCC)    2. DREAD (generalized anxiety disorder)    3. Panic disorder without agoraphobia    4. Post-traumatic stress disorder, chronic      Strengths/Personal Resources for Self-Care: \"my grandchildren\".  Area/Areas of need (in own words): \"my physical appearance, my self-esteem\".  1. Long Term Goal:   improve control of anxiety, improve control of depression, improve mood stability  Target Date: 3 months - 7/1/2024  Person/Persons responsible for completion of goal: Janine  2.  Short Term Objective (s) - How will we reach this goal?:   A.  Provider new recommended medication/dosage changes and/or continue medication(s): continue current medications as prescribed (Cymbalta, Lamictal, and Ativan).  B.  N/A.  C.  N/A.  Target Date: 3 months - 7/1/2024  Person/Persons Responsible for Completion of Goal: Janine   Progress Towards Goals: minimal progress  Treatment Modality: medication management every 3 months  Review due 180 days from date of this plan: 6 months - 10/1/2024  Expected length of service: ongoing treatment unless revised  My Physician/PA/NP and I have developed this plan together and I agree to work on the goals and objectives. I understand the treatment goals that were developed for my treatment.  Electronic Signatures: on file (unless signed below)    Francesca Daniels MD 04/01/24  "

## 2024-05-23 DIAGNOSIS — K21.9 GASTROESOPHAGEAL REFLUX DISEASE WITHOUT ESOPHAGITIS: ICD-10-CM

## 2024-05-23 NOTE — TELEPHONE ENCOUNTER
Reason for call:   [x] Refill   [] Prior Auth  [] Other:     Office:   [x] PCP/Provider -   [] Specialty/Provider -     Medication:     esomeprazole (NexIUM) 40 MG capsule       Dose/Frequency: Take 1 capsule (40 mg total) by mouth 2 (two) times a day before meals     Quantity: 90    [facility] [phone]     Does the patient have enough for 3 days?   [] Yes   [x] No - Send as HP to POD

## 2024-05-28 ENCOUNTER — TELEPHONE (OUTPATIENT)
Age: 59
End: 2024-05-28

## 2024-05-28 DIAGNOSIS — R73.01 IFG (IMPAIRED FASTING GLUCOSE): Primary | ICD-10-CM

## 2024-05-28 DIAGNOSIS — E78.5 HYPERLIPIDEMIA, UNSPECIFIED HYPERLIPIDEMIA TYPE: ICD-10-CM

## 2024-05-28 RX ORDER — ESOMEPRAZOLE MAGNESIUM 40 MG/1
40 CAPSULE, DELAYED RELEASE ORAL
Qty: 180 CAPSULE | Refills: 0 | Status: SHIPPED | OUTPATIENT
Start: 2024-05-28

## 2024-05-28 NOTE — TELEPHONE ENCOUNTER
A1C was already in. Lipid, cmp, and cbc added.     Also there is another task for her. Please ask if she's taking nexium once or twice daily and if she needs a refill.

## 2024-05-28 NOTE — TELEPHONE ENCOUNTER
Pt cancelled her appt as she feels it would be best to have her labs done prior to coming in for an appt. Pt would like to have her A1C and lipid panel checked.  Please advise.

## 2024-05-28 NOTE — TELEPHONE ENCOUNTER
Patient returned call and stated Twice daily.  She would like a 90 day supply through [facility]

## 2024-05-29 NOTE — TELEPHONE ENCOUNTER
Patient received via transfer from [facility] for [facility] hospice care. Upon auscultation, lungs are diminished bilaterally. Abdomen is soft, non-tender with hypoactive bowel sounds. Morphine PCA pump infusing at 2mg/hr. Patient is lethargic and responsive to tactile stimulation. Requires max assist for transfers. Sat is 91% on 5L via n/c. Gannon is collecting clear, yellow urine. Patient is exhibiting signs of pre-active dying. Granddaughter is present at bedside and is supportive. Call light is within reach. Bed is in the low and locked position. Residential hospice is consulted. Pt notified   Nexium twice a day

## 2024-06-03 ENCOUNTER — APPOINTMENT (OUTPATIENT)
Dept: LAB | Facility: MEDICAL CENTER | Age: 59
End: 2024-06-03
Payer: COMMERCIAL

## 2024-06-03 DIAGNOSIS — R73.01 IFG (IMPAIRED FASTING GLUCOSE): ICD-10-CM

## 2024-06-03 DIAGNOSIS — E78.5 HYPERLIPIDEMIA, UNSPECIFIED HYPERLIPIDEMIA TYPE: ICD-10-CM

## 2024-06-03 DIAGNOSIS — N13.30 HYDRONEPHROSIS, RIGHT: ICD-10-CM

## 2024-06-03 LAB
ALBUMIN SERPL BCP-MCNC: 4.2 G/DL (ref 3.5–5)
ALP SERPL-CCNC: 85 U/L (ref 34–104)
ALT SERPL W P-5'-P-CCNC: 13 U/L (ref 7–52)
ANION GAP SERPL CALCULATED.3IONS-SCNC: 10 MMOL/L (ref 4–13)
AST SERPL W P-5'-P-CCNC: 14 U/L (ref 13–39)
BASOPHILS # BLD AUTO: 0.04 THOUSANDS/ÂΜL (ref 0–0.1)
BASOPHILS NFR BLD AUTO: 1 % (ref 0–1)
BILIRUB SERPL-MCNC: 0.42 MG/DL (ref 0.2–1)
BUN SERPL-MCNC: 16 MG/DL (ref 5–25)
CALCIUM SERPL-MCNC: 9.7 MG/DL (ref 8.4–10.2)
CHLORIDE SERPL-SCNC: 104 MMOL/L (ref 96–108)
CHOLEST SERPL-MCNC: 166 MG/DL
CO2 SERPL-SCNC: 27 MMOL/L (ref 21–32)
CREAT SERPL-MCNC: 0.7 MG/DL (ref 0.6–1.3)
EOSINOPHIL # BLD AUTO: 0.15 THOUSAND/ÂΜL (ref 0–0.61)
EOSINOPHIL NFR BLD AUTO: 3 % (ref 0–6)
ERYTHROCYTE [DISTWIDTH] IN BLOOD BY AUTOMATED COUNT: 14.5 % (ref 11.6–15.1)
EST. AVERAGE GLUCOSE BLD GHB EST-MCNC: 111 MG/DL
GFR SERPL CREATININE-BSD FRML MDRD: 94 ML/MIN/1.73SQ M
GLUCOSE P FAST SERPL-MCNC: 96 MG/DL (ref 65–99)
HBA1C MFR BLD: 5.5 %
HCT VFR BLD AUTO: 39.3 % (ref 34.8–46.1)
HDLC SERPL-MCNC: 73 MG/DL
HGB BLD-MCNC: 12.3 G/DL (ref 11.5–15.4)
IMM GRANULOCYTES # BLD AUTO: 0.02 THOUSAND/UL (ref 0–0.2)
IMM GRANULOCYTES NFR BLD AUTO: 0 % (ref 0–2)
LDLC SERPL CALC-MCNC: 78 MG/DL (ref 0–100)
LYMPHOCYTES # BLD AUTO: 1.36 THOUSANDS/ÂΜL (ref 0.6–4.47)
LYMPHOCYTES NFR BLD AUTO: 23 % (ref 14–44)
MCH RBC QN AUTO: 28 PG (ref 26.8–34.3)
MCHC RBC AUTO-ENTMCNC: 31.3 G/DL (ref 31.4–37.4)
MCV RBC AUTO: 89 FL (ref 82–98)
MONOCYTES # BLD AUTO: 0.54 THOUSAND/ÂΜL (ref 0.17–1.22)
MONOCYTES NFR BLD AUTO: 9 % (ref 4–12)
NEUTROPHILS # BLD AUTO: 3.84 THOUSANDS/ÂΜL (ref 1.85–7.62)
NEUTS SEG NFR BLD AUTO: 64 % (ref 43–75)
NRBC BLD AUTO-RTO: 0 /100 WBCS
PLATELET # BLD AUTO: 136 THOUSANDS/UL (ref 149–390)
PMV BLD AUTO: 13.4 FL (ref 8.9–12.7)
POTASSIUM SERPL-SCNC: 4.2 MMOL/L (ref 3.5–5.3)
PROT SERPL-MCNC: 7.2 G/DL (ref 6.4–8.4)
RBC # BLD AUTO: 4.4 MILLION/UL (ref 3.81–5.12)
SODIUM SERPL-SCNC: 141 MMOL/L (ref 135–147)
TRIGL SERPL-MCNC: 73 MG/DL
WBC # BLD AUTO: 5.95 THOUSAND/UL (ref 4.31–10.16)

## 2024-06-03 PROCEDURE — 36415 COLL VENOUS BLD VENIPUNCTURE: CPT

## 2024-06-03 PROCEDURE — 85025 COMPLETE CBC W/AUTO DIFF WBC: CPT

## 2024-06-03 PROCEDURE — 80053 COMPREHEN METABOLIC PANEL: CPT

## 2024-06-03 PROCEDURE — 83036 HEMOGLOBIN GLYCOSYLATED A1C: CPT

## 2024-06-03 PROCEDURE — 80061 LIPID PANEL: CPT

## 2024-06-27 ENCOUNTER — TELEPHONE (OUTPATIENT)
Dept: UROLOGY | Facility: CLINIC | Age: 59
End: 2024-06-27

## 2024-06-27 NOTE — TELEPHONE ENCOUNTER
LVM providing office number     If pt calls back please inform pt today's appt is to go over US unfortunately it was not done. Ask pt if they would like to reschedule appt since it was not done. Please provided C/S number and inform pt to call our office back to set up a follow up appt to review  US

## 2024-07-05 ENCOUNTER — OFFICE VISIT (OUTPATIENT)
Dept: INTERNAL MEDICINE CLINIC | Facility: CLINIC | Age: 59
End: 2024-07-05
Payer: COMMERCIAL

## 2024-07-05 VITALS
HEART RATE: 100 BPM | TEMPERATURE: 96.7 F | OXYGEN SATURATION: 97 % | WEIGHT: 268 LBS | SYSTOLIC BLOOD PRESSURE: 118 MMHG | DIASTOLIC BLOOD PRESSURE: 78 MMHG | HEIGHT: 68 IN | BODY MASS INDEX: 40.62 KG/M2

## 2024-07-05 DIAGNOSIS — E66.01 MORBID OBESITY (HCC): ICD-10-CM

## 2024-07-05 DIAGNOSIS — K21.9 GASTROESOPHAGEAL REFLUX DISEASE WITHOUT ESOPHAGITIS: Primary | ICD-10-CM

## 2024-07-05 DIAGNOSIS — M25.562 ACUTE PAIN OF LEFT KNEE: ICD-10-CM

## 2024-07-05 DIAGNOSIS — D69.6 THROMBOCYTOPENIA (HCC): ICD-10-CM

## 2024-07-05 DIAGNOSIS — E78.5 HYPERLIPIDEMIA, UNSPECIFIED HYPERLIPIDEMIA TYPE: ICD-10-CM

## 2024-07-05 DIAGNOSIS — L82.1 SEBORRHEIC KERATOSIS: ICD-10-CM

## 2024-07-05 DIAGNOSIS — F31.81 BIPOLAR II DISORDER, MOST RECENT EPISODE MAJOR DEPRESSIVE (HCC): Chronic | ICD-10-CM

## 2024-07-05 PROCEDURE — 99214 OFFICE O/P EST MOD 30 MIN: CPT | Performed by: NURSE PRACTITIONER

## 2024-07-05 RX ORDER — OLOPATADINE HYDROCHLORIDE 2 MG/ML
1 SOLUTION/ DROPS OPHTHALMIC DAILY PRN
COMMUNITY
Start: 2024-04-15

## 2024-07-05 RX ORDER — KETOCONAZOLE 20 MG/ML
1 SHAMPOO TOPICAL 2 TIMES WEEKLY
Qty: 120 ML | Refills: 2 | Status: SHIPPED | OUTPATIENT
Start: 2024-07-08

## 2024-07-05 NOTE — PROGRESS NOTES
Ambulatory Visit  Name: Janine Carrington      : 1965      MRN: [id number]  Encounter Provider: CALEB Fall  Encounter Date: 2024   Encounter department: [facility] INTERNAL MEDICINE    Assessment & Plan   1. Gastroesophageal reflux disease without esophagitis  Assessment & Plan:  On nexium twice daily.   2. Hyperlipidemia, unspecified hyperlipidemia type  Assessment & Plan:  On a statin.   3. Thrombocytopenia (HCC)  Assessment & Plan:  CBC stable.   4. Bipolar II disorder, most recent episode major depressive (HCC)  Assessment & Plan:  Continue medications per psychiatry   5. Acute pain of left knee  -     XR knee 3 vw left non injury; Future; Expected date: 2024  -     Ambulatory Referral to Orthopedic Surgery; Future  6. Morbid obesity (HCC)  Assessment & Plan:  Start wegovy. Discussed side effects of the medication. Recommend in conjunction with a nutrition and exercise program.     7. Seborrheic keratosis  -     ketoconazole (NIZORAL) 2 % shampoo; Apply 1 Application topically 2 (two) times a week       History of Present Illness     Janine is here today for follow up  She is doing ok. She has multiple concerns today.     She has pain in her left knee when she walks. Started about 1 month ago. No injury. She has some swelling.     She is interested in medication to lose weight. She has been trying to eat less but since she's living alone she has been turning to food for comfort. She also quit smoking so when she's stressed, she eats instead. She tries to stay active but no routine exercise regimen.     She has occasional palpitations. She denies chest pain or shortness of breath. She is scheduled with cardiology.     She has flaking and dryness on her scalp, comes and goes.               Review of Systems   Constitutional:  Negative for activity change, appetite change, fatigue and unexpected weight change.   Eyes:  Negative for visual disturbance.   Respiratory:  Negative for  "cough and shortness of breath.    Cardiovascular:  Positive for palpitations. Negative for chest pain and leg swelling.   Gastrointestinal:  Negative for abdominal pain, constipation and diarrhea.   Genitourinary:  Negative for difficulty urinating.   Musculoskeletal:  Positive for arthralgias.   Skin:  Negative for rash.   Neurological:  Negative for dizziness, light-headedness and headaches.   Psychiatric/Behavioral:  Negative for sleep disturbance.        Objective     /78   Pulse 100   Temp (!) 96.7 °F (35.9 °C)   Ht 5' 8\" (1.727 m)   Wt 122 kg (268 lb)   SpO2 97%   BMI 40.75 kg/m²     Physical Exam  Vitals reviewed.   Constitutional:       Appearance: Normal appearance.   HENT:      Head: Normocephalic and atraumatic.   Eyes:      Conjunctiva/sclera: Conjunctivae normal.   Cardiovascular:      Rate and Rhythm: Normal rate and regular rhythm.      Heart sounds: Normal heart sounds.   Pulmonary:      Effort: Pulmonary effort is normal.      Breath sounds: Normal breath sounds.   Abdominal:      General: Bowel sounds are normal.      Palpations: Abdomen is soft.   Musculoskeletal:      Left knee: Swelling present. Tenderness present over the lateral joint line.      Right lower leg: No edema.      Left lower leg: No edema.   Skin:     Comments: PVD discoloration to B/L LE.     Dry scaly scalp    Neurological:      Mental Status: She is alert and oriented to person, place, and time.   Psychiatric:         Mood and Affect: Mood normal.         Behavior: Behavior normal.       Administrative Statements           "

## 2024-07-05 NOTE — TELEPHONE ENCOUNTER
Transmission failed to [facility]. They no longer keep inventory of this medication. Please forward to the office to contact patient to find alternative pharmacy

## 2024-07-05 NOTE — ASSESSMENT & PLAN NOTE
Start wegovy. Discussed side effects of the medication. Recommend in conjunction with a nutrition and exercise program.

## 2024-07-05 NOTE — TELEPHONE ENCOUNTER
Please let her know this had to go to [facility] in [place], not stocked at [facility]

## 2024-07-06 ENCOUNTER — APPOINTMENT (OUTPATIENT)
Dept: RADIOLOGY | Facility: MEDICAL CENTER | Age: 59
End: 2024-07-06
Payer: COMMERCIAL

## 2024-07-06 DIAGNOSIS — M25.562 ACUTE PAIN OF LEFT KNEE: ICD-10-CM

## 2024-07-06 PROCEDURE — 73562 X-RAY EXAM OF KNEE 3: CPT

## 2024-07-08 ENCOUNTER — TELEPHONE (OUTPATIENT)
Dept: INTERNAL MEDICINE CLINIC | Facility: CLINIC | Age: 59
End: 2024-07-08

## 2024-07-08 NOTE — TELEPHONE ENCOUNTER
Pt wants to know which facility is better for her situation. The orthopedic close to the office that PCP recommended or [facility] ?         Please contact pt and advise. Thank you for your help.

## 2024-07-09 NOTE — TELEPHONE ENCOUNTER
Medication prior auth team: please disregard telephone messages that were attached to this prior auth request by the access enter.

## 2024-07-11 NOTE — TELEPHONE ENCOUNTER
PA for Semaglutide-Weight Management (WEGOVY) 0.25 MG/0.5ML    Submitted via    []CMM-KEY   [[email or web address]-Case ID # [id number]  []Faxed to plan   []Other website   []Phone call Case ID #     Office notes sent, clinical questions answered. Awaiting determination    Turnaround time for your insurance to make a decision on your Prior Authorization can take 7-21 business days.

## 2024-07-11 NOTE — TELEPHONE ENCOUNTER
PA for Semaglutide-Weight Management (WEGOVY) 0.25 MG/0.5ML Approved     Date(s) approved 6- - 1-7-2025      Patient advised by          [x] ABFIT Productshart Message  [] Phone call   []LMOM  []L/M to call office as no active Communication consent on file  []Unable to leave detailed message as VM not approved on Communication consent       Pharmacy advised by    [x]Fax  []Phone call    Approval letter scanned into Media Yes

## 2024-07-15 ENCOUNTER — HOSPITAL ENCOUNTER (OUTPATIENT)
Dept: RADIOLOGY | Facility: MEDICAL CENTER | Age: 59
Discharge: HOME | End: 2024-07-15
Payer: COMMERCIAL

## 2024-07-15 DIAGNOSIS — N28.9 RENAL LESION: ICD-10-CM

## 2024-07-15 PROCEDURE — 76775 US EXAM ABDO BACK WALL LIM: CPT

## 2024-07-17 ENCOUNTER — TELEPHONE (OUTPATIENT)
Dept: UROLOGY | Facility: AMBULATORY SURGERY CENTER | Age: 59
End: 2024-07-17

## 2024-07-17 DIAGNOSIS — N28.1 BILATERAL RENAL CYSTS: Primary | ICD-10-CM

## 2024-07-17 DIAGNOSIS — N39.46 MIXED STRESS AND URGE URINARY INCONTINENCE: Primary | ICD-10-CM

## 2024-07-17 RX ORDER — MIRABEGRON 50 MG/1
50 TABLET, EXTENDED RELEASE ORAL DAILY
Qty: 30 TABLET | Refills: 2 | Status: SHIPPED | OUTPATIENT
Start: 2024-07-17

## 2024-07-17 NOTE — TELEPHONE ENCOUNTER
Call placed- Spoke to patient to let her know Rx was sent to her pharmacy.  She was appreciative.

## 2024-07-17 NOTE — TELEPHONE ENCOUNTER
Call placed- Spoke to patient and gave her the US results.   She then stated she is having urgency and frequency.  She does not need to wear a pad currently.  She said she had taken Myrbetriq in the past and would like to try it again.  She wants to know if she can get this sent to [facility] in [place].  Please advise.     ----- Message from Shira Au PA-C sent at 7/17/2024  1:41 PM EDT -----  US appears stable compared to prior. Bilateral renal cysts that appear benign. Recommend follow up in 1 year with repeat US

## 2024-07-22 NOTE — TELEPHONE ENCOUNTER
Patient is asking to speak with you directly  She didn't go into detail      Please advise Quality 226: Preventive Care And Screening: Tobacco Use: Screening And Cessation Intervention: Patient screened for tobacco use and is an ex/non-smoker Quality 134: Screening For Clinical Depression And Follow-Up Plan: The patient was screened for depression and the screen was negative and no follow up required Detail Level: Detailed Quality 130: Documentation Of Current Medications In The Medical Record: Current Medications Documented
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
## 2024-07-29 ENCOUNTER — TELEPHONE (OUTPATIENT)
Age: 59
End: 2024-07-29

## 2024-07-29 NOTE — TELEPHONE ENCOUNTER
Patient called to ask Dr. Daniels if its okay for her to use Wegovy injections with the medications she is already taking. Please review and call patient.

## 2024-08-02 ENCOUNTER — TELEPHONE (OUTPATIENT)
Dept: PSYCHIATRY | Facility: CLINIC | Age: 59
End: 2024-08-02

## 2024-08-02 NOTE — TELEPHONE ENCOUNTER
Janine wants to start using Wegovy and wants Dr Daniels's OK.  States that she would feel better knowing that Dr Daniels is aware and is ok with her taking it.     Will refer to Dr Daniels for review.

## 2024-08-02 NOTE — TELEPHONE ENCOUNTER
Please let Janine know that it is OK to start Wegovy. No interaction with her psychotropic medications

## 2024-08-02 NOTE — TELEPHONE ENCOUNTER
Called Janine back and informed her as per Dr Daniels, it is ok for her to take Wegovy.  No interactions with her psychotropic medications.

## 2024-08-06 ENCOUNTER — OFFICE VISIT (OUTPATIENT)
Dept: PSYCHIATRY | Facility: CLINIC | Age: 59
End: 2024-08-06
Payer: COMMERCIAL

## 2024-08-06 VITALS
DIASTOLIC BLOOD PRESSURE: 78 MMHG | BODY MASS INDEX: 38.8 KG/M2 | HEIGHT: 68 IN | WEIGHT: 256 LBS | HEART RATE: 98 BPM | SYSTOLIC BLOOD PRESSURE: 120 MMHG

## 2024-08-06 DIAGNOSIS — F41.0 PANIC DISORDER WITHOUT AGORAPHOBIA: Chronic | ICD-10-CM

## 2024-08-06 DIAGNOSIS — F31.81 BIPOLAR II DISORDER, MOST RECENT EPISODE MAJOR DEPRESSIVE (HCC): Primary | Chronic | ICD-10-CM

## 2024-08-06 DIAGNOSIS — F41.1 GAD (GENERALIZED ANXIETY DISORDER): Chronic | ICD-10-CM

## 2024-08-06 DIAGNOSIS — F43.12 POST-TRAUMATIC STRESS DISORDER, CHRONIC: Chronic | ICD-10-CM

## 2024-08-06 PROCEDURE — 90833 PSYTX W PT W E/M 30 MIN: CPT | Performed by: PSYCHIATRY & NEUROLOGY

## 2024-08-06 PROCEDURE — 99214 OFFICE O/P EST MOD 30 MIN: CPT | Performed by: PSYCHIATRY & NEUROLOGY

## 2024-08-06 RX ORDER — LORAZEPAM 0.5 MG/1
0.5 TABLET ORAL 4 TIMES DAILY PRN
Qty: 120 TABLET | Refills: 4 | Status: SHIPPED | OUTPATIENT
Start: 2024-08-06 | End: 2025-01-03

## 2024-08-06 RX ORDER — DULOXETIN HYDROCHLORIDE 60 MG/1
60 CAPSULE, DELAYED RELEASE ORAL 2 TIMES DAILY
Qty: 180 CAPSULE | Refills: 2 | Status: SHIPPED | OUTPATIENT
Start: 2024-08-06 | End: 2025-05-03

## 2024-08-06 RX ORDER — POLYETHYLENE GLYCOL 400 2.5 MG/ML
1 SOLUTION/ DROPS OPHTHALMIC 2 TIMES DAILY
COMMUNITY

## 2024-08-06 NOTE — BH TREATMENT PLAN
"TREATMENT PLAN (Medication Management Only)        [facility] - [facility]    Name/Date of Birth/MRN:  Janine Carrington 59 y.o. 1965 MRN: [id number]  Date of Treatment Plan: August 6, 2024  Diagnosis/Diagnoses:   1. Bipolar II disorder, most recent episode major depressive (HCC)    2. DREAD (generalized anxiety disorder)    3. Panic disorder without agoraphobia    4. Post-traumatic stress disorder, chronic      Strengths/Personal Resources for Self-Care: \"my grandchildren, they keep me going\".  Area/Areas of need (in own words): \"my weight, exercise, go out to events on my own\".  1. Long Term Goal:   improve control of anxiety, improve control of depression, improve mood stability  Target Date: 3 months - 11/6/2024  Person/Persons responsible for completion of goal: Janine  2.  Short Term Objective (s) - How will we reach this goal?:   A.  Provider new recommended medication/dosage changes and/or continue medication(s): continue current medications as prescribed (Cymbalta, Lamictal, and Ativan).  B.  N/A.  C.  N/A.  Target Date: 3 months - 11/6/2024  Person/Persons Responsible for Completion of Goal: Janine   Progress Towards Goals: limited progress  Treatment Modality: medication management every 3 months  Review due 180 days from date of this plan: 6 months - 2/6/2025  Expected length of service: ongoing treatment unless revised  My Physician/PA/NP and I have developed this plan together and I agree to work on the goals and objectives. I understand the treatment goals that were developed for my treatment.  Electronic Signatures: on file (unless signed below)    Francesca Daniels MD 08/06/24  "

## 2024-08-19 ENCOUNTER — TELEPHONE (OUTPATIENT)
Age: 59
End: 2024-08-19

## 2024-08-19 DIAGNOSIS — E66.01 MORBID OBESITY (HCC): ICD-10-CM

## 2024-08-19 NOTE — TELEPHONE ENCOUNTER
Patient called in to report she took her third dose of  Wegovy Sunday 8/18 with last dose  to be taken 8/25. She is inquiring about order for next dose. Has taken injections alternately between the stomach and thigh. Patient has additional questions and concern:  Constipation with felling of fullness; took stool softener, fiber gummers, and eating more ruffage to stimulate the bowels. Last BM was 8/18 and patient does not feel she emptied. Reports she is moving around, hydrating, Did not have this problem before start of Wegovy.  Can patient take or have protein shake to replace a meal and not in addition to meals?   Going  through menopause; body feels warm and has muscle aches. Does not have a thermometer to check body temp at this time; will purchase one. Does provider have any thoughts on this?   Diet guidance for patient. She is not diabetic.   Please follow up with patient for provider response to new order for Wegovy and other questions/concerns.

## 2024-08-19 NOTE — TELEPHONE ENCOUNTER
Yes as explained during her appointment. Constipation is one of the most reported side effects of wegovy. Recommend taking metamucil twice daily. If no bowel movement in 3 days she can take miralax. Continuing to focus on protein and fiber. She can have a protein shake as a meal replacement if she'd like to try that. Limit carbohydrates.   If constipation does not improve or she's uncomfortable, may need to stop the medication.  I'll send in for a refill of the wegovy but will not increase the dose yet due to constipation.     Discussed menopause symptoms. Does she want to see the menopause specialist? Not sure if we talked about that at her appointment.

## 2024-08-23 ENCOUNTER — TELEPHONE (OUTPATIENT)
Age: 59
End: 2024-08-23

## 2024-08-23 NOTE — TELEPHONE ENCOUNTER
The dose should be increased every 4-8 weeks if she is tolerating ok.   How many weeks has she been on it and when is she due for a refill?   Yes to staying on the same medication.     Also needs follow up in 3 months after starting wegovy.

## 2024-08-23 NOTE — TELEPHONE ENCOUNTER
Patient called to ask a few questions regarding Wegovy:  When will the dose be increased?   Thinking of keeping her on same medication?     Update for PCP;   Starting to feel better generally after losing 14 lbs  Does not need to see gynecologist since her symptoms are improving.   Please advise and follow up.

## 2024-08-26 NOTE — TELEPHONE ENCOUNTER
Patient is starting 5th week and is due for a refill- it is ready at [facility] too. Everything has been going well.     Patient would like to keep appointment for January.

## 2024-08-27 DIAGNOSIS — E66.01 MORBID OBESITY (HCC): Primary | ICD-10-CM

## 2024-08-27 RX ORDER — SEMAGLUTIDE 0.5 MG/.5ML
INJECTION, SOLUTION SUBCUTANEOUS
Qty: 2 ML | Refills: 0 | Status: SHIPPED | OUTPATIENT
Start: 2024-08-27

## 2024-08-29 ENCOUNTER — OFFICE VISIT (OUTPATIENT)
Dept: OBGYN CLINIC | Facility: MEDICAL CENTER | Age: 59
End: 2024-08-29
Payer: COMMERCIAL

## 2024-08-29 VITALS
SYSTOLIC BLOOD PRESSURE: 115 MMHG | DIASTOLIC BLOOD PRESSURE: 75 MMHG | WEIGHT: 256 LBS | HEART RATE: 89 BPM | BODY MASS INDEX: 38.8 KG/M2 | HEIGHT: 68 IN

## 2024-08-29 DIAGNOSIS — M25.562 ACUTE PAIN OF LEFT KNEE: Primary | ICD-10-CM

## 2024-08-29 PROCEDURE — 99204 OFFICE O/P NEW MOD 45 MIN: CPT | Performed by: PHYSICAL MEDICINE & REHABILITATION

## 2024-08-29 NOTE — PROGRESS NOTES
1. Acute pain of left knee      Orders Placed This Encounter   Procedures    Ambulatory referral to Physical Therapy     No orders of the defined types were placed in this encounter.    Impression:  Left lateral knee pain likely secondary to iliotibial band syndrome and mild tricompartmental OA.  We discussed different treatment options and decided to proceed with physical therapy.  She will also try topical patches.  I will see her back in 6 weeks if needed.  If still symptomatic, could consider intra-articular steroid injection.    Imaging Studies (I personally reviewed images in PACS and report):  Left knee x-rays most recent to this encounter reviewed.  These images show mild tricompartmental OA.  No acute osseous abnormalities.    Return in about 6 weeks (around 10/10/2024).    Patient is in agreement with the above plan.    HPI:  Janine Carrington is a 59 y.o. female  who presents for evaluation of   Chief Complaint   Patient presents with    Left Knee - Pain       Onset/Mechanism: Chronic pain in the knee without a recent injury.  Location: Lateral knee/thigh.  Radiation: As above.  Provocative: Comes and goes.  Severity: Intermittent.  Associated Symptoms: Numb/tingling at times in the calf.  Treatment so far: No recent treatment.    Following history reviewed and updated:  Past Medical History:   Diagnosis Date    Abnormal liver scan     last assessed 8/14/2014    Allergic     Anxiety     Atypical chest pain     last assessed 2/3/2016    Benign colonic polyp     Bipolar II disorder (HCC)     Bladder disorder     last assessed 1/22/2013    Cervical radiculopathy     Chronic idiopathic thrombocytopenic purpura (HCC) 01/23/2013    Transitioned From: Thrombocytopenia; [id number]: Dr. Kaur - 2012.      Chronic ITP (idiopathic thrombocytopenia) (HCC)     Chronic lumbar radiculopathy     Chronic neck pain     Chronic pain     neck and back    Dermatitis     Ganglion cyst     GERD (gastroesophageal reflux  "disease)     Headache(784.0)     Herpes simplex     Hyperlipidemia     Luteinized follicular cyst     Menorrhagia     Obesity     Panic disorder without agoraphobia 01/30/2018    Urge and stress incontinence     Uterine fibroid      Past Surgical History:   Procedure Laterality Date    CHOLECYSTECTOMY      COLONOSCOPY      HYSTERECTOMY      MELO    ORIF TIBIA & FIBULA FRACTURES Left 4/27/2018    Procedure: OPEN REDUCTION W/ INTERNAL FIXATION (ORIF) ANKLE;  Surgeon: Omid Winters MD;  Location: [facility];  Service: Orthopedics    TOOTH EXTRACTION      last assessed 3/20/2017, oral surgery     Social History   Social History     Substance and Sexual Activity   Alcohol Use No     Social History     Substance and Sexual Activity   Drug Use No     Social History     Tobacco Use   Smoking Status Former    Current packs/day: 0.25    Types: Cigarettes    Passive exposure: Past   Smokeless Tobacco Never     Family History   Problem Relation Age of Onset    Bipolar disorder Mother     Lung cancer Mother     Stroke Father     Psychosis Father     Schizoaffective Disorder  Son     Alcohol abuse Neg Hx     Substance Abuse Neg Hx     Suicidality Neg Hx      Allergies   Allergen Reactions    Buspirone Hives, Rash and Swelling        Constitutional:  /75   Pulse 89   Ht 5' 8\" (1.727 m)   Wt 116 kg (256 lb)   BMI 38.92 kg/m²    General: NAD.  Eyes: Anicteric sclerae.  Neck: Supple.  Lungs: Unlabored breathing.  Cardiovascular: No lower extremity edema.  Skin: Intact without erythema.  Neurologic: Sensation intact to light touch.  Psychiatric: Mood and affect are appropriate.    Left Knee Exam     Tenderness   The patient is experiencing tenderness in the lateral joint line.    Range of Motion   Extension:  normal   Flexion:  normal     Tests   Varus: negative Valgus: negative    Other   Erythema: absent  Scars: absent  Sensation: normal  Pulse: present  Swelling: none  Effusion: no effusion present      Left Hip Exam "     Tenderness   The patient is experiencing tenderness in the lateral.             Procedures

## 2024-08-29 NOTE — PATIENT INSTRUCTIONS
Room temperature/warm soaks with epsom salt can help with muscle tightness/cramping.  Epsom salt releases magnesium which can be helpful.    Over-the-counter salonpas patches can be applied to the area of discomfort to help with pain.  There are two types of patches; one contains lidocaine 4% and the other contains menthol (and sometimes camphor and methyl salicylate).  You can try one or the other and see which one works best for you.

## 2024-09-02 DIAGNOSIS — E78.5 HYPERLIPIDEMIA, UNSPECIFIED HYPERLIPIDEMIA TYPE: ICD-10-CM

## 2024-09-02 DIAGNOSIS — K21.9 GASTROESOPHAGEAL REFLUX DISEASE WITHOUT ESOPHAGITIS: ICD-10-CM

## 2024-09-03 RX ORDER — ATORVASTATIN CALCIUM 20 MG/1
20 TABLET, FILM COATED ORAL DAILY
Qty: 90 TABLET | Refills: 1 | Status: SHIPPED | OUTPATIENT
Start: 2024-09-03

## 2024-09-03 RX ORDER — ESOMEPRAZOLE MAGNESIUM 40 MG/1
CAPSULE, DELAYED RELEASE ORAL
Qty: 180 CAPSULE | Refills: 1 | Status: SHIPPED | OUTPATIENT
Start: 2024-09-03 | End: 2024-09-05

## 2024-09-04 ENCOUNTER — TELEPHONE (OUTPATIENT)
Dept: INTERNAL MEDICINE CLINIC | Facility: CLINIC | Age: 59
End: 2024-09-04

## 2024-09-04 DIAGNOSIS — K21.9 GASTROESOPHAGEAL REFLUX DISEASE WITHOUT ESOPHAGITIS: ICD-10-CM

## 2024-09-05 RX ORDER — ESOMEPRAZOLE MAGNESIUM 40 MG/1
40 CAPSULE, DELAYED RELEASE ORAL
Qty: 90 CAPSULE | Refills: 1 | Status: SHIPPED | OUTPATIENT
Start: 2024-09-05

## 2024-09-06 NOTE — TELEPHONE ENCOUNTER
PA for Esomeprazole 40 mg SUBMITTED     via    []M-KEY:   [x]Zaira-Case ID # [id number]   []Faxed to plan   []Other website   []Phone call Case ID #     Office notes sent, clinical questions answered. Awaiting determination    Turnaround time for your insurance to make a decision on your Prior Authorization can take 7-21 business days.

## 2024-09-06 NOTE — TELEPHONE ENCOUNTER
PA for Esomeprazole (Nexium) 40 MG capsule APPROVED     Date(s) approved 8-7-2024 - 9-6-2025        Patient advised by          [x]ClasesDhart Message  []Phone call   []LMOM  []L/M to call office as no active Communication consent on file  []Unable to leave detailed message as VM not approved on Communication consent       Pharmacy advised by    [x]Fax  []Phone call    Approval letter scanned into Media Yes

## 2024-09-17 DIAGNOSIS — E66.01 MORBID OBESITY (HCC): ICD-10-CM

## 2024-09-18 RX ORDER — SEMAGLUTIDE 0.5 MG/.5ML
INJECTION, SOLUTION SUBCUTANEOUS
Qty: 2 ML | Refills: 0 | Status: SHIPPED | OUTPATIENT
Start: 2024-09-18

## 2024-09-30 ENCOUNTER — TELEPHONE (OUTPATIENT)
Age: 59
End: 2024-09-30

## 2024-09-30 ENCOUNTER — NURSE TRIAGE (OUTPATIENT)
Age: 59
End: 2024-09-30

## 2024-09-30 NOTE — TELEPHONE ENCOUNTER
Patient called and stated she picked the Wegovy and gave herself the injection not realizing it  was the same dosage as last mouth. Patient stated she doesn't want to waist the 25.00 dollars ,is it ok to finish the rest of the three pens and start with the 1 mg next month.

## 2024-09-30 NOTE — TELEPHONE ENCOUNTER
Patient stated slightly congested but mostly runny nose. The pain started 2 days ago. Feels like sinus head ache.   Paula Farmer

## 2024-09-30 NOTE — TELEPHONE ENCOUNTER
"Reason for Disposition   Sinus congestion (pressure, fullness) present > 10 days    Answer Assessment - Initial Assessment Questions  1. LOCATION: \"Where does it hurt?\"      Frontal pain   2. ONSET: \"When did the sinus pain start?\"  (e.g., hours, days)       9/28  3. SEVERITY: \"How bad is the pain?\"   (Scale 1-10; mild, moderate or severe)    - MILD (1-3): doesn't interfere with normal activities     - MODERATE (4-7): interferes with normal activities (e.g., work or school) or awakens from sleep    - SEVERE (8-10): excruciating pain and patient unable to do any normal activities         5  4. RECURRENT SYMPTOM: \"Have you ever had sinus problems before?\" If Yes, ask: \"When was the last time?\" and \"What happened that time?\"       Yes   5. NASAL CONGESTION: \"Is the nose blocked?\" If Yes, ask: \"Can you open it or must you breathe through the mouth?\"      No   6. NASAL DISCHARGE: \"Do you have discharge from your nose?\" If so ask, \"What color?\"      Yes ,clear   7. FEVER: \"Do you have a fever?\" If Yes, ask: \"What is it, how was it measured, and when did it start?\"       No   8. OTHER SYMPTOMS: \"Do you have any other symptoms?\" (e.g., sore throat, cough, earache, difficulty breathing)      Pnd,cough,body aches .Patient denies sob and wheezing. Home Covid test negative today   9. PREGNANCY: \"Is there any chance you are pregnant?\" \"When was your last menstrual period?\"      No   Patient requesting medication to be sent to the pharmacy,patient stated its financially hard to come to the office to pay the copay.    Protocols used: Sinus Pain or Congestion-ADULT-[place]    "

## 2024-09-30 NOTE — TELEPHONE ENCOUNTER
Not in confusing- says sinus congestion >10 days then says sinus pain started 9/28. Please call for clarification.   If symptoms started on 9/28, recommend starting flonase 1 spray in each nostril daily.

## 2024-10-01 NOTE — TELEPHONE ENCOUNTER
Patient returning office call.  Clarified when sinus symptoms started.  Patient states symptoms started on 9/28.  I gave the patient the message below to start Flonase 1 spray each nostril.  Patient doesn't feel this will work.    Please review and call the patient back.

## 2024-10-01 NOTE — TELEPHONE ENCOUNTER
Antibiotics are not indicated at this time- symptoms recently started.   Recommend to continue flonase, she can take allegra or claritin as well.   Can offer appointment as well, or if symptoms aren't improving over the next week then recommend being seen.

## 2024-10-17 DIAGNOSIS — E66.01 MORBID OBESITY (HCC): Primary | ICD-10-CM

## 2024-10-17 RX ORDER — SEMAGLUTIDE 1 MG/.5ML
INJECTION, SOLUTION SUBCUTANEOUS
Qty: 2 ML | Refills: 0 | Status: SHIPPED | OUTPATIENT
Start: 2024-10-17

## 2024-10-26 ENCOUNTER — APPOINTMENT (OUTPATIENT)
Dept: LAB | Facility: MEDICAL CENTER | Age: 59
End: 2024-10-26
Payer: COMMERCIAL

## 2024-10-26 DIAGNOSIS — E66.01 MORBID OBESITY (HCC): ICD-10-CM

## 2024-10-26 LAB
ALBUMIN SERPL BCG-MCNC: 4.2 G/DL (ref 3.5–5)
ALP SERPL-CCNC: 74 U/L (ref 34–104)
ALT SERPL W P-5'-P-CCNC: 11 U/L (ref 7–52)
ANION GAP SERPL CALCULATED.3IONS-SCNC: 8 MMOL/L (ref 4–13)
AST SERPL W P-5'-P-CCNC: 15 U/L (ref 13–39)
BILIRUB SERPL-MCNC: 0.44 MG/DL (ref 0.2–1)
BUN SERPL-MCNC: 16 MG/DL (ref 5–25)
CALCIUM SERPL-MCNC: 9.6 MG/DL (ref 8.4–10.2)
CHLORIDE SERPL-SCNC: 106 MMOL/L (ref 96–108)
CHOLEST SERPL-MCNC: 142 MG/DL
CO2 SERPL-SCNC: 27 MMOL/L (ref 21–32)
CREAT SERPL-MCNC: 0.67 MG/DL (ref 0.6–1.3)
EST. AVERAGE GLUCOSE BLD GHB EST-MCNC: 105 MG/DL
GFR SERPL CREATININE-BSD FRML MDRD: 95 ML/MIN/1.73SQ M
GLUCOSE P FAST SERPL-MCNC: 84 MG/DL (ref 65–99)
HBA1C MFR BLD: 5.3 %
HDLC SERPL-MCNC: 56 MG/DL
LDLC SERPL CALC-MCNC: 71 MG/DL (ref 0–100)
POTASSIUM SERPL-SCNC: 4.1 MMOL/L (ref 3.5–5.3)
PROT SERPL-MCNC: 7 G/DL (ref 6.4–8.4)
SODIUM SERPL-SCNC: 141 MMOL/L (ref 135–147)
TRIGL SERPL-MCNC: 73 MG/DL

## 2024-10-26 PROCEDURE — 80061 LIPID PANEL: CPT

## 2024-10-26 PROCEDURE — 83036 HEMOGLOBIN GLYCOSYLATED A1C: CPT

## 2024-10-26 PROCEDURE — 80053 COMPREHEN METABOLIC PANEL: CPT

## 2024-10-26 PROCEDURE — 36415 COLL VENOUS BLD VENIPUNCTURE: CPT

## 2024-11-14 DIAGNOSIS — E66.01 MORBID OBESITY (HCC): Primary | ICD-10-CM

## 2024-11-14 DIAGNOSIS — E66.01 MORBID OBESITY (HCC): ICD-10-CM

## 2024-11-14 RX ORDER — SEMAGLUTIDE 1 MG/.5ML
INJECTION, SOLUTION SUBCUTANEOUS
Qty: 2 ML | Refills: 0 | Status: SHIPPED | OUTPATIENT
Start: 2024-11-14 | End: 2024-11-14 | Stop reason: SDUPTHER

## 2024-11-14 RX ORDER — SEMAGLUTIDE 1.7 MG/.75ML
INJECTION, SOLUTION SUBCUTANEOUS
Qty: 3 ML | Refills: 0 | Status: SHIPPED | OUTPATIENT
Start: 2024-11-14

## 2024-11-14 RX ORDER — SEMAGLUTIDE 1 MG/.5ML
INJECTION, SOLUTION SUBCUTANEOUS
Qty: 2 ML | Refills: 0 | Status: SHIPPED | OUTPATIENT
Start: 2024-11-14 | End: 2024-11-14

## 2024-11-14 NOTE — TELEPHONE ENCOUNTER
PT is inquiring if she would need to stay on this dose of wegovy or if she would need to go the next dose up. She does not have an appt until 1/6, and only has 1 pen left. She is requesting a phone call to advise.    ThankYou
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
## 2024-12-06 DIAGNOSIS — E66.01 MORBID OBESITY (HCC): ICD-10-CM

## 2024-12-06 RX ORDER — SEMAGLUTIDE 1.7 MG/.75ML
1.7 INJECTION, SOLUTION SUBCUTANEOUS WEEKLY
Qty: 3 ML | Refills: 0 | Status: SHIPPED | OUTPATIENT
Start: 2024-12-06

## 2024-12-12 ENCOUNTER — OFFICE VISIT (OUTPATIENT)
Dept: PSYCHIATRY | Facility: CLINIC | Age: 59
End: 2024-12-12
Payer: COMMERCIAL

## 2024-12-12 ENCOUNTER — TELEPHONE (OUTPATIENT)
Age: 59
End: 2024-12-12

## 2024-12-12 VITALS
BODY MASS INDEX: 32.74 KG/M2 | DIASTOLIC BLOOD PRESSURE: 74 MMHG | HEART RATE: 83 BPM | HEIGHT: 68 IN | WEIGHT: 216 LBS | SYSTOLIC BLOOD PRESSURE: 116 MMHG

## 2024-12-12 DIAGNOSIS — F31.81 BIPOLAR II DISORDER, MOST RECENT EPISODE MAJOR DEPRESSIVE (HCC): Primary | Chronic | ICD-10-CM

## 2024-12-12 DIAGNOSIS — F43.12 POST-TRAUMATIC STRESS DISORDER, CHRONIC: Chronic | ICD-10-CM

## 2024-12-12 DIAGNOSIS — F41.0 PANIC DISORDER WITHOUT AGORAPHOBIA: Chronic | ICD-10-CM

## 2024-12-12 DIAGNOSIS — F41.1 GAD (GENERALIZED ANXIETY DISORDER): Chronic | ICD-10-CM

## 2024-12-12 PROCEDURE — 90833 PSYTX W PT W E/M 30 MIN: CPT | Performed by: PSYCHIATRY & NEUROLOGY

## 2024-12-12 PROCEDURE — 99214 OFFICE O/P EST MOD 30 MIN: CPT | Performed by: PSYCHIATRY & NEUROLOGY

## 2024-12-12 RX ORDER — LAMOTRIGINE 200 MG/1
200 TABLET ORAL
Qty: 90 TABLET | Refills: 2 | Status: SHIPPED | OUTPATIENT
Start: 2024-12-12 | End: 2025-09-08

## 2024-12-12 RX ORDER — LORAZEPAM 0.5 MG/1
0.5 TABLET ORAL 4 TIMES DAILY PRN
Qty: 120 TABLET | Refills: 4 | Status: SHIPPED | OUTPATIENT
Start: 2025-01-05 | End: 2025-06-04

## 2024-12-12 RX ORDER — LAMOTRIGINE 150 MG/1
150 TABLET ORAL DAILY
Qty: 90 TABLET | Refills: 2 | Status: SHIPPED | OUTPATIENT
Start: 2024-12-12 | End: 2025-09-08

## 2024-12-12 NOTE — TELEPHONE ENCOUNTER
The medication will not automatically be increased. When she requests a refill, recommend writing in the comment box that she is doing ok on the current dose and is requesting to be increased to the next dose.   Recommend she reach out to her new insurance to see if zepbound will be covered (alternative).

## 2024-12-12 NOTE — ASSESSMENT & PLAN NOTE
Still has depressive symptoms    Continue Lamictal 150 mg daily and 200 mg at bedtime to help with mood stabilization  Continue Cymbalta 60 mg twice a day to control depressive symptoms  Does not want any medication changes  Orders:    lamoTRIgine (LaMICtal) 150 MG tablet; Take 1 tablet (150 mg total) by mouth daily    lamoTRIgine (LaMICtal) 200 MG tablet; Take 1 tablet (200 mg total) by mouth daily at bedtime

## 2024-12-12 NOTE — ASSESSMENT & PLAN NOTE
Stable    Continue Ativan 0.5 mg four times a day as needed to control anxiety symptoms  Orders:    LORazepam (ATIVAN) 0.5 mg tablet; Take 1 tablet (0.5 mg total) by mouth 4 (four) times a day as needed for anxiety Do not start before January 5, 2025.

## 2024-12-12 NOTE — BH TREATMENT PLAN
"TREATMENT PLAN (Medication Management Only)        [facility] [place] [facility]    Name/Date of Birth/MRN:  Janine Carrington 59 y.o. 1965 MRN: [id number]  Date of Treatment Plan: December 12, 2024  Diagnosis/Diagnoses:   1. Bipolar II disorder, most recent episode major depressive (HCC)    2. DREAD (generalized anxiety disorder)    3. Panic disorder without agoraphobia    4. Post-traumatic stress disorder, chronic      Strengths/Personal Resources for Self-Care: \"grandchildren\".  Area/Areas of need (in own words): \"more positivity\".  1. Long Term Goal:   maintain control of anxiety, improve control of depression, improve mood stability  Target Date: 3 months - 3/12/2025  Person/Persons responsible for completion of goal: Janine  2.  Short Term Objective (s) - How will we reach this goal?:   A.  Provider new recommended medication/dosage changes and/or continue medication(s): continue current medications as prescribed (Cymbalta, Lamictal, and Ativan).  B.  N/A.  C.  N/A.  Target Date: 3 months - 3/12/2025  Person/Persons Responsible for Completion of Goal: Janine   Progress Towards Goals: moderate progress  Treatment Modality: medication management every 3 months  Review due 180 days from date of this plan: 6 months - 6/12/2025  Expected length of service: ongoing treatment unless revised  My Physician/PA/NP and I have developed this plan together and I agree to work on the goals and objectives. I understand the treatment goals that were developed for my treatment.  Electronic Signatures: on file (unless signed below)    Francesca Daniels MD 12/12/24  "

## 2024-12-12 NOTE — BH CRISIS PLAN
"Client Name: Janine Carrington       Client YOB: 1965    [facility] Safety Plan      Creation Date: 12/11/23 Update Date: 12/12/24   Created By: Francesca Daniels MD Last Updated By: Francesca Daniels MD      Step 1: Warning Signs:   Warning Signs   \" Overthinking, I don't want my son's drama when he call me \"            Step 2: Internal Coping Strategies:   Internal Coping Strategies   \"listening to music and doing puzzles, reading, sensory toys \"            Step 3: People and social settings that provide distraction:   Name Contact Information   Thomas Carrington (son) [phone]   Lyudmila Carrington (daughter-in-law) [phone]            Step 4: People whom I can ask for help during a crisis:      Name Contact Information    Thomas Carrington (son) [phone]    Lyudmila Carrington (daughter-in-law) [phone]      Step 5: Professionals or agencies I can contact during a crisis:      Clinican/Agency Name Phone Emergency Contact    [facility] [phone]       [facility] Emergency Department Emergency Department Phone Emergency Department Address    [facility] 911         Crisis Phone Numbers:   Suicide Prevention Lifeline: Call or Text  855 Crisis Text Line: Text HOME to 163-305   Please note: Some [facility] do not have a separate number for Child/Adolescent specific crisis. If your county is not listed under Child/Adolescent, please call the adult number for your county      Adult Crisis Numbers: Child/Adolescent Crisis Numbers   [facility]: [phone] [facility]: [phone]   [facility]: [phone] [facility]: [phone]   [facility]: [phone] [place]: [phone]   [facility]: [phone] [place]: [phone]   [place]: [phone]   [facility]: [phone]   [facility]: [phone]   [facility]: [phone] (daytime) [phone] (after hours, weekends, holidays) "      Step 6: Making the environment safer (plan for lethal means safety):   Patient did not identify any lethal methods: Yes     Optional: What is most important to me and worth living for?   My grand kids     [place] Safety Plan. Alexia Hannon and Tony August. Used with permission of the authors.

## 2024-12-12 NOTE — TELEPHONE ENCOUNTER
Pt called back was supposed to up to the 2.4 wegovy dosage but was refilled the 1.7 which is fine she will continue on that one for this month but she did talk to her insurance and they did adv that because it is a weight loss drug it will fall into Tier 3 for nexy year and be a $600 copay. I suggested to have the patient contact her insurance ot see if another drug with similar benefits would be of a lesser cost and she has an appt with her PCP in beginning of January to discuss other avenues as well.

## 2024-12-27 ENCOUNTER — TELEPHONE (OUTPATIENT)
Dept: INTERNAL MEDICINE CLINIC | Facility: CLINIC | Age: 59
End: 2024-12-27

## 2024-12-27 NOTE — TELEPHONE ENCOUNTER
Patient called regarding the new insurance starting in January,patient stated she contacted her insurance and was told a PA is needed  it will cost the patient 35 dollars for a 84 day supply of Wegovy.

## 2024-12-27 NOTE — TELEPHONE ENCOUNTER
AKSHAT Garcia 1.7 MG/0.75ML SUBMITTED     to [employer]     via    []CMM-KEY:    [x]Surescripts-Case ID # [id number]  []Availity-Auth ID #  NDC #    []Faxed to plan   []Other website    []Phone call Case ID #      []PA sent as URGENT    All office notes, labs and other pertaining documents and studies sent. Clinical questions answered. Awaiting determination from insurance company.     Turnaround time for your insurance to make a decision on your Prior Authorization can take 7-21 business days.

## 2024-12-30 DIAGNOSIS — E66.01 MORBID OBESITY (HCC): Primary | ICD-10-CM

## 2024-12-30 DIAGNOSIS — E66.01 MORBID OBESITY (HCC): ICD-10-CM

## 2024-12-30 RX ORDER — SEMAGLUTIDE 2.4 MG/.75ML
INJECTION, SOLUTION SUBCUTANEOUS
Qty: 3 ML | Refills: 0 | Status: SHIPPED | OUTPATIENT
Start: 2024-12-30

## 2024-12-30 RX ORDER — SEMAGLUTIDE 1.7 MG/.75ML
1.7 INJECTION, SOLUTION SUBCUTANEOUS WEEKLY
Qty: 3 ML | Refills: 0 | OUTPATIENT
Start: 2024-12-30

## 2024-12-30 NOTE — TELEPHONE ENCOUNTER
AKSHAT Garcia 1.7 MG/0.75ML APPROVED         Patient advised by          []MyChart Message  []Phone call   [x]LMOM  []L/M to call office as no active Communication consent on file  []Unable to leave detailed message as VM not approved on Communication consent       Pharmacy advised by    [x]Fax  []Phone call

## 2024-12-30 NOTE — TELEPHONE ENCOUNTER
Pt called back and would like to speak with PCP. States that her dose is suppose to be increase and she received a approval today for her current wegovy dose. Please advise.

## 2024-12-30 NOTE — TELEPHONE ENCOUNTER
Explained previous messages to patient. Patient is happy that she finally got an answer. Patient would like to know if she should wait until Mondays appt. to resolve.

## 2025-01-12 DIAGNOSIS — N39.46 MIXED STRESS AND URGE URINARY INCONTINENCE: ICD-10-CM

## 2025-01-14 RX ORDER — MIRABEGRON 50 MG/1
1 TABLET, FILM COATED, EXTENDED RELEASE ORAL DAILY
Qty: 30 TABLET | Refills: 2 | Status: SHIPPED | OUTPATIENT
Start: 2025-01-14

## 2025-01-14 NOTE — TELEPHONE ENCOUNTER
Called to schedule an appt- pt stated she has a lot going on and will call back another day and time to schedule an appt

## 2025-01-23 ENCOUNTER — TELEPHONE (OUTPATIENT)
Age: 60
End: 2025-01-23

## 2025-02-03 ENCOUNTER — TELEPHONE (OUTPATIENT)
Age: 60
End: 2025-02-03

## 2025-02-03 ENCOUNTER — OFFICE VISIT (OUTPATIENT)
Dept: INTERNAL MEDICINE CLINIC | Facility: CLINIC | Age: 60
End: 2025-02-03
Payer: COMMERCIAL

## 2025-02-03 VITALS
TEMPERATURE: 97.9 F | DIASTOLIC BLOOD PRESSURE: 82 MMHG | WEIGHT: 202 LBS | SYSTOLIC BLOOD PRESSURE: 124 MMHG | BODY MASS INDEX: 30.62 KG/M2 | HEART RATE: 84 BPM | RESPIRATION RATE: 14 BRPM | OXYGEN SATURATION: 98 % | HEIGHT: 68 IN

## 2025-02-03 DIAGNOSIS — F31.81 BIPOLAR II DISORDER, MOST RECENT EPISODE MAJOR DEPRESSIVE (HCC): Chronic | ICD-10-CM

## 2025-02-03 DIAGNOSIS — D69.6 THROMBOCYTOPENIA (HCC): ICD-10-CM

## 2025-02-03 DIAGNOSIS — Z00.00 ANNUAL PHYSICAL EXAM: Primary | ICD-10-CM

## 2025-02-03 DIAGNOSIS — E78.5 HYPERLIPIDEMIA, UNSPECIFIED HYPERLIPIDEMIA TYPE: ICD-10-CM

## 2025-02-03 DIAGNOSIS — Z12.31 ENCOUNTER FOR SCREENING MAMMOGRAM FOR BREAST CANCER: ICD-10-CM

## 2025-02-03 DIAGNOSIS — E66.01 MORBID OBESITY (HCC): ICD-10-CM

## 2025-02-03 DIAGNOSIS — K21.9 GASTROESOPHAGEAL REFLUX DISEASE WITHOUT ESOPHAGITIS: ICD-10-CM

## 2025-02-03 PROCEDURE — 99214 OFFICE O/P EST MOD 30 MIN: CPT | Performed by: NURSE PRACTITIONER

## 2025-02-03 PROCEDURE — 99396 PREV VISIT EST AGE 40-64: CPT | Performed by: NURSE PRACTITIONER

## 2025-02-03 RX ORDER — SEMAGLUTIDE 1.7 MG/.75ML
INJECTION, SOLUTION SUBCUTANEOUS
Qty: 3 ML | Refills: 0 | Status: SHIPPED | OUTPATIENT
Start: 2025-02-03 | End: 2025-02-10 | Stop reason: SDUPTHER

## 2025-02-03 NOTE — TELEPHONE ENCOUNTER
Patient called back , the abdominal discomfort is in the epigastric area, ache  with nausea , intermittent . History of reflux .

## 2025-02-03 NOTE — TELEPHONE ENCOUNTER
This could be from the wegovy. See if its improves with lowering the dose as discussed at today's appointment. Also recommend starting famotidine 20 mg twice daily. If symptoms persist, please call the office.

## 2025-02-03 NOTE — TELEPHONE ENCOUNTER
Pt called in stating that she forgot to mention during her appt today that she does experience intermittent abdominal pain/cramping. States that it is only happens every few days and is located in the center of her stomach. Please advise.

## 2025-02-03 NOTE — ASSESSMENT & PLAN NOTE
Decrease wegovy dose.   Goal weight 180 lbs.   RTO in 3 months. Discussed weaning down to maintenance dose.     Orders:    Hemoglobin A1C; Future    Semaglutide-Weight Management (Wegovy) 1.7 MG/0.75ML; Inject 1.7 mg under the skin weekly

## 2025-02-03 NOTE — ASSESSMENT & PLAN NOTE
On a statin. Consider reducing dose next visit if she continues with weight loss.   Orders:    Comprehensive metabolic panel; Future    Lipid Panel with Direct LDL reflex; Future

## 2025-02-03 NOTE — PROGRESS NOTES
Adult Annual Physical  Name: Janine Carrington      : 1965      MRN: [id number]  Encounter Provider: CALEB Fall  Encounter Date: 2/3/2025   Encounter department: [facility]    Assessment & Plan  Annual physical exam         Morbid obesity (HCC)  Decrease wegovy dose.   Goal weight 180 lbs.   RTO in 3 months. Discussed weaning down to maintenance dose.     Orders:    Hemoglobin A1C; Future    Semaglutide-Weight Management (Wegovy) 1.7 MG/0.75ML; Inject 1.7 mg under the skin weekly    Gastroesophageal reflux disease without esophagitis  On a PPI twice daily.        Thrombocytopenia (HCC)  Stable.   Orders:    CBC and differential; Future    Bipolar II disorder, most recent episode major depressive (HCC)  Managed by psychiatry.        Hyperlipidemia, unspecified hyperlipidemia type  On a statin. Consider reducing dose next visit if she continues with weight loss.   Orders:    Comprehensive metabolic panel; Future    Lipid Panel with Direct LDL reflex; Future    Encounter for screening mammogram for breast cancer    Orders:    Mammo screening bilateral w 3d and cad; Future    Immunizations and preventive care screenings were discussed with patient today. Appropriate education was printed on patient's after visit summary.    Counseling:  Exercise: the importance of regular exercise/physical activity was discussed. Recommend exercise 3-5 times per week for at least 30 minutes.          History of Present Illness     Adult Annual Physical:  Patient presents for annual physical. She is doing well.  She has lost about 60 lbs on wegovy. She struggles with constipation since being on the higher dose.     Her  recently passed away. She feels guilt. She doesn't feel her family has been supportive.   She hasn't been in therapy due to cost. She does see psychiatry. .     Diet and Physical Activity:  - Diet/Nutrition: well balanced diet.  - Exercise: no formal exercise.    General  "Health:  - Sleep: sleeps well.  - Hearing: normal hearing right ear and normal hearing left ear.  - Vision: wears glasses and goes for regular eye exams.  - Dental:. dentures    Review of Systems   Constitutional:  Negative for activity change, appetite change, fatigue and unexpected weight change.   Respiratory:  Negative for cough and shortness of breath.    Cardiovascular:  Negative for chest pain, palpitations and leg swelling.   Gastrointestinal:  Positive for constipation. Negative for abdominal pain, blood in stool and diarrhea.   Genitourinary:  Negative for difficulty urinating.   Musculoskeletal:  Negative for arthralgias.   Skin:  Negative for rash.   Neurological:  Negative for dizziness, light-headedness and headaches.   Psychiatric/Behavioral:  Negative for dysphoric mood and sleep disturbance. The patient is not nervous/anxious.          Objective   /82 (BP Location: Left arm, Patient Position: Sitting, Cuff Size: Large)   Pulse 84   Temp 97.9 °F (36.6 °C)   Resp 14   Ht 5' 8\" (1.727 m)   Wt 91.6 kg (202 lb)   SpO2 98%   BMI 30.71 kg/m²     Physical Exam  Vitals reviewed.   Constitutional:       Appearance: Normal appearance. She is well-developed.   HENT:      Head: Normocephalic and atraumatic.   Eyes:      Conjunctiva/sclera: Conjunctivae normal.   Cardiovascular:      Rate and Rhythm: Normal rate and regular rhythm.      Heart sounds: Normal heart sounds.   Pulmonary:      Effort: Pulmonary effort is normal.      Breath sounds: Normal breath sounds.   Abdominal:      General: Bowel sounds are normal.      Palpations: Abdomen is soft.   Musculoskeletal:         General: Normal range of motion.      Right lower leg: No edema.      Left lower leg: No edema.   Skin:     General: Skin is warm and dry.   Neurological:      Mental Status: She is alert and oriented to person, place, and time.   Psychiatric:         Mood and Affect: Mood normal.         Behavior: Behavior normal.         "

## 2025-02-10 DIAGNOSIS — E66.01 MORBID OBESITY (HCC): ICD-10-CM

## 2025-02-10 RX ORDER — SEMAGLUTIDE 1.7 MG/.75ML
INJECTION, SOLUTION SUBCUTANEOUS
Qty: 3 ML | Refills: 0 | Status: SHIPPED | OUTPATIENT
Start: 2025-02-10

## 2025-02-22 DIAGNOSIS — E66.01 MORBID OBESITY (HCC): ICD-10-CM

## 2025-02-24 RX ORDER — SEMAGLUTIDE 1.7 MG/.75ML
1.7 INJECTION, SOLUTION SUBCUTANEOUS WEEKLY
Qty: 3 ML | Refills: 0 | Status: SHIPPED | OUTPATIENT
Start: 2025-02-24

## 2025-03-01 ENCOUNTER — APPOINTMENT (EMERGENCY)
Dept: RADIOLOGY | Facility: HOSPITAL | Age: 60
End: 2025-03-01
Payer: COMMERCIAL

## 2025-03-01 ENCOUNTER — HOSPITAL ENCOUNTER (EMERGENCY)
Facility: HOSPITAL | Age: 60
Discharge: HOME | End: 2025-03-01
Attending: EMERGENCY MEDICINE
Payer: COMMERCIAL

## 2025-03-01 VITALS
RESPIRATION RATE: 18 BRPM | TEMPERATURE: 97.6 F | DIASTOLIC BLOOD PRESSURE: 64 MMHG | OXYGEN SATURATION: 96 % | WEIGHT: 192.68 LBS | HEART RATE: 70 BPM | SYSTOLIC BLOOD PRESSURE: 132 MMHG | BODY MASS INDEX: 29.3 KG/M2

## 2025-03-01 DIAGNOSIS — R07.9 CHEST PAIN: Primary | ICD-10-CM

## 2025-03-01 DIAGNOSIS — F43.21 GRIEF: ICD-10-CM

## 2025-03-01 LAB
ALBUMIN SERPL BCG-MCNC: 4.4 G/DL (ref 3.5–5)
ALP SERPL-CCNC: 62 U/L (ref 34–104)
ALT SERPL W P-5'-P-CCNC: 10 U/L (ref 7–52)
ANION GAP SERPL CALCULATED.3IONS-SCNC: 6 MMOL/L (ref 4–13)
AST SERPL W P-5'-P-CCNC: 12 U/L (ref 13–39)
BASOPHILS # BLD AUTO: 0.02 THOUSANDS/ÂΜL (ref 0–0.1)
BASOPHILS NFR BLD AUTO: 0 % (ref 0–1)
BILIRUB SERPL-MCNC: 0.4 MG/DL (ref 0.2–1)
BUN SERPL-MCNC: 15 MG/DL (ref 5–25)
CALCIUM SERPL-MCNC: 9.8 MG/DL (ref 8.4–10.2)
CARDIAC TROPONIN I PNL SERPL HS: 3 NG/L (ref ?–50)
CHLORIDE SERPL-SCNC: 106 MMOL/L (ref 96–108)
CO2 SERPL-SCNC: 24 MMOL/L (ref 21–32)
CREAT SERPL-MCNC: 0.73 MG/DL (ref 0.6–1.3)
EOSINOPHIL # BLD AUTO: 0.08 THOUSAND/ÂΜL (ref 0–0.61)
EOSINOPHIL NFR BLD AUTO: 1 % (ref 0–6)
ERYTHROCYTE [DISTWIDTH] IN BLOOD BY AUTOMATED COUNT: 14.4 % (ref 11.6–15.1)
GFR SERPL CREATININE-BSD FRML MDRD: 89 ML/MIN/1.73SQ M
GLUCOSE SERPL-MCNC: 90 MG/DL (ref 65–140)
HCT VFR BLD AUTO: 38.8 % (ref 34.8–46.1)
HGB BLD-MCNC: 12.6 G/DL (ref 11.5–15.4)
IMM GRANULOCYTES # BLD AUTO: 0.01 THOUSAND/UL (ref 0–0.2)
IMM GRANULOCYTES NFR BLD AUTO: 0 % (ref 0–2)
LYMPHOCYTES # BLD AUTO: 1.32 THOUSANDS/ÂΜL (ref 0.6–4.47)
LYMPHOCYTES NFR BLD AUTO: 22 % (ref 14–44)
MCH RBC QN AUTO: 28.9 PG (ref 26.8–34.3)
MCHC RBC AUTO-ENTMCNC: 32.5 G/DL (ref 31.4–37.4)
MCV RBC AUTO: 89 FL (ref 82–98)
MONOCYTES # BLD AUTO: 0.45 THOUSAND/ÂΜL (ref 0.17–1.22)
MONOCYTES NFR BLD AUTO: 7 % (ref 4–12)
NEUTROPHILS # BLD AUTO: 4.25 THOUSANDS/ÂΜL (ref 1.85–7.62)
NEUTS SEG NFR BLD AUTO: 70 % (ref 43–75)
NRBC BLD AUTO-RTO: 0 /100 WBCS
PLATELET # BLD AUTO: 173 THOUSANDS/UL (ref 149–390)
PMV BLD AUTO: 11.7 FL (ref 8.9–12.7)
POTASSIUM SERPL-SCNC: 4.2 MMOL/L (ref 3.5–5.3)
PROT SERPL-MCNC: 7.3 G/DL (ref 6.4–8.4)
RBC # BLD AUTO: 4.36 MILLION/UL (ref 3.81–5.12)
SODIUM SERPL-SCNC: 136 MMOL/L (ref 135–147)
WBC # BLD AUTO: 6.13 THOUSAND/UL (ref 4.31–10.16)

## 2025-03-01 PROCEDURE — 80053 COMPREHEN METABOLIC PANEL: CPT

## 2025-03-01 PROCEDURE — 85025 COMPLETE CBC W/AUTO DIFF WBC: CPT

## 2025-03-01 PROCEDURE — 99285 EMERGENCY DEPT VISIT HI MDM: CPT | Performed by: EMERGENCY MEDICINE

## 2025-03-01 PROCEDURE — 93005 ELECTROCARDIOGRAM TRACING: CPT

## 2025-03-01 PROCEDURE — 71046 X-RAY EXAM CHEST 2 VIEWS: CPT

## 2025-03-01 PROCEDURE — 84484 ASSAY OF TROPONIN QUANT: CPT

## 2025-03-01 PROCEDURE — 36415 COLL VENOUS BLD VENIPUNCTURE: CPT

## 2025-03-01 PROCEDURE — 99285 EMERGENCY DEPT VISIT HI MDM: CPT

## 2025-03-01 NOTE — ED ATTENDING ATTESTATION
3/1/2025  IKneia MD, saw and evaluated the patient. I have discussed the patient with the resident/non-physician practitioner and agree with the resident's/non-physician practitioner's findings, Plan of Care, and MDM as documented in the resident's/non-physician practitioner's note, except where noted. All available labs and Radiology studies were reviewed.  I was present for key portions of any procedure(s) performed by the resident/non-physician practitioner and I was immediately available to provide assistance.       At this point I agree with the current assessment done in the Emergency Department.  I have conducted an independent evaluation of this patient a history and physical is as follows:    This is a 59-year-old female with a relevant past medical history of bipolar disorder, hyperlipidemia, anxiety, senting to the ED today for complaint of chest pain.  Her  recently passed away, and states that she has had an achy pain for the past 4 to 5 days.  Patient denies any fever, chills, sweats, nausea vomiting abdominal pain, shortness of breath, rashes lesions bruises, or any other significantly associated symptoms.  She states that her pain is improved at the time of my evaluation.  Her pain is dull achy, without radiation, 2-3 out of 10 in intensity, and has been constant since Tuesday.  Her differential diagnosis includes: Atypical ACS versus costochondritis versus anxiety/grief reaction versus other.  On exam she does have some tenderness to palpation overlying the third and fourth ribs on the left lateral region.  She does not have any overlying skin changes.  She does not have any bruising.  She denies any trauma to the area.  She does not have any abdominal pain at all.  She does not have any tenderness on my exam.  Her troponin is unremarkable, and since has been greater than 3 hours since her pain started she was ruled out from 1 troponin.  Her EKG does not show any significant  "acute abnormalities.  Chest x-ray and metabolic panel as well as CBC are unremarkable.  Patient was advised to follow-up with her PCP for further workup and management, however she is cleared from a cardiac standpoint here in the ED.  She has a low heart score.  The management plan was discussed in detail with the patient at bedside and all questions were answered. Strict ED return instructions were discussed at bedside. Prior to discharge, both verbal and written instructions were provided. We discussed the signs and symptoms that should prompt the patient to return to the ED. All questions were answered and the patient was comfortable with the plan of care and discharged home. The patient agrees to return to the Emergency Department for concerns and/or progression of illness.    Portions of the above record have been created with voice recognition software.  Occasional wrong word or \"sound alike\" substitutions may have occurred due to the inherent limitations of voice recognition software.  Read the chart carefully and recognize, using context, where substitutions may have occurred.    ED Course         Critical Care Time  Procedures      "

## 2025-03-01 NOTE — DISCHARGE INSTRUCTIONS
You were evaluated in the emergency department for: chest pain. You can access your current and pending results through [employer].    - You should follow-up with your primary care provider, as soon as possible, for re-evaluation.    Please, return to the emergency department if you experience new or worsening symptoms, fever, chest pain, shortness of breath, difficulty breathing, dizziness, abdominal pain, persistent nausea/vomiting, syncope or passing out, blood in your urine or stool, coughing up blood, leg swelling/pain, urinary retention, bowel or bladder incontinence, numbness between your legs.

## 2025-03-01 NOTE — ED PROVIDER NOTES
Time reflects when diagnosis was documented in both MDM as applicable and the Disposition within this note       Time User Action Codes Description Comment    3/1/2025 10:02 AM Mallika Mena Add [R07.9] Chest pain     3/1/2025 10:02 AM Mallika Mena Add [F43.21] Grief           ED Disposition       ED Disposition   Discharge    Condition   Stable    Date/Time   Sat Mar 1, 2025 10:00 AM    Comment   Janine Carrington discharge to home/self care.                   Assessment & Plan       Medical Decision Making  Janine Carrington is a 59 y.o. female presenting with ongoing intermittent non-radiating, non-pleuritic chest pain for 4-5 days, no SI/HI/AVH. Vitals unremarkable. Exam unremarkable.      DDX including but not limited to: ACS, MI, PTX, pneumonia, dissection, pleurisy, pericarditis, myocarditis, rhabdomyolysis, GI etiology, grief.    See ED course for further updates and interpretation of results.    Based on these results and H&P, workup negative for signs of acute cardiopulmonary disease. Unclear exact etiology of patient symptoms, possibly a component of grief. Thankfully not actively having pain in the ED. Doubt ACS at this time, Heart score 3 indicating low risk of MACE in the next 30 days. Given contact information for cardiology in event pain continues. Offered psych resources. Patient is stable for discharge.    Results, clinical impressions, and plan were discussed with patient. They expressed understanding and were in agreement with plan. Patient was given the opportunity to ask questions in ED. All questions and concerns were addressed in ED.    After evaluation and workup in the emergency department Patient appears well, is nontoxic appearing, expresses understanding and agrees with plan of care at this time.  In light of this patient would benefit from outpatient management. Discussed strict return precautions.  Discussed importance of following up with PCP in the next few days.  All questions  answered.  Patient is agreeable to discharge.    Amount and/or Complexity of Data Reviewed  External Data Reviewed: ECG and notes.  Labs:  Decision-making details documented in ED Course.  Radiology: independent interpretation performed.  ECG/medicine tests: ordered and independent interpretation performed.    Risk  OTC drugs.        ED Course as of 03/03/25 2331   Sat Mar 01, 2025   0848 Not yet roomed   0851 Pt states she has achy cp, intermittent when she tries to take a deep breath since Tuesday. Pt has been dealing with stress lately and is on wegovy (since August).   0913 Blood Pressure: 133/71   0913 CBC and differential  Within normal limits   0937 hs TnI 0hr: 3  Pain has been intermittent and ongoing for a while, unlikely to be ACS at this time   1000 Comprehensive metabolic panel(!)  Within normal limits       Medications - No data to display    ED Risk Strat Scores   HEART Risk Score      Flowsheet Row Most Recent Value   Heart Score Risk Calculator    History 1 Filed at: 03/01/2025 1001   ECG 0 Filed at: 03/01/2025 1001   Age 1 Filed at: 03/01/2025 1001   Risk Factors 1 Filed at: 03/01/2025 1001   Troponin 0 Filed at: 03/01/2025 1001   HEART Score 3 Filed at: 03/01/2025 1001          HEART Risk Score      Flowsheet Row Most Recent Value   Heart Score Risk Calculator    History 1 Filed at: 03/01/2025 1001   ECG 0 Filed at: 03/01/2025 1001   Age 1 Filed at: 03/01/2025 1001   Risk Factors 1 Filed at: 03/01/2025 1001   Troponin 0 Filed at: 03/01/2025 1001   HEART Score 3 Filed at: 03/01/2025 1001                                                  History of Present Illness       Chief Complaint   Patient presents with    Chest Pain     Pt states she has achy cp, intermittent  when she tries to take a deep breath since Tuesday. Pt has been dealing with stress lately and is on wegovy (since August).        Past Medical History:   Diagnosis Date    Abnormal liver scan     last assessed 8/14/2014    Allergic      Anxiety     Atypical chest pain     last assessed 2/3/2016    Benign colonic polyp     Bipolar II disorder (HCC)     Bladder disorder     last assessed 1/22/2013    Cervical radiculopathy     Chronic idiopathic thrombocytopenic purpura (HCC) 01/23/2013    Transitioned From: Thrombocytopenia; Annotation - 23Jan2013: Dr. Kaur - 2012.      Chronic ITP (idiopathic thrombocytopenia) (HCC)     Chronic lumbar radiculopathy     Chronic neck pain     Chronic pain     neck and back    Dermatitis     Ganglion cyst     GERD (gastroesophageal reflux disease)     Headache(784.0)     Herpes simplex     Hyperlipidemia     Luteinized follicular cyst     Menorrhagia     Obesity     Panic disorder without agoraphobia 01/30/2018    Urge and stress incontinence     Uterine fibroid       Past Surgical History:   Procedure Laterality Date    CHOLECYSTECTOMY      COLONOSCOPY      HYSTERECTOMY      MELO    ORIF TIBIA & FIBULA FRACTURES Left 4/27/2018    Procedure: OPEN REDUCTION W/ INTERNAL FIXATION (ORIF) ANKLE;  Surgeon: Omid Winters MD;  Location: [facility];  Service: Orthopedics    TOOTH EXTRACTION      last assessed 3/20/2017, oral surgery      Family History   Problem Relation Age of Onset    Bipolar disorder Mother     Lung cancer Mother     Stroke Father     Psychosis Father     Schizoaffective Disorder  Son     Alcohol abuse Neg Hx     Substance Abuse Neg Hx     Suicidality Neg Hx       Social History     Tobacco Use    Smoking status: Former     Current packs/day: 0.25     Types: Cigarettes     Passive exposure: Past    Smokeless tobacco: Never   Vaping Use    Vaping status: Never Used   Substance Use Topics    Alcohol use: No    Drug use: No      E-Cigarette/Vaping    E-Cigarette Use Never User       E-Cigarette/Vaping Substances    Nicotine No     THC No     CBD No     Flavoring No     Other No     Unknown No       I have reviewed and agree with the history as documented.     ARTHUR    Janine Carrington is a 59 y.o. female with  history of bipolar disorder, DREAD, PTSD, hyperlipidemia, hypertension, GERD senting for chest pain.    Patient reports ongoing intermittent chest pain she describes as a aching in the left side of her chest ongoing since her 's death about a month ago, but worse over the last 4 day.  Reports that pain is not specifically exertional, not specifically with deep breaths, comes on randomly. No associated symptoms. She partially attributes it to grief and stress but as it has been happening intermittently, she decided to get evaluated to make sure it wasn't something worse. Denies other symptoms. No SI/HI/AVH.    Review of Systems   Constitutional:  Negative for chills and fever.   HENT:  Negative for congestion, hearing loss and trouble swallowing.    Eyes:  Negative for pain and visual disturbance.   Respiratory:  Negative for cough and shortness of breath.    Cardiovascular:  Positive for chest pain (Intermittent. No present in th ED). Negative for palpitations and leg swelling.   Gastrointestinal:  Negative for abdominal pain, constipation, diarrhea, nausea and vomiting.   Genitourinary:  Negative for dysuria, frequency and hematuria.   Musculoskeletal:  Negative for arthralgias and back pain.   Skin:  Negative for color change and rash.   Neurological:  Negative for dizziness, seizures, syncope, weakness, light-headedness and headaches.   Psychiatric/Behavioral:  Positive for dysphoric mood. Negative for suicidal ideas.    All other systems reviewed and are negative.          Objective       ED Triage Vitals [03/01/25 0852]   Temperature Pulse Blood Pressure Respirations SpO2 Patient Position - Orthostatic VS   97.6 °F (36.4 °C) 82 134/62 16 98 % Sitting      Temp Source Heart Rate Source BP Location FiO2 (%) Pain Score    Oral Monitor Right arm -- 4      Vitals      Date and Time Temp Pulse SpO2 Resp BP Pain Score FACES Pain Rating User   03/01/25 1000 -- 70 96 % 18 132/64 -- -- RL   03/01/25 0930 -- 71 96 %  16 113/56 -- -- RL   03/01/25 0900 -- 94 98 % 18 133/71 -- -- RL   03/01/25 0852 97.6 °F (36.4 °C) 82 98 % 16 134/62 4 -- LD            Physical Exam  Vitals and nursing note reviewed.   Constitutional:       General: She is not in acute distress.     Appearance: She is well-developed.   HENT:      Head: Normocephalic and atraumatic.      Mouth/Throat:      Mouth: Mucous membranes are moist.      Pharynx: Oropharynx is clear.   Eyes:      Extraocular Movements: Extraocular movements intact.      Conjunctiva/sclera: Conjunctivae normal.      Pupils: Pupils are equal, round, and reactive to light.   Cardiovascular:      Rate and Rhythm: Normal rate and regular rhythm.      Pulses: Normal pulses.      Heart sounds: Normal heart sounds.   Pulmonary:      Effort: Pulmonary effort is normal. No respiratory distress.      Breath sounds: Normal breath sounds. No wheezing, rhonchi or rales.   Abdominal:      General: Abdomen is flat. There is no distension.      Palpations: Abdomen is soft.      Tenderness: There is no abdominal tenderness. There is no right CVA tenderness, left CVA tenderness, guarding or rebound.   Musculoskeletal:      Cervical back: Normal range of motion.      Right lower leg: No edema.      Left lower leg: No edema.   Skin:     General: Skin is warm and dry.      Capillary Refill: Capillary refill takes less than 2 seconds.   Neurological:      General: No focal deficit present.      Mental Status: She is alert and oriented to person, place, and time.      Sensory: No sensory deficit.      Motor: No weakness.   Psychiatric:         Attention and Perception: She does not perceive auditory or visual hallucinations.         Mood and Affect: Mood is depressed (Appropriate in setting of recent loss of ).         Speech: Speech normal.         Behavior: Behavior normal.         Thought Content: Thought content does not include homicidal or suicidal ideation. Thought content does not include homicidal  or suicidal plan.         Results Reviewed       Procedure Component Value Units Date/Time    Comprehensive metabolic panel [[id number]]  (Abnormal) Collected: 03/01/25 0904    Lab Status: Final result Specimen: Blood from Arm, Right Updated: 03/01/25 0958     Sodium 136 mmol/L      Potassium 4.2 mmol/L      Chloride 106 mmol/L      CO2 24 mmol/L      ANION GAP 6 mmol/L      BUN 15 mg/dL      Creatinine 0.73 mg/dL      Glucose 90 mg/dL      Calcium 9.8 mg/dL      AST 12 U/L      ALT 10 U/L      Alkaline Phosphatase 62 U/L      Total Protein 7.3 g/dL      Albumin 4.4 g/dL      Total Bilirubin 0.40 mg/dL      eGFR 89 ml/min/1.73sq m     Narrative:      National Kid[employer] Foundation guidelines for Chronic Kidney Disease (CKD):     Stage 1 with normal or high GFR (GFR > 90 mL/min/1.73 square meters)    Stage 2 Mild CKD (GFR = 60-89 mL/min/1.73 square meters)    Stage 3A Moderate CKD (GFR = 45-59 mL/min/1.73 square meters)    Stage 3B Moderate CKD (GFR = 30-44 mL/min/1.73 square meters)    Stage 4 Severe CKD (GFR = 15-29 mL/min/1.73 square meters)    Stage 5 End Stage CKD (GFR <15 mL/min/1.73 square meters)  Note: GFR calculation is accurate only with a steady state creatinine    HS Troponin 0hr (reflex protocol) [[id number]]  (Normal) Collected: 03/01/25 0904    Lab Status: Final result Specimen: Blood from Arm, Right Updated: 03/01/25 0936     hs TnI 0hr 3 ng/L     CBC and differential [[id number]] Collected: 03/01/25 0904    Lab Status: Final result Specimen: Blood from Arm, Right Updated: 03/01/25 0913     WBC 6.13 Thousand/uL      RBC 4.36 Million/uL      Hemoglobin 12.6 g/dL      Hematocrit 38.8 %      MCV 89 fL      MCH 28.9 pg      MCHC 32.5 g/dL      RDW 14.4 %      MPV 11.7 fL      Platelets 173 Thousands/uL      nRBC 0 /100 WBCs      Segmented % 70 %      Immature Grans % 0 %      Lymphocytes % 22 %      Monocytes % 7 %      Eosinophils Relative 1 %      Basophils Relative 0 %      Absolute Neutrophils 4.25  Thousands/µL      Absolute Immature Grans 0.01 Thousand/uL      Absolute Lymphocytes 1.32 Thousands/µL      Absolute Monocytes 0.45 Thousand/µL      Eosinophils Absolute 0.08 Thousand/µL      Basophils Absolute 0.02 Thousands/µL             XR chest 2 views   ED Interpretation by Mallika Mena MD (03/01 1000)   Per my interpretation: No acute cardiopulmonary disease      Final Interpretation by Rick Savage DO (03/01 2272)      No acute cardiopulmonary disease.            Resident: Augustine Garibay I, the attending radiologist, have reviewed the images and agree with the final report above.      Workstation performed: [id number]             ECG 12 Lead Documentation Only    Date/Time: 3/1/2025 9:37 AM    Performed by: Mallika Mena MD  Authorized by: Mallika Mena MD    Indications / Diagnosis:  Chest pain  Patient location:  ED  Previous ECG:     Previous ECG:  Compared to current    Comparison ECG info:  11/6/2023    Similarity:  No change  Interpretation:     Interpretation: normal    Rate:     ECG rate:  81    ECG rate assessment: normal    Rhythm:     Rhythm: sinus rhythm    Ectopy:     Ectopy: none    QRS:     QRS axis:  Normal    QRS intervals:  Normal  Conduction:     Conduction: normal    ST segments:     ST segments:  Normal  T waves:     T waves: non-specific    Comments:      QTc 434      ED Medication and Procedure Management   Prior to Admission Medications   Prescriptions Last Dose Informant Patient Reported? Taking?   DULoxetine (CYMBALTA) 60 mg delayed release capsule  Self No No   Sig: Take 1 capsule (60 mg total) by mouth 2 (two) times a day   LORazepam (ATIVAN) 0.5 mg tablet   No No   Sig: Take 1 tablet (0.5 mg total) by mouth 4 (four) times a day as needed for anxiety Do not start before January 5, 2025.   Mirabegron ER 50 MG TB24   No No   Sig: TAKE 1 TABLET BY MOUTH EVERY DAY   Polyethylene Glycol 400 (Blink Tears) 0.25 % SOLN  Self Yes No   Sig: Apply 1 drop to eye  2 (two) times a day   Semaglutide-Weight Management (Wegovy) 1.7 MG/0.75ML   No No   Sig: INJECT 1.7 MG UNDER THE SKIN WEEKLY   atorvastatin (LIPITOR) 20 mg tablet   No No   Sig: TAKE 1 TABLET DAILY   esomeprazole (NexIUM) 40 MG capsule   No No   Sig: TAKE 1 CAPSULE BY MOUTH EVERY DAY BEFORE BREAKFAST   ketoconazole (NIZORAL) 2 % shampoo  Self No No   Sig: Apply 1 Application topically 2 (two) times a week   lamoTRIgine (LaMICtal) 150 MG tablet   No No   Sig: Take 1 tablet (150 mg total) by mouth daily   lamoTRIgine (LaMICtal) 200 MG tablet   No No   Sig: Take 1 tablet (200 mg total) by mouth daily at bedtime      Facility-Administered Medications: None     Discharge Medication List as of 3/1/2025 10:02 AM        CONTINUE these medications which have NOT CHANGED    Details   atorvastatin (LIPITOR) 20 mg tablet TAKE 1 TABLET DAILY, Starting Tue 9/3/2024, Normal      DULoxetine (CYMBALTA) 60 mg delayed release capsule Take 1 capsule (60 mg total) by mouth 2 (two) times a day, Starting Tue 8/6/2024, Until Sat 5/3/2025, Normal      esomeprazole (NexIUM) 40 MG capsule TAKE 1 CAPSULE BY MOUTH EVERY DAY BEFORE BREAKFAST, Starting Thu 9/5/2024, Normal      ketoconazole (NIZORAL) 2 % shampoo Apply 1 Application topically 2 (two) times a week, Starting Mon 7/8/2024, Normal      !! lamoTRIgine (LaMICtal) 150 MG tablet Take 1 tablet (150 mg total) by mouth daily, Starting Thu 12/12/2024, Until Mon 9/8/2025, Normal      !! lamoTRIgine (LaMICtal) 200 MG tablet Take 1 tablet (200 mg total) by mouth daily at bedtime, Starting Thu 12/12/2024, Until Mon 9/8/2025, Normal      LORazepam (ATIVAN) 0.5 mg tablet Take 1 tablet (0.5 mg total) by mouth 4 (four) times a day as needed for anxiety Do not start before January 5, 2025., Starting Sun 1/5/2025, Until Wed 6/4/2025 at 2359, Normal      Mirabegron ER 50 MG TB24 TAKE 1 TABLET BY MOUTH EVERY DAY, Starting Tue 1/14/2025, Normal      Polyethylene Glycol 400 (Blink Tears) 0.25 % SOLN Apply  1 drop to eye 2 (two) times a day, Historical Med      Semaglutide-Weight Management (Wegovy) 1.7 MG/0.75ML INJECT 1.7 MG UNDER THE SKIN WEEKLY, Starting Mon 2/24/2025, Normal       !! - Potential duplicate medications found. Please discuss with provider.        No discharge procedures on file.  ED SEPSIS DOCUMENTATION   Time reflects when diagnosis was documented in both MDM as applicable and the Disposition within this note       Time User Action Codes Description Comment    3/1/2025 10:02 AM Mallika Mena Add [R07.9] Chest pain     3/1/2025 10:02 AM Mallika Mena [F43.21] Grief                  Mallika Mena MD  03/03/25 8617

## 2025-03-02 LAB
ATRIAL RATE: 81 BPM
P AXIS: 33 DEGREES
PR INTERVAL: 174 MS
QRS AXIS: 25 DEGREES
QRSD INTERVAL: 78 MS
QT INTERVAL: 374 MS
QTC INTERVAL: 434 MS
T WAVE AXIS: 53 DEGREES
VENTRICULAR RATE: 81 BPM

## 2025-03-02 PROCEDURE — 93010 ELECTROCARDIOGRAM REPORT: CPT | Performed by: INTERNAL MEDICINE

## 2025-03-06 DIAGNOSIS — N39.46 MIXED STRESS AND URGE URINARY INCONTINENCE: ICD-10-CM

## 2025-03-06 DIAGNOSIS — K21.9 GASTROESOPHAGEAL REFLUX DISEASE WITHOUT ESOPHAGITIS: ICD-10-CM

## 2025-03-06 DIAGNOSIS — E78.5 HYPERLIPIDEMIA, UNSPECIFIED HYPERLIPIDEMIA TYPE: ICD-10-CM

## 2025-03-06 RX ORDER — ATORVASTATIN CALCIUM 20 MG/1
20 TABLET, FILM COATED ORAL DAILY
Qty: 90 TABLET | Refills: 1 | Status: SHIPPED | OUTPATIENT
Start: 2025-03-06

## 2025-03-06 RX ORDER — ESOMEPRAZOLE MAGNESIUM 40 MG/1
40 CAPSULE, DELAYED RELEASE ORAL
Qty: 90 CAPSULE | Refills: 1 | Status: SHIPPED | OUTPATIENT
Start: 2025-03-06

## 2025-03-06 RX ORDER — MIRABEGRON 50 MG/1
1 TABLET, FILM COATED, EXTENDED RELEASE ORAL DAILY
Qty: 30 TABLET | Refills: 2 | OUTPATIENT
Start: 2025-03-06

## 2025-03-17 ENCOUNTER — TELEPHONE (OUTPATIENT)
Age: 60
End: 2025-03-17

## 2025-03-17 NOTE — TELEPHONE ENCOUNTER
Patient is calling regarding cancelling an appointment.    Date/Time: 3/31 @ 2    Reason: pt    Patient was rescheduled: YES [x] NO []  If yes, when was Patient reschedule for: 4/2 @ 3    Patient requesting call back to reschedule: YES [] NO [x]

## 2025-03-20 ENCOUNTER — NURSE TRIAGE (OUTPATIENT)
Age: 60
End: 2025-03-20

## 2025-03-20 DIAGNOSIS — R39.9 UTI SYMPTOMS: Primary | ICD-10-CM

## 2025-03-20 NOTE — TELEPHONE ENCOUNTER
PT wants to transfer care to a new office at a later date  pt transferred to triage for evaluation for uti symptoms

## 2025-03-20 NOTE — TELEPHONE ENCOUNTER
FOLLOW UP: Urine testing     REASON FOR CONVERSATION: Possible UTI    SYMPTOMS: Dysuria, urgency    OTHER: Advised to increase water intake and avoid bladder irritants. Reviewed ED precautions     DISPOSITION: Order Lab Work (overriding Next Available Appointment with Provider)    Reason for Disposition   The patient has an unknown case of mild dysuria    Answer Assessment - Initial Assessment Questions  1. When did your symptoms start?   A few days ago   2. Do you experience pain or burning with every void?   Yes   3. Do you have any other urinary symptoms such as urinary frequency, urgency, incontinence, blood in your urine?   Urgency   4. Do you have any abdominal pain or flank pain?  No  5. Do you have a fever of 101 or higher? How did you take your temperature?   No   7. Have you become unable to urinate?   No   9. Have you had a recent urologic procedure, surgery, or any recent urine testing?   No    Protocols used: Urology-Dysuria-ADULT-[place]

## 2025-03-21 ENCOUNTER — APPOINTMENT (OUTPATIENT)
Dept: LAB | Facility: MEDICAL CENTER | Age: 60
End: 2025-03-21
Payer: COMMERCIAL

## 2025-03-21 LAB
BACTERIA UR QL AUTO: ABNORMAL /HPF
BILIRUB UR QL STRIP: NEGATIVE
CLARITY UR: CLEAR
COLOR UR: YELLOW
GLUCOSE UR STRIP-MCNC: NEGATIVE MG/DL
HGB UR QL STRIP.AUTO: NEGATIVE
KETONES UR STRIP-MCNC: NEGATIVE MG/DL
LEUKOCYTE ESTERASE UR QL STRIP: NEGATIVE
MUCOUS THREADS UR QL AUTO: ABNORMAL
NITRITE UR QL STRIP: NEGATIVE
NON-SQ EPI CELLS URNS QL MICRO: ABNORMAL /HPF
PH UR STRIP.AUTO: 6.5 [PH]
PROT UR STRIP-MCNC: ABNORMAL MG/DL
RBC #/AREA URNS AUTO: ABNORMAL /HPF
SP GR UR STRIP.AUTO: 1.03 (ref 1–1.03)
UROBILINOGEN UR STRIP-ACNC: <2 MG/DL
WBC #/AREA URNS AUTO: ABNORMAL /HPF

## 2025-03-21 PROCEDURE — 81001 URINALYSIS AUTO W/SCOPE: CPT

## 2025-03-21 PROCEDURE — 87086 URINE CULTURE/COLONY COUNT: CPT

## 2025-03-23 LAB — BACTERIA UR CULT: NORMAL

## 2025-03-27 ENCOUNTER — TELEPHONE (OUTPATIENT)
Age: 60
End: 2025-03-27

## 2025-03-31 DIAGNOSIS — E66.01 MORBID OBESITY (HCC): ICD-10-CM

## 2025-04-01 RX ORDER — SEMAGLUTIDE 1.7 MG/.75ML
1.7 INJECTION, SOLUTION SUBCUTANEOUS WEEKLY
Qty: 3 ML | Refills: 0 | Status: SHIPPED | OUTPATIENT
Start: 2025-04-01

## 2025-04-29 DIAGNOSIS — E66.01 MORBID OBESITY (HCC): ICD-10-CM

## 2025-04-29 RX ORDER — SEMAGLUTIDE 1.7 MG/.75ML
1.7 INJECTION, SOLUTION SUBCUTANEOUS WEEKLY
Qty: 6 ML | Refills: 0 | Status: SHIPPED | OUTPATIENT
Start: 2025-04-29

## 2025-05-02 ENCOUNTER — TELEPHONE (OUTPATIENT)
Age: 60
End: 2025-05-02

## 2025-05-02 NOTE — TELEPHONE ENCOUNTER
Patient stating she could not get a live person when calling the insurance company.  She is calling asking if there is anything we can do?  She is trying to find out why they are charging her so much for wegovy.

## 2025-05-02 NOTE — TELEPHONE ENCOUNTER
Patient went to  the Wegovy and the pharmacy wanted to charge her $643 when in the past it only cost her $35. She has one pen left for Sunday. She will call her insurance to ask what had caused the price increase and will call us back with any pertinent information.

## 2025-05-05 NOTE — TELEPHONE ENCOUNTER
Pt called stating the following med:     Semaglutide-Weight Management (Wegovy) 1.7 MG/0.75ML     Please advise

## 2025-05-05 NOTE — TELEPHONE ENCOUNTER
I spoke with Janine.  She was unable to get in touch with someone from her insurance.  I suggested a co-pay card but she said she had no luck with that is the past.  She said the Wegovy was authorized for $35 until the end of this year.  She will call her insurance to see what happened and let us know.

## 2025-05-05 NOTE — TELEPHONE ENCOUNTER
Please refer to letter in MEDIA 12/27/2024.  Patient is approved through 12/27/25.  She will need to inquire if the additional cost may be due to her deductible for the year. Prior Authorization already approved.

## 2025-05-06 NOTE — TELEPHONE ENCOUNTER
Called patient and let her know she should call the insurance company and [facility] to gather more information.

## 2025-05-08 NOTE — TELEPHONE ENCOUNTER
Recommend tracking calories to remain in a calorie deficit. And exercising at least 3-4 days a week for 20-30 minutes.

## 2025-05-12 ENCOUNTER — APPOINTMENT (OUTPATIENT)
Dept: LAB | Facility: MEDICAL CENTER | Age: 60
End: 2025-05-12
Attending: NURSE PRACTITIONER
Payer: COMMERCIAL

## 2025-05-12 DIAGNOSIS — E66.01 MORBID OBESITY (HCC): ICD-10-CM

## 2025-05-12 DIAGNOSIS — D69.6 THROMBOCYTOPENIA (HCC): ICD-10-CM

## 2025-05-12 DIAGNOSIS — E78.5 HYPERLIPIDEMIA, UNSPECIFIED HYPERLIPIDEMIA TYPE: ICD-10-CM

## 2025-05-12 LAB
ALBUMIN SERPL BCG-MCNC: 4.3 G/DL (ref 3.5–5)
ALP SERPL-CCNC: 60 U/L (ref 34–104)
ALT SERPL W P-5'-P-CCNC: 14 U/L (ref 7–52)
ANION GAP SERPL CALCULATED.3IONS-SCNC: 6 MMOL/L (ref 4–13)
AST SERPL W P-5'-P-CCNC: 14 U/L (ref 13–39)
BASOPHILS # BLD AUTO: 0.03 THOUSANDS/ÂΜL (ref 0–0.1)
BASOPHILS NFR BLD AUTO: 1 % (ref 0–1)
BILIRUB SERPL-MCNC: 0.42 MG/DL (ref 0.2–1)
BUN SERPL-MCNC: 17 MG/DL (ref 5–25)
CALCIUM SERPL-MCNC: 9.9 MG/DL (ref 8.4–10.2)
CHLORIDE SERPL-SCNC: 106 MMOL/L (ref 96–108)
CHOLEST SERPL-MCNC: 162 MG/DL (ref ?–200)
CO2 SERPL-SCNC: 28 MMOL/L (ref 21–32)
CREAT SERPL-MCNC: 0.71 MG/DL (ref 0.6–1.3)
EOSINOPHIL # BLD AUTO: 0.15 THOUSAND/ÂΜL (ref 0–0.61)
EOSINOPHIL NFR BLD AUTO: 3 % (ref 0–6)
ERYTHROCYTE [DISTWIDTH] IN BLOOD BY AUTOMATED COUNT: 13.9 % (ref 11.6–15.1)
EST. AVERAGE GLUCOSE BLD GHB EST-MCNC: 100 MG/DL
GFR SERPL CREATININE-BSD FRML MDRD: 92 ML/MIN/1.73SQ M
GLUCOSE P FAST SERPL-MCNC: 83 MG/DL (ref 65–99)
HBA1C MFR BLD: 5.1 %
HCT VFR BLD AUTO: 40.7 % (ref 34.8–46.1)
HDLC SERPL-MCNC: 58 MG/DL
HGB BLD-MCNC: 12.4 G/DL (ref 11.5–15.4)
IMM GRANULOCYTES # BLD AUTO: 0.02 THOUSAND/UL (ref 0–0.2)
IMM GRANULOCYTES NFR BLD AUTO: 0 % (ref 0–2)
LDLC SERPL CALC-MCNC: 87 MG/DL (ref 0–100)
LYMPHOCYTES # BLD AUTO: 1.61 THOUSANDS/ÂΜL (ref 0.6–4.47)
LYMPHOCYTES NFR BLD AUTO: 27 % (ref 14–44)
MCH RBC QN AUTO: 29 PG (ref 26.8–34.3)
MCHC RBC AUTO-ENTMCNC: 30.5 G/DL (ref 31.4–37.4)
MCV RBC AUTO: 95 FL (ref 82–98)
MONOCYTES # BLD AUTO: 0.46 THOUSAND/ÂΜL (ref 0.17–1.22)
MONOCYTES NFR BLD AUTO: 8 % (ref 4–12)
NEUTROPHILS # BLD AUTO: 3.66 THOUSANDS/ÂΜL (ref 1.85–7.62)
NEUTS SEG NFR BLD AUTO: 61 % (ref 43–75)
NRBC BLD AUTO-RTO: 0 /100 WBCS
PLATELET # BLD AUTO: 151 THOUSANDS/UL (ref 149–390)
PMV BLD AUTO: 12.5 FL (ref 8.9–12.7)
POTASSIUM SERPL-SCNC: 3.9 MMOL/L (ref 3.5–5.3)
PROT SERPL-MCNC: 7.2 G/DL (ref 6.4–8.4)
RBC # BLD AUTO: 4.28 MILLION/UL (ref 3.81–5.12)
SODIUM SERPL-SCNC: 140 MMOL/L (ref 135–147)
TRIGL SERPL-MCNC: 87 MG/DL (ref ?–150)
WBC # BLD AUTO: 5.93 THOUSAND/UL (ref 4.31–10.16)

## 2025-05-12 PROCEDURE — 36415 COLL VENOUS BLD VENIPUNCTURE: CPT

## 2025-05-12 PROCEDURE — 80053 COMPREHEN METABOLIC PANEL: CPT

## 2025-05-12 PROCEDURE — 83036 HEMOGLOBIN GLYCOSYLATED A1C: CPT

## 2025-05-12 PROCEDURE — 80061 LIPID PANEL: CPT

## 2025-05-12 PROCEDURE — 85025 COMPLETE CBC W/AUTO DIFF WBC: CPT
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
## 2025-05-21 ENCOUNTER — OFFICE VISIT (OUTPATIENT)
Dept: PSYCHIATRY | Facility: CLINIC | Age: 60
End: 2025-05-21
Payer: COMMERCIAL

## 2025-05-21 VITALS
WEIGHT: 188 LBS | HEIGHT: 68 IN | HEART RATE: 86 BPM | BODY MASS INDEX: 28.49 KG/M2 | SYSTOLIC BLOOD PRESSURE: 98 MMHG | DIASTOLIC BLOOD PRESSURE: 64 MMHG

## 2025-05-21 DIAGNOSIS — F41.1 GAD (GENERALIZED ANXIETY DISORDER): Chronic | ICD-10-CM

## 2025-05-21 DIAGNOSIS — F41.0 PANIC DISORDER WITHOUT AGORAPHOBIA: Chronic | ICD-10-CM

## 2025-05-21 DIAGNOSIS — F43.12 POST-TRAUMATIC STRESS DISORDER, CHRONIC: Chronic | ICD-10-CM

## 2025-05-21 DIAGNOSIS — F31.81 BIPOLAR II DISORDER, MOST RECENT EPISODE MAJOR DEPRESSIVE (HCC): Primary | Chronic | ICD-10-CM

## 2025-05-21 PROCEDURE — 90833 PSYTX W PT W E/M 30 MIN: CPT | Performed by: PSYCHIATRY & NEUROLOGY

## 2025-05-21 PROCEDURE — 99214 OFFICE O/P EST MOD 30 MIN: CPT | Performed by: PSYCHIATRY & NEUROLOGY

## 2025-05-21 RX ORDER — LORAZEPAM 0.5 MG/1
0.5 TABLET ORAL 4 TIMES DAILY PRN
Qty: 120 TABLET | Refills: 4 | Status: SHIPPED | OUTPATIENT
Start: 2025-06-09 | End: 2025-11-06

## 2025-05-21 RX ORDER — LAMOTRIGINE 200 MG/1
200 TABLET ORAL
Qty: 90 TABLET | Refills: 2 | Status: SHIPPED | OUTPATIENT
Start: 2025-05-21 | End: 2026-02-15

## 2025-05-21 RX ORDER — LAMOTRIGINE 150 MG/1
150 TABLET ORAL DAILY
Qty: 90 TABLET | Refills: 2 | Status: SHIPPED | OUTPATIENT
Start: 2025-05-21 | End: 2026-02-15

## 2025-05-21 RX ORDER — DULOXETIN HYDROCHLORIDE 60 MG/1
60 CAPSULE, DELAYED RELEASE ORAL 2 TIMES DAILY
Qty: 180 CAPSULE | Refills: 2 | Status: SHIPPED | OUTPATIENT
Start: 2025-05-21 | End: 2026-02-15

## 2025-05-21 NOTE — ASSESSMENT & PLAN NOTE
Still has anxiety    Continue Cymbalta 60 mg twice a day to control depressive symptoms and anxiety symptoms  Continue Ativan 0.5 mg four times a day as needed to control anxiety symptoms  Orders:    DULoxetine (CYMBALTA) 60 mg delayed release capsule; Take 1 capsule (60 mg total) by mouth 2 (two) times a day    LORazepam (ATIVAN) 0.5 mg tablet; Take 1 tablet (0.5 mg total) by mouth 4 (four) times a day as needed for anxiety Do not start before June 9, 2025.

## 2025-05-21 NOTE — ASSESSMENT & PLAN NOTE
Ongoing depressive symptoms    Continue Lamictal 150 mg daily and 200 mg at bedtime to help with mood stabilization  Continue Cymbalta 60 mg twice a day to control depressive symptoms and anxiety symptoms  Orders:    DULoxetine (CYMBALTA) 60 mg delayed release capsule; Take 1 capsule (60 mg total) by mouth 2 (two) times a day    lamoTRIgine (LaMICtal) 150 MG tablet; Take 1 tablet (150 mg total) by mouth daily    lamoTRIgine (LaMICtal) 200 MG tablet; Take 1 tablet (200 mg total) by mouth daily at bedtime

## 2025-05-21 NOTE — ASSESSMENT & PLAN NOTE
Stable    Continue Cymbalta 60 mg twice a day to control depressive symptoms and anxiety symptoms  Continue Ativan 0.5 mg four times a day as needed to control anxiety symptoms  Orders:    DULoxetine (CYMBALTA) 60 mg delayed release capsule; Take 1 capsule (60 mg total) by mouth 2 (two) times a day    LORazepam (ATIVAN) 0.5 mg tablet; Take 1 tablet (0.5 mg total) by mouth 4 (four) times a day as needed for anxiety Do not start before June 9, 2025.

## 2025-05-21 NOTE — ASSESSMENT & PLAN NOTE
Stable    Continue Cymbalta 60 mg twice a day to control depressive symptoms and anxiety symptoms  Orders:    DULoxetine (CYMBALTA) 60 mg delayed release capsule; Take 1 capsule (60 mg total) by mouth 2 (two) times a day

## 2025-05-21 NOTE — BH TREATMENT PLAN
"TREATMENT PLAN (Medication Management Only)        [facility]    Name/Date of Birth/MRN:  Janine Carrington 60 y.o. 1965 MRN: [id number]  Date of Treatment Plan: May 21, 2025  Diagnosis/Diagnoses:   1. Bipolar II disorder, most recent episode major depressive (HCC)    2. DREAD (generalized anxiety disorder)    3. Panic disorder without agoraphobia    4. Post-traumatic stress disorder, chronic      Strengths/Personal Resources for Self-Care: \"my grand kids and my kids\".  Area/Areas of need (in own words): \"the loneliness\".  1. Long Term Goal:   improve control of anxiety, improve control of depression, improve mood stability  Target Date: 3 months - 8/21/2025  Person/Persons responsible for completion of goal: Janine  2.  Short Term Objective (s) - How will we reach this goal?:   A.  Provider new recommended medication/dosage changes and/or continue medication(s): continue current medications as prescribed (Cymbalta, Lamictal, and Ativan).  B.  N/A.  C.  N/A.  Target Date: 3 months - 8/21/2025  Person/Persons Responsible for Completion of Goal: Janine   Progress Towards Goals: limited progress  Treatment Modality: medication management every 3 months, referral for individual psychotherapy  Review due 180 days from date of this plan: 6 months - 11/21/2025  Expected length of service: ongoing treatment unless revised  My Physician/PA/NP and I have developed this plan together and I agree to work on the goals and objectives. I understand the treatment goals that were developed for my treatment.  Electronic Signatures: on file (unless signed below)    Francesca Daniels MD 05/21/25  "

## 2025-05-27 ENCOUNTER — OFFICE VISIT (OUTPATIENT)
Dept: INTERNAL MEDICINE CLINIC | Facility: CLINIC | Age: 60
End: 2025-05-27
Payer: COMMERCIAL

## 2025-05-27 VITALS
HEIGHT: 68 IN | BODY MASS INDEX: 28.49 KG/M2 | SYSTOLIC BLOOD PRESSURE: 104 MMHG | OXYGEN SATURATION: 97 % | DIASTOLIC BLOOD PRESSURE: 68 MMHG | RESPIRATION RATE: 147 BRPM | HEART RATE: 80 BPM | TEMPERATURE: 97.8 F | WEIGHT: 188 LBS

## 2025-05-27 DIAGNOSIS — E78.5 HYPERLIPIDEMIA, UNSPECIFIED HYPERLIPIDEMIA TYPE: ICD-10-CM

## 2025-05-27 DIAGNOSIS — K21.9 GASTROESOPHAGEAL REFLUX DISEASE WITHOUT ESOPHAGITIS: Primary | ICD-10-CM

## 2025-05-27 DIAGNOSIS — F41.1 GAD (GENERALIZED ANXIETY DISORDER): Chronic | ICD-10-CM

## 2025-05-27 DIAGNOSIS — E66.01 MORBID OBESITY (HCC): ICD-10-CM

## 2025-05-27 DIAGNOSIS — F31.81 BIPOLAR II DISORDER, MOST RECENT EPISODE MAJOR DEPRESSIVE (HCC): Chronic | ICD-10-CM

## 2025-05-27 PROCEDURE — 99214 OFFICE O/P EST MOD 30 MIN: CPT | Performed by: NURSE PRACTITIONER

## 2025-05-27 NOTE — ASSESSMENT & PLAN NOTE
She is down about 80 lbs.   Unfortunately, wegovy is no longer covered so she had to stop the medication.  She plans to continue a high protein diet and walking daily.

## 2025-05-27 NOTE — PROGRESS NOTES
"Name: Janine Carrington      : 1965      MRN: [id number]  Encounter Provider: CALEB Fall  Encounter Date: 2025   Encounter department: [facility] INTERNAL MEDICINE  :  Assessment & Plan  Gastroesophageal reflux disease without esophagitis  Stable.  On a PPI twice daily.        DREAD (generalized anxiety disorder)  Managed by psychiatry.   Starting counseling next month.        Bipolar II disorder, most recent episode major depressive (HCC)  As above.        Morbid obesity (HCC)  She is down about 80 lbs.   Unfortunately, wegovy is no longer covered so she had to stop the medication.  She plans to continue a high protein diet and walking daily.        Hyperlipidemia, unspecified hyperlipidemia type  On a statin.  lipids at goal.   Orders:    Comprehensive metabolic panel; Future    Lipid Panel with Direct LDL reflex; Future           History of Present Illness   Janine is here today for follow up.  She is doing ok.  She is frustrated with her weight. She was on wegovy, now can't afford it anymore.   She is afraid of gaining the weight back.   She has been walking more, eating healthier.     Right knee pain, off and on for a week.     Review of Systems   Constitutional:  Negative for activity change, appetite change and fatigue.   Respiratory:  Negative for cough and shortness of breath.    Cardiovascular:  Negative for chest pain, palpitations and leg swelling.   Gastrointestinal:  Negative for abdominal pain.   Genitourinary:  Negative for difficulty urinating.   Musculoskeletal:  Positive for arthralgias.   Neurological:  Negative for dizziness, light-headedness and headaches.   Psychiatric/Behavioral:  Negative for sleep disturbance.        Objective   /68 (BP Location: Left arm, Patient Position: Sitting, Cuff Size: Large)   Pulse 80   Temp 97.8 °F (36.6 °C)   Resp (!) 147   Ht 5' 8\" (1.727 m)   Wt 85.3 kg (188 lb)   SpO2 97%   BMI 28.59 kg/m²      Physical Exam  Vitals " reviewed.   Constitutional:       Appearance: Normal appearance.   HENT:      Head: Normocephalic and atraumatic.     Eyes:      Conjunctiva/sclera: Conjunctivae normal.       Cardiovascular:      Rate and Rhythm: Normal rate and regular rhythm.      Heart sounds: Normal heart sounds.   Pulmonary:      Effort: Pulmonary effort is normal.      Breath sounds: Normal breath sounds.     Musculoskeletal:      Right knee: Tenderness present over the patellar tendon.      Right lower leg: No edema.      Left lower leg: No edema.     Neurological:      Mental Status: She is alert and oriented to person, place, and time.     Psychiatric:         Mood and Affect: Mood normal.         Behavior: Behavior normal.

## 2025-05-27 NOTE — ASSESSMENT & PLAN NOTE
On a statin.  lipids at goal.   Orders:    Comprehensive metabolic panel; Future    Lipid Panel with Direct LDL reflex; Future

## 2025-06-16 DIAGNOSIS — K21.9 GASTROESOPHAGEAL REFLUX DISEASE WITHOUT ESOPHAGITIS: ICD-10-CM

## 2025-06-17 RX ORDER — ESOMEPRAZOLE MAGNESIUM 40 MG/1
CAPSULE, DELAYED RELEASE ORAL
Qty: 90 CAPSULE | Refills: 1 | Status: SHIPPED | OUTPATIENT
Start: 2025-06-17

## 2025-07-01 ENCOUNTER — TELEPHONE (OUTPATIENT)
Age: 60
End: 2025-07-01

## 2025-07-01 NOTE — TELEPHONE ENCOUNTER
Patients GI provider:  Dr. Martinez    Number to return call: [phone]    Reason for call: Pt calling stated she is taking the med   esomeprazole (NexIUM) 40 MG capsule   Twice daily as per dr advise and she is running out of it now and [facility] said can not fill it too early advised pt according to the Dr advised she suppose to take 1 capsule daily before breakfast not two. Pt is confused said dr told her twice daily. So she has requested a call back to confirm how much she has to take it. Please lucas pt for the further advise to take 1 daily or twice daily.     Scheduled procedure/appointment date if applicable: none

## 2025-07-03 ENCOUNTER — TELEPHONE (OUTPATIENT)
Dept: GASTROENTEROLOGY | Facility: CLINIC | Age: 60
End: 2025-07-03

## 2025-07-03 DIAGNOSIS — K21.9 GASTROESOPHAGEAL REFLUX DISEASE WITHOUT ESOPHAGITIS: Primary | ICD-10-CM

## 2025-07-03 RX ORDER — ESOMEPRAZOLE MAGNESIUM 40 MG/1
40 CAPSULE, DELAYED RELEASE ORAL
Qty: 62 CAPSULE | Refills: 6 | Status: SHIPPED | OUTPATIENT
Start: 2025-07-03

## 2025-07-03 NOTE — TELEPHONE ENCOUNTER
Please  schedule an appointment for follow-up in the office for medication refill within the next 1 month either with Dr. Martinez or with Yuridia.   Thank you!

## (undated) DEVICE — CUFF TOURNIQUET 34 X 4 IN QUICK CONNECT DISP 1BLA

## (undated) DEVICE — ACE WRAP 6 IN UNSTERILE

## (undated) DEVICE — OCCLUSIVE GAUZE STRIP,3% BISMUTH TRIBROMOPHENATE IN PETROLATUM BLEND: Brand: XEROFORM

## (undated) DEVICE — 1.25MM NON-THREADED GUIDE WIRE 150MM

## (undated) DEVICE — KERLIX BANDAGE ROLL: Brand: KERLIX

## (undated) DEVICE — GLOVE INDICATOR PI UNDERGLOVE SZ 8.5 BLUE

## (undated) DEVICE — REM POLYHESIVE ADULT PATIENT RETURN ELECTRODE: Brand: VALLEYLAB

## (undated) DEVICE — 2.7MM THREE-FLUTED DRILL BIT QC/125MM

## (undated) DEVICE — 2.7MM CORTEX SCREW SELF-TAPPING 24MM: Type: IMPLANTABLE DEVICE | Site: ANKLE | Status: NON-FUNCTIONAL

## (undated) DEVICE — GAUZE SPONGES,16 PLY: Brand: CURITY

## (undated) DEVICE — PADDING CAST 4 IN  COTTON STRL

## (undated) DEVICE — 2.0MM DRILL BIT/QC/125MM

## (undated) DEVICE — BULB SYRINGE,IRRIGATION WITH PROTECTIVE CAP: Brand: DOVER

## (undated) DEVICE — INTENDED FOR TISSUE SEPARATION, AND OTHER PROCEDURES THAT REQUIRE A SHARP SURGICAL BLADE TO PUNCTURE OR CUT.: Brand: BARD-PARKER SAFETY BLADES SIZE 10, STERILE

## (undated) DEVICE — UNIVERSAL MAJOR EXTREMITY,KIT: Brand: CARDINAL HEALTH

## (undated) DEVICE — GLOVE SRG BIOGEL 8.5

## (undated) DEVICE — CHLORAPREP HI-LITE 26ML ORANGE

## (undated) DEVICE — DRAPE EQUIPMENT RF WAND

## (undated) DEVICE — BANDAGE, ESMARK LF STR 6"X9' (20/CS): Brand: CYPRESS

## (undated) DEVICE — 4.0MM CANNULATED SCREW SHORT THREAD/44MM: Type: IMPLANTABLE DEVICE | Site: ANKLE | Status: NON-FUNCTIONAL

## (undated) DEVICE — DRAPE C-ARMOUR

## (undated) DEVICE — STOCKINETTE REGULAR

## (undated) DEVICE — U-DRAPE: Brand: CONVERTORS

## (undated) DEVICE — SPLINT ORTHO-GLASS 3IN X 15FT

## (undated) DEVICE — SUT VICRYL 2-0 CTB-1 36 IN JB945

## (undated) DEVICE — PLUMEPEN PRO 10FT

## (undated) DEVICE — DRAPE C-ARM X-RAY

## (undated) DEVICE — SPONGE PVP SCRUB WING STERILE

## (undated) DEVICE — 2.7MM CORTEX SCREW SELF-TAPPING 18MM: Type: IMPLANTABLE DEVICE | Site: ANKLE | Status: NON-FUNCTIONAL

## (undated) DEVICE — SUT VICRYL 2-0 CT-1 27 IN J259H

## (undated) DEVICE — 2.5MM DRILL BIT/QC/GOLD/110MM

## (undated) DEVICE — SUT ETHILON 3-0 FS-1 18 IN 663G